# Patient Record
Sex: MALE | Race: WHITE | Employment: FULL TIME | URBAN - METROPOLITAN AREA
[De-identification: names, ages, dates, MRNs, and addresses within clinical notes are randomized per-mention and may not be internally consistent; named-entity substitution may affect disease eponyms.]

---

## 2017-01-03 NOTE — PROGRESS NOTES
"  SUBJECTIVE:                                                    Richard Ball is a 56 year old male who presents to clinic today for the following health issues:      Cough SYMPTOMS      Duration: sx for about 5 weeks    Description  cough and chest congestion    Severity: severe    Accompanying signs and symptoms: None    History (predisposing factors):  asthma    Precipitating or alleviating factors: None    Therapies tried and outcome:  Abx, tessalon       cough started about 5 weeks ago with isolated dry, hacking cough.  Did not improve and was seen, cxr was read at the time as LL infiltrate, treated with zpack with no change in symptoms.  Cough meds not helping.  Cough is non productive.  Some headache and PND noted, but that is new.  No fever or chills, but at night feels like he is running low grade fever.  Is achy.  No sweats, no chills.  No hemoptysis. Diagnosed with asthma after heart surgery.  Uses inhalers in allergy seasons.  Tried inhaler initially but didn't help the cough.   No chest pain, no shortness of breath, no orthopnea or pnd, no swelling noted.        Problem list and histories reviewed & adjusted, as indicated.  Additional history: as documented    Problem list, Medication list, Allergies, and Medical/Social/Surgical histories reviewed in [facility] and updated as appropriate.    ROS:  Constitutional, HEENT, cardiovascular, pulmonary, gi and gu systems are negative, except as otherwise noted.    OBJECTIVE:                                                    /60 mmHg  Pulse 70  Temp(Src) 97.6  F (36.4  C) (Tympanic)  Resp 20  Ht 5' 11\" (1.803 m)  Wt 171 lb (77.565 kg)  BMI 23.86 kg/m2  SpO2 97%  Body mass index is 23.86 kg/(m^2).  GENERAL: healthy, alert and no distress  EYES: Eyes grossly normal to inspection, PERRL and conjunctivae and sclerae normal  HENT: ear canals and TM's normal, nose and mouth without ulcers or lesions  NECK: no adenopathy, no asymmetry, masses, or scars and " thyroid normal to palpation  RESP: lungs clear to auscultation - no rales, rhonchi or wheezes  CV: regular rate and rhythm, normal S1 S2, no S3 or S4, no murmur, click or rub, no peripheral edema and peripheral pulses strong  MS: no gross musculoskeletal defects noted, no edema    Diagnostic Test Results:  none      ASSESSMENT/PLAN:                                                    (R05) Cough  (primary encounter diagnosis)  -cxr initially read as normal, no fever or other symptoms to indicate need for repeat  -will check pertussis, was treated with zithromax  -suspect PND triggering asthma, will treat for sinusitis and RAD  -if not improving consider allergy/asthma treatment and possible CT chest   Plan: Bordetella pert parapert DNA pcr           (J01.10) Acute non-recurrent frontal sinusitis  -will treat for sinusitis with RAD component  -reviewed potential side effects of medications  -reviewed effect of abx on inr  -call if worsening or not improving  Plan: cefdinir (OMNICEF) 300 MG capsule, predniSONE         (DELTASONE) 20 MG tablet          FUTURE APPOINTMENTS:       - Follow-up for annual visit or as needed    Teresa Esquivel MD  [facility]

## 2017-01-04 ENCOUNTER — OFFICE VISIT (OUTPATIENT)
Dept: PEDIATRICS | Facility: CLINIC | Age: 57
End: 2017-01-04
Payer: COMMERCIAL

## 2017-01-04 VITALS
OXYGEN SATURATION: 97 % | SYSTOLIC BLOOD PRESSURE: 122 MMHG | DIASTOLIC BLOOD PRESSURE: 60 MMHG | HEART RATE: 70 BPM | HEIGHT: 71 IN | RESPIRATION RATE: 20 BRPM | TEMPERATURE: 97.6 F | WEIGHT: 171 LBS | BODY MASS INDEX: 23.94 KG/M2

## 2017-01-04 DIAGNOSIS — R05.9 COUGH: Primary | ICD-10-CM

## 2017-01-04 DIAGNOSIS — J01.10 ACUTE NON-RECURRENT FRONTAL SINUSITIS: ICD-10-CM

## 2017-01-04 PROCEDURE — 99213 OFFICE O/P EST LOW 20 MIN: CPT | Performed by: INTERNAL MEDICINE

## 2017-01-04 PROCEDURE — 99000 SPECIMEN HANDLING OFFICE-LAB: CPT | Performed by: INTERNAL MEDICINE

## 2017-01-04 PROCEDURE — 87801 DETECT AGNT MULT DNA AMPLI: CPT | Mod: 90 | Performed by: INTERNAL MEDICINE

## 2017-01-04 RX ORDER — CEFDINIR 300 MG/1
300 CAPSULE ORAL 2 TIMES DAILY
Qty: 20 CAPSULE | Refills: 0 | Status: SHIPPED | OUTPATIENT
Start: 2017-01-04 | End: 2017-03-24

## 2017-01-04 RX ORDER — PREDNISONE 20 MG/1
40 TABLET ORAL DAILY
Qty: 10 TABLET | Refills: 0 | Status: SHIPPED | OUTPATIENT
Start: 2017-01-04 | End: 2017-01-09

## 2017-01-04 NOTE — NURSING NOTE
"Chief Complaint   Patient presents with     Cough     f/u on cold sx, pt still having cough sx for 5 weeks, chest congestions       Initial /60 mmHg  Pulse 70  Temp(Src) 97.6  F (36.4  C) (Tympanic)  Resp 20  Ht 5' 11\" (1.803 m)  Wt 171 lb (77.565 kg)  BMI 23.86 kg/m2  SpO2 97% Estimated body mass index is 23.86 kg/(m^2) as calculated from the following:    Height as of this encounter: 5' 11\" (1.803 m).    Weight as of this encounter: 171 lb (77.565 kg).  BP completed using cuff size: large  Irma Chang CMA      "

## 2017-01-04 NOTE — Clinical Note
[facility]  [address] [address]                  [phone]   January 10, 2017    Richard Quinn  [address] [address] [address]      Dear Richard,    Here is a summary of your recent test results:    Your whooping cough test was negative.    Your test results are enclosed.      Please contact me if you have any questions.           Thank you very much for choosing [facility]    Best regards,    Teresa Esquivel MD        Results for orders placed or performed in visit on 01/04/17   Bordetella pert parapert DNA pcr   Result Value Ref Range    Specimen Description Nasopharyngeal     Bordetella Per/Paraper PCR  NEG     Negative  Negative for B. pertussis and B. parapertussis by PCR     This test was developed and its performance characteristics determined by the   [facility] at [place], [place]. The PCR test has proven to be more sensitive than culture and   highly specific.  A false positive for B. pertussis may occur in samples   containing B. holmesii DNA.   A false positive for B. parapertussis may occur in samples containing B.   bronchiseptica DNA. Both B. holmesii and B. bronchiseptica rarely cause disease   in humans.  A negative result does not rule out the presence of B. pertussis or   B. parapertussis DNA in concentrations below detection level of the assay.   Nasopharyngeal swabs are the preferred specimen types.   Analyte Specific Reagents (ASRs) are used in many laboratory tests necessary   for standard medical care and generally do not require U.S. Food and Drug   Administration approval. The FDA has determined that such c learance or approval   is not necessary. This test is used for clinical purposes. It should not be   regarded as investigational or for research.   This laboratory is certified under the Clinical Laboratory Improvement   Amendments of 1988  (CLIA-88) as qualified to perform high complexity clinical   laboratory testing.

## 2017-01-05 LAB
B PERT+PARAPERT DNA PNL SPEC NAA+PROBE: NORMAL
SPECIMEN SOURCE: NORMAL

## 2017-03-24 ENCOUNTER — OFFICE VISIT (OUTPATIENT)
Dept: PEDIATRICS | Facility: CLINIC | Age: 57
End: 2017-03-24
Payer: COMMERCIAL

## 2017-03-24 VITALS
OXYGEN SATURATION: 97 % | DIASTOLIC BLOOD PRESSURE: 60 MMHG | TEMPERATURE: 97.7 F | HEART RATE: 61 BPM | BODY MASS INDEX: 23.66 KG/M2 | HEIGHT: 71 IN | SYSTOLIC BLOOD PRESSURE: 128 MMHG | WEIGHT: 169 LBS

## 2017-03-24 DIAGNOSIS — M41.9 SCOLIOSIS OF THORACOLUMBAR SPINE, UNSPECIFIED SCOLIOSIS TYPE: Primary | ICD-10-CM

## 2017-03-24 DIAGNOSIS — Q87.40 MARFAN'S SYNDROME: ICD-10-CM

## 2017-03-24 DIAGNOSIS — M25.512 ACUTE PAIN OF LEFT SHOULDER: ICD-10-CM

## 2017-03-24 DIAGNOSIS — L30.9 DERMATITIS: ICD-10-CM

## 2017-03-24 PROCEDURE — 99214 OFFICE O/P EST MOD 30 MIN: CPT | Performed by: INTERNAL MEDICINE

## 2017-03-24 RX ORDER — TRIAMCINOLONE ACETONIDE 1 MG/G
CREAM TOPICAL
Qty: 80 G | Refills: 0 | Status: SHIPPED | OUTPATIENT
Start: 2017-03-24 | End: 2019-04-30

## 2017-03-24 NOTE — MR AVS SNAPSHOT
After Visit Summary   3/24/2017    Richard Ball    MRN: [id number]           Patient Information     Date Of Birth          1960        Visit Information        Provider Department      3/24/2017 1:50 PM Royer Roblero MD [facility]        Today's Diagnoses     Scoliosis of thoracolumbar spine, unspecified scoliosis type    -  1    Acute pain of left shoulder        Dermatitis          Care Instructions    1) Physical therapy evaluation as we discussed for both back and for the shoulder    2) Dermatology evaluation here in clinic- call to make appointment    3) Triamcinolone up to three times per day on hand area, use Aquaphor or Eucerin at night with gloves to keep hydrated    Great to meet you let me know if questions or concerns come up!    Royer Roblero MD                                       Rotator Cuff Injury   What is a rotator cuff injury?   A rotator cuff injury is a strain or tear in the group of tendons and muscles that hold your shoulder joint together and help move your shoulder.   How does it occur?   A rotator cuff injury may result from:     using your arm to break a fall     falling onto your arm     lifting a heavy object     use of your shoulder in sports with a repetitive overhead movement, such as swimming, baseball (mainly pitchers), football, and tennis, which gradually strains the tendon     manual labor such as painting, plastering, raking leaves, or housework   What are the symptoms?   The symptoms of a torn rotator cuff are:     arm and shoulder pain     shoulder weakness     shoulder tenderness     loss of shoulder movement, especially overhead   How is it diagnosed?   Your healthcare provider will examine you and check your shoulder for pain, tenderness, and loss of motion as you move your arm in all directions. Your provider will ask if your shoulder pain began suddenly or gradually. You may have an X-ray to make sure there are not any fractures or  bone spurs.   Based on these results, you may have other tests or procedures right away or later, such as:     magnetic resonance imaging (MRI), which creates images of your shoulder and surrounding structures with sound waves     an arthrogram, which is an X-ray or MRI that is taken after a special dye has been injected into your shoulder joint to outline its soft structures     arthroscopy, a surgical procedure in which a small instrument is inserted into your shoulder joint so your provider can look directly at your rotator cuff   What is the treatment?   A tendon in your shoulder can be inflamed, partially torn, or completely torn. What is done about it depends on how torn it is and how much it hurts.   If your tear is a minor one, it can be left to heal by itself if it does not interfere with your everyday activities. Your treatment plan should include:     proper sitting posture, in which your head and shoulders are balanced     rest for your shoulder, which means avoiding strenuous activity or any overhead motion that causes pain     ice packs at least once a day, and preferably 2 or 3 times a day     doing the exercises your healthcare provider gives you     anti-inflammatory drugs. Adults aged 65 years and older should not take non-steroidal anti-inflammatory medicine for more than 7 days without their healthcare provider's approval.     physical therapy to strengthen your shoulder as it heals   If you have a bad tear, you may need to have it repaired by arthroscopy. Arthroscopy can be used to perform surgery on a joint as well as to see inside the joint. The rough edges of a torn tendon can be trimmed and left to heal. Larger tears can be stitched back together. After surgery, your treatment plan will include physical therapy to strengthen your shoulder as it heals.   How long will the effects last?   Full recovery depends on what is torn and how it is treated.   When can I return to my normal activities?    Everyone recovers from an injury at a different rate. Return to your activities will be determined by how soon your shoulder recovers, not by how many days or weeks it has been since your injury has occurred. In general, the longer you have symptoms before you start treatment, the longer it will take to get better. The goal of rehabilitation is to return you to your normal activities as soon as is safely possible. If you return too soon you may worsen your injury.   You may safely return to your normal activities when:     Your injured shoulder has full range of motion without pain.     Your injured shoulder has regained normal strength compared to the uninjured shoulder.   What can be done to help prevent this from recurring?   The best way to prevent a recurrence is to strengthen your shoulder muscles and keep them in peak condition with shoulder exercises.           Rotator Cuff Strain Rehabilitation Exercises                  You may do all of these exercises right away.   Isometric shoulder external rotation:  a doorway with your elbow bent 90 degrees and the back of the wrist on your injured side pressed against the door frame. Try to press your hand outward into the door frame. Hold for 5 seconds. Do 3 sets of 10.   Isometric shoulder internal rotation:  a doorway with your elbow bent 90 degrees and the front of the wrist on your injured side pressed against the door frame. Try to press your palm into the door frame. Hold for 5 seconds. Do 3 sets of 10.   Wand exercise: Flexion: Stand upright and hold a stick in both hands, palms down. Stretch your arms by lifting them over your head, keeping your arms straight. Hold for 5 seconds and return to the starting position. Repeat 10 times.   Wand exercise: Extension: Stand upright and hold a stick in both hands behind your back. Move the stick away from your back. Hold the end position for 5 seconds. Relax and return to the starting position.  Repeat 10 times.   Wand exercise: External rotation: Lie on your back and hold a stick in both hands, palms up. Your upper arms should be resting on the floor with your elbows at your sides and bent 90 degrees. Use your uninjured arm to push your injured arm out away from your body. Keep the elbow of your injured arm at your side while it is being pushed. Hold the stretch for 5 seconds. Repeat 10 times.   Wand exercise: Shoulder abduction and adduction: Stand and hold a stick with both hands, palms facing away from your body. Rest the stick against the front of your thighs. Use your uninjured arm to push your injured arm out to the side and up as high as possible. Keep your arms straight. Hold for 5 seconds. Repeat 10 times.   Resisted shoulder external rotation: Stand sideways next to a door with your injured arm farther from the door. Tie a knot in the end of the tubing and shut the knot in the door at waist level. Hold the other end of the tubing with the hand of your injured arm. Rest the hand of your injured arm across your stomach. Keeping your elbow in at your side, rotate your arm outward and away from your waist. Make sure you keep your elbow bent 90 degrees and your forearm parallel to the floor. Repeat 10 times. Build up to 3 sets of 10.   Resisted shoulder internal rotation: Stand sideways next to a door with your injured arm closest to the door. Tie a knot in the end of the tubing and shut the knot in the door at waist level. Hold the other end of the tubing with the hand of your injured arm. Bend the elbow of your injured arm 90 degrees. Keeping your elbow in at your side, rotate your forearm across your body and then back to the starting position. Make sure you keep your forearm parallel to the floor. Do 3 sets of 10.   Scaption: Stand with your arms at your sides and with your elbows straight. Slowly raise your arms to eye level. As you raise your arms, spread them apart so that they are only  "slightly in front of your body (at about a 30-degree angle to the front of your body). Point your thumbs toward the ceiling. Hold for 2 seconds and lower your arms slowly. Do 3 sets of 10. Progress to holding a soup can or light weight when you are doing the exercise and increase the weight as the exercise gets easier.   Side-lying external rotation: Lie on your uninjured side with your injured arm at your side and your elbow bent 90 degrees. Keeping your elbow against your side, raise your forearm toward the ceiling and hold for 2 seconds. Slowly lower your arm. Do 3 sets of 10. You can start doing this exercise holding a soup can or light weight and gradually increase the weight as long as there is no pain.   Horizontal abduction: Lie on your stomach on a table or the edge of a bed with the arm on your injured side hanging down over the edge. Raise your arm out to the side, with your thumb pointed toward the ceiling, until your arm is parallel to the floor. Hold for 2 seconds and then lower it slowly. Start this exercise with no weight. As you get stronger, add a light weight or hold a soup can. Do 3 sets of 10.   Push-up with a plus: Begin on the floor on your hands and knees. Keep your arms a shoulder width apart and lift your feet off the floor. Arch your back as high as possible and round your shoulders (this is the \"plus\" part or the exercise). Bend your elbows and lower your body to the floor. Return to the starting position and arch your back again. Do 3 sets of 10.   Published by [employer].  This content is reviewed periodically and is subject to change as new health information becomes available. The information is intended to inform and educate and is not a replacement for medical evaluation, advice, diagnosis or treatment by a healthcare professional.   Written by Joceline Sneed, MS, PT, and Radha Brown PT, [facility], [facility], for [employer].   ? 2010 [employer] and/or its affiliates. All Rights Reserved. "   Copyright   [employer] 2011  Adult Health Advisor                      Follow-ups after your visit        Additional Services     DERMATOLOGY REFERRAL       Your provider has referred you to: [facility] [phone]    Please be aware that coverage of these services is subject to the terms and limitations of your health insurance plan.  Call member services at your health plan with any benefit or coverage questions.      Please bring the following with you to your appointment:    (1) Any X-Rays, CTs or MRIs which have been performed.  Contact the facility where they were done to arrange for  prior to your scheduled appointment.    (2) List of current medications  (3) This referral request   (4) Any documents/labs given to you for this referral            [facility] PT, HAND, AND CHIROPRACTIC REFERRAL       **This order will print in the [facility] Scheduling Office**    Physical Therapy, Hand Therapy and Chiropractic Care are available through:    *[facility]  *[facility]  *[facility]    Call one number to schedule at any of the above locations: [phone].    Your provider has referred you to: Physical Therapy at [facility] or [facility]    Indication/Reason for Referral: scoliosis and also left shoulder pain- concern for rotator cuff tendonitis  Onset of Illness: Fall 2016  Therapy Orders: Evaluate and Treat  Special Programs: None  Special Request: None    Layton Traylor      Additional Comments for the Therapist or Chiropractor:     Please be aware that coverage of these services is subject to the terms and limitations of your health insurance plan.  Call member services at your health plan with any benefit or coverage questions.      Please bring the following to your appointment:    *Your personal calendar for scheduling future appointments  *Comfortable clothing                  Who to contact     If you have questions or need follow up  "information about today's clinic visit or your schedule please contact [facility] directly at [phone].  Normal or non-critical lab and imaging results will be communicated to you by [employer], letter or phone within 4 business days after the clinic has received the results. If you do not hear from us within 7 days, please contact the clinic through [employer] or phone. If you have a critical or abnormal lab result, we will notify you by phone as soon as possible.  Submit refill requests through [employer] or call your pharmacy and they will forward the refill request to us. Please allow 3 business days for your refill to be completed.          Additional Information About Your Visit        [employer] Information     [employer] gives you secure access to your electronic health record. If you see a primary care provider, you can also send messages to your care team and make appointments. If you have questions, please call your primary care clinic.  If you do not have a primary care provider, please call [phone] and they will assist you.        Care EveryWhere ID     This is your Care EveryWhere ID. This could be used by other organizations to access your [facility] medical records  [id number]        Your Vitals Were     Pulse Temperature Height Pulse Oximetry BMI (Body Mass Index)       61 97.7  F (36.5  C) (Oral) 5' 11\" (1.803 m) 97% 23.57 kg/m2        Blood Pressure from Last 3 Encounters:   03/24/17 128/60   01/04/17 122/60   12/08/16 116/52    Weight from Last 3 Encounters:   03/24/17 169 lb (76.7 kg)   01/04/17 171 lb (77.6 kg)   12/08/16 170 lb 11.2 oz (77.4 kg)              We Performed the Following     DERMATOLOGY REFERRAL     [facility] PT, HAND, AND CHIROPRACTIC REFERRAL          Today's Medication Changes          These changes are accurate as of: 3/24/17  2:33 PM.  If you have any questions, ask your nurse or doctor.               Start taking these medicines.        Dose/Directions    triamcinolone 0.1 " % cream   Commonly known as:  KENALOG   Used for:  Dermatitis   Started by:  Royer Roblero MD        Apply sparingly to affected area three times daily as needed   Quantity:  80 g   Refills:  0            Where to get your medicines      These medications were sent to [id number] [address]  [address] [facility] [address]     Phone:  [phone]     triamcinolone 0.1 % cream                Primary Care Provider Office Phone # Fax #    Royer Roblero [phone]       [facility] [address] [place] [address]        Thank you!     Thank you for choosing [facility]  for your care. Our goal is always to provide you with excellent care. Hearing back from our patients is one way we can continue to improve our services. Please take a few minutes to complete the written survey that you may receive in the mail after your visit with us. Thank you!             Your Updated Medication List - Protect others around you: Learn how to safely use, store and throw away your medicines at www.disposemymeds.org.          This list is accurate as of: 3/24/17  2:33 PM.  Always use your most recent med list.                   Brand Name Dispense Instructions for use    atenolol 50 MG tablet    TENORMIN     Take 50 mg by mouth daily       atorvastatin 20 MG tablet    LIPITOR         COUMADIN 5 MG tablet   Generic drug:  warfarin      Take 0.5 tablets (2.5 mg) by mouth daily While on antibiotic       fexofenadine 180 MG tablet    ALLEGRA    30 tablet    Take 1 tablet (180 mg) by mouth daily       triamcinolone 0.1 % cream    KENALOG    80 g    Apply sparingly to affected area three times daily as needed       VENTOLIN  (90 BASE) MCG/ACT Inhaler   Generic drug:  albuterol      Reported on 3/24/2017

## 2017-03-24 NOTE — PROGRESS NOTES
SUBJECTIVE:                                                    Richard Ball is a 57 year old male who presents to clinic today for the following health issues:      Musculoskeletal problem/pain      Duration: Fall of 2016     Description  Location: Left Arm     Intensity:  moderate    Accompanying signs and symptoms:Weakness of left arm, range of motion is becoming less and less. Will get shooting pains, states the worst of it is just above the elbow.     History  Previous similar problem: no   Previous evaluation:  none    Precipitating or alleviating factors:  Trauma or overuse: no   Aggravating factors include: lifting.     Therapies tried and outcome: acetaminophen-slightly effective     Left arm, ROM decreased, extreme pain in the mid arm, when going to reach over cannot reach without having pain.     [facility] has been- managing warfarin, goal INR 2.5-3.5.     Has congenital scoliosis and does have some ongoing pain from this.     Right hand rash: has noted dryness on the hands and area of a patch with pruritis, no new soaps or lotions. Has believed to use a steroid cream in the past. Also notes area on left finger that has been having some changes where part of the nailbed will grow out abnormally.        Problem list and histories reviewed & adjusted, as indicated.  Additional history: as documented    Patient Active Problem List   Diagnosis     Marfan's syndrome     Pacemaker     Scoliosis, congenital     S/P aortic valve replacement/ 2.5-3.5     Health Care Home     Advanced directives, counseling/discussion     Past Surgical History:   Procedure Laterality Date     AORTIC VALVE REPLACEMENT  8/2/2001    Ascending aorta graft     HC REMOVE TONSILS/ADENOIDS,<13 Y/O      T & A <13y.o.     HC VASECTOMY UNILAT/BILAT W POSTOP SEMEN      Vasectomy       Social History   Substance Use Topics     Smoking status: Never Smoker     Smokeless tobacco: Never Used     Alcohol use 2.4 oz/week     4  "Standard drinks or equivalent per week     Family History   Problem Relation Age of Onset     Asthma No family hx of      DIABETES No family hx of      Hypertension No family hx of      Breast Cancer No family hx of      Cancer - colorectal No family hx of      Prostate Cancer No family hx of      Lipids No family hx of      Musculoskeletal Disorder No family hx of      Neurologic Disorder No family hx of            Reviewed and updated as needed this visit by clinical staff       Reviewed and updated as needed this visit by Provider         ROS:  Constitutional, HEENT, cardiovascular, pulmonary, GI, , musculoskeletal, neuro, skin, endocrine and psych systems are negative, except as otherwise noted.    OBJECTIVE:                                                    /60 (BP Location: Right arm, Cuff Size: Adult Regular)  Pulse 61  Temp 97.7  F (36.5  C) (Oral)  Ht 5' 11\" (1.803 m)  Wt 169 lb (76.7 kg)  SpO2 97%  BMI 23.57 kg/m2  Body mass index is 23.57 kg/(m^2).  GENERAL: healthy, alert and no distress  NECK: no adenopathy, no asymmetry, masses, or scars and thyroid normal to palpation  RESP: lungs clear to auscultation - no rales, rhonchi or wheezes  CV: RRR, mechanical valve noted  ABDOMEN: soft, nontender, no hepatosplenomegaly, no masses and bowel sounds normal  MS: decreased ROM of the left shoulder noted, difficulty with abduction past 90 degrees, increased pain with near impingement testing, diffuclty with scapular lift testing, normal internal and external rotation, significant curvature from scoliosis.   SKIN: noted dry papules over the palm of the right hand. Dry and lichenified skin noted, noted area on the left finger with subungual changes with lichenification    Diagnostic Test Results:  none      ASSESSMENT/PLAN:                                                    1. Scoliosis of thoracolumbar spine, unspecified scoliosis type  Will have PT evaluate exercises he may do at home to help  - " [facility]    2. Acute pain of left shoulder  Likely rotator cuff tendonitis, will refer to PT for evaluation cannot use NSAIDs on warfarin  - [facility]    3. Dermatitis  Will try topical therapy with antifungal and steroid cream over right hand, dermatology evaluation for area on left hand  - DERMATOLOGY REFERRAL  - triamcinolone (KENALOG) 0.1 % cream; Apply sparingly to affected area three times daily as needed  Dispense: 80 g; Refill: 0      Patient Instructions   1) Physical therapy evaluation as we discussed for both back and for the shoulder    2) Dermatology evaluation here in clinic- call to make appointment    3) Triamcinolone up to three times per day on hand area, use Aquaphor or Eucerin at night with gloves to keep hydrated    Great to meet you let me know if questions or concerns come up!    MD Royer Chirinos MD, MD  [facility]

## 2017-03-24 NOTE — NURSING NOTE
"Chief Complaint   Patient presents with     Musculoskeletal Problem       Initial /60 (BP Location: Right arm, Cuff Size: Adult Regular)  Pulse 61  Temp 97.7  F (36.5  C) (Oral)  Ht 5' 11\" (1.803 m)  Wt 169 lb (76.7 kg)  SpO2 97%  BMI 23.57 kg/m2 Estimated body mass index is 23.57 kg/(m^2) as calculated from the following:    Height as of this encounter: 5' 11\" (1.803 m).    Weight as of this encounter: 169 lb (76.7 kg).  Medication Reconciliation: complete   Celi Winter MA    "

## 2017-03-24 NOTE — PATIENT INSTRUCTIONS
1) Physical therapy evaluation as we discussed for both back and for the shoulder    2) Dermatology evaluation here in clinic- call to make appointment    3) Triamcinolone up to three times per day on hand area, use Aquaphor or Eucerin at night with gloves to keep hydrated    Great to meet you let me know if questions or concerns come up!    Royer Roblero MD                                       Rotator Cuff Injury   What is a rotator cuff injury?   A rotator cuff injury is a strain or tear in the group of tendons and muscles that hold your shoulder joint together and help move your shoulder.   How does it occur?   A rotator cuff injury may result from:     using your arm to break a fall     falling onto your arm     lifting a heavy object     use of your shoulder in sports with a repetitive overhead movement, such as swimming, baseball (mainly pitchers), football, and tennis, which gradually strains the tendon     manual labor such as painting, plastering, raking leaves, or housework   What are the symptoms?   The symptoms of a torn rotator cuff are:     arm and shoulder pain     shoulder weakness     shoulder tenderness     loss of shoulder movement, especially overhead   How is it diagnosed?   Your healthcare provider will examine you and check your shoulder for pain, tenderness, and loss of motion as you move your arm in all directions. Your provider will ask if your shoulder pain began suddenly or gradually. You may have an X-ray to make sure there are not any fractures or bone spurs.   Based on these results, you may have other tests or procedures right away or later, such as:     magnetic resonance imaging (MRI), which creates images of your shoulder and surrounding structures with sound waves     an arthrogram, which is an X-ray or MRI that is taken after a special dye has been injected into your shoulder joint to outline its soft structures     arthroscopy, a surgical procedure in which a small instrument is  inserted into your shoulder joint so your provider can look directly at your rotator cuff   What is the treatment?   A tendon in your shoulder can be inflamed, partially torn, or completely torn. What is done about it depends on how torn it is and how much it hurts.   If your tear is a minor one, it can be left to heal by itself if it does not interfere with your everyday activities. Your treatment plan should include:     proper sitting posture, in which your head and shoulders are balanced     rest for your shoulder, which means avoiding strenuous activity or any overhead motion that causes pain     ice packs at least once a day, and preferably 2 or 3 times a day     doing the exercises your healthcare provider gives you     anti-inflammatory drugs. Adults aged 65 years and older should not take non-steroidal anti-inflammatory medicine for more than 7 days without their healthcare provider's approval.     physical therapy to strengthen your shoulder as it heals   If you have a bad tear, you may need to have it repaired by arthroscopy. Arthroscopy can be used to perform surgery on a joint as well as to see inside the joint. The rough edges of a torn tendon can be trimmed and left to heal. Larger tears can be stitched back together. After surgery, your treatment plan will include physical therapy to strengthen your shoulder as it heals.   How long will the effects last?   Full recovery depends on what is torn and how it is treated.   When can I return to my normal activities?   Everyone recovers from an injury at a different rate. Return to your activities will be determined by how soon your shoulder recovers, not by how many days or weeks it has been since your injury has occurred. In general, the longer you have symptoms before you start treatment, the longer it will take to get better. The goal of rehabilitation is to return you to your normal activities as soon as is safely possible. If you return too soon you  may worsen your injury.   You may safely return to your normal activities when:     Your injured shoulder has full range of motion without pain.     Your injured shoulder has regained normal strength compared to the uninjured shoulder.   What can be done to help prevent this from recurring?   The best way to prevent a recurrence is to strengthen your shoulder muscles and keep them in peak condition with shoulder exercises.           Rotator Cuff Strain Rehabilitation Exercises                  You may do all of these exercises right away.   Isometric shoulder external rotation:  a doorway with your elbow bent 90 degrees and the back of the wrist on your injured side pressed against the door frame. Try to press your hand outward into the door frame. Hold for 5 seconds. Do 3 sets of 10.   Isometric shoulder internal rotation:  a doorway with your elbow bent 90 degrees and the front of the wrist on your injured side pressed against the door frame. Try to press your palm into the door frame. Hold for 5 seconds. Do 3 sets of 10.   Wand exercise: Flexion: Stand upright and hold a stick in both hands, palms down. Stretch your arms by lifting them over your head, keeping your arms straight. Hold for 5 seconds and return to the starting position. Repeat 10 times.   Wand exercise: Extension: Stand upright and hold a stick in both hands behind your back. Move the stick away from your back. Hold the end position for 5 seconds. Relax and return to the starting position. Repeat 10 times.   Wand exercise: External rotation: Lie on your back and hold a stick in both hands, palms up. Your upper arms should be resting on the floor with your elbows at your sides and bent 90 degrees. Use your uninjured arm to push your injured arm out away from your body. Keep the elbow of your injured arm at your side while it is being pushed. Hold the stretch for 5 seconds. Repeat 10 times.   Wand exercise: Shoulder abduction and  adduction: Stand and hold a stick with both hands, palms facing away from your body. Rest the stick against the front of your thighs. Use your uninjured arm to push your injured arm out to the side and up as high as possible. Keep your arms straight. Hold for 5 seconds. Repeat 10 times.   Resisted shoulder external rotation: Stand sideways next to a door with your injured arm farther from the door. Tie a knot in the end of the tubing and shut the knot in the door at waist level. Hold the other end of the tubing with the hand of your injured arm. Rest the hand of your injured arm across your stomach. Keeping your elbow in at your side, rotate your arm outward and away from your waist. Make sure you keep your elbow bent 90 degrees and your forearm parallel to the floor. Repeat 10 times. Build up to 3 sets of 10.   Resisted shoulder internal rotation: Stand sideways next to a door with your injured arm closest to the door. Tie a knot in the end of the tubing and shut the knot in the door at waist level. Hold the other end of the tubing with the hand of your injured arm. Bend the elbow of your injured arm 90 degrees. Keeping your elbow in at your side, rotate your forearm across your body and then back to the starting position. Make sure you keep your forearm parallel to the floor. Do 3 sets of 10.   Scaption: Stand with your arms at your sides and with your elbows straight. Slowly raise your arms to eye level. As you raise your arms, spread them apart so that they are only slightly in front of your body (at about a 30-degree angle to the front of your body). Point your thumbs toward the ceiling. Hold for 2 seconds and lower your arms slowly. Do 3 sets of 10. Progress to holding a soup can or light weight when you are doing the exercise and increase the weight as the exercise gets easier.   Side-lying external rotation: Lie on your uninjured side with your injured arm at your side and your elbow bent 90 degrees. Keeping  "your elbow against your side, raise your forearm toward the ceiling and hold for 2 seconds. Slowly lower your arm. Do 3 sets of 10. You can start doing this exercise holding a soup can or light weight and gradually increase the weight as long as there is no pain.   Horizontal abduction: Lie on your stomach on a table or the edge of a bed with the arm on your injured side hanging down over the edge. Raise your arm out to the side, with your thumb pointed toward the ceiling, until your arm is parallel to the floor. Hold for 2 seconds and then lower it slowly. Start this exercise with no weight. As you get stronger, add a light weight or hold a soup can. Do 3 sets of 10.   Push-up with a plus: Begin on the floor on your hands and knees. Keep your arms a shoulder width apart and lift your feet off the floor. Arch your back as high as possible and round your shoulders (this is the \"plus\" part or the exercise). Bend your elbows and lower your body to the floor. Return to the starting position and arch your back again. Do 3 sets of 10.   Published by [employer].  This content is reviewed periodically and is subject to change as new health information becomes available. The information is intended to inform and educate and is not a replacement for medical evaluation, advice, diagnosis or treatment by a healthcare professional.   Written by Joceline Sneed MS, PT, and Radha Brown PT, [facility], [facility], for [employer].   ? 2010 [employer] [facility] and/or its affiliates. All Rights Reserved.   Copyright   [employer] 2011  Adult Health Advisor                "

## 2017-03-30 ENCOUNTER — THERAPY VISIT (OUTPATIENT)
Dept: PHYSICAL THERAPY | Facility: CLINIC | Age: 57
End: 2017-03-30
Payer: COMMERCIAL

## 2017-03-30 DIAGNOSIS — Q67.5 SCOLIOSIS, CONGENITAL: ICD-10-CM

## 2017-03-30 DIAGNOSIS — M25.512 SHOULDER PAIN, LEFT: Primary | ICD-10-CM

## 2017-03-30 DIAGNOSIS — M54.9 BACK PAIN: ICD-10-CM

## 2017-03-30 PROCEDURE — 97110 THERAPEUTIC EXERCISES: CPT | Mod: GP | Performed by: PHYSICAL THERAPIST

## 2017-03-30 PROCEDURE — 97161 PT EVAL LOW COMPLEX 20 MIN: CPT | Mod: GP | Performed by: PHYSICAL THERAPIST

## 2017-03-30 NOTE — PROGRESS NOTES
[facility] Initial Evaluation    Subjective:    Richard Ball is a 57 year old male with a left shoulder condition.  Condition occurred with:  Unknown cause.  Condition occurred: for unknown reasons.  This is a new condition  Patient is reporting insidious onset L upper arm pain that began in December 2016. Feels the pain in the posterior lateral arm between the elbow and the shoulder. No reports of neck pain. States is increases with external rotation in 90* of abduction and when he puts his arm around someone. Also reporting generalized back pain, more recently the lower back. This is a chronic issue and not his primary concern, reports he has scoliosis since the age of 11. Reports he has a 70* S curve, per [facility] xray the thoracic curve is R convex. Reports he wore a brace for 4 years in high school no surgery. Feels this pain is worse around 4am, when he is sleeping. .    Patient reports pain:  Upper arm, posterior and lateral.    Pain is described as shooting and is constant and reported as 6/10 and 10/10.  Associated symptoms:  Numbness, loss of strength and loss of motion/stiffness. Worse during: varies with activity.  Symptoms are exacerbated by using arm overhead, using arm behind back, other, lifting and lying on extremity (extending arm ) and relieved by analgesics.  Since onset symptoms are gradually worsening.        General health as reported by patient is good.          Current occupation is ..    Primary job tasks include:  Prolonged sitting and other (computer work).                                Objective:    Standing Alignment:    Cervical/Thoracic:  Convex thoracic scoliosis R  Shoulder/UE:  Scapular winging R  Lumbar:  Lordosis decr                           Lumbar/SI Evaluation  ROM:    AROM Lumbar:   Flexion:        Mid thighs pain  Ext:                    50%   Side Bend:        Left:     Right:   Rotation:           Left:     Right:   Side Glide:         Left:     Right:                                  Cervical/Thoracic Evaluation  Arom wnl cervical: no pain provocation with AROM.     AROM:  AROM Cervical:    Flexion:            Wfl pulling  Extension:       50%  Rotation:         Left: 75%     Right: 75%  Side Bend:      Left: 50%     Right:  50%        Cervical Myotomes:        C4 (shrug):  Left: 5    Right: 5  C5 (Deltoid):  Left: 5-    Right: 5-  C6 (Biceps):  Left: 5    Right: 5  C7 (Triceps):  Left: 5    Right: 5  C8 (Thumb Ext): Left: 5    Right: 5  T1 (Intrinsics): Left: 5    Right: 5                       Shoulder Evaluation:  ROM:  AROM:  : R Shoulder normal all directions.  Flexion:  Left:  120        Abduction:  Left: 93                   Flexion/External Rotation:  Left:  T3      Extension/Internal Rotation:  Left:  T10        PROM:    Flexion:  Left:  130          Abduction:  Left:  123        Internal Rotation:  Left:  Wfl      External Rotation:  Left:  42                        Strength:    Flexion: Left:5-/5    Pain: +    Right: 5/5     Pain:   Extension:  Left: 5/5    Pain:    Right: 5/5    Pain:  Abduction:  Left: 5-/5   Pain:+    Right: 5/5     Pain:    Internal Rotation:  Left:5/5     Pain:    Right: 5/5     Pain:  External Rotation:   Left:5/5     Pain:   Right:5/5     Pain:        Elbow Flexion:  Left:5/5     Pain:    Right:5/5     Pain:  Elbow Extension:  Left:5/5     Pain:    Right:5/5     Pain:    Special Tests:  Special tests assessed shoulder: Speed's: +L.  Left shoulder positive for the following special tests:  Impingement  Left shoulder negative for the following special tests:  Rotator cuff tear    Palpation:    Left shoulder tenderness present at:  Bicipital Groove  Left shoulder tenderness not present at: Supraspinatus; Infraspinatus or Teres Minor    Mobility Tests:    Glenohumeral anterior left:  Hypomobile      Glenohumeral inferior left:  Hypomobile                                             General      ROS    Assessment/Plan:      Patient is a 57 year old male with thoracic, lumbar and left side shoulder complaints.    Patient has the following significant findings with corresponding treatment plan.                Diagnosis 1:  L shoulder pain  Pain -  hot/cold therapy, manual therapy, education and home program  Decreased ROM/flexibility - manual therapy and therapeutic exercise  Decreased joint mobility - manual therapy and therapeutic exercise  Decreased strength - therapeutic exercise and therapeutic activities  Impaired muscle performance - neuro re-education  Decreased function - therapeutic activities  Impaired posture - neuro re-education  Diagnosis 2:  Back pain secondary to congenital scoliosis   Pain -  hot/cold therapy, US, manual therapy, education, directional preference exercise and home program  Decreased ROM/flexibility - manual therapy and therapeutic exercise  Impaired muscle performance - neuro re-education  Impaired posture - neuro re-education    Therapy Evaluation Codes:   1) History comprised of:   Personal factors that impact the plan of care:      Time since onset of symptoms and finances.    Comorbidity factors that impact the plan of care are:      Heart problems and Marfans.     Medications impacting care: Cardiac.  2) Examination of Body Systems comprised of:   Body structures and functions that impact the plan of care:      Lumbar spine, Shoulder and Thoracic Spine.   Activity limitations that impact the plan of care are:      Bathing, Cooking, Dressing, Lifting, Reading/Computer work, Throwing, Sleeping and Laying down.  3) Clinical presentation characteristics are:   Stable/Uncomplicated.  4) Decision-Making    Low complexity using standardized patient assessment instrument and/or measureable assessment of functional outcome.  Cumulative Therapy Evaluation is: Low complexity.    Previous and current functional limitations:  (See Goal Flow Sheet for this information)    Short  term and Long term goals: (See Goal Flow Sheet for this information)     Communication ability:  Patient appears to be able to clearly communicate and understand verbal and written communication and follow directions correctly.  Treatment Explanation - The following has been discussed with the patient:   RX ordered/plan of care  Anticipated outcomes  Possible risks and side effects  This patient would benefit from PT intervention to resume normal activities.   Rehab potential is fair.    Frequency:  2 X a month, once daily (per pt request d/t high deductible plan)  Duration:  for 4 months  Discharge Plan:  Achieve all LTG.  Independent in home treatment program.  Reach maximal therapeutic benefit.    Please refer to the daily flowsheet for treatment today, total treatment time and time spent performing 1:1 timed codes.

## 2017-05-04 ENCOUNTER — TELEPHONE (OUTPATIENT)
Dept: PEDIATRICS | Facility: CLINIC | Age: 57
End: 2017-05-04

## 2017-05-04 NOTE — TELEPHONE ENCOUNTER
Panel Management Review      Patient has the following on his problem list: None      Composite cancer screening  Chart review shows that this patient is due/due soon for the following Colonoscopy  Summary:    Patient is due/failing the following:   None, patient has since completed.    Action needed:   None, patient has since completed.    Type of outreach:    None, patient has since completed.    Questions for provider review:    None                                                                                                                                    Celi Winter MA       Chart routed to self .

## 2017-05-08 PROBLEM — M25.512 SHOULDER PAIN, LEFT: Status: RESOLVED | Noted: 2017-03-30 | Resolved: 2017-05-08

## 2017-05-08 PROBLEM — M54.9 BACK PAIN: Status: RESOLVED | Noted: 2017-03-30 | Resolved: 2017-05-08

## 2017-05-08 NOTE — PROGRESS NOTES
Patient seen one time for evaluation and treatment.  Patient did not return for further treatment and current status is unknown.  Please see initial evaluation for further information.

## 2017-07-17 ENCOUNTER — TRANSFERRED RECORDS (OUTPATIENT)
Dept: HEALTH INFORMATION MANAGEMENT | Facility: CLINIC | Age: 57
End: 2017-07-17

## 2018-04-11 ENCOUNTER — OFFICE VISIT (OUTPATIENT)
Dept: FAMILY MEDICINE | Facility: CLINIC | Age: 58
End: 2018-04-11

## 2018-04-11 VITALS
SYSTOLIC BLOOD PRESSURE: 132 MMHG | WEIGHT: 168.4 LBS | RESPIRATION RATE: 20 BRPM | TEMPERATURE: 98.2 F | BODY MASS INDEX: 23.57 KG/M2 | DIASTOLIC BLOOD PRESSURE: 74 MMHG | HEART RATE: 64 BPM | HEIGHT: 71 IN

## 2018-04-11 DIAGNOSIS — Z95.2 S/P AORTIC VALVE REPLACEMENT: ICD-10-CM

## 2018-04-11 DIAGNOSIS — J01.01 ACUTE RECURRENT MAXILLARY SINUSITIS: Primary | ICD-10-CM

## 2018-04-11 DIAGNOSIS — Q87.40 MARFAN'S SYNDROME: ICD-10-CM

## 2018-04-11 PROCEDURE — 99213 OFFICE O/P EST LOW 20 MIN: CPT | Performed by: PHYSICIAN ASSISTANT

## 2018-04-11 RX ORDER — AMOXICILLIN 875 MG
875 TABLET ORAL 2 TIMES DAILY
Qty: 20 TABLET | Refills: 0 | Status: SHIPPED | OUTPATIENT
Start: 2018-04-11 | End: 2018-04-21

## 2018-04-11 RX ORDER — WARFARIN SODIUM 5 MG/1
5 TABLET ORAL DAILY
Qty: 30 TABLET | COMMUNITY
Start: 2018-04-11 | End: 2018-11-20

## 2018-04-11 RX ORDER — LISINOPRIL 5 MG/1
5 TABLET ORAL DAILY
Qty: 90 TABLET | Refills: 1 | COMMUNITY
Start: 2018-04-11 | End: 2018-07-23

## 2018-04-11 NOTE — PROGRESS NOTES
"CC: Sinus Issues    History:  5 days ago, [facility] with [facility], fever. Tested negative for strep and flu. Following day developed headache, and significant facial pain. Teeth are starting to hurt as well. No fever, sweats, chills. Is getting some nasal drainage, and unfortunately some epistaxis.     Has been taking Sudafed and Tylenol extra strength without relief. Does have a distant history of sinus infections.     PMH, MEDICATIONS, ALLERGIES, SOCIAL AND FAMILY HISTORY in [facility] and reviewed by me personally.      ROS negative other than the symptoms noted above in the HPI.        Examination   /74 (BP Location: Left arm, Patient Position: Chair, Cuff Size: Adult Regular)  Pulse 64  Temp 98.2  F (36.8  C) (Oral)  Resp 20  Ht 1.803 m (5' 11\")  Wt 76.4 kg (168 lb 6.4 oz)  BMI 23.49 kg/m2       Constitutional: Sitting comfortably, in no acute distress. Vital signs noted  Eyes: pupils equal round reactive to light and accomodation, extra ocular movements intact  Ears: external canals and TMs free of abnormalities  Nose: patent, without mucosal abnormalities. Severe tenderness to palpation over maxillary sinuses.   Mouth and throat: without erythema or lesions of the mucosa  Neck:  no adenopathy, trachea midline and normal to palpation  Cardiovascular:  regular rate and rhythm, no murmurs, clicks, or gallops  Respiratory:  normal respiratory rate and rhythm, lungs clear to auscultation  Psychiatric: mentation appears normal and affect normal/bright        A/P    ICD-10-CM    1. Acute recurrent maxillary sinusitis J01.01 amoxicillin (AMOXIL) 875 MG tablet   2. S/P aortic valve replacement/ 2.5-3.5 Z95.2 warfarin (COUMADIN) 5 MG tablet   3. Marfan's syndrome Q87.40 warfarin (COUMADIN) 5 MG tablet       DISCUSSION:  Given duration of symptoms, recommended he complete 10 day course of amoxicillin. He should take this twice daily with food, and should consider taking probiotic or eating yogurt " in between. Warned him of possible side effects including loose stool. Provided him with recommendations on OTC therapies based on symptoms. He should contact me in 1 week if symptoms are not improving, or sooner with any intolerable side effects or worsening symptoms.    Should consider closer monitoring of INR while on antibiotics, as can alter control.     follow up visit: As needed    Jennifer Moreno PA-C  [facility]

## 2018-04-11 NOTE — NURSING NOTE
Richard Ball is here for a possible sinus infection. C/O sinus pain, pressure, tooth pain, HA and congestion.  Questioned patient about current smoking habits.  Pt. has never smoked.  PULSE regular  My Chart: active  CLASSIFICATION OF OVERWEIGHT AND OBESITY BY BMI                        Obesity Class           BMI(kg/m2)  Underweight                                    < 18.5  Normal                                         18.5-24.9  Overweight                                     25.0-29.9  OBESITY                     I                  30.0-34.9                             II                 35.0-39.9  EXTREME OBESITY             III                >40                            Patient's  BMI Body mass index is 23.49 kg/(m^2).  http://hin.nhlbi.nih.gov/menuplanner/menu.cgi  Pre-visit planning  Immunizations - Pt thinks up to date  Colonoscopy - is up to date  Mammogram -   Asthma -   PHQ9 -    PINKY-7 -

## 2018-04-11 NOTE — MR AVS SNAPSHOT
After Visit Summary   4/11/2018    Richard Ball    MRN: [id number]           Patient Information     Date Of Birth          1960        Visit Information        Provider Department      4/11/2018 9:00 AM Jennifer Moreno PA-C [facility]        Today's Diagnoses     Acute recurrent maxillary sinusitis    -  1    S/P aortic valve replacement/ 2.5-3.5        Marfan's syndrome           Follow-ups after your visit        Follow-up notes from your care team     Return in about 1 week (around 4/18/2018), or if symptoms worsen or fail to improve.      Who to contact     If you have questions or need follow up information about today's clinic visit or your schedule please contact [facility] directly at [phone].  Normal or non-critical lab and imaging results will be communicated to you by MyChart, letter or phone within 4 business days after the clinic has received the results. If you do not hear from us within 7 days, please contact the clinic through [employer] or phone. If you have a critical or abnormal lab result, we will notify you by phone as soon as possible.  Submit refill requests through [employer] or call your pharmacy and they will forward the refill request to us. Please allow 3 business days for your refill to be completed.          Additional Information About Your Visit        [employer] Information     [employer] gives you secure access to your electronic health record. If you see a primary care provider, you can also send messages to your care team and make appointments. If you have questions, please call your primary care clinic.  If you do not have a primary care provider, please call [phone] and they will assist you.        Care EveryWhere ID     This is your Care EveryWhere ID. This could be used by other organizations to access your [facility] medical records  [id number]        Your Vitals Were     Pulse Temperature Respirations  "Height BMI (Body Mass Index)       64 98.2  F (36.8  C) (Oral) 20 1.803 m (5' 11\") 23.49 kg/m2        Blood Pressure from Last 3 Encounters:   04/11/18 132/74   03/24/17 128/60   01/04/17 122/60    Weight from Last 3 Encounters:   04/11/18 76.4 kg (168 lb 6.4 oz)   03/24/17 76.7 kg (169 lb)   01/04/17 77.6 kg (171 lb)              Today, you had the following     No orders found for display         Today's Medication Changes          These changes are accurate as of 4/11/18  9:40 AM.  If you have any questions, ask your nurse or doctor.               Start taking these medicines.        Dose/Directions    amoxicillin 875 MG tablet   Commonly known as:  AMOXIL   Used for:  Acute recurrent maxillary sinusitis   Started by:  Jennifer Moreno PA-C        Dose:  875 mg   Take 1 tablet (875 mg) by mouth 2 times daily for 10 days   Quantity:  20 tablet   Refills:  0         These medicines have changed or have updated prescriptions.        Dose/Directions    COUMADIN 5 MG tablet   This may have changed:    - how much to take  - additional instructions   Used for:  S/P aortic valve replacement, Marfan's syndrome   Generic drug:  warfarin   Changed by:  Jennifer Moreno PA-C        Dose:  5 mg   Take 1 tablet (5 mg) by mouth daily Alternating with 2.5 every other day   Quantity:  30 tablet   Refills:  0            Where to get your medicines      These medications were sent to [facility] [address] [place] [address]     Phone:  [phone]     amoxicillin 875 MG tablet                Primary Care Provider Office Phone # Fax #    Miranda Moran [phone]       [address] [address]        Equal Access to Services     CAMILLE ESCALONA AH: Tran Valdez, wacelestina luqfly, qaybta kaalmapresley phillips, geovanny renteria. [facility] [phone].    ATENCIÓN: Si ike ramirez, " tiene a joel disposición servicios gratuitos de asistencia lingüística. Jayson jain [phone].    We comply with applicable federal civil rights laws and [place] laws. We do not discriminate on the basis of race, color, national origin, age, disability, sex, sexual orientation, or gender identity.            Thank you!     Thank you for choosing [facility]  for your care. Our goal is always to provide you with excellent care. Hearing back from our patients is one way we can continue to improve our services. Please take a few minutes to complete the written survey that you may receive in the mail after your visit with us. Thank you!             Your Updated Medication List - Protect others around you: Learn how to safely use, store and throw away your medicines at www.disposemymeds.org.          This list is accurate as of 4/11/18  9:40 AM.  Always use your most recent med list.                   Brand Name Dispense Instructions for use Diagnosis    amoxicillin 875 MG tablet    AMOXIL    20 tablet    Take 1 tablet (875 mg) by mouth 2 times daily for 10 days    Acute recurrent maxillary sinusitis       atenolol 50 MG tablet    TENORMIN     Take 50 mg by mouth daily        atorvastatin 20 MG tablet    LIPITOR          COUMADIN 5 MG tablet   Generic drug:  warfarin     30 tablet    Take 1 tablet (5 mg) by mouth daily Alternating with 2.5 every other day    S/P aortic valve replacement, Marfan's syndrome       fexofenadine 180 MG tablet    ALLEGRA    30 tablet    Take 1 tablet (180 mg) by mouth daily    Sinus pain       lisinopril 5 MG tablet    PRINIVIL/ZESTRIL    90 tablet    Take 1 tablet (5 mg) by mouth daily        triamcinolone 0.1 % cream    KENALOG    80 g    Apply sparingly to affected area three times daily as needed    Dermatitis       VENTOLIN  (90 Base) MCG/ACT Inhaler   Generic drug:  albuterol      Reported on 3/24/2017

## 2018-05-01 ENCOUNTER — HOSPITAL ENCOUNTER (EMERGENCY)
Facility: CLINIC | Age: 58
Discharge: HOME | End: 2018-05-01
Attending: EMERGENCY MEDICINE | Admitting: EMERGENCY MEDICINE
Payer: COMMERCIAL

## 2018-05-01 ENCOUNTER — APPOINTMENT (OUTPATIENT)
Dept: CT IMAGING | Facility: CLINIC | Age: 58
End: 2018-05-01
Attending: EMERGENCY MEDICINE
Payer: COMMERCIAL

## 2018-05-01 ENCOUNTER — APPOINTMENT (OUTPATIENT)
Dept: CARDIOLOGY | Facility: CLINIC | Age: 58
End: 2018-05-01
Attending: EMERGENCY MEDICINE
Payer: COMMERCIAL

## 2018-05-01 ENCOUNTER — APPOINTMENT (OUTPATIENT)
Dept: GENERAL RADIOLOGY | Facility: CLINIC | Age: 58
End: 2018-05-01
Attending: EMERGENCY MEDICINE
Payer: COMMERCIAL

## 2018-05-01 VITALS
RESPIRATION RATE: 14 BRPM | DIASTOLIC BLOOD PRESSURE: 67 MMHG | SYSTOLIC BLOOD PRESSURE: 119 MMHG | BODY MASS INDEX: 28.7 KG/M2 | OXYGEN SATURATION: 98 % | TEMPERATURE: 97.8 F | HEART RATE: 51 BPM | WEIGHT: 162 LBS | HEIGHT: 63 IN

## 2018-05-01 DIAGNOSIS — G45.9 TRANSIENT CEREBRAL ISCHEMIA, UNSPECIFIED TYPE: ICD-10-CM

## 2018-05-01 LAB
ALBUMIN SERPL-MCNC: 3.5 G/DL (ref 3.4–5)
ALP SERPL-CCNC: 69 U/L (ref 40–150)
ALT SERPL W P-5'-P-CCNC: 18 U/L (ref 0–70)
ANION GAP SERPL CALCULATED.3IONS-SCNC: 8 MMOL/L (ref 3–14)
AST SERPL W P-5'-P-CCNC: 19 U/L (ref 0–45)
BASOPHILS # BLD AUTO: 0 10E9/L (ref 0–0.2)
BASOPHILS NFR BLD AUTO: 0 %
BILIRUB DIRECT SERPL-MCNC: 0.1 MG/DL (ref 0–0.2)
BILIRUB SERPL-MCNC: 0.5 MG/DL (ref 0.2–1.3)
BUN SERPL-MCNC: 12 MG/DL (ref 7–30)
CALCIUM SERPL-MCNC: 8.4 MG/DL (ref 8.5–10.1)
CHLORIDE SERPL-SCNC: 105 MMOL/L (ref 94–109)
CO2 SERPL-SCNC: 29 MMOL/L (ref 20–32)
CREAT SERPL-MCNC: 0.82 MG/DL (ref 0.66–1.25)
DIFFERENTIAL METHOD BLD: ABNORMAL
EOSINOPHIL # BLD AUTO: 0 10E9/L (ref 0–0.7)
EOSINOPHIL NFR BLD AUTO: 0.9 %
ERYTHROCYTE [DISTWIDTH] IN BLOOD BY AUTOMATED COUNT: 14 % (ref 10–15)
GFR SERPL CREATININE-BSD FRML MDRD: >90 ML/MIN/1.7M2
GLUCOSE SERPL-MCNC: 94 MG/DL (ref 70–99)
HCT VFR BLD AUTO: 37.7 % (ref 40–53)
HGB BLD-MCNC: 13.1 G/DL (ref 13.3–17.7)
IMM GRANULOCYTES # BLD: 0 10E9/L (ref 0–0.4)
IMM GRANULOCYTES NFR BLD: 0.3 %
INR PPP: 2.11 (ref 0.86–1.14)
INTERPRETATION ECG - MUSE: NORMAL
LYMPHOCYTES # BLD AUTO: 1.3 10E9/L (ref 0.8–5.3)
LYMPHOCYTES NFR BLD AUTO: 39.9 %
MCH RBC QN AUTO: 30.6 PG (ref 26.5–33)
MCHC RBC AUTO-ENTMCNC: 34.7 G/DL (ref 31.5–36.5)
MCV RBC AUTO: 88 FL (ref 78–100)
MONOCYTES # BLD AUTO: 0.2 10E9/L (ref 0–1.3)
MONOCYTES NFR BLD AUTO: 6.6 %
NEUTROPHILS # BLD AUTO: 1.7 10E9/L (ref 1.6–8.3)
NEUTROPHILS NFR BLD AUTO: 52.3 %
NRBC # BLD AUTO: 0 10*3/UL
NRBC BLD AUTO-RTO: 0 /100
PLATELET # BLD AUTO: 102 10E9/L (ref 150–450)
POTASSIUM SERPL-SCNC: 3.9 MMOL/L (ref 3.4–5.3)
PROT SERPL-MCNC: 6.3 G/DL (ref 6.8–8.8)
RBC # BLD AUTO: 4.28 10E12/L (ref 4.4–5.9)
SODIUM SERPL-SCNC: 142 MMOL/L (ref 133–144)
TROPONIN I SERPL-MCNC: <0.015 UG/L (ref 0–0.04)
WBC # BLD AUTO: 3.2 10E9/L (ref 4–11)

## 2018-05-01 PROCEDURE — 96360 HYDRATION IV INFUSION INIT: CPT | Mod: 59

## 2018-05-01 PROCEDURE — 93296 REM INTERROG EVL PM/IDS: CPT

## 2018-05-01 PROCEDURE — 25000125 ZZHC RX 250: Performed by: EMERGENCY MEDICINE

## 2018-05-01 PROCEDURE — 70450 CT HEAD/BRAIN W/O DYE: CPT

## 2018-05-01 PROCEDURE — 87040 BLOOD CULTURE FOR BACTERIA: CPT | Mod: 91 | Performed by: EMERGENCY MEDICINE

## 2018-05-01 PROCEDURE — 85025 COMPLETE CBC W/AUTO DIFF WBC: CPT | Performed by: EMERGENCY MEDICINE

## 2018-05-01 PROCEDURE — 25000128 H RX IP 250 OP 636: Performed by: EMERGENCY MEDICINE

## 2018-05-01 PROCEDURE — 93005 ELECTROCARDIOGRAM TRACING: CPT

## 2018-05-01 PROCEDURE — 93306 TTE W/DOPPLER COMPLETE: CPT | Mod: 26 | Performed by: INTERNAL MEDICINE

## 2018-05-01 PROCEDURE — 99285 EMERGENCY DEPT VISIT HI MDM: CPT | Mod: 25

## 2018-05-01 PROCEDURE — 93306 TTE W/DOPPLER COMPLETE: CPT

## 2018-05-01 PROCEDURE — 70496 CT ANGIOGRAPHY HEAD: CPT

## 2018-05-01 PROCEDURE — 85610 PROTHROMBIN TIME: CPT | Performed by: EMERGENCY MEDICINE

## 2018-05-01 PROCEDURE — 80076 HEPATIC FUNCTION PANEL: CPT | Performed by: EMERGENCY MEDICINE

## 2018-05-01 PROCEDURE — 84484 ASSAY OF TROPONIN QUANT: CPT | Performed by: EMERGENCY MEDICINE

## 2018-05-01 PROCEDURE — 36415 COLL VENOUS BLD VENIPUNCTURE: CPT

## 2018-05-01 PROCEDURE — 80048 BASIC METABOLIC PNL TOTAL CA: CPT | Performed by: EMERGENCY MEDICINE

## 2018-05-01 PROCEDURE — 71046 X-RAY EXAM CHEST 2 VIEWS: CPT

## 2018-05-01 RX ORDER — SODIUM CHLORIDE 9 MG/ML
1000 INJECTION, SOLUTION INTRAVENOUS CONTINUOUS
Status: DISCONTINUED | OUTPATIENT
Start: 2018-05-01 | End: 2018-05-01 | Stop reason: HOSPADM

## 2018-05-01 RX ORDER — IOPAMIDOL 755 MG/ML
70 INJECTION, SOLUTION INTRAVASCULAR ONCE
Status: COMPLETED | OUTPATIENT
Start: 2018-05-01 | End: 2018-05-01

## 2018-05-01 RX ADMIN — SODIUM CHLORIDE 90 ML: 9 INJECTION, SOLUTION INTRAVENOUS at 10:34

## 2018-05-01 RX ADMIN — SODIUM CHLORIDE 1000 ML: 9 INJECTION, SOLUTION INTRAVENOUS at 10:42

## 2018-05-01 RX ADMIN — IOPAMIDOL 70 ML: 755 INJECTION, SOLUTION INTRAVENOUS at 10:34

## 2018-05-01 ASSESSMENT — ENCOUNTER SYMPTOMS
LIGHT-HEADEDNESS: 1
HEADACHES: 0
NAUSEA: 1
DIZZINESS: 1
SHORTNESS OF BREATH: 0

## 2018-05-01 NOTE — ED AVS SNAPSHOT
Emergency Department    [address] [place] [id number]    Phone:  [phone]    Fax:  [phone]                                       Richard Ball   MRN: [id number]    Department:   Emergency Department   Date of Visit:  5/1/2018           After Visit Summary Signature Page     I have received my discharge instructions, and my questions have been answered. I have discussed any challenges I see with this plan with the nurse or doctor.    ..........................................................................................................................................  Patient/Patient Representative Signature      ..........................................................................................................................................  Patient Representative Print Name and Relationship to Patient    ..................................................               ................................................  Date                                            Time    ..........................................................................................................................................  Reviewed by Signature/Title    ...................................................              ..............................................  Date                                                            Time

## 2018-05-01 NOTE — ED AVS SNAPSHOT
Emergency Department    [address] [facility] [address]    Phone:  [phone]    Fax:  [phone]                                       Richard Ball   MRN: [id number]    Department:   Emergency Department   Date of Visit:  5/1/2018           Patient Information     Date Of Birth          1960        Your diagnoses for this visit were:     Transient cerebral ischemia, unspecified type        You were seen by Prabhu Telles MD.      Follow-up Information     Follow up with Luis Vail MD. Call in 1 day.    Specialty:  Neurology    Contact information:    [facility]  [address]  [facility] [address]  [phone]          Discharge Instructions         Transient Ischemic Attack (TIA)   Your symptoms were caused by a TIA, or mini-stroke. Even though your symptoms have gone away, this condition is as serious as a full stroke. It means you are more likely to have a full stroke. About 1 in 3 people who have a TIA go on to have a full stroke. And 4% to 10% of those people will have the stroke within 2 days.  A TIA is caused when something decreases or blocks blood flow to a part of your brain. A TIA often happens when a blood clot travels to a blood vessel in the brain. The clot reduces or blocks blood flow. This causes the symptoms you had. After a short while, the clot dissolves. Blood flows again, and the symptoms go away. People with hardening of the arteries (atherosclerosis) are at higher risk for a TIA. So are people who have an irregular heartbeat called atrial fibrillation.  A TIA causes symptoms similar to a stroke, but they last less than 24 hours. A full stroke causes symptoms that last more than 24 hours and may be permanent. But even if your symptoms only lasted a short time, the TIA may have damaged your brain tissue. Once you have had a TIA, you are at risk of having a full stroke. You will need tests to look at the blood flow to your brain.  The tests can also rule out other causes of your symptoms. The tests may include an ultrasound of the arteries in your neck and an evaluation of your heart. They may also include a CT scan of your brain, an MRI scan of your brain, or both. If your healthcare provider finds problems, he or she will recommend treatment with medicines, procedures, or both.  Your provider may prescribe medicines to reduce your chance of having another TIA and stroke. These may include medicines that prevent blood clots, such as antiplatelet medicines and blood thinners (anticoagulants). Your doctor may recommend other treatment. This may include a procedure to open up a blocked artery in your neck.  Home care  The following guidelines will help you take care of yourself at home:    Take any medicines your doctor has prescribed as directed. These may include antiplatelet medicines or medicines for other conditions, such as high blood pressure or high cholesterol.    A TIA is a serious event that puts you at risk of having a full stroke. Because of this, it is important to take steps to help prevent a stroke from happening. Your doctor will look at all of your risk factors when deciding on what other treatment you may need.  Ways to reduce your risk for stroke  High blood pressure, diabetes, high cholesterol, heavy drinking, and smoking are risk factors for stroke and heart disease. You can control these by taking medicines and making diet and lifestyle changes. One way to help prevent a stroke is to take aspirin or a similar medicine every day. But don't take daily aspirin unless your healthcare provider tells you to.  Your provider will work with you to make lifestyle changes to help prevent a stroke.  Diet  Your healthcare provider will give you information about changes you may need to make to your diet. You may need to see a registered dietitian for help with diet changes. Changes may include:    Eating less fat and  cholesterol    Eating less salt (sodium). This is especially important if you have high blood pressure.    Eating more fresh fruits and vegetables    Eating lean proteins, such as fish, poultry, beans, and peas    Eating less red meat and processed meats    Using low-fat dairy products    Using vegetable and nut oils in limited amounts    Limiting how many sweets and processed foods such as chips, cookies, and baked goods you eat    Limiting how much alcohol you drink  Physical activity  Your healthcare provider may recommend that you get more exercise if you have not been as active as possible. He or she may suggest that you get 40 minutes of moderate to vigorous physical activity each day. You should do this at least 3 to 4 days a week. A few examples of moderate to vigorous exercise are:    Walking at a brisk pace, about 3 to 4 miles per hour    Jogging or running    Swimming or water aerobics    Hiking    Dancing    Martial arts    Tennis    Riding a bike  Other ways to reduce your risk    Weight management. If you are overweight or obese, your healthcare provider will work with you to lose weight and lower your body mass index (BMI) to a normal or near-normal level. Making diet changes and increasing physical activity can help.    Smoking. If you smoke, break the habit. Enroll in a stop smoking program to improve your chances of success.    Stress. Learn how to manage your stress. This will help you deal with stress at home and at work.  Follow-up care  Call your doctor for an appointment in the next few days for another evaluation, or as advised. This is to make a plan for preventing another TIA or stroke. You may need to see a neurologist to follow up on your TIA. A neurologist is a doctor who specializes in treating brain and nervous system problems. You may need other tests or procedures.  If you had an X-ray, CT scan, MRI scan, or ECG (electrocardiogram), a specialist will review it. You ll be told of any  "new findings that will affect your care.  Call 911  Call 911 if any of these occur:    Any of your TIA symptoms return    New problems with speech, vision, walking, or weakness or numbness of the face or on one side of the body    Severe headache, fainting spell, dizziness, or seizure  Date Last Reviewed: 10/1/2016    9754-9047 [employer]. [address] [place] [address]. All rights reserved. This information is not intended as a substitute for professional medical care. Always follow your healthcare professional's instructions.      Paraesthesias  Paraesthesia is a burning or prickling sensation that is sometimes felt in the hands, arms, legs or feet. It can also occur in other parts of the body. It can also feel like tingling or numbness, skin crawling, or itching. The feeling is not comfortable, but it is not painful. (The \"pins and needles\" feeling that happens when a foot or hand \"falls asleep\" is a temporary paraesthesia.)  Paraesthesias that last or come and go may be caused by medical issues that need to be treated. These include stroke, a bulging disk pressing on a nerve, a trapped nerve, vitamin deficiencies, or even certain medicines.  Tests are often done. These tests may include blood tests, X-ray, CT (computerized tomography) scan, or a muscle test (electromyography). Depending on the cause, treatment may include physical therapy.  Home care    Tell the healthcare provider about all medicines you take. This includes prescription and over-the-counter medicines, vitamins, and herbs. Ask if any of the medicines may be causing your problems. Do not make any changes to prescription medicines without talking to your healthcare provider first.    You may be prescribed medicines to help relieve the tingling feeling or for pain. Take all medicines as directed.    A numb hand or foot may be more prone to injury. To help protect it:  ? Always use oven mitts.  ? Test water with an " unaffected hand or foot.  ? Use caution when trimming nails. File sharp areas.  ? Wear shoes that fit well to avoid pressure points, blisters, and ulcers.  ? Inspect your hands and feet carefully (including the soles of your feet and between your toes) at least once a week. If you see red areas, sores, or other problems, tell your healthcare provider.  Follow-up care  Follow up with your doctor or as advised by our staff. You may need further testing or evaluation.  When to seek medical advice  Call your healthcare provider right away if any of the following occur:    Numbness or weakness of the face, one arm, or one leg    Slurred speech, confusion, trouble speaking, walking, or seeing    Severe headache, fainting spell, dizziness, or seizure    Chest, arm, neck, or upper back pain    Loss of bladder or bowel control    Open wound with redness, swelling, or pus  Date Last Reviewed: 9/25/2015 2000-2017 [employer]. [address] [place] [address]. All rights reserved. This information is not intended as a substitute for professional medical care. Always follow your healthcare professional's instructions.              24 Hour Appointment Hotline       To make an appointment at any [facility], call [phone] ([phone]). If you don't have a family doctor or clinic, we will help you find one. [facility] are conveniently located to serve the needs of you and your family.             Review of your medicines      Our records show that you are taking the medicines listed below. If these are incorrect, please call your family doctor or clinic.        Dose / Directions Last dose taken    atenolol 50 MG tablet   Commonly known as:  TENORMIN   Dose:  50 mg        Take 50 mg by mouth daily   Refills:  0        atorvastatin 20 MG tablet   Commonly known as:  LIPITOR        Refills:  0        COUMADIN 5 MG tablet   Dose:  5 mg   Quantity:  30 tablet   Generic drug:  warfarin         Take 1 tablet (5 mg) by mouth daily Alternating with 2.5 every other day   Refills:  0        fexofenadine 180 MG tablet   Commonly known as:  ALLEGRA   Dose:  180 mg   Quantity:  30 tablet        Take 1 tablet (180 mg) by mouth daily   Refills:  1        lisinopril 5 MG tablet   Commonly known as:  PRINIVIL/ZESTRIL   Dose:  5 mg   Quantity:  90 tablet        Take 1 tablet (5 mg) by mouth daily   Refills:  1        triamcinolone 0.1 % cream   Commonly known as:  KENALOG   Quantity:  80 g        Apply sparingly to affected area three times daily as needed   Refills:  0        VENTOLIN  (90 Base) MCG/ACT Inhaler   Generic drug:  albuterol        Reported on 3/24/2017   Refills:  0                Procedures and tests performed during your visit     Procedure/Test Number of Times Performed    Basic metabolic panel 1    Blood culture 2    CBC with platelets + differential 1    CT Head Neck Angio w/o & w Contrast 1    Cardiac Continuous Monitoring 1    EKG 12 lead 1    Echocardiogram Complete 1    Head CT w/o contrast 1    Hepatic panel 1    INR 1    Pulse oximetry nursing 1    Troponin I 1    XR Chest 2 Views 1      Orders Needing Specimen Collection     None      Pending Results     Date and Time Order Name Status Description    5/1/2018 1152 Blood culture Preliminary     5/1/2018 1152 Blood culture Preliminary     5/1/2018 1022 CT Head Neck Angio w/o & w Contrast Preliminary     5/1/2018 1005 Head CT w/o contrast Preliminary             Pending Culture Results     Date and Time Order Name Status Description    5/1/2018 1152 Blood culture Preliminary     5/1/2018 1152 Blood culture Preliminary             Pending Results Instructions     If you had any lab results that were not finalized at the time of your Discharge, you can call the ED Lab Result RN at [phone]. You will be contacted by this team for any positive Lab results or changes in treatment. The nurses are available 7 days a week from 10A to 6:30P.   You can leave a message 24 hours per day and they will return your call.        Test Results From Your Hospital Stay        5/1/2018 10:24 AM      Component Results     Component Value Ref Range & Units Status    WBC 3.2 (L) 4.0 - 11.0 10e9/L Final    RBC Count 4.28 (L) 4.4 - 5.9 10e12/L Final    Hemoglobin 13.1 (L) 13.3 - 17.7 g/dL Final    Hematocrit 37.7 (L) 40.0 - 53.0 % Final    MCV 88 78 - 100 fl Final    MCH 30.6 26.5 - 33.0 pg Final    MCHC 34.7 31.5 - 36.5 g/dL Final    RDW 14.0 10.0 - 15.0 % Final    Platelet Count 102 (L) 150 - 450 10e9/L Final    Diff Method Automated Method  Final    % Neutrophils 52.3 % Final    % Lymphocytes 39.9 % Final    % Monocytes 6.6 % Final    % Eosinophils 0.9 % Final    % Basophils 0.0 % Final    % Immature Granulocytes 0.3 % Final    Nucleated RBCs 0 0 /100 Final    Absolute Neutrophil 1.7 1.6 - 8.3 10e9/L Final    Absolute Lymphocytes 1.3 0.8 - 5.3 10e9/L Final    Absolute Monocytes 0.2 0.0 - 1.3 10e9/L Final    Absolute Eosinophils 0.0 0.0 - 0.7 10e9/L Final    Absolute Basophils 0.0 0.0 - 0.2 10e9/L Final    Abs Immature Granulocytes 0.0 0 - 0.4 10e9/L Final    Absolute Nucleated RBC 0.0  Final         5/1/2018 10:31 AM      Component Results     Component Value Ref Range & Units Status    Sodium 142 133 - 144 mmol/L Final    Potassium 3.9 3.4 - 5.3 mmol/L Final    Chloride 105 94 - 109 mmol/L Final    Carbon Dioxide 29 20 - 32 mmol/L Final    Anion Gap 8 3 - 14 mmol/L Final    Glucose 94 70 - 99 mg/dL Final    Urea Nitrogen 12 7 - 30 mg/dL Final    Creatinine 0.82 0.66 - 1.25 mg/dL Final    GFR Estimate >90 >60 mL/min/1.7m2 Final    Non  GFR Calc    GFR Estimate If Black >90 >60 mL/min/1.7m2 Final    African American GFR Calc    Calcium 8.4 (L) 8.5 - 10.1 mg/dL Final         5/1/2018 10:40 AM      Component Results     Component Value Ref Range & Units Status    INR 2.11 (H) 0.86 - 1.14 Final         5/1/2018 10:46 AM      Narrative     XR CHEST 2 VW  5/1/2018 10:39 AM    COMPARISON: 12/8/2016    HISTORY: Chest pain.        Impression     IMPRESSION: 2-lead implanted cardiac pacer over the left chest in  unchanged position. Median sternotomy wires and valvular prosthesis  again seen. Convex right curvature in the thoracic spine again seen.  No focal airspace disease, pleural effusion or pneumothorax.    KIKA JACOBSON MD         5/1/2018 10:55 AM      Narrative     CT OF THE HEAD WITHOUT CONTRAST 5/1/2018 10:49 AM     COMPARISON: None.    HISTORY: Left-sided numbness, history of Marfan's, AV replacement,  aortic graft.     TECHNIQUE: 5 mm thick axial CT images of the head were acquired  without IV contrast material.    FINDINGS: There is mild diffuse cerebral volume loss. There are subtle  patchy areas of decreased density in the cerebral white matter  bilaterally that are consistent with sequela of chronic small vessel  ischemic disease.    The ventricles and basal cisterns are within normal limits in  configuration given the degree of cerebral volume loss. There is no  midline shift. There are no extra-axial fluid collections.    No intracranial hemorrhage, mass or recent infarct.    The visualized paranasal sinuses are well-aerated. There is no  mastoiditis. There are no fractures of the visualized bones.        Impression     IMPRESSION: Diffuse cerebral volume loss and cerebral white matter  changes consistent with chronic small vessel ischemic disease. No  evidence for acute intracranial pathology.        Radiation dose for this scan was reduced using automated exposure  control, adjustment of the mA and/or kV according to patient size, or  iterative reconstruction technique.           5/1/2018 10:36 AM      Component Results     Component Value Ref Range & Units Status    Bilirubin Direct 0.1 0.0 - 0.2 mg/dL Final    Bilirubin Total 0.5 0.2 - 1.3 mg/dL Final    Albumin 3.5 3.4 - 5.0 g/dL Final    Protein Total 6.3 (L) 6.8 - 8.8 g/dL Final    Alkaline  Phosphatase 69 40 - 150 U/L Final    ALT 18 0 - 70 U/L Final    AST 19 0 - 45 U/L Final         5/1/2018 10:36 AM      Component Results     Component Value Ref Range & Units Status    Troponin I ES <0.015 0.000 - 0.045 ug/L Final    The 99th percentile for upper reference range is 0.045 ug/L.  Troponin values   in the range of 0.045 - 0.120 ug/L may be associated with risks of adverse   clinical events.           5/1/2018 11:19 AM      Narrative     CT ANGIOGRAM OF THE HEAD AND NECK WITHOUT AND WITH CONTRAST  5/1/2018  11:03 AM     COMPARISON: None.    HISTORY: Evaluate for dissection/thromboembolism.    TECHNIQUE:  Precontrast localizing scans were followed by CT  angiography with an injection of 70 mL [id number]-370 nonionic intravenous  contrast material with scans through the head and neck.  Images were  transferred to a separate 3-D workstation where multiplanar  reformations and 3-D images were created.  Estimates of carotid  stenoses are made relative to the distal internal carotid artery  diameters except as noted.      FINDINGS:   Neck CTA: The bilateral common carotid, bilateral cervical internal  carotid and bilateral vertebral arteries are patent without stenosis.     Head CTA: The basilar, bilateral distal internal carotid, bilateral  anterior cerebral, bilateral middle cerebral and bilateral posterior  cerebral arteries are patent and unremarkable. The anterior  communicating artery is patent and unremarkable.        Impression     IMPRESSION: Normal neck and head CTA.    Radiation dose for this scan was reduced using automated exposure  control, adjustment of the mA and/or kV according to patient size, or  iterative reconstruction technique.         5/1/2018  2:03 PM      Component Results     Component    Specimen Description    Blood Right Arm    Special Requests    Aerobic and anaerobic bottles received    Culture Micro    No growth after 1 hour         5/1/2018  2:03 PM      Component Results      Component    Specimen Description    Blood Right Arm    Special Requests    Aerobic and anaerobic bottles received    Culture Micro    No growth after 1 hour                Clinical Quality Measure: Blood Pressure Screening     Your blood pressure was checked while you were in the emergency department today. The last reading we obtained was  BP: (!) 123/95 . Please read the guidelines below about what these numbers mean and what you should do about them.  If your systolic blood pressure (the top number) is less than 120 and your diastolic blood pressure (the bottom number) is less than 80, then your blood pressure is normal. There is nothing more that you need to do about it.  If your systolic blood pressure (the top number) is 120-139 or your diastolic blood pressure (the bottom number) is 80-89, your blood pressure may be higher than it should be. You should have your blood pressure rechecked within a year by a primary care provider.  If your systolic blood pressure (the top number) is 140 or greater or your diastolic blood pressure (the bottom number) is 90 or greater, you may have high blood pressure. High blood pressure is treatable, but if left untreated over time it can put you at risk for heart attack, stroke, or kidney failure. You should have your blood pressure rechecked by a primary care provider within the next 4 weeks.  If your provider in the emergency department today gave you specific instructions to follow-up with your doctor or provider even sooner than that, you should follow that instruction and not wait for up to 4 weeks for your follow-up visit.        Thank you for choosing [facility]       Thank you for choosing [facility] for your care. Our goal is always to provide you with excellent care. Hearing back from our patients is one way we can continue to improve our services. Please take a few minutes to complete the written survey that you may receive in the mail after you visit with us. Thank  you!        [employer] Information     [employer] gives you secure access to your electronic health record. If you see a primary care provider, you can also send messages to your care team and make appointments. If you have questions, please call your primary care clinic.  If you do not have a primary care provider, please call [phone] and they will assist you.        Care EveryWhere ID     This is your Care EveryWhere ID. This could be used by other organizations to access your [facility] medical records  [id number]        Equal Access to Services     CAMILLE ESCALONA : Tran Valdez, waaxpresley luqadaha, qaconor kaalmapresley phillips, geovanny renteria. So [facility] [phone].    ATENCIÓN: Si habla español, tiene a joel disposición servicios gratuitos de asistencia lingüística. Llame al [phone].    We comply with applicable federal civil rights laws and [place] laws. We do not discriminate on the basis of race, color, national origin, age, disability, sex, sexual orientation, or gender identity.            After Visit Summary       This is your record. Keep this with you and show to your community pharmacist(s) and doctor(s) at your next visit.

## 2018-05-01 NOTE — DISCHARGE INSTRUCTIONS
Transient Ischemic Attack (TIA)   Your symptoms were caused by a TIA, or mini-stroke. Even though your symptoms have gone away, this condition is as serious as a full stroke. It means you are more likely to have a full stroke. About 1 in 3 people who have a TIA go on to have a full stroke. And 4% to 10% of those people will have the stroke within 2 days.  A TIA is caused when something decreases or blocks blood flow to a part of your brain. A TIA often happens when a blood clot travels to a blood vessel in the brain. The clot reduces or blocks blood flow. This causes the symptoms you had. After a short while, the clot dissolves. Blood flows again, and the symptoms go away. People with hardening of the arteries (atherosclerosis) are at higher risk for a TIA. So are people who have an irregular heartbeat called atrial fibrillation.  A TIA causes symptoms similar to a stroke, but they last less than 24 hours. A full stroke causes symptoms that last more than 24 hours and may be permanent. But even if your symptoms only lasted a short time, the TIA may have damaged your brain tissue. Once you have had a TIA, you are at risk of having a full stroke. You will need tests to look at the blood flow to your brain. The tests can also rule out other causes of your symptoms. The tests may include an ultrasound of the arteries in your neck and an evaluation of your heart. They may also include a CT scan of your brain, an MRI scan of your brain, or both. If your healthcare provider finds problems, he or she will recommend treatment with medicines, procedures, or both.  Your provider may prescribe medicines to reduce your chance of having another TIA and stroke. These may include medicines that prevent blood clots, such as antiplatelet medicines and blood thinners (anticoagulants). Your doctor may recommend other treatment. This may include a procedure to open up a blocked artery in your neck.  Home care  The following guidelines  will help you take care of yourself at home:    Take any medicines your doctor has prescribed as directed. These may include antiplatelet medicines or medicines for other conditions, such as high blood pressure or high cholesterol.    A TIA is a serious event that puts you at risk of having a full stroke. Because of this, it is important to take steps to help prevent a stroke from happening. Your doctor will look at all of your risk factors when deciding on what other treatment you may need.  Ways to reduce your risk for stroke  High blood pressure, diabetes, high cholesterol, heavy drinking, and smoking are risk factors for stroke and heart disease. You can control these by taking medicines and making diet and lifestyle changes. One way to help prevent a stroke is to take aspirin or a similar medicine every day. But don't take daily aspirin unless your healthcare provider tells you to.  Your provider will work with you to make lifestyle changes to help prevent a stroke.  Diet  Your healthcare provider will give you information about changes you may need to make to your diet. You may need to see a registered dietitian for help with diet changes. Changes may include:    Eating less fat and cholesterol    Eating less salt (sodium). This is especially important if you have high blood pressure.    Eating more fresh fruits and vegetables    Eating lean proteins, such as fish, poultry, beans, and peas    Eating less red meat and processed meats    Using low-fat dairy products    Using vegetable and nut oils in limited amounts    Limiting how many sweets and processed foods such as chips, cookies, and baked goods you eat    Limiting how much alcohol you drink  Physical activity  Your healthcare provider may recommend that you get more exercise if you have not been as active as possible. He or she may suggest that you get 40 minutes of moderate to vigorous physical activity each day. You should do this at least 3 to 4 days  a week. A few examples of moderate to vigorous exercise are:    Walking at a brisk pace, about 3 to 4 miles per hour    Jogging or running    Swimming or water aerobics    Hiking    Dancing    Martial arts    Tennis    Riding a bike  Other ways to reduce your risk    Weight management. If you are overweight or obese, your healthcare provider will work with you to lose weight and lower your body mass index (BMI) to a normal or near-normal level. Making diet changes and increasing physical activity can help.    Smoking. If you smoke, break the habit. Enroll in a stop smoking program to improve your chances of success.    Stress. Learn how to manage your stress. This will help you deal with stress at home and at work.  Follow-up care  Call your doctor for an appointment in the next few days for another evaluation, or as advised. This is to make a plan for preventing another TIA or stroke. You may need to see a neurologist to follow up on your TIA. A neurologist is a doctor who specializes in treating brain and nervous system problems. You may need other tests or procedures.  If you had an X-ray, CT scan, MRI scan, or ECG (electrocardiogram), a specialist will review it. You ll be told of any new findings that will affect your care.  Call 911  Call 911 if any of these occur:    Any of your TIA symptoms return    New problems with speech, vision, walking, or weakness or numbness of the face or on one side of the body    Severe headache, fainting spell, dizziness, or seizure  Date Last Reviewed: 10/1/2016    9903-6561 [employer]. [address] [place] [address], [place] [address]. All rights reserved. This information is not intended as a substitute for professional medical care. Always follow your healthcare professional's instructions.      Paraesthesias  Paraesthesia is a burning or prickling sensation that is sometimes felt in the hands, arms, legs or feet. It can also occur in other parts of the body. It can  "also feel like tingling or numbness, skin crawling, or itching. The feeling is not comfortable, but it is not painful. (The \"pins and needles\" feeling that happens when a foot or hand \"falls asleep\" is a temporary paraesthesia.)  Paraesthesias that last or come and go may be caused by medical issues that need to be treated. These include stroke, a bulging disk pressing on a nerve, a trapped nerve, vitamin deficiencies, or even certain medicines.  Tests are often done. These tests may include blood tests, X-ray, CT (computerized tomography) scan, or a muscle test (electromyography). Depending on the cause, treatment may include physical therapy.  Home care    Tell the healthcare provider about all medicines you take. This includes prescription and over-the-counter medicines, vitamins, and herbs. Ask if any of the medicines may be causing your problems. Do not make any changes to prescription medicines without talking to your healthcare provider first.    You may be prescribed medicines to help relieve the tingling feeling or for pain. Take all medicines as directed.    A numb hand or foot may be more prone to injury. To help protect it:  ? Always use oven mitts.  ? Test water with an unaffected hand or foot.  ? Use caution when trimming nails. File sharp areas.  ? Wear shoes that fit well to avoid pressure points, blisters, and ulcers.  ? Inspect your hands and feet carefully (including the soles of your feet and between your toes) at least once a week. If you see red areas, sores, or other problems, tell your healthcare provider.  Follow-up care  Follow up with your doctor or as advised by our staff. You may need further testing or evaluation.  When to seek medical advice  Call your healthcare provider right away if any of the following occur:    Numbness or weakness of the face, one arm, or one leg    Slurred speech, confusion, trouble speaking, walking, or seeing    Severe headache, fainting spell, dizziness, or " seizure    Chest, arm, neck, or upper back pain    Loss of bladder or bowel control    Open wound with redness, swelling, or pus  Date Last Reviewed: 9/25/2015 2000-2017 [employer]. [address] [place] [address], [place] [address]. All rights reserved. This information is not intended as a substitute for professional medical care. Always follow your healthcare professional's instructions.

## 2018-05-01 NOTE — CONSULTS
Neurology Stroke Consult    Richard Ball  [id number]  1960    HPI  Mr. Ball is a 58-year-old gentleman with past medical history of Marfan syndrome, mechanical aortic valve placement approximately more than 10 years ago, otherwise healthy-who is on Coumadin with goal INR 2-3 had an episode of left hand heaviness and numbness that progressed to left leg numbness lasted for several minutes and resolved completely.  At the time of evaluation in the emergency room patient had no neurological deficits.  CT CTA of head and neck did not show any acute abnormalities.  He is compliant with his Coumadin and his INR was therapeutic to 2.1.  He did mention that recently he suffered from sinus infection for which she required amoxicillin for 10 days.  He denies of any fevers, chills, fatigue, any other neurological deficits.    Pertinent Past Medical/Surgical History    Active Ambulatory Problems     Diagnosis Date Noted     Marfan's syndrome 12/06/2011     Pacemaker 12/06/2011     S/P aortic valve replacement/ 2.5-3.5 12/16/2011     Health Care Home 06/24/2013     Advanced directives, counseling/discussion 12/22/2014     Resolved Ambulatory Problems     Diagnosis Date Noted     Scoliosis, congenital 12/06/2011     Shoulder pain, left 03/30/2017     Back pain 03/30/2017     Past Medical History:   Diagnosis Date     Heart abnormality      Hemothorax      Marfan's syndrome 12/6/2011         Medications:   (Not in a hospital admission).    Allergies: No Known Allergies.    Family History:   Family History   Problem Relation Age of Onset     Asthma No family hx of      DIABETES No family hx of      Hypertension No family hx of      Breast Cancer No family hx of      Cancer - colorectal No family hx of      Prostate Cancer No family hx of      Lipids No family hx of      Musculoskeletal Disorder No family hx of      Neurologic Disorder No family hx of    .    Social History:   Social History   Substance Use Topics      Smoking status: Never Smoker     Smokeless tobacco: Never Used     Alcohol use 2.4 oz/week     4 Standard drinks or equivalent per week      Comment: Occasional   .      ROS:  The 10 point Review of Systems is negative other than noted in the HPI or here.     PHYSICAL EXAMINATION  Vital Signs:  B/P: 111/67,  T: 97.9,  P: Data Unavailable,  R: 24    General:  patient lying in bed without any acute distress    HEENT:  normocephalic/atraumatic  Cardio:  RRR  Pulmonary:  no respiratory distress  Abdomen:  soft  Extremities:  no edema  Skin:  intact     Neurologic  Mental Status:  fully alert, attentive and oriented, follows commands, speech clear and fluent  Cranial Nerves:  visual fields intact, PERRL, EOMI with normal smooth pursuit, facial sensation intact and symmetric, facial movements symmetric, hearing not formally tested but intact to conversation, palate elevation symmetric and uvula midline, no dysarthria, shoulder shrug strong bilaterally, tongue protrusion midline  Motor:  no abnormal movements, normal tone throughout, normal muscle bulk, no pronator drift, normal and symmetric rapid finger tapping, able to move all limbs spontaneously, strength 5/5 throughout upper and lower extremities  Reflexes:  2+ and symmetric throughout, no clonus, toes downgoing  Sensory:  intact/symmetric to light touch and pin prick throughout upper and lower extremities  Coordination:  FNF and HS intact without dysmetria  Station/Gait:  unable to test    Labs  Most Recent 3 CBC's:  Recent Labs   Lab Test  05/01/18   0957 05/29/14   WBC  3.2*  4.8   HGB  13.1*  14.2   MCV  88  96.4*   PLT  102*  151      Most Recent 3 BMP's:  Recent Labs   Lab Test  05/01/18   0957   NA  142   POTASSIUM  3.9   CHLORIDE  105   CO2  29   BUN  12   CR  0.82   ANIONGAP  8   CLINT  8.4*   GLC  94     Most Recent 2 LFT's:  Recent Labs   Lab Test  05/01/18   0957   AST  19   ALT  18   ALKPHOS  69   BILITOTAL  0.5     Most Recent INR's and Anticoagulation  Dosing History:  Anticoagulation Dose History     Recent Dosing and Labs Latest Ref Rng & Units 12/16/2011 5/29/2014 5/1/2018    INR 0.86 - 1.14 - - 2.11(H)    INR - 3.2 3.4 -        Most Recent 3 Troponin's:  Recent Labs   Lab Test  05/01/18   0957   TROPI  <0.015     Most Recent Cholesterol Panel:No lab results found.  Most Recent 6 Bacteria Isolates From Any Culture (See EPIC Reports for Culture Details):  Recent Labs   Lab Test  05/01/18   1230  05/01/18   1200   CULT  No growth after 1 hour  No growth after 1 hour     Most Recent TSH, T4 and A1c Labs:No lab results found.  Results for orders placed or performed during the hospital encounter of 05/01/18   XR Chest 2 Views    Narrative    XR CHEST 2 VW 5/1/2018 10:39 AM    COMPARISON: 12/8/2016    HISTORY: Chest pain.      Impression    IMPRESSION: 2-lead implanted cardiac pacer over the left chest in  unchanged position. Median sternotomy wires and valvular prosthesis  again seen. Convex right curvature in the thoracic spine again seen.  No focal airspace disease, pleural effusion or pneumothorax.    KIKA JACOBSON MD   Head CT w/o contrast    Narrative    CT OF THE HEAD WITHOUT CONTRAST 5/1/2018 10:49 AM     COMPARISON: None.    HISTORY: Left-sided numbness, history of Marfan's, AV replacement,  aortic graft.     TECHNIQUE: 5 mm thick axial CT images of the head were acquired  without IV contrast material.    FINDINGS: There is mild diffuse cerebral volume loss. There are subtle  patchy areas of decreased density in the cerebral white matter  bilaterally that are consistent with sequela of chronic small vessel  ischemic disease.    The ventricles and basal cisterns are within normal limits in  configuration given the degree of cerebral volume loss. There is no  midline shift. There are no extra-axial fluid collections.    No intracranial hemorrhage, mass or recent infarct.    The visualized paranasal sinuses are well-aerated. There is no  mastoiditis. There are  no fractures of the visualized bones.      Impression    IMPRESSION: Diffuse cerebral volume loss and cerebral white matter  changes consistent with chronic small vessel ischemic disease. No  evidence for acute intracranial pathology.        Radiation dose for this scan was reduced using automated exposure  control, adjustment of the mA and/or kV according to patient size, or  iterative reconstruction technique.    JOSE CASTELLON MD   CT Head Neck Angio w/o & w Contrast    Narrative    CT ANGIOGRAM OF THE HEAD AND NECK WITHOUT AND WITH CONTRAST  5/1/2018  11:03 AM     COMPARISON: None.    HISTORY: Evaluate for dissection/thromboembolism.    TECHNIQUE:  Precontrast localizing scans were followed by CT  angiography with an injection of 70 mL [id number]-370 nonionic intravenous  contrast material with scans through the head and neck.  Images were  transferred to a separate 3-D workstation where multiplanar  reformations and 3-D images were created.  Estimates of carotid  stenoses are made relative to the distal internal carotid artery  diameters except as noted.      FINDINGS:   Neck CTA: The bilateral common carotid, bilateral cervical internal  carotid and bilateral vertebral arteries are patent without stenosis.     Head CTA: The basilar, bilateral distal internal carotid, bilateral  anterior cerebral, bilateral middle cerebral and bilateral posterior  cerebral arteries are patent and unremarkable. The anterior  communicating artery is patent and unremarkable.      Impression    IMPRESSION: Normal neck and head CTA.    Radiation dose for this scan was reduced using automated exposure  control, adjustment of the mA and/or kV according to patient size, or  iterative reconstruction technique.    JOSE CASTELLON MD          ASSESSMENT & RECOMMENDATIONS     Mr. Ball is a 58-year-old gentleman with past medical history of Marfan syndrome and mechanical aortic valve who is on Coumadin with therapeutic INR goal of 2-3  presented with an episode of transient neurologic deficit that was likely TIA.  He is CT was unremarkable for any acute intracranial pathology, CTA did not show any intra-or extracranial vascular abnormalities including dissection.  At this point of time his symptoms are resolved.  His mechanical valve is not compatible with MRI.    Given the fact that he recently had sinus infection for which he required antibiotics, consider drawing 2 sets of blood culture, and repeat echocardiogram to rule out any cardiac source of emboli.  Patient will need outpatient PCP and cardiology follow-up.      Please call with questions    Plan discussed withDr. Beatriz Darnell Lopez  [phone]

## 2018-05-01 NOTE — ED PROVIDER NOTES
"  History     Chief Complaint:  Left Arm Weakness      HPI   Richard Ball is a 58 year old male on Coumadin with a history of aortic valve replacement and Marfan's syndrome with a pacemaker who presents to the emergency department today accompanied by his  for evaluation of left arm weakness. The patient reports that he was at work this morning at [employer] when he had sudden onset of dizziness, lightheadedness, and nausea followed by left-sided weakness around 0915. He notes that the nausea resolved quickly. He reports that he has never had these symptoms in the past. He states that he was evaluated by the school nurse who found him to have \"left-sided deficits\" and referred him to the emergency department for a stroke evaluation. The patient notes that his left arm has continued to feel \"rubbery\" and that he is also still dizzy. He denies double vision, vision changes, headache, chest pain, or shortness of breath. He notes that his spine curves left-cloud due to his Marfan's. He reports a history of left shoulder pain and limited range of motion which has been on-going for one year. He notes that his pacemaker is not MRI compatible.    Allergies:  No Known Drug Allergies     Medications:    Atenolol  Lipitor  Allegra  Lisinopril  Kenalog cream  Ventolin inhaler  Warfarin    Past Medical History:    Marfan's syndrome  Chronic anticoagulation  Complete heart block  Pacemaker atrial lead malfunction  Coronary artery calcification  Aortic valve replaced    Past Surgical History:    Aortic valve replacement  Tonsillectomy/adenoidectomy  Vasectomy  Implant pacemaker    Family History:    No family history of asthma, diabetes, hypertension, breast cancer, colorectal cancer, prostate cancer, lipids, musculoskeletal disorder, or neurologic disorder.    Social History:  The patient was accompanied to the ED by his .  Smoking Status: Never Smoker  Smokeless Tobacco: Never Used  Alcohol Use: " "Positive  Marital Status:       Review of Systems   Eyes: Negative for visual disturbance.        Negative for double vision.   Respiratory: Negative for shortness of breath.    Cardiovascular: Negative for chest pain.   Gastrointestinal: Positive for nausea.   Neurological: Positive for dizziness and light-headedness. Negative for headaches.        Positive for left-sided weakness.   All other systems reviewed and are negative.    Physical Exam     Patient Vitals for the past 24 hrs:   BP Temp Temp src Pulse Heart Rate Resp SpO2 Height Weight   05/01/18 1400 - - - - 57 19 - - -   05/01/18 1330 (!) 123/95 - - - 56 18 98 % - -   05/01/18 1300 130/79 - - - 49 13 96 % - -   05/01/18 1230 (!) 131/91 - - - 49 11 98 % - -   05/01/18 1200 130/89 - - - 49 14 98 % - -   05/01/18 1130 131/79 - - - 50 16 98 % - -   05/01/18 1100 122/75 - - - 50 (!) 7 95 % - -   05/01/18 1042 140/79 - - - 51 14 99 % - -   05/01/18 1005 125/79 - - 51 - 16 97 % - -   05/01/18 1001 129/82 97.8  F (36.6  C) Oral 53 - 16 98 % 1.6 m (5' 3\") 73.5 kg (162 lb)     Left Arm - Blood Pressure 127/78, Rate 55  Right Arm - Blood Pressure 129/82, Rate 53    Physical Exam  Nursing note and vitals reviewed.  General: Oriented to person, place, and time. Appears well-developed and well-nourished.   Head: No signs of trauma.   Mouth/Throat: Oropharynx is clear and moist.   Eyes: Conjunctivae are normal. Pupils are equal, round, and reactive to light.   Neck: Normal range of motion. No nuchal rigidity.   Cardiovascular: Normal rate and regular rhythm. Mechanical click at S2. Pacemaker in left upper chest.  Respiratory: Effort normal and breath sounds normal. No respiratory distress.   Abdominal: Soft. There is no tenderness. There is no guarding.   Musculoskeletal: Normal range of motion. no edema.   Neurological: The patient is alert and oriented to person, place, and time.  PERRLA, EOMI, visual fields intact, strength in upper/lower extremities normal and " symmetrical. No drift. DTR's normal.  Sensation normal. Finger nose finger, rapid alternating movements normal. Rhomberg normal. Speech normal. Gait normal. GCS 15.  Skin: Skin is warm and dry. No rash noted.   Psychiatric: normal mood and affect. behavior is normal.     Emergency Department Course     ECG:  ECG taken at 1010, ECG read at 1015  Atrial-sensed ventricular-paced rhythm  Abnormal ECG  Rate 54 bpm. DC interval 154 ms. QRS duration 168 ms. QT/QTc 506/479 ms. P-R-T axes 53 -77 83.    Imaging:  Radiology findings were communicated with the patient who voiced understanding of the findings.    Echocardiogram  1. The left ventricle is normal in structure, function and size. The visual  ejection fraction is estimated at 60%.  2. The right ventricle is normal in structure, function and size.  3. There is a mechanical aortic valve ([place] Casimiro, size unknown). Mean gradient  6mmHg, Vmax 1.9m/s, ALEJA 1.4cm2, DI 0.49, SVI low at 28ml/m2. Mild paravalvular  AI. Normal valve function.  Echo report from 5/2016 ([facility]): EF 60%, mechanical AV with mean 7mmHg, Vmax  1.8m/s, ALEJA 1.3cm2, DI 0.41, mild AI.    CT Head Neck Angio w/o & w Contrast  Normal neck and head CTA.  Reading per radiology     Head CT w/o contrast  Diffuse cerebral volume loss and cerebral white matter  changes consistent with chronic small vessel ischemic disease. No  evidence for acute intracranial pathology.  Reading per radiology    XR Chest 2 Views  2-lead implanted cardiac pacer over the left chest in  unchanged position. Median sternotomy wires and valvular prosthesis  again seen. Convex right curvature in the thoracic spine again seen.  No focal airspace disease, pleural effusion or pneumothorax.  KIKA JACOBSON MD  Reading per radiology    Laboratory:  Laboratory findings were communicated with the patient who voiced understanding of the findings.    Blood culture: pending x2  CBC: WBC 3.2 (L), HGB 13.1 (L),  (L)  BMP: Calcium 8.4 (L), o/w WNL  (Creatinine 0.82)  INR: 2.11 (H)  Hepatic panel: Bilirubin direct 0.1, Bilirubin total 0.5, Albumin 3.5, Protein total 6.3 (L), Alkphos 69, ALT 18, and AST 19  Troponin (Collected 0957): <0.015    Interventions:  1042 NS 1000 mL IV    Emergency Department Course:  Nursing notes and vitals reviewed.  I performed an exam of the patient as documented above.   IV was inserted and blood was drawn for laboratory testing, results above.  The patient was sent for an echocardiogram, XR Chest 2 Views, CT Head Neck Angio without and with contrast, Head CT without contrast while in the emergency department, results above.   1015 I spoke with Dr. Rios Henderson of the neurology service regarding patient's presentation, findings, and plan of care.  1023 I spoke with radiology regarding the plan for the patient's CT.  1355 I spoke with [employer] regarding the patient's pacemaker interrogation. They report that the setting for the patient's pacemaker is VDD. The lowest rate is set at 50. The pacemaker is functioning properly. The last arrhythmia detected was on 01/26/2018 when the patient had a short episode of atrial fibrillation.  1511 I discussed the treatment plan with the patient. They expressed understanding of this plan and consented to discharge. They will be discharged home with instructions for care and follow up. In addition, the patient will return to the emergency department if their symptoms persist, worsen, if new symptoms arise or if there is any concern.  All questions were answered.  I personally reviewed the ECG, imaging, and laboratory results with the patient and answered all related questions prior to discharge.    Impression & Plan      Medical Decision Making:  Richard Ball is a 58 year old male who presents for evaluation of left sided numbness with slight weakness.    Neurology was consulted, unable to MRI secondary to current VDD pacer, CTs negative. Negative cardiac echo eval for synthetic aortic valvular  vegetation.  This is consistent with possible transient ischemic attack.     Detailed neurologic exam here in ED shows complete resolution of symptoms.     Based on symptom resolution and NIH stroke scale, they are not a candidate for TPA.   Did discuss this with the patient and they express understanding.   Differential considered for symptoms included CVA, TIA, dissection, cerebral tumor, migraine, infection, metabolic derangement, demyelination, etc.    EKG shows no atrial fibrillation nor arrhythmia noted on telemetry monitoring.    Dr Lopez from Neurology is recommending outpatient follow up.    Diagnosis:    ICD-10-CM    1. Transient cerebral ischemia, unspecified type G45.9      Disposition:  The patient is discharged to home.    Scribe Disclosure:  Zechariah MELTON, am serving as a scribe at 10:04 AM on 5/1/2018 to document services personally performed by Prabhu Telles MD based on my observations and the provider's statements to me.    EMERGENCY DEPARTMENT       Prabhu Telles MD  05/01/18 7122

## 2018-05-01 NOTE — ED NOTES
Pt's HR remains consistently from 49-57. He is 100% ventricular paced. Seems odd to me that this would be his set rate. Pt reports that he believes his set rate is either 60 or 62. Dr. Telles requested pacemaker interrogation. EDT aware.

## 2018-05-01 NOTE — ED NOTES
Pt passed swallow evaluation. Diet ordered for pt per approval from Dr. Telles. 2 sets of blood cultures have been sent. Pending ECHO.

## 2018-05-07 LAB
BACTERIA SPEC CULT: NO GROWTH
BACTERIA SPEC CULT: NO GROWTH
Lab: NORMAL
Lab: NORMAL
SPECIMEN SOURCE: NORMAL
SPECIMEN SOURCE: NORMAL

## 2018-05-08 ENCOUNTER — TELEPHONE (OUTPATIENT)
Dept: CARDIOLOGY | Facility: CLINIC | Age: 58
End: 2018-05-08

## 2018-06-21 ENCOUNTER — OFFICE VISIT (OUTPATIENT)
Dept: CARDIOLOGY | Facility: CLINIC | Age: 58
End: 2018-06-21
Payer: COMMERCIAL

## 2018-06-21 VITALS
SYSTOLIC BLOOD PRESSURE: 132 MMHG | HEART RATE: 60 BPM | WEIGHT: 166.7 LBS | DIASTOLIC BLOOD PRESSURE: 86 MMHG | BODY MASS INDEX: 23.34 KG/M2 | HEIGHT: 71 IN

## 2018-06-21 DIAGNOSIS — Z95.0 PACEMAKER: ICD-10-CM

## 2018-06-21 DIAGNOSIS — Z95.2 S/P AVR (AORTIC VALVE REPLACEMENT): ICD-10-CM

## 2018-06-21 DIAGNOSIS — Z95.2 S/P AORTIC VALVE REPLACEMENT: ICD-10-CM

## 2018-06-21 DIAGNOSIS — Q87.40 MARFAN'S SYNDROME: Primary | ICD-10-CM

## 2018-06-21 DIAGNOSIS — I71.21 ASCENDING AORTIC ANEURYSM (H): ICD-10-CM

## 2018-06-21 PROCEDURE — 99213 OFFICE O/P EST LOW 20 MIN: CPT | Performed by: INTERNAL MEDICINE

## 2018-06-21 RX ORDER — ASPIRIN 81 MG/1
81 TABLET, CHEWABLE ORAL DAILY
Qty: 90 TABLET | Refills: 3 | Status: SHIPPED | OUTPATIENT
Start: 2018-06-21

## 2018-06-21 NOTE — LETTER
2018      Royer Roblero MD, MD  [address] [place] [address]      RE: Richard Ball       Dear Colleague,    I had the pleasure of seeing Richard Ball in the [facility].    Service Date: 2018      HISTORY OF PRESENT ILLNESS:  Mr. Ball is a very pleasant 58-year-old gentleman who has a past medical history significant for Marfan syndrome.  Unfortunately, his childhood was traumatized by the sudden death of his dad when he was 2 year old from a brain aneurysm.  His dad was not known at that time to have Marfan's.  His mom subsequently  when he was 4 years old of ovarian cancer.  She was 39 at the time, and his family was then split up when he was adopted by a family in [place] and his 4 sisters were adopted by other families.  One of his sisters  from ovarian cancer at age 42.  He has 2 other sisters who are living and he is in contact with that have not been screened for Marfan syndrome.  A fourth sister no one has heard from in a number of years.  He has 2 children, age 28, daughter, and 29, a son, neither one of them have been screened for Marfan syndrome.      Mr. Ball was diagnosed with Marfan at age 24.  He apparently had genetic testing done at [facility] in [place] after a murmur was found and then he was found to have an aneurysm.  He ultimately required surgery in , which was done at [facility] in [place] by Dr. Arriola.  He believes his aorta was 5.5 cm at the time, but at this point we do not have these records either from [place] or from [place].  They did replace his aortic valve and reimplanted his coronary arteries.  His last testing appears to be from  and this was done at [facility].  They did not note any additional aneurysms at that time with a CT. He does have a pacemaker which was placed at the time of surgery due to heart block.  Apparently his upper lead has failed, and  only his lower lead is working.  He is 100% paced.      Mr. Ball works as a  in [employer].  He is  for 2 years to his , and he is with him today and very supportive.  He has been on atenolol 50 mg a day and lisinopril 5 mg a day as well as Lipitor 20 mg a day for quite some time.  He is also on Coumadin given his mechanical valve.  He has not been on a baby aspirin for unclear reasons.  He does see the dentist every 6 months and takes antibiotics beforehand.  He sees the ophthalmologist yearly.  He has never had any retinal detachment.  He has significant scoliosis for which he had to wear a brace in high school; however, he has back pain and has not seen anybody that can deal with that.        Mr. Ball was followed at [facility] by Dr. Oliveros after a recent hospitalization here at [facility] and has been referred to [facility] and seeing me today, as noted, for the first time.  His recent visit at [facility] involved left-sided heaviness that occurred suddenly around 9:00 a.m. on 05/01/2018.  He felt like his arm and leg were heavy.  He felt nauseated, lightheaded and there was also some numbness associated.  He ended up at the ER here.  They did do a CT of the head that at that time was unremarkable and his heaviness resolved after 2 minutes, but he continued to have numbness actually for hours, ultimately resolved around 12 that same day.  It was a CT angio, and they did not notice any cerebral aneurysms, and the CT of the head was also unremarkable.  He cannot have an MRI given his pacemaker.  He is otherwise doing well.  He is pretty active.  He knows his limitations with regards to his aneurysm, and he did undergo an echo on 05/01/2018 that showed an EF of 50%-60%, mechanical aortic valve with a mean gradient of 7 and no concerning findings.  He was noted to have mild perivalvular AI.      IMPRESSION, REPORT, PLAN:    1.  Marfan syndrome.   2.  Aortic aneurysm, status post composite graft placement with mechanical aortic valve at [facility] in [place] in 2001 by Dr. Arriola.   3.  TIA 05/01/2018 with complete resolution of symptoms.  Negative CTA of the head with no aneurysms found or bleeding.   4.  Hypertension, well controlled on atenolol and lisinopril.   5.  Hyperlipidemia, on Lipitor 20.   6.  Scoliosis with back pain.   7.  Need for family members screening.   8.  Pacemaker placement in 2001 with a failure of the atrial lead.      DISCUSSION:  It was a pleasure to see Mr. Ball in [facility].  Today I reviewed his history, his symptoms and the testing he has had thus far in detail with him and his .  We will work on getting his operative report from [facility] in [place] as well as his genetic testing that was done in [place] at the [facility].  I did discuss with him the importance of having his family members screened and with knowing the gene that he is positive for Marfan, it will be beneficial for them to have genetic testing, but at the minimum they will need an echo.  We discussed that he see his ophthalmologist yearly, which he will continue.  We will check for cholesterol and TSH, will to the CTA of the chest, abdomen and pelvis that is indicated as well as a coronary CTA to look at the coronary buttons, and we can call with those results.  His valve is functioning well at this time.  I did recommend being on a baby aspirin a day.  He already does take antibiotic prophylaxis before dental work, which he will continue.  We will work on getting him to a specialist for his back as well.  He understands and is in agreement with the plan as outlined.  All questions were answered.  It was a pleasure to see him.  Please do not hesitate to contact me with any questions or concerns.         HARRY SAINI MD             D: 06/21/2018   T: 06/21/2018    MT: JOSE      Name:     KAVON GARCIA   MRN:      [id number]        Account:      [id number]   :      1960           Service Date: 2018      Document: [id number]         Outpatient Encounter Prescriptions as of 2018   Medication Sig Dispense Refill     aspirin 81 MG chewable tablet Take 1 tablet (81 mg) by mouth daily 90 tablet 3     atenolol (TENORMIN) 50 MG tablet Take 50 mg by mouth daily       atorvastatin (LIPITOR) 20 MG tablet daily        Fexofenadine HCl (ALLEGRA PO) Take by mouth daily as needed for allergies       lisinopril (PRINIVIL/ZESTRIL) 5 MG tablet Take 1 tablet (5 mg) by mouth daily 90 tablet 1     triamcinolone (KENALOG) 0.1 % cream Apply sparingly to affected area three times daily as needed 80 g 0     VENTOLIN  (90 BASE) MCG/ACT inhaler daily as needed Reported on 3/24/2017       warfarin (COUMADIN) 5 MG tablet Take 1 tablet (5 mg) by mouth daily Alternating with 2.5 every other day 30 tablet      [DISCONTINUED] fexofenadine (ALLEGRA) 180 MG tablet Take 1 tablet (180 mg) by mouth daily (Patient not taking: Reported on 3/24/2017) 30 tablet 1     No facility-administered encounter medications on file as of 2018.        Again, thank you for allowing me to participate in the care of your patient.      Sincerely,    Sundar Garcia MD     [facility]

## 2018-06-21 NOTE — PATIENT INSTRUCTIONS
"You were seen today in the Adult Congenital and Cardiovascular Genetics Clinic at the [facility].    Cardiology Providers you saw during your visit:  Dr. Alondra Garcia     Diagnosis:  Marfan's     Results:  No testing today    Recommendations:    1.  Continue to eat a heart healthy, low salt diet.  2.  Continue to get 20-30 minutes of aerobic activity, 4-5 days per week.  Examples of aerobic activity include walking, running, swimming, cycling, etc.  3.  Continue to observe good oral hygiene, with regular dental visits.  4.  Please start a baby aspirin - 81 mg tablet daily.   5. Please have all 1st degree relatives screened for Marfans with an echo and possibly see a genetic counselor for genetic testing.  You can call our genetic counselor Anita Luna to discuss genetic testing for your children.   6. We will call you with a referral for a spine doctor at the [facility].  7. Please have labs drawn - fasting labs (TSH and Lipid panel).  8. Please have a CT angiogram of your chest/abdomen/ pelvis and your coronary arteries.  9. Please follow up with the device clinic to establish routine care.       Vitals:    06/21/18 0808   BP: 132/86   BP Location: Right arm   Cuff Size: Adult Large   Pulse: 60   Weight: 75.6 kg (166 lb 11.2 oz)   Height: 1.803 m (5' 11\")     SBE prophylaxis:   Yes__X__  No____    Lifelong Bacterial Endocarditis Prophylaxis:  YES__X__  NO____    If YES is checked, follow the recommendations outlined below:  1. Take antibiotic(s) prior to recommended dental procedures and procedures on the respiratory tract or with infected skin, muscle or bones. SBE prophylaxis is not needed for routine GI and  procedures (ie. Colonoscopy or vaginal delivery)  2. Observe good oral hygiene daily, as advised by your dentist. Get regular professional dental care.  3. Keep cuts clean.  4. Infections should be treated promptly.  5. Symptoms of Infective Endocarditis could include: fever lasting more " than 4-5 days or a recurrent fever that initially resolves but returns within 1-2 days)      Exercise restrictions:   Yes__X__  No____         If yes, list restrictions:  Must be allowed to rest if fatigued or SOB      Work restrictions:  Yes____  No_X___         If yes, list restrictions:      Follow-up:  Follow up with Dr. Garcia in 1 year with an echo - and Anita Luna genetic councselor if you would like.     If you have questions or concerns please contact us at:    Brody Elder, RN, BSN   Odell Cartagena (Scheduling)  Nurse Coordinator     Clinic   Adult Congenital and CV Genetics Adult Congenital and CV Genetic  [facility]  (P)[phone]    (P) [phone]  [email or web address] (F)[phone]        For after hours urgent needs, call [phone] and ask to speak to the Adult Congenital Physician on call.  Mention Job Code 0401.    For emergencies call 911.    [facility]  [facility]  Mail Code [id number]  [address] [place] [address]  [id number]

## 2018-06-21 NOTE — MR AVS SNAPSHOT
"              After Visit Summary   6/21/2018    Richard Ball    MRN: [id number]           Patient Information     Date Of Birth          1960        Visit Information        Provider Department      6/21/2018 8:00 AM Sundar Garcia MD [facility]   [place]        Today's Diagnoses     Marfan's syndrome    -  1    S/P aortic valve replacement/ 2.5-3.5        Pacemaker        S/P AVR (aortic valve replacement)        Ascending aortic aneurysm (H)          Care Instructions    You were seen today in the Adult Congenital and Cardiovascular Genetics Clinic at the [facility].    Cardiology Providers you saw during your visit:  Dr. Alondra Garcia     Diagnosis:  Marfan's     Results:  No testing today    Recommendations:    1.  Continue to eat a heart healthy, low salt diet.  2.  Continue to get 20-30 minutes of aerobic activity, 4-5 days per week.  Examples of aerobic activity include walking, running, swimming, cycling, etc.  3.  Continue to observe good oral hygiene, with regular dental visits.  4.  Please start a baby aspirin - 81 mg tablet daily.   5. Please have all 1st degree relatives screened for Marfans with an echo and possibly see a genetic counselor for genetic testing.  You can call our genetic counselor Anita Luna to discuss genetic testing for your children.   6. We will call you with a referral for a spine doctor at the [facility].  7. Please have labs drawn - fasting labs (TSH and Lipid panel).  8. Please have a CT angiogram of your chest/abdomen/ pelvis and your coronary arteries.  9. Please follow up with the device clinic to establish routine care.       Vitals:    06/21/18 0808   BP: 132/86   BP Location: Right arm   Cuff Size: Adult Large   Pulse: 60   Weight: 75.6 kg (166 lb 11.2 oz)   Height: 1.803 m (5' 11\")     SBE prophylaxis:   Yes__X__  No____    Lifelong Bacterial Endocarditis Prophylaxis:  YES__X__  NO____    If YES is checked, follow " the recommendations outlined below:  1. Take antibiotic(s) prior to recommended dental procedures and procedures on the respiratory tract or with infected skin, muscle or bones. SBE prophylaxis is not needed for routine GI and  procedures (ie. Colonoscopy or vaginal delivery)  2. Observe good oral hygiene daily, as advised by your dentist. Get regular professional dental care.  3. Keep cuts clean.  4. Infections should be treated promptly.  5. Symptoms of Infective Endocarditis could include: fever lasting more than 4-5 days or a recurrent fever that initially resolves but returns within 1-2 days)      Exercise restrictions:   Yes__X__  No____         If yes, list restrictions:  Must be allowed to rest if fatigued or SOB      Work restrictions:  Yes____  No_X___         If yes, list restrictions:      Follow-up:  Follow up with Dr. Garcia in 1 year with an echo - and palmira Gonzales councselor if you would like.     If you have questions or concerns please contact us at:    Brody Elder RN, BSN   Odell Cartagena (Scheduling)  Nurse Coordinator     Clinic   Adult Congenital and CV Genetics Adult Congenital and CV Genetic  [facility]  (P)[phone]    (P) [phone]  [email or web address] (F)[phone]        For after hours urgent needs, call [phone] and ask to speak to the Adult Congenital Physician on call.  Mention Job Code 0401.    For emergencies call 911.    [facility]   Clinics and Surgery Center  Mail Code [id number]  [address] [place] [address]  [id number]           Follow-ups after your visit        Additional Services     Follow-Up with Device Clinic                 Future tests that were ordered for you today     Open Future Orders        Priority Expected Expires Ordered    Follow-Up with Device Clinic Routine 6/22/2018 6/21/2019  "6/21/2018    CT Chest/Abd/Pelvis Angio w Processing Routine 6/22/2018 6/21/2019 6/21/2018    CT Angiogram coronary artery Routine 6/22/2018 6/21/2019 6/21/2018            Who to contact     If you have questions or need follow up information about today's clinic visit or your schedule please contact [facility] directly at [phone].  Normal or non-critical lab and imaging results will be communicated to you by Hub[employer], letter or phone within 4 business days after the clinic has received the results. If you do not hear from us within 7 days, please contact the clinic through [facility] [employer] or phone. If you have a critical or abnormal lab result, we will notify you by phone as soon as possible.  Submit refill requests through [employer] or call your pharmacy and they will forward the refill request to us. Please allow 3 business days for your refill to be completed.          Additional Information About Your Visit        [employer] Information     [employer] gives you secure access to your electronic health record. If you see a primary care provider, you can also send messages to your care team and make appointments. If you have questions, please call your primary care clinic.  If you do not have a primary care provider, please call [phone] and they will assist you.        Care EveryWhere ID     This is your Care EveryWhere ID. This could be used by other organizations to access your [facility] medical records  [id number]        Your Vitals Were     Pulse Height BMI (Body Mass Index)             60 1.803 m (5' 11\") 23.25 kg/m2          Blood Pressure from Last 3 Encounters:   06/21/18 132/86   05/01/18 119/67   04/11/18 132/74    Weight from Last 3 Encounters:   06/21/18 75.6 kg (166 lb 11.2 oz)   05/01/18 73.5 kg (162 lb)   04/11/18 76.4 kg (168 lb 6.4 oz)                 Today's Medication Changes          These changes are accurate as of 6/21/18  9:19 AM.  If you have any questions, " ask your nurse or doctor.               Start taking these medicines.        Dose/Directions    aspirin 81 MG chewable tablet   Used for:  Marfan's syndrome, S/P aortic valve replacement   Started by:  Sundar Garcia MD        Dose:  81 mg   Take 1 tablet (81 mg) by mouth daily   Quantity:  90 tablet   Refills:  3            Where to get your medicines      These medications were sent to [facility] [id number]  [address] [facility] [address]     Phone:  [phone]     aspirin 81 MG chewable tablet                Primary Care Provider Office Phone # Fax #    Royer Roblero [phone]       [address] [place] [address]        Equal Access to Services     [facility]: Hadii gregory nayak hadasho Soomaali, waaxda luqadaha, qaybta kaalmada adeegyada, waxay anthonyin haykyle qureshi . So [facility] [phone].    ATENCIÓN: Si habla español, tiene a joel disposición servicios gratuitos de asistencia lingüística. [facility] [phone].    We comply with applicable federal civil rights laws and [place] laws. We do not discriminate on the basis of race, color, national origin, age, disability, sex, sexual orientation, or gender identity.            Thank you!     Thank you for choosing [facility]  for your care. Our goal is always to provide you with excellent care. Hearing back from our patients is one way we can continue to improve our services. Please take a few minutes to complete the written survey that you may receive in the mail after your visit with us. Thank you!             Your Updated Medication List - Protect others around you: Learn how to safely use, store and throw away your medicines at www.disposemymeds.org.          This list is accurate as of 6/21/18  9:19 AM.  Always use your most recent med list.                   Brand Name Dispense Instructions for use Diagnosis     ALLEGRA PO      Take by mouth daily as needed for allergies        aspirin 81 MG chewable tablet     90 tablet    Take 1 tablet (81 mg) by mouth daily    Marfan's syndrome, S/P aortic valve replacement       atenolol 50 MG tablet    TENORMIN     Take 50 mg by mouth daily        atorvastatin 20 MG tablet    LIPITOR     daily        COUMADIN 5 MG tablet   Generic drug:  warfarin     30 tablet    Take 1 tablet (5 mg) by mouth daily Alternating with 2.5 every other day    S/P aortic valve replacement, Marfan's syndrome       lisinopril 5 MG tablet    PRINIVIL/ZESTRIL    90 tablet    Take 1 tablet (5 mg) by mouth daily        triamcinolone 0.1 % cream    KENALOG    80 g    Apply sparingly to affected area three times daily as needed    Dermatitis       VENTOLIN  (90 Base) MCG/ACT Inhaler   Generic drug:  albuterol      daily as needed Reported on 3/24/2017

## 2018-06-21 NOTE — LETTER
6/21/2018    Royer Roblero MD, MD  [address] [place] [address]    RE: Richard Ball       Dear Colleague,    I had the pleasure of seeing Richard Ball in the [facility].    HPI:     Please see dictated note    PAST MEDICAL HISTORY:  Past Medical History:   Diagnosis Date     Heart abnormality     Artifical aortic graft - ascending aorta     Hemothorax      Marfan's syndrome 12/6/2011       CURRENT MEDICATIONS:  Current Outpatient Prescriptions   Medication Sig Dispense Refill     atenolol (TENORMIN) 50 MG tablet Take 50 mg by mouth daily       atorvastatin (LIPITOR) 20 MG tablet        fexofenadine (ALLEGRA) 180 MG tablet Take 1 tablet (180 mg) by mouth daily (Patient not taking: Reported on 3/24/2017) 30 tablet 1     lisinopril (PRINIVIL/ZESTRIL) 5 MG tablet Take 1 tablet (5 mg) by mouth daily 90 tablet 1     triamcinolone (KENALOG) 0.1 % cream Apply sparingly to affected area three times daily as needed 80 g 0     VENTOLIN  (90 BASE) MCG/ACT inhaler Reported on 3/24/2017       warfarin (COUMADIN) 5 MG tablet Take 1 tablet (5 mg) by mouth daily Alternating with 2.5 every other day 30 tablet        PAST SURGICAL HISTORY:  Past Surgical History:   Procedure Laterality Date     AORTIC VALVE REPLACEMENT  8/2/2001    Ascending aorta graft     HC REMOVE TONSILS/ADENOIDS,<12 Y/O      T & A <13y.o.     HC VASECTOMY UNILAT/BILAT W POSTOP SEMEN      Vasectomy     IMPLANT PACEMAKER         ALLERGIES   No Known Allergies    FAMILY HISTORY:  Family History   Problem Relation Age of Onset     Asthma No family hx of      Diabetes No family hx of      Hypertension No family hx of      Breast Cancer No family hx of      Cancer - colorectal No family hx of      Prostate Cancer No family hx of      Lipids No family hx of      Musculoskeletal Disorder No family hx of      Neurologic Disorder No family hx of        SOCIAL HISTORY:  Social History     Social History      "Marital status:      Spouse name: N/A     Number of children: 2     Years of education: 16     Occupational History      Unemployed     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 No     Exercise Yes     Seat Belt Yes     Self-Exams Yes     Social History Narrative       ROS:   Constitutional: No fever, chills, or sweats. No weight gain/loss   ENT: No visual disturbance, ear ache, epistaxis, sore throat  Allergies/Immunologic: Negative.   Respiratory: No cough, hemoptysia  Cardiovascular: As per HPI  GI: No nausea, vomiting, hematemesis, melena, or hematochezia  : No urinary frequency, dysuria, or hematuria  Integument: Negative  Psychiatric: Negative  Neuro: Negative  Endocrinology: Negative   Musculoskeletal: Negative    EXAM:  /86 (BP Location: Right arm, Cuff Size: Adult Large)  Pulse 60  Ht 1.803 m (5' 11\")  Wt 75.6 kg (166 lb 11.2 oz)  BMI 23.25 kg/m2  In general, the patient is a pleasant male in no apparent distress.    HEENT: NC/AT.  PERRLA.  EOMI.  Sclerae white, not injected.  Nares clear.  Pharynx without erythema or exudate.  Dentition intact.  Wears glasses  Neck:  Carotids +4/4 bilaterally without bruits.  No jugular venous distension.   Heart: RRR. Normal S1, S2 splits physiologically. There is a mechanical S2.  Soft systolic flow murmur. No diastolic murmur  Lungs: CTA.    Abdomen: Soft, nontender, nondistended.   Extremities: No clubbing, cyanosis, or edema. Scoliosis.  Neurologic: Alert and oriented to person/place/time, normal speech, gait and affect  Skin: No petechiae, purpura or rash.    Labs:  LIPID RESULTS:    LIVER ENZYME RESULTS:  Lab Results   Component Value Date "    AST 19 2018    ALT 18 2018       CBC RESULTS:  Lab Results   Component Value Date    WBC 3.2 (L) 2018    RBC 4.28 (L) 2018    HGB 13.1 (L) 2018    HCT 37.7 (L) 2018    MCV 88 2018    MCH 30.6 2018    MCHC 34.7 2018    RDW 14.0 2018     (L) 2018       BMP RESULTS:  Lab Results   Component Value Date     2018    POTASSIUM 3.9 2018    CHLORIDE 105 2018    CO2 29 2018    ANIONGAP 8 2018    GLC 94 2018    BUN 12 2018    CR 0.82 2018    GFRESTIMATED >90 2018    GFRESTBLACK >90 2018    CLINT 8.4 (L) 2018        A1C RESULTS:  No results found for: A1C    INR RESULTS:  Lab Results   Component Value Date    INR 2.11 (H) 2018    INR 3.4 2014    INR 3.2 2011       LASHAWN Garcia MD [facility]  Adult Congenital and Interventional Cardiology   Physicians Heart    CC  Patient Care Team:  Royer Bass MD as PCP - General (Internal Medicine)  ROYER BASS    Service Date: 2018      HISTORY OF PRESENT ILLNESS:  Mr. Ball is a very pleasant 58-year-old gentleman who has a past medical history significant for Marfan syndrome.  Unfortunately, his childhood was traumatized by the sudden death of his dad when he was 2 year old from a brain aneurysm.  His dad was not known at that time to have Marfan's.  His mom subsequently  when he was 4 years old of ovarian cancer.  She was 39 at the time, and his family was then split up when he was adopted by a family in [place] and his 4 sisters were adopted by other families.  One of his sisters  from ovarian cancer at age 42.  He has 2 other sisters who are living and he is in contact with that have not been screened for Marfan syndrome.  A fourth sister no one has heard from in a number of years.  He has 2 children, age 28, daughter, and 29, a son, neither one of them have been screened for Marfan  syndrome.      Mr. Ball was diagnosed with Marfan at age 24.  He apparently had genetic testing done at [facility] in [place] after a murmur was found and then he was found to have an aneurysm.  He ultimately required surgery in 2001, which was done at [facility] in [place] by Dr. Arriola.  He believes his aorta was 5.5 cm at the time, but at this point we do not have these records either from [place] or from [place].  They did replace his aortic valve and reimplanted his coronary arteries.  His last testing appears to be from 2014 and this was done at [facility].  They did not note any additional aneurysms at that time with a CT. He does have a pacemaker which was placed at the time of surgery due to heart block.  Apparently his upper lead has failed, and only his lower lead is working.  He is 100% paced.      Mr. Ball works as a  in [employer].  He is  for 2 years to his , and he is with him today and very supportive.  He has been on atenolol 50 mg a day and lisinopril 5 mg a day as well as Lipitor 20 mg a day for quite some time.  He is also on Coumadin given his mechanical valve.  He has not been on a baby aspirin for unclear reasons.  He does see the dentist every 6 months and takes antibiotics beforehand.  He sees the ophthalmologist yearly.  He has never had any retinal detachment.  He has significant scoliosis for which he had to wear a brace in high school; however, he has back pain and has not seen anybody that can deal with that.        Mr. Ball was followed at [facility] by Dr. Oliveros after a recent hospitalization here at [facility] and has been referred to Adult Congenital Cardiovascular Genetics Clinic and seeing me today, as noted, for the first time.  His recent visit at [facility] involved left-sided heaviness that occurred suddenly around 9:00 a.m. on 05/01/2018.  He felt like his arm and leg were  heavy.  He felt nauseated, lightheaded and there was also some numbness associated.  He ended up at the ER here.  They did do a CT of the head that at that time was unremarkable and his heaviness resolved after 2 minutes, but he continued to have numbness actually for hours, ultimately resolved around 12 that same day.  It was a CT angio, and they did not notice any cerebral aneurysms, and the CT of the head was also unremarkable.  He cannot have an MRI given his pacemaker.  He is otherwise doing well.  He is pretty active.  He knows his limitations with regards to his aneurysm, and he did undergo an echo on 05/01/2018 that showed an EF of 50%-60%, mechanical aortic valve with a mean gradient of 7 and no concerning findings.  He was noted to have mild perivalvular AI.      IMPRESSION, REPORT, PLAN:   1.  Marfan syndrome.   2.  Aortic aneurysm, status post composite graft placement with mechanical aortic valve at [facility] in [place] in 2001 by Dr. Arriola.   3.  TIA 05/01/2018 with complete resolution of symptoms.  Negative CTA of the head with no aneurysms found or bleeding.   4.  Hypertension, well controlled on atenolol and lisinopril.   5.  Hyperlipidemia, on Lipitor 20.   6.  Scoliosis with back pain.   7.  Need for family members screening.   8.  Pacemaker placement in 2001 with a failure of the atrial lead.      DISCUSSION:  It was a pleasure to see Mr. Ball in Adult Congenital Cardiovascular Genetics Clinic.  Today I reviewed his history, his symptoms and the testing he has had thus far in detail with him and his .  We will work on getting his operative report from [facility] in [place] as well as his genetic testing that was done in [place] at the [facility].  I did discuss with him the importance of having his family members screened and with knowing the gene that he is positive for Marfan, it will be beneficial for them to have genetic testing, but at the minimum they will  need an echo.  We discussed that he see his ophthalmologist yearly, which he will continue.  We will check for cholesterol and TSH, will to the CTA of the chest, abdomen and pelvis that is indicated as well as a coronary CTA to look at the coronary buttons, and we can call with those results.  His valve is functioning well at this time.  I did recommend being on a baby aspirin a day.  He already does take antibiotic prophylaxis before dental work, which he will continue.  We will work on getting him to a specialist for his back as well.  He understands and is in agreement with the plan as outlined.  All questions were answered.  It was a pleasure to see him.  Please do not hesitate to contact me with any questions or concerns.         SUNDAR GARCIA MD             D: 2018   T: 2018   MT: JOSE      Name:     KAVON GARCIA   MRN:      [id number]        Account:      [id number]   :      1960           Service Date: 2018      Document: [id number]       Thank you for allowing me to participate in the care of your patient.      Sincerely,     Sundar Garcia MD     [facility]    cc:   Royer Roblero MD  [address] [place] [address]

## 2018-06-21 NOTE — NURSING NOTE
Chief Complaint   Patient presents with     Congenital Heart Disease        Cardiac Testing: Patient given instructions regarding  echocardiogram  (CTA chest/abd/pelvis - coronary). Discussed purpose, preparation, procedure and when to expect results reported back to the patient. Patient demonstrated understanding of this information and agreed to call with further questions or concerns.  Labs: Patient was given results of the laboratory testing obtained today. Patient was instructed to return for the next laboratory testing next week . Patient demonstrated understanding of this information and agreed to call with further questions or concerns.   Med Reconcile: Reviewed and verified all current medications with the patient. The updated medication list was printed and given to the patient.  New Medication: Patient was educated regarding newly prescribed medication, including discussion of  the indication, administration, side effects, and when to report to MD or RN. Patient demonstrated understanding of this information and agreed to call with further questions or concerns.  Return Appointment: Patient given instructions regarding scheduling next clinic visit. Patient demonstrated understanding of this information and agreed to call with further questions or concerns.  Patient stated he understood all health information given and agreed to call with further questions or concerns.     Brody Elder RN, BSN  Cardiology Care Coordinator  [facility] Heart  [email or web address]  [phone]

## 2018-06-21 NOTE — PROGRESS NOTES
HPI:     Please see dictated note    PAST MEDICAL HISTORY:  Past Medical History:   Diagnosis Date     Heart abnormality     Artifical aortic graft - ascending aorta     Hemothorax      Marfan's syndrome 12/6/2011       CURRENT MEDICATIONS:  Current Outpatient Prescriptions   Medication Sig Dispense Refill     atenolol (TENORMIN) 50 MG tablet Take 50 mg by mouth daily       atorvastatin (LIPITOR) 20 MG tablet        fexofenadine (ALLEGRA) 180 MG tablet Take 1 tablet (180 mg) by mouth daily (Patient not taking: Reported on 3/24/2017) 30 tablet 1     lisinopril (PRINIVIL/ZESTRIL) 5 MG tablet Take 1 tablet (5 mg) by mouth daily 90 tablet 1     triamcinolone (KENALOG) 0.1 % cream Apply sparingly to affected area three times daily as needed 80 g 0     VENTOLIN  (90 BASE) MCG/ACT inhaler Reported on 3/24/2017       warfarin (COUMADIN) 5 MG tablet Take 1 tablet (5 mg) by mouth daily Alternating with 2.5 every other day 30 tablet        PAST SURGICAL HISTORY:  Past Surgical History:   Procedure Laterality Date     AORTIC VALVE REPLACEMENT  8/2/2001    Ascending aorta graft     HC REMOVE TONSILS/ADENOIDS,<14 Y/O      T & A <13y.o.     HC VASECTOMY UNILAT/BILAT W POSTOP SEMEN      Vasectomy     IMPLANT PACEMAKER         ALLERGIES   No Known Allergies    FAMILY HISTORY:  Family History   Problem Relation Age of Onset     Asthma No family hx of      Diabetes No family hx of      Hypertension No family hx of      Breast Cancer No family hx of      Cancer - colorectal No family hx of      Prostate Cancer No family hx of      Lipids No family hx of      Musculoskeletal Disorder No family hx of      Neurologic Disorder No family hx of        SOCIAL HISTORY:  Social History     Social History     Marital status:      Spouse name: N/A     Number of children: 2     Years of education: 16     Occupational History      Unemployed     Fabricator in window [employer]     Social History Main Topics     Smoking status: Never  "Smoker     Smokeless tobacco: Never Used     Alcohol use 2.4 oz/week     4 Standard drinks or equivalent per week      Comment: 2 drinks week     Drug use: No     Sexual activity: No     Other Topics Concern     Parent/Sibling W/ Cabg, Mi Or Angioplasty Before 65f 55m? No     Exercise Yes     Seat Belt Yes     Self-Exams Yes     Social History Narrative       ROS:   Constitutional: No fever, chills, or sweats. No weight gain/loss   ENT: No visual disturbance, ear ache, epistaxis, sore throat  Allergies/Immunologic: Negative.   Respiratory: No cough, hemoptysia  Cardiovascular: As per HPI  GI: No nausea, vomiting, hematemesis, melena, or hematochezia  : No urinary frequency, dysuria, or hematuria  Integument: Negative  Psychiatric: Negative  Neuro: Negative  Endocrinology: Negative   Musculoskeletal: Negative    EXAM:  /86 (BP Location: Right arm, Cuff Size: Adult Large)  Pulse 60  Ht 1.803 m (5' 11\")  Wt 75.6 kg (166 lb 11.2 oz)  BMI 23.25 kg/m2  In general, the patient is a pleasant male in no apparent distress.    HEENT: NC/AT.  PERRLA.  EOMI.  Sclerae white, not injected.  Nares clear.  Pharynx without erythema or exudate.  Dentition intact.  Wears glasses  Neck:  Carotids +4/4 bilaterally without bruits.  No jugular venous distension.   Heart: RRR. Normal S1, S2 splits physiologically. There is a mechanical S2.  Soft systolic flow murmur. No diastolic murmur  Lungs: CTA.    Abdomen: Soft, nontender, nondistended.   Extremities: No clubbing, cyanosis, or edema. Scoliosis.  Neurologic: Alert and oriented to person/place/time, normal speech, gait and affect  Skin: No petechiae, purpura or rash.    Labs:  LIPID RESULTS:    LIVER ENZYME RESULTS:  Lab Results   Component Value Date    AST 19 05/01/2018    ALT 18 05/01/2018       CBC RESULTS:  Lab Results   Component Value Date    WBC 3.2 (L) 05/01/2018    RBC 4.28 (L) 05/01/2018    HGB 13.1 (L) 05/01/2018    HCT 37.7 (L) 05/01/2018    MCV 88 05/01/2018    " MCH 30.6 05/01/2018    MCHC 34.7 05/01/2018    RDW 14.0 05/01/2018     (L) 05/01/2018       BMP RESULTS:  Lab Results   Component Value Date     05/01/2018    POTASSIUM 3.9 05/01/2018    CHLORIDE 105 05/01/2018    CO2 29 05/01/2018    ANIONGAP 8 05/01/2018    GLC 94 05/01/2018    BUN 12 05/01/2018    CR 0.82 05/01/2018    GFRESTIMATED >90 05/01/2018    GFRESTBLACK >90 05/01/2018    CLINT 8.4 (L) 05/01/2018        A1C RESULTS:  No results found for: A1C    INR RESULTS:  Lab Results   Component Value Date    INR 2.11 (H) 05/01/2018    INR 3.4 05/29/2014    INR 3.2 12/16/2011       LASHAWN Garcia MD [facility]  Adult Congenital [facility] Interventional Cardiology   [facility]    CC  Patient Care Team:  Royer Bass MD as PCP - General (Internal Medicine)  ROYER BASS

## 2018-06-21 NOTE — PROGRESS NOTES
Service Date: 2018      HISTORY OF PRESENT ILLNESS:  Mr. Ball is a very pleasant 58-year-old gentleman who has a past medical history significant for Marfan syndrome.  Unfortunately, his childhood was traumatized by the sudden death of his dad when he was 2 year old from a brain aneurysm.  His dad was not known at that time to have Marfan's.  His mom subsequently  when he was 4 years old of ovarian cancer.  She was 39 at the time, and his family was then split up when he was adopted by a family in [place] and his 4 sisters were adopted by other families.  One of his sisters  from ovarian cancer at age 42.  He has 2 other sisters who are living and he is in contact with that have not been screened for Marfan syndrome.  A fourth sister no one has heard from in a number of years.  He has 2 children, age 28, daughter, and 29, a son, neither one of them have been screened for Marfan syndrome.      Mr. Ball was diagnosed with Marfan at age 24.  He apparently had genetic testing done at [facility] in [place] after a murmur was found and then he was found to have an aneurysm.  He ultimately required surgery in , which was done at [facility] in [place] by Dr. Arriola.  He believes his aorta was 5.5 cm at the time, but at this point we do not have these records either from [place] or from [place].  They did replace his aortic valve and reimplanted his coronary arteries.  His last testing appears to be from  and this was done at [facility] in [place].  They did not note any additional aneurysms at that time with a CT. He does have a pacemaker which was placed at the time of surgery due to heart block.  Apparently his upper lead has failed, and only his lower lead is working.  He is 100% paced.      Mr. Ball works as a  in [employer].  He is  for 2 years to his , and he is with him today and very supportive.  He has been on atenolol 50  mg a day and lisinopril 5 mg a day as well as Lipitor 20 mg a day for quite some time.  He is also on Coumadin given his mechanical valve.  He has not been on a baby aspirin for unclear reasons.  He does see the dentist every 6 months and takes antibiotics beforehand.  He sees the ophthalmologist yearly.  He has never had any retinal detachment.  He has significant scoliosis for which he had to wear a brace in high school; however, he has back pain and has not seen anybody that can deal with that.        Mr. Ball was followed [place] [facility] [place]. Oliveros after a recent hospitalization here at [facility] and has been referred to [facility] and seeing me today, as noted, for the first time.  His recent visit at [facility] involved left-sided heaviness that occurred suddenly around 9:00 a.m. on 05/01/2018.  He felt like his arm and leg were heavy.  He felt nauseated, lightheaded and there was also some numbness associated.  He ended up at the ER here.  They did do a CT of the head that at that time was unremarkable and his heaviness resolved after 2 minutes, but he continued to have numbness actually for hours, ultimately resolved around 12 that same day.  It was a CT angio, and they did not notice any cerebral aneurysms, and the CT of the head was also unremarkable.  He cannot have an MRI given his pacemaker.  He is otherwise doing well.  He is pretty active.  He knows his limitations with regards to his aneurysm, and he did undergo an echo on 05/01/2018 that showed an EF of 50%-60%, mechanical aortic valve with a mean gradient of 7 and no concerning findings.  He was noted to have mild perivalvular AI.      IMPRESSION, REPORT, PLAN:   1.  Marfan syndrome.   2.  Aortic aneurysm, status post composite graft placement with mechanical aortic valve at [facility] in [place] in 2001 by Dr. Arriola.   3.  TIA 05/01/2018 with complete resolution of symptoms.   Negative CTA of the head with no aneurysms found or bleeding.   4.  Hypertension, well controlled on atenolol and lisinopril.   5.  Hyperlipidemia, on Lipitor 20.   6.  Scoliosis with back pain.   7.  Need for family members screening.   8.  Pacemaker placement in  with a failure of the atrial lead.      DISCUSSION:  It was a pleasure to see Mr. Ball in [facility].  Today I reviewed his history, his symptoms and the testing he has had thus far in detail with him and his .  We will work on getting his operative report from [facility] in [place] as well as his genetic testing that was done in [place] at the [facility].  I did discuss with him the importance of having his family members screened and with knowing the gene that he is positive for Marfan, it will be beneficial for them to have genetic testing, but at the minimum they will need an echo.  We discussed that he see his ophthalmologist yearly, which he will continue.  We will check for cholesterol and TSH, will to the CTA of the chest, abdomen and pelvis that is indicated as well as a coronary CTA to look at the coronary buttons, and we can call with those results.  His valve is functioning well at this time.  I did recommend being on a baby aspirin a day.  He already does take antibiotic prophylaxis before dental work, which he will continue.  We will work on getting him to a specialist for his back as well.  He understands and is in agreement with the plan as outlined.  All questions were answered.  It was a pleasure to see him.  Please do not hesitate to contact me with any questions or concerns.         HARRY SAINI MD             D: 2018   T: 2018   MT: JOSE      Name:     KAVON BALL   MRN:      [id number]        Account:      [id number]   :      1960           Service Date: 2018      Document: [id number]

## 2018-06-25 ENCOUNTER — HOSPITAL ENCOUNTER (OUTPATIENT)
Dept: CARDIOLOGY | Facility: CLINIC | Age: 58
Discharge: HOME | End: 2018-06-25
Attending: INTERNAL MEDICINE | Admitting: INTERNAL MEDICINE
Payer: COMMERCIAL

## 2018-06-25 ENCOUNTER — PRE VISIT (OUTPATIENT)
Dept: CARDIOLOGY | Facility: CLINIC | Age: 58
End: 2018-06-25

## 2018-06-25 VITALS — HEART RATE: 60 BPM | SYSTOLIC BLOOD PRESSURE: 99 MMHG | DIASTOLIC BLOOD PRESSURE: 62 MMHG

## 2018-06-25 DIAGNOSIS — Q87.40 MARFAN'S SYNDROME: ICD-10-CM

## 2018-06-25 DIAGNOSIS — Z95.2 S/P AVR (AORTIC VALVE REPLACEMENT): ICD-10-CM

## 2018-06-25 PROCEDURE — 71275 CT ANGIOGRAPHY CHEST: CPT

## 2018-06-25 PROCEDURE — 75574 CT ANGIO HRT W/3D IMAGE: CPT | Mod: 26 | Performed by: INTERNAL MEDICINE

## 2018-06-25 PROCEDURE — 25000128 H RX IP 250 OP 636: Performed by: INTERNAL MEDICINE

## 2018-06-25 PROCEDURE — 75574 CT ANGIO HRT W/3D IMAGE: CPT

## 2018-06-25 PROCEDURE — 25000132 ZZH RX MED GY IP 250 OP 250 PS 637: Performed by: INTERNAL MEDICINE

## 2018-06-25 RX ORDER — NITROGLYCERIN 0.4 MG/1
0.4 TABLET SUBLINGUAL
Status: DISCONTINUED | OUTPATIENT
Start: 2018-06-25 | End: 2018-06-26 | Stop reason: HOSPADM

## 2018-06-25 RX ORDER — METHYLPREDNISOLONE SODIUM SUCCINATE 125 MG/2ML
125 INJECTION, POWDER, LYOPHILIZED, FOR SOLUTION INTRAMUSCULAR; INTRAVENOUS
Status: DISCONTINUED | OUTPATIENT
Start: 2018-06-25 | End: 2018-06-26 | Stop reason: HOSPADM

## 2018-06-25 RX ORDER — METOPROLOL TARTRATE 50 MG
50-100 TABLET ORAL
Status: DISCONTINUED | OUTPATIENT
Start: 2018-06-25 | End: 2018-06-26 | Stop reason: HOSPADM

## 2018-06-25 RX ORDER — METOPROLOL TARTRATE 1 MG/ML
5-15 INJECTION, SOLUTION INTRAVENOUS
Status: DISCONTINUED | OUTPATIENT
Start: 2018-06-25 | End: 2018-06-26 | Stop reason: HOSPADM

## 2018-06-25 RX ORDER — ACYCLOVIR 200 MG/1
0-1 CAPSULE ORAL
Status: DISCONTINUED | OUTPATIENT
Start: 2018-06-25 | End: 2018-06-26 | Stop reason: HOSPADM

## 2018-06-25 RX ORDER — IOPAMIDOL 755 MG/ML
150 INJECTION, SOLUTION INTRAVASCULAR ONCE
Status: COMPLETED | OUTPATIENT
Start: 2018-06-25 | End: 2018-06-25

## 2018-06-25 RX ORDER — DIPHENHYDRAMINE HCL 25 MG
25 CAPSULE ORAL
Status: DISCONTINUED | OUTPATIENT
Start: 2018-06-25 | End: 2018-06-26 | Stop reason: HOSPADM

## 2018-06-25 RX ORDER — DIPHENHYDRAMINE HYDROCHLORIDE 50 MG/ML
25-50 INJECTION INTRAMUSCULAR; INTRAVENOUS
Status: DISCONTINUED | OUTPATIENT
Start: 2018-06-25 | End: 2018-06-26 | Stop reason: HOSPADM

## 2018-06-25 RX ORDER — ONDANSETRON 2 MG/ML
4 INJECTION INTRAMUSCULAR; INTRAVENOUS
Status: DISCONTINUED | OUTPATIENT
Start: 2018-06-25 | End: 2018-06-26 | Stop reason: HOSPADM

## 2018-06-25 RX ADMIN — IOPAMIDOL 135 ML: 755 INJECTION, SOLUTION INTRAVENOUS at 15:35

## 2018-06-25 RX ADMIN — NITROGLYCERIN 0.4 MG: 0.4 TABLET SUBLINGUAL at 14:33

## 2018-06-25 NOTE — TELEPHONE ENCOUNTER
FUTURE VISIT INFORMATION      FUTURE VISIT INFORMATION:    Date: 06/25/2018    Time: 1256    Location: [facility]  REFERRAL INFORMATION:    Referring provider:  Luis Alberto    Referring providers clinic:  [facility]    Reason for visit/diagnosis  Incoming Records    RECORDS REQUESTED FROM:       Clinic name Comments Records Status Imaging Status   [facility] Genetic Testing Received -   [facility] OP and H&P Reports Received -                             RECORDS STATUS

## 2018-06-26 ENCOUNTER — ANTICOAGULATION THERAPY VISIT (OUTPATIENT)
Dept: CARDIOLOGY | Facility: CLINIC | Age: 58
End: 2018-06-26
Attending: INTERNAL MEDICINE
Payer: COMMERCIAL

## 2018-06-26 ENCOUNTER — TELEPHONE (OUTPATIENT)
Dept: CARDIOLOGY | Facility: CLINIC | Age: 58
End: 2018-06-26

## 2018-06-26 DIAGNOSIS — Z79.01 LONG-TERM (CURRENT) USE OF ANTICOAGULANTS: ICD-10-CM

## 2018-06-26 DIAGNOSIS — I25.10 LAD STENOSIS: Primary | ICD-10-CM

## 2018-06-26 DIAGNOSIS — Z95.2 HEART VALVE REPLACED: ICD-10-CM

## 2018-06-26 LAB
INR POINT OF CARE: 3.1 (ref 0.86–1.14)
INR PPP: 3

## 2018-06-26 PROCEDURE — 85610 PROTHROMBIN TIME: CPT | Mod: QW | Performed by: INTERNAL MEDICINE

## 2018-06-26 PROCEDURE — 36416 COLLJ CAPILLARY BLOOD SPEC: CPT | Performed by: INTERNAL MEDICINE

## 2018-06-26 RX ORDER — LIDOCAINE 40 MG/G
CREAM TOPICAL
Status: CANCELLED | OUTPATIENT
Start: 2018-06-26

## 2018-06-26 RX ORDER — SODIUM CHLORIDE 9 MG/ML
INJECTION, SOLUTION INTRAVENOUS CONTINUOUS
Status: CANCELLED | OUTPATIENT
Start: 2018-06-26

## 2018-06-26 NOTE — TELEPHONE ENCOUNTER
INR clinic returned Brody's call from earlier today in regards to patients INR and heart cath tomorrow. Will forward message to Brody DELA CRUZ.

## 2018-06-26 NOTE — PROGRESS NOTES
ANTICOAGULATION INITIAL CLINIC VISIT    Patient Name:  Richard Ball  Date:  6/26/2018  Referred by: Dr. Garcia  Contact Type:  Face to Face    SUBJECTIVE:  Coumadin education was completed today.  Topics covered include:  -Introduction to coumadin  -Proper Administration  -INR Testing  -Sign/Symptoms of Bleeding  -Signs/Symptoms of Clot Formation or Stroke  -Dietary Intake of Vitamin K  -Drug Interactions  -Anticoagulation Identification (bracelet, necklace or wallet card)  -Future Surgery  -Effects of Alcohol, Tobacco, and Exercise on Coumadin    Coumadin Education Booklet and Coumadin Identification Wallet Card were given to the patient.       Patient Findings     Positives Initiation of therapy    Comments Been on Warfarin since 2001 after AVR surgery, switching from [facility] clinic to /[facility] clinics for INR management          OBJECTIVE    INR Protime   Date Value Ref Range Status   06/26/2018 3.1 (A) 0.86 - 1.14 Final       ASSESSMENT / PLAN  INR assessment THER    Recheck INR In: 3 DAYS    INR Location Clinic      Anticoagulation Summary as of 6/26/2018     INR goal 2.0-3.0   Today's INR 3.1!   Warfarin maintenance plan 2.5 mg (5 mg x 0.5) on Mon, Wed, Fri; 5 mg (5 mg x 1) all other days   Full warfarin instructions 6/26: Hold; Otherwise 2.5 mg on Mon, Wed, Fri; 5 mg all other days   Weekly warfarin total 27.5 mg   Plan last modified Teresa Espino, RN (6/26/2018)   Next INR check 6/29/2018   Target end date Indefinite    Indications   Long-term (current) use of anticoagulants [Z79.01] [Z79.01]  Heart valve replaced [Z95.2] [Z95.2]         Anticoagulation Episode Summary     INR check location     Preferred lab     Send INR reminders to [facility] HEART INR NURSE    Comments AVR in 2001, range 2-3, no Lovenox needed      Anticoagulation Care Providers     Provider Role Specialty Phone number    March, Sundar Cantu MD Responsible Cardiology [phone]            See the Encounter Report to view  Anticoagulation Flowsheet and Dosing Calendar (Go to Encounters tab in chart review, and find the Anticoagulation Therapy Visit)    INR 3.1.  Patient was added to the INR schedule today per Dr. Garcia.  He is scheduled for heart cath tomorrow and they want INR 1.8-2.0.  Patient has been managed by [facility] clinic but he is switching INR management to our clinic now that he established care with Dr. Garcia.  He has history of Marfan syndrome and s/p mechanical aortic valve in 2001.  His most consistent warfarin dosing schedule was 4 days of 2.5mg and 3 days of 5mg.  On 5/1/18 he had possible TIA with complete resolution of symptoms.  At that time his range changed from 2-3 to 2.5-3.5 so his dosing was increased to 4 days of 5mg and 3 days of 2.5mg.  I spoke to LANIE Jean and confirmed that his range is standard AVR range 2-3 per Dr. Garcia and ASA has been added.  Called and left message for LANIE Jean with INR of 3.1 today.  Patient had already held his Warfarin last night on Monday and he will hold his dose again tonight and eat greens for lunch and dinner and the decision to move ahead with the heart cath tomorrow will be up to Dr. Garcia and her team.  NO lovenox needed if INR <2.  He is scheduled for device check in [place] on Friday and CT will be cancelled according to the patient so we will check an INR on Friday since he will be in the clinic. Otherwise, once he gets past holding for heart cath we should be able to resume his normal dosing schedule and recheck INR in 10 days which I scheduled in [place] then we just need to decide if he needs 3 or 4 days of the 2.5mg.  He said he took tylenol recently and usually that isn't needed.  INR was 2.11 on 5/1 then he said it was 3.0 when checked by neuro clinic about 3 weeks ago.  Rios Espino RN

## 2018-06-26 NOTE — MR AVS SNAPSHOT
Richard Ball   6/26/2018 10:20 AM   Anticoagulation Therapy Visit    Description:  58 year old male   Provider:  [facility]   Department:  [facility]           INR as of 6/26/2018     Today's INR 3.1!      Anticoagulation Summary as of 6/26/2018     INR goal 2.0-3.0   Today's INR 3.1!   Full warfarin instructions 6/26: Hold; Otherwise 2.5 mg on Mon, Wed, Fri; 5 mg all other days   Next INR check 6/29/2018    Indications   Long-term (current) use of anticoagulants [Z79.01] [Z79.01]  Heart valve replaced [Z95.2] [Z95.2]         Your next Anticoagulation Clinic appointment(s)     Jun 29, 2018 10:00 AM CDT   Anticoagulation Visit with [facility]   [facility]    [address]  [facility] [address]   [phone] OPT 2            Jul 10, 2018  9:40 AM CDT   Anticoagulation Visit with RU ANTICOAGULATION   [facility] ([facility])    [address]  [facility] [place] [place] [address]   [phone]              June 2018 Details    Sun Mon Tue Wed Thu Fri Sat          1               2                 3               4               5               6               7               8               9                 10               11               12               13               14               15               16                 17               18               19               20               21               22               23                 24               25               26      Hold   See details      27      2.5 mg         28      5 mg         29            30                Date Details   06/26 This INR check       Date of next INR:  6/29/2018         How to take your warfarin dose     To take:  2.5 mg Take 0.5 of a 5 mg tablet.    To take:  5 mg Take 1 of the 5 mg tablets.    Hold Do not take your warfarin dose. See the Details table to the right for  additional instructions.

## 2018-06-27 ENCOUNTER — APPOINTMENT (OUTPATIENT)
Dept: CARDIOLOGY | Facility: CLINIC | Age: 58
End: 2018-06-27
Attending: INTERNAL MEDICINE
Payer: COMMERCIAL

## 2018-06-27 ENCOUNTER — HOSPITAL ENCOUNTER (OUTPATIENT)
Facility: CLINIC | Age: 58
Discharge: HOME | End: 2018-06-27
Attending: INTERNAL MEDICINE | Admitting: INTERNAL MEDICINE
Payer: COMMERCIAL

## 2018-06-27 VITALS
TEMPERATURE: 97.9 F | RESPIRATION RATE: 18 BRPM | WEIGHT: 159 LBS | SYSTOLIC BLOOD PRESSURE: 128 MMHG | BODY MASS INDEX: 22.26 KG/M2 | HEIGHT: 71 IN | HEART RATE: 62 BPM | OXYGEN SATURATION: 97 % | DIASTOLIC BLOOD PRESSURE: 77 MMHG

## 2018-06-27 DIAGNOSIS — I25.10 LAD STENOSIS: ICD-10-CM

## 2018-06-27 DIAGNOSIS — I25.10 ATHEROSCLEROSIS OF NATIVE CORONARY ARTERY OF NATIVE HEART, ANGINA PRESENCE UNSPECIFIED: ICD-10-CM

## 2018-06-27 DIAGNOSIS — Z95.2 S/P AORTIC VALVE REPLACEMENT: Primary | ICD-10-CM

## 2018-06-27 DIAGNOSIS — Z98.890 STATUS POST CORONARY ANGIOGRAM: ICD-10-CM

## 2018-06-27 LAB
ANION GAP SERPL CALCULATED.3IONS-SCNC: 3 MMOL/L (ref 3–14)
APTT PPP: 36 SEC (ref 22–37)
BUN SERPL-MCNC: 12 MG/DL (ref 7–30)
CALCIUM SERPL-MCNC: 9 MG/DL (ref 8.5–10.1)
CHLORIDE SERPL-SCNC: 110 MMOL/L (ref 94–109)
CO2 SERPL-SCNC: 30 MMOL/L (ref 20–32)
CREAT SERPL-MCNC: 0.92 MG/DL (ref 0.66–1.25)
ERYTHROCYTE [DISTWIDTH] IN BLOOD BY AUTOMATED COUNT: 13.6 % (ref 10–15)
GFR SERPL CREATININE-BSD FRML MDRD: 85 ML/MIN/1.7M2
GLUCOSE SERPL-MCNC: 92 MG/DL (ref 70–99)
HCT VFR BLD AUTO: 42.1 % (ref 40–53)
HGB BLD-MCNC: 14.8 G/DL (ref 13.3–17.7)
INR PPP: 1.38 (ref 0.86–1.14)
KCT BLD-ACNC: 347 SEC (ref 75–150)
MCH RBC QN AUTO: 31.2 PG (ref 26.5–33)
MCHC RBC AUTO-ENTMCNC: 35.2 G/DL (ref 31.5–36.5)
MCV RBC AUTO: 89 FL (ref 78–100)
PLATELET # BLD AUTO: 103 10E9/L (ref 150–450)
POTASSIUM SERPL-SCNC: 4.5 MMOL/L (ref 3.4–5.3)
RBC # BLD AUTO: 4.75 10E12/L (ref 4.4–5.9)
SODIUM SERPL-SCNC: 143 MMOL/L (ref 133–144)
WBC # BLD AUTO: 4 10E9/L (ref 4–11)

## 2018-06-27 PROCEDURE — 25000125 ZZHC RX 250

## 2018-06-27 PROCEDURE — 85027 COMPLETE CBC AUTOMATED: CPT | Performed by: INTERNAL MEDICINE

## 2018-06-27 PROCEDURE — 93454 CORONARY ARTERY ANGIO S&I: CPT | Mod: 26 | Performed by: INTERNAL MEDICINE

## 2018-06-27 PROCEDURE — 85610 PROTHROMBIN TIME: CPT | Performed by: INTERNAL MEDICINE

## 2018-06-27 PROCEDURE — C1769 GUIDE WIRE: HCPCS

## 2018-06-27 PROCEDURE — 40000852 ZZH STATISTIC HEART CATH LAB OR EP LAB

## 2018-06-27 PROCEDURE — 80048 BASIC METABOLIC PNL TOTAL CA: CPT | Performed by: INTERNAL MEDICINE

## 2018-06-27 PROCEDURE — 40000065 ZZH STATISTIC EKG NON-CHARGEABLE

## 2018-06-27 PROCEDURE — 25000128 H RX IP 250 OP 636

## 2018-06-27 PROCEDURE — 85347 COAGULATION TIME ACTIVATED: CPT

## 2018-06-27 PROCEDURE — 27210946 ZZH KIT HC TOTES DISP CR8

## 2018-06-27 PROCEDURE — 27211089 ZZH KIT ACIST INJECTOR CR3

## 2018-06-27 PROCEDURE — 40000235 ZZH STATISTIC TELEMETRY

## 2018-06-27 PROCEDURE — 93010 ELECTROCARDIOGRAM REPORT: CPT | Performed by: INTERNAL MEDICINE

## 2018-06-27 PROCEDURE — 27210742 ZZH CATH CR1

## 2018-06-27 PROCEDURE — 93571 IV DOP VEL&/PRESS C FLO 1ST: CPT

## 2018-06-27 PROCEDURE — 27210914 ZZH SHEATH CR8

## 2018-06-27 PROCEDURE — 36415 COLL VENOUS BLD VENIPUNCTURE: CPT | Performed by: INTERNAL MEDICINE

## 2018-06-27 PROCEDURE — 27210998 ZZH ACCESS HEART CATH CR6

## 2018-06-27 PROCEDURE — 27210795 ZZH PAD DEFIB QUICK CR4

## 2018-06-27 PROCEDURE — 27210856 ZZH ACCESS HEART CATH CR2

## 2018-06-27 PROCEDURE — 99152 MOD SED SAME PHYS/QHP 5/>YRS: CPT | Mod: GC | Performed by: INTERNAL MEDICINE

## 2018-06-27 PROCEDURE — 27210787 ZZH MANIFOLD CR2

## 2018-06-27 PROCEDURE — 93454 CORONARY ARTERY ANGIO S&I: CPT

## 2018-06-27 PROCEDURE — 93571 IV DOP VEL&/PRESS C FLO 1ST: CPT | Mod: 26 | Performed by: INTERNAL MEDICINE

## 2018-06-27 PROCEDURE — 85730 THROMBOPLASTIN TIME PARTIAL: CPT | Performed by: INTERNAL MEDICINE

## 2018-06-27 PROCEDURE — 99153 MOD SED SAME PHYS/QHP EA: CPT

## 2018-06-27 PROCEDURE — 25000128 H RX IP 250 OP 636: Performed by: INTERNAL MEDICINE

## 2018-06-27 PROCEDURE — 99152 MOD SED SAME PHYS/QHP 5/>YRS: CPT

## 2018-06-27 PROCEDURE — 93005 ELECTROCARDIOGRAM TRACING: CPT

## 2018-06-27 RX ORDER — METHYLPREDNISOLONE SODIUM SUCCINATE 125 MG/2ML
125 INJECTION, POWDER, LYOPHILIZED, FOR SOLUTION INTRAMUSCULAR; INTRAVENOUS
Status: DISCONTINUED | OUTPATIENT
Start: 2018-06-27 | End: 2018-06-27 | Stop reason: HOSPADM

## 2018-06-27 RX ORDER — ONDANSETRON 2 MG/ML
4 INJECTION INTRAMUSCULAR; INTRAVENOUS EVERY 4 HOURS PRN
Status: DISCONTINUED | OUTPATIENT
Start: 2018-06-27 | End: 2018-06-27 | Stop reason: HOSPADM

## 2018-06-27 RX ORDER — METOPROLOL TARTRATE 1 MG/ML
5 INJECTION, SOLUTION INTRAVENOUS EVERY 5 MIN PRN
Status: DISCONTINUED | OUTPATIENT
Start: 2018-06-27 | End: 2018-06-27 | Stop reason: HOSPADM

## 2018-06-27 RX ORDER — CLOPIDOGREL BISULFATE 75 MG/1
75 TABLET ORAL
Status: DISCONTINUED | OUTPATIENT
Start: 2018-06-27 | End: 2018-06-27 | Stop reason: HOSPADM

## 2018-06-27 RX ORDER — POTASSIUM CHLORIDE 7.45 MG/ML
10 INJECTION INTRAVENOUS
Status: DISCONTINUED | OUTPATIENT
Start: 2018-06-27 | End: 2018-06-27 | Stop reason: HOSPADM

## 2018-06-27 RX ORDER — LIDOCAINE 40 MG/G
CREAM TOPICAL
Status: DISCONTINUED | OUTPATIENT
Start: 2018-06-27 | End: 2018-06-27 | Stop reason: HOSPADM

## 2018-06-27 RX ORDER — SODIUM NITROPRUSSIDE 25 MG/ML
100-200 INJECTION INTRAVENOUS
Status: DISCONTINUED | OUTPATIENT
Start: 2018-06-27 | End: 2018-06-27 | Stop reason: HOSPADM

## 2018-06-27 RX ORDER — HYDRALAZINE HYDROCHLORIDE 20 MG/ML
10-20 INJECTION INTRAMUSCULAR; INTRAVENOUS
Status: DISCONTINUED | OUTPATIENT
Start: 2018-06-27 | End: 2018-06-27 | Stop reason: HOSPADM

## 2018-06-27 RX ORDER — NITROGLYCERIN 20 MG/100ML
.07-2 INJECTION INTRAVENOUS CONTINUOUS PRN
Status: DISCONTINUED | OUTPATIENT
Start: 2018-06-27 | End: 2018-06-27 | Stop reason: HOSPADM

## 2018-06-27 RX ORDER — NALOXONE HYDROCHLORIDE 0.4 MG/ML
.1-.4 INJECTION, SOLUTION INTRAMUSCULAR; INTRAVENOUS; SUBCUTANEOUS
Status: DISCONTINUED | OUTPATIENT
Start: 2018-06-27 | End: 2018-06-27 | Stop reason: HOSPADM

## 2018-06-27 RX ORDER — FENTANYL CITRATE 50 UG/ML
25-50 INJECTION, SOLUTION INTRAMUSCULAR; INTRAVENOUS
Status: DISCONTINUED | OUTPATIENT
Start: 2018-06-27 | End: 2018-06-27 | Stop reason: HOSPADM

## 2018-06-27 RX ORDER — DOPAMINE HYDROCHLORIDE 160 MG/100ML
2-20 INJECTION, SOLUTION INTRAVENOUS CONTINUOUS PRN
Status: DISCONTINUED | OUTPATIENT
Start: 2018-06-27 | End: 2018-06-27 | Stop reason: HOSPADM

## 2018-06-27 RX ORDER — ENALAPRILAT 1.25 MG/ML
1.25-2.5 INJECTION INTRAVENOUS
Status: DISCONTINUED | OUTPATIENT
Start: 2018-06-27 | End: 2018-06-27 | Stop reason: HOSPADM

## 2018-06-27 RX ORDER — PROTAMINE SULFATE 10 MG/ML
25-100 INJECTION, SOLUTION INTRAVENOUS EVERY 5 MIN PRN
Status: DISCONTINUED | OUTPATIENT
Start: 2018-06-27 | End: 2018-06-27 | Stop reason: HOSPADM

## 2018-06-27 RX ORDER — LORAZEPAM 2 MG/ML
.5-2 INJECTION INTRAMUSCULAR EVERY 4 HOURS PRN
Status: DISCONTINUED | OUTPATIENT
Start: 2018-06-27 | End: 2018-06-27 | Stop reason: HOSPADM

## 2018-06-27 RX ORDER — NALOXONE HYDROCHLORIDE 0.4 MG/ML
.2-.4 INJECTION, SOLUTION INTRAMUSCULAR; INTRAVENOUS; SUBCUTANEOUS
Status: DISCONTINUED | OUTPATIENT
Start: 2018-06-27 | End: 2018-06-27 | Stop reason: HOSPADM

## 2018-06-27 RX ORDER — LIDOCAINE HYDROCHLORIDE 10 MG/ML
30 INJECTION, SOLUTION EPIDURAL; INFILTRATION; INTRACAUDAL; PERINEURAL
Status: DISCONTINUED | OUTPATIENT
Start: 2018-06-27 | End: 2018-06-27 | Stop reason: HOSPADM

## 2018-06-27 RX ORDER — DEXTROSE MONOHYDRATE 25 G/50ML
12.5-5 INJECTION, SOLUTION INTRAVENOUS EVERY 30 MIN PRN
Status: DISCONTINUED | OUTPATIENT
Start: 2018-06-27 | End: 2018-06-27 | Stop reason: HOSPADM

## 2018-06-27 RX ORDER — SODIUM CHLORIDE 9 MG/ML
INJECTION, SOLUTION INTRAVENOUS CONTINUOUS
Status: DISCONTINUED | OUTPATIENT
Start: 2018-06-27 | End: 2018-06-27 | Stop reason: HOSPADM

## 2018-06-27 RX ORDER — PHENYLEPHRINE HCL IN 0.9% NACL 1 MG/10 ML
20-100 SYRINGE (ML) INTRAVENOUS
Status: DISCONTINUED | OUTPATIENT
Start: 2018-06-27 | End: 2018-06-27 | Stop reason: HOSPADM

## 2018-06-27 RX ORDER — FUROSEMIDE 10 MG/ML
20-100 INJECTION INTRAMUSCULAR; INTRAVENOUS
Status: DISCONTINUED | OUTPATIENT
Start: 2018-06-27 | End: 2018-06-27 | Stop reason: HOSPADM

## 2018-06-27 RX ORDER — ATROPINE SULFATE 0.1 MG/ML
.5-1 INJECTION INTRAVENOUS
Status: DISCONTINUED | OUTPATIENT
Start: 2018-06-27 | End: 2018-06-27 | Stop reason: HOSPADM

## 2018-06-27 RX ORDER — ATROPINE SULFATE 0.1 MG/ML
0.5 INJECTION INTRAVENOUS EVERY 5 MIN PRN
Status: DISCONTINUED | OUTPATIENT
Start: 2018-06-27 | End: 2018-06-27 | Stop reason: HOSPADM

## 2018-06-27 RX ORDER — NIFEDIPINE 10 MG/1
10 CAPSULE ORAL
Status: DISCONTINUED | OUTPATIENT
Start: 2018-06-27 | End: 2018-06-27 | Stop reason: HOSPADM

## 2018-06-27 RX ORDER — EPINEPHRINE 1 MG/ML
0.3 INJECTION, SOLUTION, CONCENTRATE INTRAVENOUS
Status: DISCONTINUED | OUTPATIENT
Start: 2018-06-27 | End: 2018-06-27 | Stop reason: HOSPADM

## 2018-06-27 RX ORDER — NITROGLYCERIN 5 MG/ML
100-500 VIAL (ML) INTRAVENOUS
Status: DISCONTINUED | OUTPATIENT
Start: 2018-06-27 | End: 2018-06-27 | Stop reason: HOSPADM

## 2018-06-27 RX ORDER — IOPAMIDOL 755 MG/ML
80 INJECTION, SOLUTION INTRAVASCULAR ONCE
Status: COMPLETED | OUTPATIENT
Start: 2018-06-27 | End: 2018-06-27

## 2018-06-27 RX ORDER — PRASUGREL 10 MG/1
10-60 TABLET, FILM COATED ORAL
Status: DISCONTINUED | OUTPATIENT
Start: 2018-06-27 | End: 2018-06-27 | Stop reason: HOSPADM

## 2018-06-27 RX ORDER — CLOPIDOGREL 300 MG/1
300-600 TABLET, FILM COATED ORAL
Status: DISCONTINUED | OUTPATIENT
Start: 2018-06-27 | End: 2018-06-27 | Stop reason: HOSPADM

## 2018-06-27 RX ORDER — MORPHINE SULFATE 2 MG/ML
1-2 INJECTION, SOLUTION INTRAMUSCULAR; INTRAVENOUS EVERY 5 MIN PRN
Status: DISCONTINUED | OUTPATIENT
Start: 2018-06-27 | End: 2018-06-27 | Stop reason: HOSPADM

## 2018-06-27 RX ORDER — HEPARIN SODIUM 1000 [USP'U]/ML
1000-10000 INJECTION, SOLUTION INTRAVENOUS; SUBCUTANEOUS EVERY 5 MIN PRN
Status: DISCONTINUED | OUTPATIENT
Start: 2018-06-27 | End: 2018-06-27 | Stop reason: HOSPADM

## 2018-06-27 RX ORDER — VERAPAMIL HYDROCHLORIDE 2.5 MG/ML
1-2.5 INJECTION, SOLUTION INTRAVENOUS
Status: COMPLETED | OUTPATIENT
Start: 2018-06-27 | End: 2018-06-27

## 2018-06-27 RX ORDER — NITROGLYCERIN 5 MG/ML
100-200 VIAL (ML) INTRAVENOUS
Status: DISCONTINUED | OUTPATIENT
Start: 2018-06-27 | End: 2018-06-27 | Stop reason: HOSPADM

## 2018-06-27 RX ORDER — BUPIVACAINE HYDROCHLORIDE 2.5 MG/ML
1-10 INJECTION, SOLUTION EPIDURAL; INFILTRATION; INTRACAUDAL
Status: DISCONTINUED | OUTPATIENT
Start: 2018-06-27 | End: 2018-06-27 | Stop reason: HOSPADM

## 2018-06-27 RX ORDER — DIPHENHYDRAMINE HYDROCHLORIDE 50 MG/ML
25-50 INJECTION INTRAMUSCULAR; INTRAVENOUS
Status: DISCONTINUED | OUTPATIENT
Start: 2018-06-27 | End: 2018-06-27 | Stop reason: HOSPADM

## 2018-06-27 RX ORDER — NALOXONE HYDROCHLORIDE 0.4 MG/ML
0.4 INJECTION, SOLUTION INTRAMUSCULAR; INTRAVENOUS; SUBCUTANEOUS EVERY 5 MIN PRN
Status: DISCONTINUED | OUTPATIENT
Start: 2018-06-27 | End: 2018-06-27 | Stop reason: HOSPADM

## 2018-06-27 RX ORDER — NICARDIPINE HYDROCHLORIDE 2.5 MG/ML
100 INJECTION INTRAVENOUS
Status: DISCONTINUED | OUTPATIENT
Start: 2018-06-27 | End: 2018-06-27 | Stop reason: HOSPADM

## 2018-06-27 RX ORDER — DOBUTAMINE HYDROCHLORIDE 200 MG/100ML
2-20 INJECTION INTRAVENOUS CONTINUOUS PRN
Status: DISCONTINUED | OUTPATIENT
Start: 2018-06-27 | End: 2018-06-27 | Stop reason: HOSPADM

## 2018-06-27 RX ORDER — FLUMAZENIL 0.1 MG/ML
0.2 INJECTION, SOLUTION INTRAVENOUS
Status: DISCONTINUED | OUTPATIENT
Start: 2018-06-27 | End: 2018-06-27 | Stop reason: HOSPADM

## 2018-06-27 RX ORDER — HYDROCODONE BITARTRATE AND ACETAMINOPHEN 5; 325 MG/1; MG/1
1-2 TABLET ORAL EVERY 4 HOURS PRN
Status: DISCONTINUED | OUTPATIENT
Start: 2018-06-27 | End: 2018-06-27 | Stop reason: HOSPADM

## 2018-06-27 RX ORDER — ACETAMINOPHEN 325 MG/1
325-650 TABLET ORAL EVERY 4 HOURS PRN
Status: DISCONTINUED | OUTPATIENT
Start: 2018-06-27 | End: 2018-06-27 | Stop reason: HOSPADM

## 2018-06-27 RX ORDER — ADENOSINE 3 MG/ML
12-12000 INJECTION, SOLUTION INTRAVENOUS
Status: DISCONTINUED | OUTPATIENT
Start: 2018-06-27 | End: 2018-06-27 | Stop reason: HOSPADM

## 2018-06-27 RX ORDER — ASPIRIN 325 MG
325 TABLET ORAL
Status: DISCONTINUED | OUTPATIENT
Start: 2018-06-27 | End: 2018-06-27 | Stop reason: HOSPADM

## 2018-06-27 RX ORDER — POTASSIUM CHLORIDE 29.8 MG/ML
20 INJECTION INTRAVENOUS
Status: DISCONTINUED | OUTPATIENT
Start: 2018-06-27 | End: 2018-06-27 | Stop reason: HOSPADM

## 2018-06-27 RX ORDER — PROTAMINE SULFATE 10 MG/ML
1-5 INJECTION, SOLUTION INTRAVENOUS
Status: DISCONTINUED | OUTPATIENT
Start: 2018-06-27 | End: 2018-06-27 | Stop reason: HOSPADM

## 2018-06-27 RX ORDER — ASPIRIN 81 MG/1
81-324 TABLET, CHEWABLE ORAL
Status: DISCONTINUED | OUTPATIENT
Start: 2018-06-27 | End: 2018-06-27 | Stop reason: HOSPADM

## 2018-06-27 RX ORDER — NITROGLYCERIN 0.4 MG/1
0.4 TABLET SUBLINGUAL EVERY 5 MIN PRN
Status: DISCONTINUED | OUTPATIENT
Start: 2018-06-27 | End: 2018-06-27 | Stop reason: HOSPADM

## 2018-06-27 RX ORDER — LIDOCAINE HYDROCHLORIDE 10 MG/ML
1-10 INJECTION, SOLUTION EPIDURAL; INFILTRATION; INTRACAUDAL; PERINEURAL
Status: COMPLETED | OUTPATIENT
Start: 2018-06-27 | End: 2018-06-27

## 2018-06-27 RX ADMIN — FENTANYL CITRATE 50 MCG: 50 INJECTION, SOLUTION INTRAMUSCULAR; INTRAVENOUS at 14:25

## 2018-06-27 RX ADMIN — NITROGLYCERIN 200 MCG: 5 INJECTION, SOLUTION INTRAVENOUS at 14:25

## 2018-06-27 RX ADMIN — MIDAZOLAM 1 MG: 1 INJECTION INTRAMUSCULAR; INTRAVENOUS at 15:15

## 2018-06-27 RX ADMIN — VERAPAMIL HYDROCHLORIDE 2.5 MG: 2.5 INJECTION, SOLUTION INTRAVENOUS at 14:25

## 2018-06-27 RX ADMIN — MIDAZOLAM 1 MG: 1 INJECTION INTRAMUSCULAR; INTRAVENOUS at 14:15

## 2018-06-27 RX ADMIN — ADENOSINE 140 MCG/KG/MIN: 3 INJECTION, SOLUTION INTRAVENOUS at 15:05

## 2018-06-27 RX ADMIN — SODIUM CHLORIDE: 9 INJECTION, SOLUTION INTRAVENOUS at 11:27

## 2018-06-27 RX ADMIN — HEPARIN SODIUM 2000 UNITS: 1000 INJECTION, SOLUTION INTRAVENOUS; SUBCUTANEOUS at 14:53

## 2018-06-27 RX ADMIN — HEPARIN SODIUM 5000 UNITS: 1000 INJECTION, SOLUTION INTRAVENOUS; SUBCUTANEOUS at 14:27

## 2018-06-27 RX ADMIN — FENTANYL CITRATE 25 MCG: 50 INJECTION, SOLUTION INTRAMUSCULAR; INTRAVENOUS at 14:40

## 2018-06-27 RX ADMIN — IOPAMIDOL 80 ML: 755 INJECTION, SOLUTION INTRAVASCULAR at 15:30

## 2018-06-27 RX ADMIN — MIDAZOLAM 1 MG: 1 INJECTION INTRAMUSCULAR; INTRAVENOUS at 14:40

## 2018-06-27 RX ADMIN — LIDOCAINE HYDROCHLORIDE 1 ML: 10 INJECTION, SOLUTION EPIDURAL; INFILTRATION; INTRACAUDAL; PERINEURAL at 14:23

## 2018-06-27 RX ADMIN — FENTANYL CITRATE 25 MCG: 50 INJECTION, SOLUTION INTRAMUSCULAR; INTRAVENOUS at 15:15

## 2018-06-27 RX ADMIN — FENTANYL CITRATE 50 MCG: 50 INJECTION, SOLUTION INTRAMUSCULAR; INTRAVENOUS at 14:15

## 2018-06-27 RX ADMIN — MIDAZOLAM 1 MG: 1 INJECTION INTRAMUSCULAR; INTRAVENOUS at 14:25

## 2018-06-27 NOTE — IP AVS SNAPSHOT
MRN:[id number]                      After Visit Summary   6/27/2018    Richard Ball    MRN: [id number]           Visit Information        Department      6/27/2018 10:56 AM [facility]          Review of your medicines      UNREVIEWED medicines. Ask your doctor about these medicines        Dose / Directions    ALLEGRA PO        Take by mouth daily as needed for allergies   Refills:  0       aspirin 81 MG chewable tablet   Used for:  Marfan's syndrome, S/P aortic valve replacement        Dose:  81 mg   Take 1 tablet (81 mg) by mouth daily   Quantity:  90 tablet   Refills:  3       atenolol 50 MG tablet   Commonly known as:  TENORMIN        Dose:  50 mg   Take 50 mg by mouth daily   Refills:  0       atorvastatin 20 MG tablet   Commonly known as:  LIPITOR        daily   Refills:  0       COUMADIN 5 MG tablet   Used for:  S/P aortic valve replacement, Marfan's syndrome   Generic drug:  warfarin        Dose:  5 mg   Take 1 tablet (5 mg) by mouth daily Alternating with 2.5 every other day   Quantity:  30 tablet   Refills:  0       lisinopril 5 MG tablet   Commonly known as:  PRINIVIL/ZESTRIL        Dose:  5 mg   Take 1 tablet (5 mg) by mouth daily   Quantity:  90 tablet   Refills:  1       triamcinolone 0.1 % cream   Commonly known as:  KENALOG   Used for:  Dermatitis        Apply sparingly to affected area three times daily as needed   Quantity:  80 g   Refills:  0       VENTOLIN  (90 Base) MCG/ACT Inhaler   Generic drug:  albuterol        daily as needed Reported on 3/24/2017   Refills:  0         START taking        Dose / Directions    LOVENOX 80 MG/0.8ML injection   Used for:  S/P aortic valve replacement   Generic drug:  enoxaparin        Dose:  70 mg   Inject 0.7 mLs (70 mg) Subcutaneous 2 times daily   Quantity:  10 Syringe   Refills:  0            Where to get your medicines      Some of these will need a paper prescription and others can be bought over the counter.  Ask your nurse if you have questions.     Bring a paper prescription for each of these medications     LOVENOX 80 MG/0.8ML injection               Prescriptions were sent or printed at these locations (1 Prescription)                   [id number] [address] [facility] [place]   [address] [facility] [address]    Telephone:  [phone]   Fax:  [phone]   Hours:                  Printed at Department/Unit printer (1 of 1)         LOVENOX 80 MG/0.8ML injection                 Protect others around you: Learn how to safely use, store and throw away your medicines at www.disposemymeds.org.         Follow-ups after your visit        Your next 10 appointments already scheduled     Jun 29, 2018  9:00 AM CDT   Pacemaker Check with TYSON ASHLEYN   [facility] ([facility])    [address]  [facility] [id number] [phone] OPT 2            Jun 29, 2018 10:00 AM CDT   CT CHEST/ABD/PELVIS ANGIO W PROCESSING with SHCT1   [facility] ([facility])    [address] [id number] [phone]           Please bring any scans or X-rays taken at other hospitals, if similar tests were done. Also bring a list of your medicines, including vitamins, minerals and over-the-counter drugs. It is safest to leave personal items at home.  Be sure to tell your doctor:   If you have any allergies.   If there s any chance you are pregnant.   If you are breastfeeding.    If you have diabetes as your medication may need to be adjusted for this exam.  You will have contrast for this exam. To prepare:   Do not eat or drink for 2 hours before your exam. If you need to take medicine, you may take it with small sips of water. (We may ask you to take liquid medicine as well.)   The day before your exam, drink extra fluids at least six 8-ounce glasses (unless your doctor tells you to restrict your fluids).   Patients over 70 or patients with diabetes or kidney problems:   If you haven t had a blood test (creatinine test) within the last 30 days, the Cardiologist/Radiologist may require you to get this test prior to your exam.  Please wear loose clothing, such as a sweat suit or jogging clothes. Avoid snaps, zippers and other metal. We may ask you to undress and put on a hospital gown.  If you have any questions, please call the Imaging Department where you will have your exam.            Jun 29, 2018 10:00 AM CDT   Anticoagulation Visit with TYSON ANTICOAGULATION   [facility] ([facility])    [address]  [facility] [place] [facility] [address]   [phone] OPT 2            Jul 10, 2018  9:40 AM CDT   Anticoagulation Visit with RU ANTICOAGULATION   [facility] ([facility])    [address]  [facility] [place] [address]   [phone]               Care Instructions        After Care Instructions     Discharge Instructions - IF on Metformin (Glucophage or Glucovance) or Metformin containing medications       IF on Metformin (Glucophage or Glucovance) or Metformin containing medications , schedule a Basic Metabolic Panel at [facility] or Primary Clinic in 48 - 72 hours post procedure and PRIOR TO resuming the Metformin or Metformin containing medications.  Hold Metformin (Glucophage or Glucovance) or Metformin containing medications until after the Basic Metabolic Panel on the 2nd or 3rd day following the procedure.  May resume after blood draw is complete.                  Further instructions from your care team       Cardiac Angiogram Discharge Instructions - Radial    After you go home:      Have an adult stay with you until tomorrow.    Drink extra fluids for 2 days.    You may resume your normal diet.    No smoking       For 24 hours - due to the sedation you received:    Relax and take it easy.    Do NOT make  any important or legal decisions.    Do NOT drive or operate machines at home or at work.    Do NOT drink alcohol.    Care of Wrist Puncture Site:      For the first 24 hrs - check the puncture site every 1-2 hours while awake.    It is normal to have soreness at the puncture site and mild tingling in your hand for up to 3 days.    Remove the bandaid after 24 hours. If there is minor oozing, apply another bandaid and remove it after 12 hours.    You may shower tomorrow.  Do NOT take a bath, or use a hot tub or pool for at least 3 days. Do NOT scrub the site. Do not use lotion or powder near the puncture site.           Activity:        For 2 days:     do not use your hand or arm to support your weight (such as rising from a chair)     do not bend your wrist (such as lifting a garage door).    do not lift more than 5 pounds or exercise your arm (such as tennis, golf or bowling).    Do NOT do any heavy activity such as exercise, lifting, or straining.     Bleeding:      If you start bleeding from the site in your wrist, sit down and press firmly on/above the site for 10 minutes.     Once bleeding stops, keep arm still for 2 hours.     Call [facility] as soon as you can.       Call 911 right away if you have heavy bleeding or bleeding that does not stop.      Medicines:.           Take your medications, including blood thinners, unless your provider tells you not to.  If you take Coumadin (Warfarin), have your INR checked by your provider in  3-5 days. Call your clinic to schedule this. Restart coumadin tonight.    If you have stopped any medicines, check with your provider about when to restart them.    Lovenox injections twice daily, 12 hours apart, 70 mg.  Start tonight. Until INR is therapeutic    Follow Up Appointments:      Follow up with [facility] Nurse Practitioner at [facility] of patient preference in 7-10 days.    Call the clinic if:      You have a large or growing hard lump around the site.    The  "site is red, swollen, hot or tender.    Blood or fluid is draining from the site.    You have chills or a fever greater than 101 F (38 C).    Your arm feels numb, cool or changes color.    You have hives, a rash or unusual itching.    Any questions or concerns.          [facility] at [facility]:    [phone] [facility] (7 days a week)             Additional Information About Your Visit        [employer] Information     The [employer] gives you secure access to your electronic health record. If you see a primary care provider, you can also send messages to your care team and make appointments. If you have questions, please call your primary care clinic.  If you do not have a primary care provider, please call [phone] and they will assist you.        Care EveryWhere ID     This is your Care EveryWhere ID. This could be used by other organizations to access your [facility] medical records  [id number]        Your Vitals Were     Blood Pressure Pulse Temperature Respirations Height Weight    109/68 (BP Location: Right arm) 62 97.9  F (36.6  C) (Oral) 18 1.803 m (5' 11\") 72.1 kg (159 lb)    Pulse Oximetry BMI (Body Mass Index)                95% 22.18 kg/m2           Primary Care Provider Office Phone # Fax #    Royer Roblero [phone]      Equal Access to Services     CAMILLE ESCALONA AH: Hadii aad ku hadasho Soomaali, waaxda luqadaha, qaybta kaalmada adeegyada, waxay tigist haykyle renteria. So [facility] [phone].    ATENCIÓN: Si habla español, tiene a joel disposición servicios gratuitos de asistencia lingüística. Llame al [phone].    We comply with applicable federal civil rights laws and [place] laws. We do not discriminate on the basis of race, color, national origin, age, disability, sex, sexual orientation, or gender identity.            Thank you!     Thank you for choosing [facility] for your care. Our goal is always to provide you with excellent care. Hearing " back from our patients is one way we can continue to improve our services. Please take a few minutes to complete the written survey that you may receive in the mail after you visit with us. Thank you!             Medication List: This is a list of all your medications and when to take them. Check marks below indicate your daily home schedule. Keep this list as a reference.      Medications           Morning Afternoon Evening Bedtime As Needed    ALLEGRA PO   Take by mouth daily as needed for allergies                                aspirin 81 MG chewable tablet   Take 1 tablet (81 mg) by mouth daily                                atenolol 50 MG tablet   Commonly known as:  TENORMIN   Take 50 mg by mouth daily                                atorvastatin 20 MG tablet   Commonly known as:  LIPITOR   daily                                COUMADIN 5 MG tablet   Take 1 tablet (5 mg) by mouth daily Alternating with 2.5 every other day   Generic drug:  warfarin                                lisinopril 5 MG tablet   Commonly known as:  PRINIVIL/ZESTRIL   Take 1 tablet (5 mg) by mouth daily                                LOVENOX 80 MG/0.8ML injection   Inject 0.7 mLs (70 mg) Subcutaneous 2 times daily   Generic drug:  enoxaparin                                triamcinolone 0.1 % cream   Commonly known as:  KENALOG   Apply sparingly to affected area three times daily as needed                                VENTOLIN  (90 Base) MCG/ACT Inhaler   daily as needed Reported on 3/24/2017   Generic drug:  albuterol

## 2018-06-27 NOTE — IP AVS SNAPSHOT
[place] [address]    [place] [address]    Phone:  [phone]                                       After Visit Summary   6/27/2018    Richard Ball    MRN: [id number]           After Visit Summary Signature Page     I have received my discharge instructions, and my questions have been answered. I have discussed any challenges I see with this plan with the nurse or doctor.    ..........................................................................................................................................  Patient/Patient Representative Signature      ..........................................................................................................................................  Patient Representative Print Name and Relationship to Patient    ..................................................               ................................................  Date                                            Time    ..........................................................................................................................................  Reviewed by Signature/Title    ...................................................              ..............................................  Date                                                            Time

## 2018-06-27 NOTE — PROGRESS NOTES
Admission profile complete. Contrast instructions reviewed with patient. Labs drawn. IV fluids infusing. Patient resting comfortably,  at bedside.

## 2018-06-27 NOTE — DISCHARGE INSTRUCTIONS
Cardiac Angiogram Discharge Instructions - Radial    After you go home:      Have an adult stay with you until tomorrow.    Drink extra fluids for 2 days.    You may resume your normal diet.    No smoking       For 24 hours - due to the sedation you received:    Relax and take it easy.    Do NOT make any important or legal decisions.    Do NOT drive or operate machines at home or at work.    Do NOT drink alcohol.    Care of Wrist Puncture Site:      For the first 24 hrs - check the puncture site every 1-2 hours while awake.    It is normal to have soreness at the puncture site and mild tingling in your hand for up to 3 days.    Remove the bandaid after 24 hours. If there is minor oozing, apply another bandaid and remove it after 12 hours.    You may shower tomorrow.  Do NOT take a bath, or use a hot tub or pool for at least 3 days. Do NOT scrub the site. Do not use lotion or powder near the puncture site.           Activity:        For 2 days:     do not use your hand or arm to support your weight (such as rising from a chair)     do not bend your wrist (such as lifting a garage door).    do not lift more than 5 pounds or exercise your arm (such as tennis, golf or bowling).    Do NOT do any heavy activity such as exercise, lifting, or straining.     Bleeding:      If you start bleeding from the site in your wrist, sit down and press firmly on/above the site for 10 minutes.     Once bleeding stops, keep arm still for 2 hours.     Call [facility] as soon as you can.       Call 911 right away if you have heavy bleeding or bleeding that does not stop.      Medicines:.           Take your medications, including blood thinners, unless your provider tells you not to.  If you take Coumadin (Warfarin), have your INR checked by your provider in  3-5 days. Call your clinic to schedule this. Restart coumadin tonight.    If you have stopped any medicines, check with your provider about when to restart them.    Lovenox injections  twice daily, 12 hours apart, 70 mg.  Start tonight. Until INR is therapeutic    Follow Up Appointments:      Follow up with [facility] Nurse Practitioner at [facility] of patient preference in 7-10 days.    Call the clinic if:      You have a large or growing hard lump around the site.    The site is red, swollen, hot or tender.    Blood or fluid is draining from the site.    You have chills or a fever greater than 101 F (38 C).    Your arm feels numb, cool or changes color.    You have hives, a rash or unusual itching.    Any questions or concerns.          [facility] at [place]:    [phone] [facility] (7 days a week)

## 2018-06-27 NOTE — PROGRESS NOTES
1611 back to room 1530. Denies pain. VSS. Left radial site CDI, soft, no hematoma. Fingers warm. Given water. Ordered meal. Partner in room. Pt still feels a bit groogy.Rx for lovenox sent to pharmacy to fill. Has copy of contrast discharge sheet and copy of labs given.  1704 first attempt to remove air from tr band at 1630, site bleed and 3 cc put back in. 1700 now able to remove air without bleeding. Reviewed avs discharge instructions with pt and partner and copy given. Questions answered. Pt given paper copy of guide to lovenox therapy and pt reviewing this now.   1720  Pt watching video on giving self lovenox. Pt ok with this process.  1830 VSS. Denies pain. Tr band off and site slight bruised under band. No bleed or hematoma. Soft. CDI with bandaid. Up to walk and tolerated well, able to void in restroom. No dizziness. Pt ate meal. Pt was able to give self first lovenox injection of 70 mg into left abdomen at about 1810. Did well. Has full filled rx from pharmacy of his lovenox. Iv d/c'd. Will discharge walking out to partner picking him up.  1839 site CDI, stable. Pt does have some swelling in thumb area which is squishy soft, no hematoma. Also still has some numbness there. Discharged to partner.

## 2018-06-28 ENCOUNTER — TELEPHONE (OUTPATIENT)
Dept: CARDIOLOGY | Facility: CLINIC | Age: 58
End: 2018-06-28

## 2018-06-28 NOTE — TELEPHONE ENCOUNTER
Called patient back to confirm that he still need CTA C/A/P scheduled for tomorrow to evaluate his entire aorta. Left message for him to call if he has additional questions.    Brody Elder RN, BSN  Cardiology Care Coordinator  [facility]  [email or web address]  [phone]

## 2018-06-29 ENCOUNTER — ANTICOAGULATION THERAPY VISIT (OUTPATIENT)
Dept: CARDIOLOGY | Facility: CLINIC | Age: 58
End: 2018-06-29
Payer: COMMERCIAL

## 2018-06-29 ENCOUNTER — ALLIED HEALTH/NURSE VISIT (OUTPATIENT)
Dept: CARDIOLOGY | Facility: CLINIC | Age: 58
End: 2018-06-29
Attending: INTERNAL MEDICINE
Payer: COMMERCIAL

## 2018-06-29 ENCOUNTER — CARE COORDINATION (OUTPATIENT)
Dept: CARDIOLOGY | Facility: CLINIC | Age: 58
End: 2018-06-29

## 2018-06-29 DIAGNOSIS — Q87.40 MARFAN'S SYNDROME: ICD-10-CM

## 2018-06-29 DIAGNOSIS — Z95.2 S/P AORTIC VALVE REPLACEMENT: ICD-10-CM

## 2018-06-29 DIAGNOSIS — Z95.2 S/P AVR (AORTIC VALVE REPLACEMENT): ICD-10-CM

## 2018-06-29 DIAGNOSIS — Z79.01 LONG-TERM (CURRENT) USE OF ANTICOAGULANTS: ICD-10-CM

## 2018-06-29 DIAGNOSIS — I71.21 ASCENDING AORTIC ANEURYSM (H): ICD-10-CM

## 2018-06-29 DIAGNOSIS — Z95.0 PACEMAKER: ICD-10-CM

## 2018-06-29 DIAGNOSIS — Z95.2 HEART VALVE REPLACED: ICD-10-CM

## 2018-06-29 LAB
CHOLEST SERPL-MCNC: 154 MG/DL
HDLC SERPL-MCNC: 44 MG/DL
INR POINT OF CARE: 1.4 (ref 0.86–1.14)
INTERPRETATION ECG - MUSE: NORMAL
LDLC SERPL CALC-MCNC: 80 MG/DL
NONHDLC SERPL-MCNC: 110 MG/DL
TRIGL SERPL-MCNC: 150 MG/DL
TSH SERPL DL<=0.005 MIU/L-ACNC: 3.06 MU/L (ref 0.4–4)

## 2018-06-29 PROCEDURE — 84443 ASSAY THYROID STIM HORMONE: CPT | Performed by: INTERNAL MEDICINE

## 2018-06-29 PROCEDURE — 85610 PROTHROMBIN TIME: CPT | Mod: QW | Performed by: INTERNAL MEDICINE

## 2018-06-29 PROCEDURE — 36416 COLLJ CAPILLARY BLOOD SPEC: CPT | Performed by: INTERNAL MEDICINE

## 2018-06-29 PROCEDURE — 80061 LIPID PANEL: CPT | Performed by: INTERNAL MEDICINE

## 2018-06-29 PROCEDURE — 93280 PM DEVICE PROGR EVAL DUAL: CPT | Performed by: INTERNAL MEDICINE

## 2018-06-29 NOTE — MR AVS SNAPSHOT
Richard Ball   6/29/2018 8:20 AM   Anticoagulation Therapy Visit    Description:  58 year old male   Provider:   ANTICOAGULATION   Department:  [facility]           INR as of 6/29/2018     Today's INR 1.4!      Anticoagulation Summary as of 6/29/2018     INR goal 2.0-3.0   Today's INR 1.4!   Full warfarin instructions 6/29: 7.5 mg; Otherwise 2.5 mg on Mon, Wed, Fri; 5 mg all other days   Next INR check 7/2/2018    Indications   Long-term (current) use of anticoagulants [Z79.01] [Z79.01]  Heart valve replaced [Z95.2] [Z95.2]         Your next Anticoagulation Clinic appointment(s)     Jun 29, 2018  8:20 AM CDT   Anticoagulation Visit with  ANTICOAGULATION   [facility] ([facility])    [address] [place] [address]   [phone] OPT 2            Jul 02, 2018 10:00 AM CDT   Anticoagulation Visit with  ANTICOAGULATION   [facility] ([facility])    [address] [place] [address]   [phone] OPT 2              Contact Numbers     Anticoagulant (INR) Clinic Number: [phone]          June 2018 Details    Sun Mon Tue Wed Thu Fri Sat          1               2                 3               4               5               6               7               8               9                 10               11               12               13               14               15               16                 17               18               19               20               21               22               23                 24               25               26               27               28               29      7.5 mg   See details      30      5 mg          Date Details   06/29 This INR check               How to take your warfarin dose     To take:  5 mg Take 1 of the 5 mg tablets.    To take:  7.5 mg Take 1.5 of the 5 mg tablets.           July 2018  Details    Sun Mon Tue Wed Thu Fri Sat     1      5 mg         2            3               4               5               6               7                 8               9               10               11               12               13               14                 15               16               17               18               19               20               21                 22               23               24               25               26               27               28                 29               30               31                    Date Details   No additional details    Date of next INR:  7/2/2018         How to take your warfarin dose     To take:  2.5 mg Take 0.5 of a 5 mg tablet.    To take:  5 mg Take 1 of the 5 mg tablets.

## 2018-06-29 NOTE — PROGRESS NOTES
Medtronic Adapta Pacemaker Device Check  AP: 0 % : 100 %  Mode: VDD        Underlying Rhythm: CHB with vent rate < 30  Heart Rate: Adequate variation  Sensing: Stable - no R waves at 40    Pacing Threshold: V stable. Atrial lead not working   Impedance: stable  Battery Status: 4 years  Device Site: well healed   Atrial Arrhythmia: 10 atrial high rates since last check. EGMs show 'noise' on atrail lead. Hx of previous fx.   Ventricular Arrhythmia: none  Setting Change: none    Care Plan: f/u 3 months remote PPM check. Enrolled in [employer]. KIKI

## 2018-06-29 NOTE — PROGRESS NOTES
ANTICOAGULATION FOLLOW-UP CLINIC VISIT    Patient Name:  Richard Ball  Date:  6/29/2018  Contact Type:  Face to Face    SUBJECTIVE:     Patient Findings     Positives Change in diet/appetite (ate dark greens prior to heart cath this week), Intentional hold of therapy (held 2 doses for heart cath)           OBJECTIVE    INR Protime   Date Value Ref Range Status   06/29/2018 1.4 (A) 0.86 - 1.14 Final       ASSESSMENT / PLAN  INR assessment SUB    Recheck INR In: 3 DAYS    INR Location Clinic      Anticoagulation Summary as of 6/29/2018     INR goal 2.0-3.0   Today's INR 1.4!   Warfarin maintenance plan 2.5 mg (5 mg x 0.5) on Mon, Wed, Fri; 5 mg (5 mg x 1) all other days   Full warfarin instructions 6/29: 7.5 mg; Otherwise 2.5 mg on Mon, Wed, Fri; 5 mg all other days   Weekly warfarin total 27.5 mg   Plan last modified Teresa Espino RN (6/26/2018)   Next INR check 7/2/2018   Target end date Indefinite    Indications   Long-term (current) use of anticoagulants [Z79.01] [Z79.01]  Heart valve replaced [Z95.2] [Z95.2]         Anticoagulation Episode Summary     INR check location     Preferred lab     Send INR reminders to [facility] HEART INR NURSE    Comments AVR in 2001, range 2-3, no Lovenox needed      Anticoagulation Care Providers     Provider Role Specialty Phone number    March, Sundar Cantu MD Responsible Cardiology [phone]            See the Encounter Report to view Anticoagulation Flowsheet and Dosing Calendar (Go to Encounters tab in chart review, and find the Anticoagulation Therapy Visit)    INR 1.4 Pt recently transferred INR management to our clinic. He has Marfan's syndrome and is s/p AVR in 2001 and had TIA in 5/2018. He held 2 doses of Warfarin prior to a heart cath on 6/27 (focal CAD with no flow limiting lesions). Hospital MD ordered bridging with Lovenox 70  Mg SQ every 12 hours until INR therapeutic. He has bruising on abdomen at injection sites. Pt has been avoiding greens since  heart cath to allow INR to rise faster. He also switched his dosing days (took 5 mg on 6/27 and 2.5 mg 6/28). Will boost today's dose to 7.5 mg then resume 2.5 mg MWF and 5 mg all other days with recheck on Monday. He will continue to use Lovenox 70 mg SQ every 12 hours until INR 2.0 or greater (he has 7 syringes left). He still has an INR appt scheduled at the [place] INR clinic on 7/10/18. Dosage adjustment made based on physician directed care plan.    Constance Chacko RN

## 2018-06-29 NOTE — PROGRESS NOTES
Patient called with questions about bruising on his left wrist from his recent coronary angiogram. States that it is a little sore, able to move and has some bruising up his thumb and about 3 inches up his wrist. Advised him bruising is normal but to continue to watch for a lump or hardening at the site. All questions answered.    Brody Elder RN, BSN  Cardiology Care Coordinator  [facility]  [email or web address]  [phone]

## 2018-06-29 NOTE — MR AVS SNAPSHOT
After Visit Summary   6/29/2018    Richard Ball    MRN: [id number]           Patient Information     Date Of Birth          1960        Visit Information        Provider Department      6/29/2018 9:00 AM TYSON DCRN [facility]        Today's Diagnoses     Marfan's syndrome        Pacemaker        S/P AVR (aortic valve replacement)        Ascending aortic aneurysm (H)           Follow-ups after your visit        Your next 10 appointments already scheduled     Jul 02, 2018 10:00 AM CDT   Anticoagulation Visit with TYSON ANTICOAGULATION   [facility] ([facility])    [address]  [place] [address] [phone] OPT 2            Jul 10, 2018  9:40 AM CDT   Anticoagulation Visit with RU ANTICOAGULATION   [facility] ([facility])    [address]  [place] [address] [phone]            Oct 08, 2018  1:45 PM CDT   Remote PPM Check with TYSON TECH1   [facility] ([facility])    [address]  [place] [address] [phone] OPT 2           This appointment is for a remote check of your pacemaker.  This is not an appointment at the office.              Who to contact     If you have questions or need follow up information about today's clinic visit or your schedule please contact [facility] directly at [phone].  Normal or non-critical lab and imaging results will be communicated to you by MyChart, letter or phone within 4 business days after the clinic has received the results. If you do not hear from us within 7 days, please contact the clinic through MyChart or phone. If you have a critical or abnormal lab result, we will notify you by phone as soon as possible.  Submit refill requests through [employer] or call your  pharmacy and they will forward the refill request to us. Please allow 3 business days for your refill to be completed.          Additional Information About Your Visit        Studentgemshart Information     [employer] gives you secure access to your electronic health record. If you see a primary care provider, you can also send messages to your care team and make appointments. If you have questions, please call your primary care clinic.  If you do not have a primary care provider, please call [phone] and they will assist you.        Care EveryWhere ID     This is your Care EveryWhere ID. This could be used by other organizations to access your [place] medical records  [id number]         Blood Pressure from Last 3 Encounters:   06/27/18 128/77   06/25/18 99/62   06/21/18 132/86    Weight from Last 3 Encounters:   06/27/18 72.1 kg (159 lb)   06/21/18 75.6 kg (166 lb 11.2 oz)   05/01/18 73.5 kg (162 lb)              We Performed the Following     Follow-Up with Device Clinic     PM DEVICE PROGRAMMING EVAL, DUAL LEAD PACER ([id number])        Primary Care Provider Office Phone # Fax #    Royer Roblero [phone] [address] [place] [address]        Equal Access to Services     HERACLIO ESCALONA : Hadii aad ku hadasho Soomaali, waaxda luqadaha, qaybta kaalmada adeegyada, geovanny irahetan xavi renteria. So [facility] [phone].    ATENCIÓN: Si habla español, tiene a joel disposición servicios gratuitos de asistencia lingüística. Llame al [phone].    We comply with applicable federal civil rights laws and [place] laws. We do not discriminate on the basis of race, color, national origin, age, disability, sex, sexual orientation, or gender identity.            Thank you!     Thank you for choosing [facility]  for your care. Our goal is always to provide you with excellent care. Hearing back from our patients is one way we can continue to  improve our services. Please take a few minutes to complete the written survey that you may receive in the mail after your visit with us. Thank you!             Your Updated Medication List - Protect others around you: Learn how to safely use, store and throw away your medicines at www.disposemymeds.org.          This list is accurate as of 6/29/18  9:33 AM.  Always use your most recent med list.                   Brand Name Dispense Instructions for use Diagnosis    ALLEGRA PO      Take by mouth daily as needed for allergies        aspirin 81 MG chewable tablet     90 tablet    Take 1 tablet (81 mg) by mouth daily    Marfan's syndrome, S/P aortic valve replacement       atenolol 50 MG tablet    TENORMIN     Take 50 mg by mouth daily        atorvastatin 20 MG tablet    LIPITOR     daily        COUMADIN 5 MG tablet   Generic drug:  warfarin     30 tablet    Take 1 tablet (5 mg) by mouth daily Alternating with 2.5 every other day    S/P aortic valve replacement, Marfan's syndrome       lisinopril 5 MG tablet    PRINIVIL/ZESTRIL    90 tablet    Take 1 tablet (5 mg) by mouth daily        LOVENOX 80 MG/0.8ML injection   Generic drug:  enoxaparin     10 Syringe    Inject 0.7 mLs (70 mg) Subcutaneous 2 times daily    S/P aortic valve replacement       triamcinolone 0.1 % cream    KENALOG    80 g    Apply sparingly to affected area three times daily as needed    Dermatitis       VENTOLIN  (90 Base) MCG/ACT Inhaler   Generic drug:  albuterol      daily as needed Reported on 3/24/2017

## 2018-07-02 ENCOUNTER — ANTICOAGULATION THERAPY VISIT (OUTPATIENT)
Dept: CARDIOLOGY | Facility: CLINIC | Age: 58
End: 2018-07-02
Payer: COMMERCIAL

## 2018-07-02 DIAGNOSIS — Z79.01 LONG-TERM (CURRENT) USE OF ANTICOAGULANTS: ICD-10-CM

## 2018-07-02 DIAGNOSIS — Z95.2 HEART VALVE REPLACED: ICD-10-CM

## 2018-07-02 LAB — INR POINT OF CARE: 2.6 (ref 0.86–1.14)

## 2018-07-02 PROCEDURE — 85610 PROTHROMBIN TIME: CPT | Mod: QW | Performed by: INTERNAL MEDICINE

## 2018-07-02 PROCEDURE — 36416 COLLJ CAPILLARY BLOOD SPEC: CPT | Performed by: INTERNAL MEDICINE

## 2018-07-02 PROCEDURE — 99207 ZZC NO CHARGE NURSE ONLY: CPT | Performed by: INTERNAL MEDICINE

## 2018-07-02 NOTE — PROGRESS NOTES
ANTICOAGULATION FOLLOW-UP CLINIC VISIT    Patient Name:  Richard Ball  Date:  7/2/2018  Contact Type:  Face to Face    SUBJECTIVE:     Patient Findings     Positives No Problem Findings           OBJECTIVE    INR Protime   Date Value Ref Range Status   07/02/2018 2.6 (A) 0.86 - 1.14 Final       ASSESSMENT / PLAN  INR assessment THER    Recheck INR In: 10 DAYS    INR Location Clinic      Anticoagulation Summary as of 7/2/2018     INR goal 2.0-3.0   Today's INR 2.6   Warfarin maintenance plan 5 mg (5 mg x 1) on Sun, Tue, Thu; 2.5 mg (5 mg x 0.5) all other days   Full warfarin instructions 5 mg on Sun, Tue, Thu; 2.5 mg all other days   Weekly warfarin total 25 mg   Plan last modified Teresa Espino RN (7/2/2018)   Next INR check 7/13/2018   Target end date Indefinite    Indications   Long-term (current) use of anticoagulants [Z79.01] [Z79.01]  Heart valve replaced [Z95.2] [Z95.2]         Anticoagulation Episode Summary     INR check location     Preferred lab     Send INR reminders to [facility] HE[facility] INR NURSE    Comments AVR in 2001, range 2-3, no Lovenox needed      Anticoagulation Care Providers     Provider Role Specialty Phone number    March, Sundar Cantu MD Responsible Cardiology [phone]            See the Encounter Report to view Anticoagulation Flowsheet and Dosing Calendar (Go to Encounters tab in chart review, and find the Anticoagulation Therapy Visit)    INR 2.6.  He held warfarin for 2 days for heart cath last week then was discharged on Lovenox.  He did his last injection of Lovenox on Saturday AM then he had bleeding through the band-aid of the injection site so he didn't do any further shots.  His most stable dosing schedule prior to us managing since last week has been 4 days of 2.5mg and 3 days of 5mg.  His first INR with us was 3.1 and that was after dosing had been increased to 4 days of 5mg and 3 days of 2.5mg because they first increased his range to 2.5-3.5 after possible TIA.   Dr. Garcia confirmed that range is 2-3 and she had said no lovenox needed.  We will resume his previous lower schedule an recheck in [place] in 10 days.  He then leaves for [place] for 1 month on 7/24 so we will check another INR right before he goes and if perfectly in range then stretch to 1 month or give him an order for INR check if needed.  Rios Espino, RN

## 2018-07-02 NOTE — MR AVS SNAPSHOT
Richard Ball   7/2/2018 10:00 AM   Anticoagulation Therapy Visit    Description:  58 year old male   Provider:  JAH ANTICOAGULATION   Department:  [facility]           INR as of 7/2/2018     Today's INR 2.6      Anticoagulation Summary as of 7/2/2018     INR goal 2.0-3.0   Today's INR 2.6   Full warfarin instructions 5 mg on Sun, Tue, Thu; 2.5 mg all other days   Next INR check 7/13/2018    Indications   Long-term (current) use of anticoagulants [Z79.01] [Z79.01]  Heart valve replaced [Z95.2] [Z95.2]         Your next Anticoagulation Clinic appointment(s)     Jul 13, 2018  9:20 AM CDT   Anticoagulation Visit with RU ANTICOAGULATION   [facility] ([facility])    [id number] [place] [address] [address]  [facility] [id number] [phone]              Contact Numbers     Anticoagulant (INR) Clinic Number: [phone]          July 2018 Details    Sun Mon Tue Wed Thu Fri Sat     1               2      2.5 mg   See details      3      5 mg         4      2.5 mg         5      5 mg         6      2.5 mg         7      2.5 mg           8      5 mg         9      2.5 mg         10      5 mg         11      2.5 mg         12      5 mg         13            14                 15               16               17               18               19               20               21                 22               23               24               25               26               27               28                 29               30               31                    Date Details   07/02 This INR check       Date of next INR:  7/13/2018         How to take your warfarin dose     To take:  2.5 mg Take 0.5 of a 5 mg tablet.    To take:  5 mg Take 1 of the 5 mg tablets.

## 2018-07-13 ENCOUNTER — ANTICOAGULATION THERAPY VISIT (OUTPATIENT)
Dept: CARDIOLOGY | Facility: CLINIC | Age: 58
End: 2018-07-13
Payer: COMMERCIAL

## 2018-07-13 DIAGNOSIS — Z79.01 LONG-TERM (CURRENT) USE OF ANTICOAGULANTS: ICD-10-CM

## 2018-07-13 DIAGNOSIS — Z95.2 HEART VALVE REPLACED: ICD-10-CM

## 2018-07-13 LAB — INR POINT OF CARE: 3 (ref 0.86–1.14)

## 2018-07-13 PROCEDURE — 85610 PROTHROMBIN TIME: CPT | Mod: QW | Performed by: INTERNAL MEDICINE

## 2018-07-13 PROCEDURE — 36416 COLLJ CAPILLARY BLOOD SPEC: CPT | Performed by: INTERNAL MEDICINE

## 2018-07-13 NOTE — MR AVS SNAPSHOT
Richard Ball   7/13/2018 9:20 AM   Anticoagulation Therapy Visit    Description:  58 year old male   Provider:  ADDIE ANTICOAGULATION   Department:  [facility]           INR as of 7/13/2018     Today's INR 3.0      Anticoagulation Summary as of 7/13/2018     INR goal 2.0-3.0   Today's INR 3.0   Full warfarin instructions 5 mg on Sun, Tue, Thu; 2.5 mg all other days   Next INR check 7/23/2018    Indications   Long-term (current) use of anticoagulants [Z79.01] [Z79.01]  Heart valve replaced [Z95.2] [Z95.2]         Your next Anticoagulation Clinic appointment(s)     Jul 23, 2018 10:00 AM CDT   Anticoagulation Visit with TYSON ANTICOAGULATION   [facility] ([facility])    [address]  [place] [address]   [phone] OPT 2            Aug 24, 2018  9:20 AM CDT   Anticoagulation Visit with RU ANTICOAGULATION   [facility] ([facility])    [address]  [place] [address] [phone]              July 2018 Details    Sun Mon Tue Wed Thu Fri Sat     1               2               3               4               5               6               7                 8               9               10               11               12               13      2.5 mg   See details      14      2.5 mg           15      5 mg         16      2.5 mg         17      5 mg         18      2.5 mg         19      5 mg         20      2.5 mg         21      2.5 mg           22      5 mg         23            24               25               26               27               28                 29               30               31                    Date Details   07/13 This INR check       Date of next INR:  7/23/2018         How to take your warfarin dose     To take:  2.5 mg Take 0.5 of a 5 mg tablet.    To take:  5 mg Take 1 of the 5 mg tablets.

## 2018-07-13 NOTE — PROGRESS NOTES
ANTICOAGULATION FOLLOW-UP CLINIC VISIT    Patient Name:  Richard Ball  Date:  7/13/2018  Contact Type:  Face to Face    SUBJECTIVE:     Patient Findings     Positives No Problem Findings           OBJECTIVE    INR Protime   Date Value Ref Range Status   07/13/2018 3.0 (A) 0.86 - 1.14 Final       ASSESSMENT / PLAN  INR assessment THER    Recheck INR In: 10 DAYS    INR Location Clinic      Anticoagulation Summary as of 7/13/2018     INR goal 2.0-3.0   Today's INR 3.0   Warfarin maintenance plan 5 mg (5 mg x 1) on Sun, Tue, Thu; 2.5 mg (5 mg x 0.5) all other days   Full warfarin instructions 5 mg on Sun, Tue, Thu; 2.5 mg all other days   Weekly warfarin total 25 mg   No change documented Teresa Espino RN   Plan last modified Teresa Espino RN (7/2/2018)   Next INR check 7/23/2018   Target end date Indefinite    Indications   Long-term (current) use of anticoagulants [Z79.01] [Z79.01]  Heart valve replaced [Z95.2] [Z95.2]         Anticoagulation Episode Summary     INR check location     Preferred lab     Send INR reminders to [facility] HEART INR NURSE    Comments AVR in 2001, range 2-3, no Lovenox needed      Anticoagulation Care Providers     Provider Role Specialty Phone number    March, Sundar Cantu MD Responsible Cardiology [phone]            See the Encounter Report to view Anticoagulation Flowsheet and Dosing Calendar (Go to Encounters tab in chart review, and find the Anticoagulation Therapy Visit)    INR 3.0.  He said his abdomen is still sore from doing Lovenox shots after the heart cath.  He has been back on his most stable schedule of 3 days of 5mg and 4 days of 2.5mg.  We just started managing his INRs just as he needed to hold his Warfarin for heart cath but he has been on Warfarin since 2001 and INRs usually very stable.  Continue same schedule and recheck in 10 days since then he will be in [place] for about 1 month.  Rios Espino, LANIE

## 2018-07-23 ENCOUNTER — TELEPHONE (OUTPATIENT)
Dept: CARDIOLOGY | Facility: CLINIC | Age: 58
End: 2018-07-23

## 2018-07-23 ENCOUNTER — ANTICOAGULATION THERAPY VISIT (OUTPATIENT)
Dept: CARDIOLOGY | Facility: CLINIC | Age: 58
End: 2018-07-23
Payer: COMMERCIAL

## 2018-07-23 DIAGNOSIS — Z79.01 LONG-TERM (CURRENT) USE OF ANTICOAGULANTS: ICD-10-CM

## 2018-07-23 DIAGNOSIS — Q87.40 MARFAN'S SYNDROME: Primary | ICD-10-CM

## 2018-07-23 DIAGNOSIS — Z95.2 HEART VALVE REPLACED: ICD-10-CM

## 2018-07-23 LAB — INR POINT OF CARE: 2.5 (ref 0.86–1.14)

## 2018-07-23 PROCEDURE — 85610 PROTHROMBIN TIME: CPT | Mod: QW | Performed by: INTERNAL MEDICINE

## 2018-07-23 PROCEDURE — 36416 COLLJ CAPILLARY BLOOD SPEC: CPT | Performed by: INTERNAL MEDICINE

## 2018-07-23 RX ORDER — ATENOLOL 50 MG/1
50 TABLET ORAL DAILY
Qty: 90 TABLET | Refills: 3 | Status: SHIPPED | OUTPATIENT
Start: 2018-07-23 | End: 2019-07-27

## 2018-07-23 RX ORDER — LISINOPRIL 5 MG/1
5 TABLET ORAL DAILY
Qty: 90 TABLET | Refills: 3 | Status: SHIPPED | OUTPATIENT
Start: 2018-07-23 | End: 2019-10-29

## 2018-07-23 NOTE — TELEPHONE ENCOUNTER
Patient called asking for a medication refill for his Lisinopril and Atenolol to [place] [facility] sent in today, as he will be out of town tomorrow.

## 2018-07-23 NOTE — MR AVS SNAPSHOT
Richard Ball   7/23/2018 10:00 AM   Anticoagulation Therapy Visit    Description:  58 year old male   Provider:  JAH ANTICOAGULATION   Department:  [facility]           INR as of 7/23/2018     Today's INR 2.5      Anticoagulation Summary as of 7/23/2018     INR goal 2.0-3.0   Today's INR 2.5   Full warfarin instructions 5 mg on Sun, Tue, Thu; 2.5 mg all other days   Next INR check 8/24/2018    Indications   Long-term (current) use of anticoagulants [Z79.01] [Z79.01]  Heart valve replaced [Z95.2] [Z95.2]         Your next Anticoagulation Clinic appointment(s)     Aug 24, 2018  9:20 AM CDT   Anticoagulation Visit with RU ANTICOAGULATION   [facility] ([facility])    [id number] [place] [id number] [phone]              Contact Numbers     Anticoagulant (INR) Clinic Number: [phone]          July 2018 Details    Sun Mon Tue Wed Thu Fri Sat     1               2               3               4               5               6               7                 8               9               10               11               12               13               14                 15               16               17               18               19               20               21                 22               23      2.5 mg   See details      24      5 mg         25      2.5 mg         26      5 mg         27      2.5 mg         28      2.5 mg           29      5 mg         30      2.5 mg         31      5 mg              Date Details   07/23 This INR check               How to take your warfarin dose     To take:  2.5 mg Take 0.5 of a 5 mg tablet.    To take:  5 mg Take 1 of the 5 mg tablets.           August 2018 Details    Sun Mon Tue Wed Thu Fri Sat        1      2.5 mg         2      5 mg         3      2.5 mg         4      2.5 mg           5      5 mg         6      2.5 mg         7      5 mg         8      2.5 mg          9      5 mg         10      2.5 mg         11      2.5 mg           12      5 mg         13      2.5 mg         14      5 mg         15      2.5 mg         16      5 mg         17      2.5 mg         18      2.5 mg           19      5 mg         20      2.5 mg         21      5 mg         22      2.5 mg         23      5 mg         24            25                 26               27               28               29               30               31                 Date Details   No additional details    Date of next INR:  8/24/2018         How to take your warfarin dose     To take:  2.5 mg Take 0.5 of a 5 mg tablet.    To take:  5 mg Take 1 of the 5 mg tablets.

## 2018-07-23 NOTE — PROGRESS NOTES
ANTICOAGULATION FOLLOW-UP CLINIC VISIT    Patient Name:  Richard Ball  Date:  7/23/2018  Contact Type:  Face to Face    SUBJECTIVE:     Patient Findings     Positives No Problem Findings           OBJECTIVE    INR Protime   Date Value Ref Range Status   07/23/2018 2.5 (A) 0.86 - 1.14 Final       ASSESSMENT / PLAN  INR assessment THER    Recheck INR In: 4 WEEKS    INR Location Clinic      Anticoagulation Summary as of 7/23/2018     INR goal 2.0-3.0   Today's INR 2.5   Warfarin maintenance plan 5 mg (5 mg x 1) on Sun, Tue, Thu; 2.5 mg (5 mg x 0.5) all other days   Full warfarin instructions 5 mg on Sun, Tue, Thu; 2.5 mg all other days   Weekly warfarin total 25 mg   No change documented Constance Chacko RN   Plan last modified Teresa Espino RN (7/2/2018)   Next INR check 8/24/2018   Target end date Indefinite    Indications   Long-term (current) use of anticoagulants [Z79.01] [Z79.01]  Heart valve replaced [Z95.2] [Z95.2]         Anticoagulation Episode Summary     INR check location     Preferred lab     Send INR reminders to [facility] HEART INR NURSE    Comments AVR in 2001, range 2-3, no Lovenox needed      Anticoagulation Care Providers     Provider Role Specialty Phone number    March, Sundar Cantu MD Responsible Cardiology [phone]            See the Encounter Report to view Anticoagulation Flowsheet and Dosing Calendar (Go to Encounters tab in chart review, and find the Anticoagulation Therapy Visit)    INR 2.5 No change in meds or diet. No abnormal bleeding or bruising. He is leaving for a 4 week trip to [place] tomorrow. Will continue current dosing of 5 mg SuTuTh and 2.5 mg all other days with recheck in 4 weeks in [place] INR clinic.     Constance Chacko, RN

## 2018-08-09 ENCOUNTER — CARE COORDINATION (OUTPATIENT)
Dept: CARDIOLOGY | Facility: CLINIC | Age: 58
End: 2018-08-09

## 2018-08-16 ENCOUNTER — NURSE TRIAGE (OUTPATIENT)
Dept: NURSING | Facility: CLINIC | Age: 58
End: 2018-08-16

## 2018-08-16 NOTE — TELEPHONE ENCOUNTER
Patient is down in [place] and is calling for someone within [place] to call him in a prescription to treat what he thinks is a bladder infection. He said it burns when he urinates from the bladder, all the way out. He feels like he's not able to empty his bladder when he goes. It's not normal amounts coming out. I advised the ER where he is located. He was hoping for a prescription. I told him not without being seen and ER is appropriate.  Heather Laughlin RN-[facility]      Reason for Disposition    [1] Unable to urinate (or only a few drops) > 4 hours AND     [2] bladder feels very full (e.g., feels blocked with strong urge to urinate; palpable bladder)    Additional Information    Negative: Shock suspected (e.g., cold/pale/clammy skin, too weak to stand, low BP, rapid pulse)    Negative: Sounds like a life-threatening emergency to the triager    Negative: Followed a genital injury (e.g., penis, scrotum)    Negative: Taking antibiotic for urinary tract infection (UTI)    Negative: Pain in scrotum is main symptom    Protocols used: URINATION PAIN - MALE-ADULT-

## 2018-08-24 ENCOUNTER — ANTICOAGULATION THERAPY VISIT (OUTPATIENT)
Dept: CARDIOLOGY | Facility: CLINIC | Age: 58
End: 2018-08-24
Payer: COMMERCIAL

## 2018-08-24 DIAGNOSIS — Z95.2 HEART VALVE REPLACED: ICD-10-CM

## 2018-08-24 DIAGNOSIS — Z79.01 LONG-TERM (CURRENT) USE OF ANTICOAGULANTS: ICD-10-CM

## 2018-08-24 LAB — INR POINT OF CARE: 2.9 (ref 0.86–1.14)

## 2018-08-24 PROCEDURE — 85610 PROTHROMBIN TIME: CPT | Mod: QW | Performed by: INTERNAL MEDICINE

## 2018-08-24 PROCEDURE — 36416 COLLJ CAPILLARY BLOOD SPEC: CPT | Performed by: INTERNAL MEDICINE

## 2018-08-24 NOTE — PROGRESS NOTES
ANTICOAGULATION FOLLOW-UP CLINIC VISIT    Patient Name:  Rihcard Ball  Date:  8/24/2018  Contact Type:  Face to Face    SUBJECTIVE:     Patient Findings     Positives No Problem Findings           OBJECTIVE    INR Protime   Date Value Ref Range Status   08/24/2018 2.9 (A) 0.86 - 1.14 Final       ASSESSMENT / PLAN  INR assessment THER    Recheck INR In: 4 WEEKS    INR Location Clinic      Anticoagulation Summary as of 8/24/2018     INR goal 2.0-3.0   Today's INR 2.9   Warfarin maintenance plan 5 mg (5 mg x 1) on Sun, Tue, Thu; 2.5 mg (5 mg x 0.5) all other days   Full warfarin instructions 5 mg on Sun, Tue, Thu; 2.5 mg all other days   Weekly warfarin total 25 mg   No change documented Teresa Espino RN   Plan last modified Teresa Espino RN (7/2/2018)   Next INR check 9/21/2018   Target end date Indefinite    Indications   Long-term (current) use of anticoagulants [Z79.01] [Z79.01]  Heart valve replaced [Z95.2] [Z95.2]         Anticoagulation Episode Summary     INR check location     Preferred lab     Send INR reminders to [facility] HEART INR NURSE    Comments AVR in 2001, range 2-3, no Lovenox needed      Anticoagulation Care Providers     Provider Role Specialty Phone number    March, Sundar Cantu MD Responsible Cardiology [phone]            See the Encounter Report to view Anticoagulation Flowsheet and Dosing Calendar (Go to Encounters tab in chart review, and find the Anticoagulation Therapy Visit)    INR 2.9.  He has been out of the [place] for the past month.  He started treatment for UTI last week and finishes antibiotic on Sunday but he didn't know the name of the antibiotic.  No missed doses of Warfarin.  Continue same schedule and recheck in 4 weeks.  Rios Espino RN

## 2018-08-24 NOTE — MR AVS SNAPSHOT
Richard Ball   8/24/2018 1:40 PM   Anticoagulation Therapy Visit    Description:  58 year old male   Provider:  [facility]   Department:  [facility]           INR as of 8/24/2018     Today's INR 2.9      Anticoagulation Summary as of 8/24/2018     INR goal 2.0-3.0   Today's INR 2.9   Full warfarin instructions 5 mg on Sun, Tue, Thu; 2.5 mg all other days   Next INR check 9/21/2018    Indications   Long-term (current) use of anticoagulants [Z79.01] [Z79.01]  Heart valve replaced [Z95.2] [Z95.2]         Your next Anticoagulation Clinic appointment(s)     Sep 21, 2018  3:20 PM CDT   Anticoagulation Visit with RU ANTICOAGULATION   [facility] ([facility])    [address] [address] [address] [phone]              August 2018 Details    Sun Mon Tue Wed Thu Fri Sat        1               2               3               4                 5               6               7               8               9               10               11                 12               13               14               15               16               17               18                 19               20               21               22               23               24      2.5 mg   See details      25      2.5 mg           26      5 mg         27      2.5 mg         28      5 mg         29      2.5 mg         30      5 mg         31      2.5 mg           Date Details   08/24 This INR check               How to take your warfarin dose     To take:  2.5 mg Take 0.5 of a 5 mg tablet.    To take:  5 mg Take 1 of the 5 mg tablets.           September 2018 Details    Sun Mon Tue Wed Thu Fri Sat           1      2.5 mg           2      5 mg         3      2.5 mg         4      5 mg         5      2.5 mg         6      5 mg         7      2.5 mg         8      2.5 mg           9      5 mg         10      2.5 mg         11      5 mg         12       2.5 mg         13      5 mg         14      2.5 mg         15      2.5 mg           16      5 mg         17      2.5 mg         18      5 mg         19      2.5 mg         20      5 mg         21            22                 23               24               25               26               27               28               29                 30                      Date Details   No additional details    Date of next INR:  9/21/2018         How to take your warfarin dose     To take:  2.5 mg Take 0.5 of a 5 mg tablet.    To take:  5 mg Take 1 of the 5 mg tablets.

## 2018-09-21 ENCOUNTER — ANTICOAGULATION THERAPY VISIT (OUTPATIENT)
Dept: CARDIOLOGY | Facility: CLINIC | Age: 58
End: 2018-09-21
Payer: COMMERCIAL

## 2018-09-21 DIAGNOSIS — Z79.01 LONG-TERM (CURRENT) USE OF ANTICOAGULANTS: ICD-10-CM

## 2018-09-21 DIAGNOSIS — Z95.2 HEART VALVE REPLACED: ICD-10-CM

## 2018-09-21 LAB — INR POINT OF CARE: 3.2 (ref 0.86–1.14)

## 2018-09-21 PROCEDURE — 85610 PROTHROMBIN TIME: CPT | Mod: QW | Performed by: INTERNAL MEDICINE

## 2018-09-21 PROCEDURE — 99207 ZZC NO CHARGE NURSE ONLY: CPT | Performed by: INTERNAL MEDICINE

## 2018-09-21 PROCEDURE — 36416 COLLJ CAPILLARY BLOOD SPEC: CPT | Performed by: INTERNAL MEDICINE

## 2018-09-21 NOTE — PROGRESS NOTES
ANTICOAGULATION FOLLOW-UP CLINIC VISIT    Patient Name:  Richard Ball  Date:  9/21/2018  Contact Type:  Face to Face    SUBJECTIVE:     Patient Findings     Positives Change in diet/appetite (had two beers last noc. Not eating as meany greens this week)           OBJECTIVE    INR Protime   Date Value Ref Range Status   09/21/2018 3.2 (A) 0.86 - 1.14 Final       ASSESSMENT / PLAN  INR assessment SUPRA    Recheck INR In: 4 WEEKS    INR Location Clinic      Anticoagulation Summary as of 9/21/2018     INR goal 2.0-3.0   Today's INR 3.2!   Warfarin maintenance plan 5 mg (5 mg x 1) on Sun, Tue, Thu; 2.5 mg (5 mg x 0.5) all other days   Full warfarin instructions 5 mg on Sun, Tue, Thu; 2.5 mg all other days   Weekly warfarin total 25 mg   No change documented Xin Altamirano RN   Plan last modified Teresa Espino RN (7/2/2018)   Next INR check    Target end date Indefinite    Indications   Long-term (current) use of anticoagulants [Z79.01] [Z79.01]  Heart valve replaced [Z95.2] [Z95.2]         Anticoagulation Episode Summary     INR check location     Preferred lab     Send INR reminders to [facility] HEART INR NURSE    Comments AVR in 2001, range 2-3, no Lovenox needed      Anticoagulation Care Providers     Provider Role Specialty Phone number    March, Sundar Cantu MD Responsible Cardiology [phone]            See the Encounter Report to view Anticoagulation Flowsheet and Dosing Calendar (Go to Encounters tab in chart review, and find the Anticoagulation Therapy Visit)    INR 3.2. No changes to dosing. Pt had 2 beers last noc and is not eating his usual greens. Will eat greens tonight and resume his usual diet of greens. Advised pt to eat greens when drinking alcohol to offset the effects of alcohol on the INR. States he missed a dose 2 weeks ago. Advised pt he can make up the dose the next day when missing a dose. Denies unusual bleeding or bruising. Next appt in 4 weeks. Dosage adjustment made based on  physician directed care plan.    Xin Altamirano RN

## 2018-09-21 NOTE — MR AVS SNAPSHOT
Richard Ball   9/21/2018 8:20 AM   Anticoagulation Therapy Visit    Description:  58 year old male   Provider:  [facility]   Department:  [facility]           INR as of 9/21/2018     Today's INR 3.2!      Anticoagulation Summary as of 9/21/2018     INR goal 2.0-3.0   Today's INR 3.2!   Full warfarin instructions 5 mg on Sun, Tue, Thu; 2.5 mg all other days   Next INR check 10/12/2018    Indications   Long-term (current) use of anticoagulants [Z79.01] [Z79.01]  Heart valve replaced [Z95.2] [Z95.2]         Your next Anticoagulation Clinic appointment(s)     Oct 12, 2018  7:40 AM CDT   Anticoagulation Visit with RU ANTICOAGULATION   [facility] ([facility])    [address] [address] [id number] [phone]              Contact Numbers     Anticoagulant (INR) Clinic Number: [phone]          September 2018 Details    Sun Mon Tue Wed Thu Fri Sat           1                 2               3               4               5               6               7               8                 9               10               11               12               13               14               15                 16               17               18               19               20               21      2.5 mg   See details      22      2.5 mg           23      5 mg         24      2.5 mg         25      5 mg         26      2.5 mg         27      5 mg         28      2.5 mg         29      2.5 mg           30      5 mg                Date Details   09/21 This INR check               How to take your warfarin dose     To take:  2.5 mg Take 0.5 of a 5 mg tablet.    To take:  5 mg Take 1 of the 5 mg tablets.           October 2018 Details    Sun Mon Tue Wed Thu Fri Sat      1      2.5 mg         2      5 mg         3      2.5 mg         4      5 mg         5      2.5 mg         6      2.5 mg           7      5 mg         8      2.5  mg         9      5 mg         10      2.5 mg         11      5 mg         12            13                 14               15               16               17               18               19               20                 21               22               23               24               25               26               27                 28               29               30               31                   Date Details   No additional details    Date of next INR:  10/12/2018         How to take your warfarin dose     To take:  2.5 mg Take 0.5 of a 5 mg tablet.    To take:  5 mg Take 1 of the 5 mg tablets.

## 2018-10-12 ENCOUNTER — ANTICOAGULATION THERAPY VISIT (OUTPATIENT)
Dept: CARDIOLOGY | Facility: CLINIC | Age: 58
End: 2018-10-12
Payer: COMMERCIAL

## 2018-10-12 DIAGNOSIS — Z95.2 HEART VALVE REPLACED: ICD-10-CM

## 2018-10-12 LAB — INR POINT OF CARE: 2 (ref 0.86–1.14)

## 2018-10-12 PROCEDURE — 36416 COLLJ CAPILLARY BLOOD SPEC: CPT | Performed by: INTERNAL MEDICINE

## 2018-10-12 PROCEDURE — 99207 ZZC NO CHARGE NURSE ONLY: CPT | Performed by: INTERNAL MEDICINE

## 2018-10-12 PROCEDURE — 85610 PROTHROMBIN TIME: CPT | Mod: QW | Performed by: INTERNAL MEDICINE

## 2018-10-12 NOTE — MR AVS SNAPSHOT
Richard Ball   10/12/2018 7:40 AM   Anticoagulation Therapy Visit    Description:  58 year old male   Provider:  [facility]   Department:  [facility]           INR as of 10/12/2018     Today's INR 2.0      Anticoagulation Summary as of 10/12/2018     INR goal 2.0-3.0   Today's INR 2.0   Full warfarin instructions 10/12: 5 mg; Otherwise 5 mg on Sun, Tue, Thu; 2.5 mg all other days   Next INR check 11/2/2018    Indications   Long-term (current) use of anticoagulants [Z79.01] [Z79.01]  Heart valve replaced [Z95.2] [Z95.2]         Your next Anticoagulation Clinic appointment(s)     Nov 02, 2018  7:40 AM CDT   Anticoagulation Visit with RU ANTICOAGULATION   [facility] ([facility])    [address] [address] [address] [phone]              October 2018 Details    Sun Mon Tue Wed Thu Fri Sat      1               2               3               4               5               6                 7               8               9               10               11               12      5 mg   See details      13      2.5 mg           14      5 mg         15      2.5 mg         16      5 mg         17      2.5 mg         18      5 mg         19      2.5 mg         20      2.5 mg           21      5 mg         22      2.5 mg         23      5 mg         24      2.5 mg         25      5 mg         26      2.5 mg         27      2.5 mg           28      5 mg         29      2.5 mg         30      5 mg         31      2.5 mg             Date Details   10/12 This INR check               How to take your warfarin dose     To take:  2.5 mg Take 0.5 of a 5 mg tablet.    To take:  5 mg Take 1 of the 5 mg tablets.           November 2018 Details    Sun Mon Tue Wed Thu Fri Sat         1      5 mg         2            3                 4               5               6               7               8               9               10                  11               12               13               14               15               16               17                 18               19               20               21               22               23               24                 25               26               27               28               29               30                 Date Details   No additional details    Date of next INR:  11/2/2018         How to take your warfarin dose     To take:  2.5 mg Take 0.5 of a 5 mg tablet.    To take:  5 mg Take 1 of the 5 mg tablets.

## 2018-10-12 NOTE — PROGRESS NOTES
ANTICOAGULATION FOLLOW-UP CLINIC VISIT    Patient Name:  Richard Ball  Date:  10/12/2018  Contact Type:  Face to Face    SUBJECTIVE:     Patient Findings     Positives No Problem Findings           OBJECTIVE    INR Protime   Date Value Ref Range Status   10/12/2018 2.0 (A) 0.86 - 1.14 Final       ASSESSMENT / PLAN  INR assessment THER    Recheck INR In: 3 WEEKS    INR Location Clinic      Anticoagulation Summary as of 10/12/2018     INR goal 2.0-3.0   Today's INR 2.0   Warfarin maintenance plan 5 mg (5 mg x 1) on Sun, Tue, Thu; 2.5 mg (5 mg x 0.5) all other days   Full warfarin instructions 10/12: 5 mg; Otherwise 5 mg on Sun, Tue, Thu; 2.5 mg all other days   Weekly warfarin total 25 mg   Plan last modified Teresa Espino, RN (7/2/2018)   Next INR check 11/2/2018   Target end date Indefinite    Indications   Long-term (current) use of anticoagulants [Z79.01] [Z79.01]  Heart valve replaced [Z95.2] [Z95.2]         Anticoagulation Episode Summary     INR check location     Preferred lab     Send INR reminders to [facility] HEART INR NURSE    Comments AVR in 2001, range 2-3, no Lovenox needed      Anticoagulation Care Providers     Provider Role Specialty Phone number    March, Sundar Cantu MD Responsible Cardiology [phone]            See the Encounter Report to view Anticoagulation Flowsheet and Dosing Calendar (Go to Encounters tab in chart review, and find the Anticoagulation Therapy Visit)    INR 2.0.  No missed doses.  He has been taking some tylenol.  No med changes.  No extra dark greens.  Increase today from 2.5mg to 5mg x1 then back to normal schedule and recheck in 3 weeks.  INR dropped from 3.2 to 2.0 this past month for unknown reason.  Rios Espino, RN

## 2018-10-16 ENCOUNTER — DOCUMENTATION ONLY (OUTPATIENT)
Dept: CARDIOLOGY | Facility: CLINIC | Age: 58
End: 2018-10-16

## 2018-10-16 NOTE — PROGRESS NOTES
Patient missed [facility] transmission on 10/8/18. Sent letter 10/9, no response. Sent 1 week letter today

## 2018-10-23 ENCOUNTER — ALLIED HEALTH/NURSE VISIT (OUTPATIENT)
Dept: CARDIOLOGY | Facility: CLINIC | Age: 58
End: 2018-10-23
Payer: COMMERCIAL

## 2018-10-23 DIAGNOSIS — Z95.0 CARDIAC PACEMAKER IN SITU: Primary | ICD-10-CM

## 2018-10-23 PROCEDURE — 93294 REM INTERROG EVL PM/LDLS PM: CPT | Performed by: INTERNAL MEDICINE

## 2018-10-23 PROCEDURE — 93296 REM INTERROG EVL PM/IDS: CPT | Performed by: INTERNAL MEDICINE

## 2018-10-23 NOTE — MR AVS SNAPSHOT
After Visit Summary   10/23/2018    Richard Ball    MRN: [id number]           Patient Information     Date Of Birth          1960        Visit Information        Provider Department      10/23/2018 1:45 PM [facility]        Today's Diagnoses     Cardiac pacemaker in situ    -  1       Follow-ups after your visit        Your next 10 appointments already scheduled     Oct 24, 2018  4:10 PM CDT   [facility]    [address]  [address]  [place] [address] [phone]            Nov 02, 2018  7:40 AM CDT   Anticoagulation Visit with RU ANTICOAGULATION   [facility] ([facility])    [address]  [facility] [facility] [address] [phone]              Who to contact     If you have questions or need follow up information about today's clinic visit or your schedule please contact [facility] directly at [phone].  Normal or non-critical lab and imaging results will be communicated to you by [employer], letter or phone within 4 business days after the clinic has received the results. If you do not hear from us within 7 days, please contact the clinic through [employer] or phone. If you have a critical or abnormal lab result, we will notify you by phone as soon as possible.  Submit refill requests through [employer] or call your pharmacy and they will forward the refill request to us. Please allow 3 business days for your refill to be completed.          Additional Information About Your Visit        [employer] Information     [employer] gives you secure access to your electronic health record. If you see a primary care provider, you can also send messages to your care team and make appointments. If you have questions, please call your primary care clinic.  If you do not have  a primary care provider, please call [phone] and they will assist you.        Care EveryWhere ID     This is your Care EveryWhere ID. This could be used by other organizations to access your [place] medical records  [id number]         Blood Pressure from Last 3 Encounters:   06/27/18 128/77   06/25/18 99/62   06/21/18 132/86    Weight from Last 3 Encounters:   06/27/18 72.1 kg (159 lb)   06/21/18 75.6 kg (166 lb 11.2 oz)   05/01/18 73.5 kg (162 lb)              We Performed the Following     INTERROGATION DEVICE EVAL REMOTE, PACER/ICD (26895)     PM DEVICE INTERROGATE REMOTE (41902)        Primary Care Provider Office Phone # Fax #    Royer Roblero [phone] [address] [place] [address]        Equal Access to Services     [facility]: Hadii aad ku hadasho Soomaali, waaxda luqadaha, qaybta kaalmada adeegyada, waxay idiin hayaan xavi qureshi . So [facility] [phone].    ATENCIÓN: Si habla español, tiene a joel disposición servicios gratuitos de asistencia lingüística. Jayson al [phone].    We comply with applicable federal civil rights laws and [place] laws. We do not discriminate on the basis of race, color, national origin, age, disability, sex, sexual orientation, or gender identity.            Thank you!     Thank you for choosing [facility]  for your care. Our goal is always to provide you with excellent care. Hearing back from our patients is one way we can continue to improve our services. Please take a few minutes to complete the written survey that you may receive in the mail after your visit with us. Thank you!             Your Updated Medication List - Protect others around you: Learn how to safely use, store and throw away your medicines at www.disposemymeds.org.          This list is accurate as of 10/23/18 11:59 PM.  Always use your most recent med list.                   Brand Name Dispense  Instructions for use Diagnosis    ALLEGRA PO      Take by mouth daily as needed for allergies        aspirin 81 MG chewable tablet     90 tablet    Take 1 tablet (81 mg) by mouth daily    Marfan's syndrome, S/P aortic valve replacement       atenolol 50 MG tablet    TENORMIN    90 tablet    Take 1 tablet (50 mg) by mouth daily    Marfan's syndrome       atorvastatin 20 MG tablet    LIPITOR     daily        COUMADIN 5 MG tablet   Generic drug:  warfarin     30 tablet    Take 1 tablet (5 mg) by mouth daily Alternating with 2.5 every other day    S/P aortic valve replacement, Marfan's syndrome       lisinopril 5 MG tablet    PRINIVIL/ZESTRIL    90 tablet    Take 1 tablet (5 mg) by mouth daily    Marfan's syndrome       LOVENOX 80 MG/0.8ML injection   Generic drug:  enoxaparin     10 Syringe    Inject 0.7 mLs (70 mg) Subcutaneous 2 times daily    S/P aortic valve replacement       triamcinolone 0.1 % cream    KENALOG    80 g    Apply sparingly to affected area three times daily as needed    Dermatitis       VENTOLIN  (90 Base) MCG/ACT inhaler   Generic drug:  albuterol      daily as needed Reported on 3/24/2017

## 2018-10-24 ENCOUNTER — OFFICE VISIT (OUTPATIENT)
Dept: PEDIATRICS | Facility: CLINIC | Age: 58
End: 2018-10-24
Payer: COMMERCIAL

## 2018-10-24 VITALS
WEIGHT: 164 LBS | TEMPERATURE: 97.9 F | HEIGHT: 71 IN | SYSTOLIC BLOOD PRESSURE: 132 MMHG | OXYGEN SATURATION: 96 % | BODY MASS INDEX: 22.96 KG/M2 | DIASTOLIC BLOOD PRESSURE: 66 MMHG | HEART RATE: 64 BPM

## 2018-10-24 DIAGNOSIS — Z23 NEED FOR PROPHYLACTIC VACCINATION AND INOCULATION AGAINST INFLUENZA: ICD-10-CM

## 2018-10-24 DIAGNOSIS — M25.519 CHRONIC SHOULDER PAIN, UNSPECIFIED LATERALITY: ICD-10-CM

## 2018-10-24 DIAGNOSIS — G89.29 CHRONIC SHOULDER PAIN, UNSPECIFIED LATERALITY: ICD-10-CM

## 2018-10-24 DIAGNOSIS — B35.1 TOENAIL FUNGUS: ICD-10-CM

## 2018-10-24 DIAGNOSIS — B07.8 COMMON WART: ICD-10-CM

## 2018-10-24 DIAGNOSIS — M41.9 SCOLIOSIS OF THORACIC SPINE, UNSPECIFIED SCOLIOSIS TYPE: ICD-10-CM

## 2018-10-24 DIAGNOSIS — Z00.00 ROUTINE HISTORY AND PHYSICAL EXAMINATION OF ADULT: Primary | ICD-10-CM

## 2018-10-24 DIAGNOSIS — R06.2 WHEEZING: ICD-10-CM

## 2018-10-24 PROCEDURE — 90686 IIV4 VACC NO PRSV 0.5 ML IM: CPT | Performed by: INTERNAL MEDICINE

## 2018-10-24 PROCEDURE — 36415 COLL VENOUS BLD VENIPUNCTURE: CPT | Performed by: INTERNAL MEDICINE

## 2018-10-24 PROCEDURE — 90471 IMMUNIZATION ADMIN: CPT | Performed by: INTERNAL MEDICINE

## 2018-10-24 PROCEDURE — G0103 PSA SCREENING: HCPCS | Performed by: INTERNAL MEDICINE

## 2018-10-24 PROCEDURE — 86706 HEP B SURFACE ANTIBODY: CPT | Performed by: INTERNAL MEDICINE

## 2018-10-24 PROCEDURE — 99396 PREV VISIT EST AGE 40-64: CPT | Mod: 25 | Performed by: INTERNAL MEDICINE

## 2018-10-24 PROCEDURE — 87340 HEPATITIS B SURFACE AG IA: CPT | Performed by: INTERNAL MEDICINE

## 2018-10-24 PROCEDURE — 86803 HEPATITIS C AB TEST: CPT | Performed by: INTERNAL MEDICINE

## 2018-10-24 PROCEDURE — 99213 OFFICE O/P EST LOW 20 MIN: CPT | Mod: 25 | Performed by: INTERNAL MEDICINE

## 2018-10-24 RX ORDER — IMIQUIMOD 12.5 MG/.25G
CREAM TOPICAL
Qty: 12 PACKET | Refills: 3 | Status: SHIPPED | OUTPATIENT
Start: 2018-10-24 | End: 2019-04-30

## 2018-10-24 RX ORDER — ALBUTEROL SULFATE 90 UG/1
2 AEROSOL, METERED RESPIRATORY (INHALATION) EVERY 6 HOURS PRN
Qty: 1 INHALER | Refills: 3 | Status: SHIPPED | OUTPATIENT
Start: 2018-10-24 | End: 2022-04-04

## 2018-10-24 RX ORDER — TERBINAFINE HYDROCHLORIDE 250 MG/1
250 TABLET ORAL DAILY
Qty: 90 TABLET | Refills: 1 | Status: SHIPPED | OUTPATIENT
Start: 2018-10-24 | End: 2019-04-30

## 2018-10-24 ASSESSMENT — ENCOUNTER SYMPTOMS
ARTHRALGIAS: 1
CONSTIPATION: 0
DIARRHEA: 0
HEARTBURN: 0
DIZZINESS: 0
COUGH: 0
FREQUENCY: 0
HEMATOCHEZIA: 0
DYSURIA: 0
PARESTHESIAS: 0
FEVER: 0
ABDOMINAL PAIN: 0
NAUSEA: 0
CHILLS: 0
MYALGIAS: 1
WEAKNESS: 0
EYE PAIN: 0
SHORTNESS OF BREATH: 0
NERVOUS/ANXIOUS: 0
JOINT SWELLING: 0
HEADACHES: 0
HEMATURIA: 0
PALPITATIONS: 0
SORE THROAT: 0

## 2018-10-24 NOTE — PATIENT INSTRUCTIONS
1) Flu shot today    2) New shingles vaccine at the pharmacy (shingrix)     3) Labs downstairs today.    4) They will call you to set up with orthopedics    5) Call to set up with neurosurgery    6) Recheck liver functions in 1 month on the terbinafine (lab only check)    7) Use the aldara on the finger every other night and wash off in the morning.    Royer Roblero MD    Preventive Health Recommendations  Male Ages 50 - 64    Yearly exam:             See your health care provider every year in order to  o   Review health changes.   o   Discuss preventive care.    o   Review your medicines if your doctor has prescribed any.     Have a cholesterol test every 5 years, or more frequently if you are at risk for high cholesterol/heart disease.     Have a diabetes test (fasting glucose) every three years. If you are at risk for diabetes, you should have this test more often.     Have a colonoscopy at age 50, or have a yearly FIT test (stool test). These exams will check for colon cancer.      Talk with your health care provider about whether or not a prostate cancer screening test (PSA) is right for you.    You should be tested each year for STDs (sexually transmitted diseases), if you re at risk.     Shots: Get a flu shot each year. Get a tetanus shot every 10 years.     Nutrition:    Eat at least 5 servings of fruits and vegetables daily.     Eat whole-grain bread, whole-wheat pasta and brown rice instead of white grains and rice.     Get adequate Calcium and Vitamin D.     Lifestyle    Exercise for at least 150 minutes a week (30 minutes a day, 5 days a week). This will help you control your weight and prevent disease.     Limit alcohol to one drink per day.     No smoking.     Wear sunscreen to prevent skin cancer.     See your dentist every six months for an exam and cleaning.     See your eye doctor every 1 to 2 years.

## 2018-10-24 NOTE — PROGRESS NOTES
[employer]pta ADDRL1 (S) Remote PPM Device Check  : 100%  Mode: VDD        Presenting Rhythm: AS/  Heart Rate: adequate heart rates per histogram  Sensing: not performed    Pacing Threshold: stable    Impedance: stable  Battery Status: 2 - 5.5 years remaining  Atrial Arrhythmia: 310 mode switch episodes.  2 EGMs available showing 'noise' on atrail lead. Hx of previous fx.   Ventricular Arrhythmia: none     Care Plan: F/U Carelink q 3 months. Order in for annual OV to be scheduled in June 2019. No answer, left message with results and next transmission date. Kianna BUTTS

## 2018-10-24 NOTE — PROGRESS NOTES
SUBJECTIVE:   CC: Richard Ball is an 58 year old male who presents for preventative health visit.     Physical   Annual:     Getting at least 3 servings of Calcium per day:  Yes    Bi-annual eye exam:  Yes    Dental care twice a year:  Yes    Sleep apnea or symptoms of sleep apnea:  None    Diet:  Regular (no restrictions)    Taking medications regularly:  Yes    Medication side effects:  None    Additional concerns today:  No    Finger changes: notes skin changes over the left 4th finger near the nail. Has tried cutting off skin areas- wondering about next steps.    Toenail fungus: has used lamisil in the past and worked well, was before he was on warfarin. Would like to try again.     Left shoulder pain: has been ongoing for the last year. He notes that it has not been getting better. Difficulty with lifting arms above head, wondering if related to ongoing scoliosis, has been having worsening pain in the thoracic and lumbar area. No leg weakness or paresthesias.     S/p aortic valve replacement with Marfan's following with cardiology, doing well, INR in good range. No chest pains or shortness of breath.     Occasional use of albuterol, needs refill today.    Today's PHQ-2 Score:   PHQ-2 ( 1999 Pfizer) 10/24/2018   Q1: Little interest or pleasure in doing things 0   Q2: Feeling down, depressed or hopeless 0   PHQ-2 Score 0   Q1: Little interest or pleasure in doing things Not at all   Q2: Feeling down, depressed or hopeless Not at all   PHQ-2 Score 0       Abuse: Current or Past(Physical, Sexual or Emotional)- No  Do you feel safe in your environment - Yes    Social History   Substance Use Topics     Smoking status: Never Smoker     Smokeless tobacco: Never Used     Alcohol use 2.4 oz/week     4 Standard drinks or equivalent per week      Comment: 2 drinks week     No flowsheet data found.No flowsheet data found.    Last PSA: No results found for: PSA    Reviewed orders with patient. Reviewed health maintenance  "and updated orders accordingly - Yes  Labs reviewed in EPIC    Reviewed and updated as needed this visit by clinical staff  Tobacco  Allergies  Meds  Med Hx  Surg Hx  Fam Hx  Soc Hx        Reviewed and updated as needed this visit by Provider            Review of Systems  CONSTITUTIONAL: NEGATIVE for fever, chills, change in weight  INTEGUMENTARY/SKIN: NEGATIVE for worrisome rashes, moles or lesions  EYES: NEGATIVE for vision changes or irritation  ENT: NEGATIVE for ear, mouth and throat problems  RESP: NEGATIVE for significant cough or SOB  CV: NEGATIVE for chest pain, palpitations or peripheral edema  GI: NEGATIVE for nausea, abdominal pain, heartburn, or change in bowel habits   male: negative for dysuria, hematuria, decreased urinary stream, erectile dysfunction, urethral discharge  MUSCULOSKELETAL: NEGATIVE for significant arthralgias or myalgia  NEURO: NEGATIVE for weakness, dizziness or paresthesias  PSYCHIATRIC: NEGATIVE for changes in mood or affect    OBJECTIVE:   /66 (BP Location: Right arm, Cuff Size: Adult Regular)  Pulse 64  Temp 97.9  F (36.6  C) (Oral)  Ht 5' 10.5\" (1.791 m)  Wt 164 lb (74.4 kg)  SpO2 96%  BMI 23.2 kg/m2    Physical Exam  GENERAL: healthy, alert and no distress  EYES: Eyes grossly normal to inspection, PERRL and conjunctivae and sclerae normal  HENT: ear canals and TM's normal, nose and mouth without ulcers or lesions  NECK: no adenopathy, no asymmetry, masses, or scars and thyroid normal to palpation  RESP: lungs clear to auscultation - no rales, rhonchi or wheezes  CV: noted click of aortic valve, normal rhythm, normal rate  ABDOMEN: soft, nontender, no hepatosplenomegaly, no masses and bowel sounds normal  MS: noted decreased ROM of the left shoulder and noted difficulty with Neer impingement and scapular lift testing, normal  strength of the hands, normal perfusion of hands, significant scoliosis noted over the thoracic region with increased muscle tension " of bilateral para thoracic area, negative straight leg raise, normal lower extremity sensation  SKIN: noted lichenification and scaling along the lateral 4th nailbed area. Noted lichenified toenails bilaterally consistent with onychomycosis   NEURO: Normal strength and tone, mentation intact and speech normal  PSYCH: mentation appears normal, affect normal/bright    Diagnostic Test Results:  Results for orders placed or performed in visit on 10/24/18   Hepatitis B surface antigen   Result Value Ref Range    Hep B Surface Agn Nonreactive NR^Nonreactive   Hepatitis B Surface Antibody   Result Value Ref Range    Hepatitis B Surface Antibody 0.15 <8.00 m[IU]/mL   Hepatitis C Screen Reflex to HCV RNA Quant and Genotype   Result Value Ref Range    Hepatitis C Antibody Nonreactive NR^Nonreactive   PSA, screen   Result Value Ref Range    PSA 1.53 0 - 4 ug/L       ASSESSMENT/PLAN:       ICD-10-CM    1. Routine history and physical examination of adult Z00.00 Hepatitis B surface antigen     Hepatitis B Surface Antibody     Hepatitis C Screen Reflex to HCV RNA Quant and Genotype     PSA, screen   2. Scoliosis of thoracic spine, unspecified scoliosis type M41.9 NEUROSURGERY REFERRAL     OFFICE/OUTPT VISIT,EST,LEVL III   3. Wheezing R06.2 albuterol (VENTOLIN HFA) 108 (90 Base) MCG/ACT inhaler     OFFICE/OUTPT VISIT,EST,LEVL III   4. Common wart B07.8 imiquimod (ALDARA) 5 % cream     OFFICE/OUTPT VISIT,EST,LEVL III   5. Toenail fungus B35.1 imiquimod (ALDARA) 5 % cream     terbinafine (LAMISIL) 250 MG tablet     Comprehensive metabolic panel     OFFICE/OUTPT VISIT,EST,LEVL III   6. Chronic shoulder pain, unspecified laterality M25.519 OFFICE/OUTPT VISIT,EST,LEVL III    G89.29    7. Need for prophylactic vaccination and inoculation against influenza Z23 FLU VACCINE, SPLIT VIRUS, IM (QUADRIVALENT) [65283]- >3 YRS     Vaccine Administration, Initial [56876]   See pt instructions    COUNSELING:   Reviewed preventive health  "counseling, as reflected in patient instructions    BP Readings from Last 1 Encounters:   10/24/18 132/66     Estimated body mass index is 23.2 kg/(m^2) as calculated from the following:    Height as of this encounter: 5' 10.5\" (1.791 m).    Weight as of this encounter: 164 lb (74.4 kg).           reports that he has never smoked. He has never used smokeless tobacco.      Counseling Resources:  ATP IV Guidelines  Pooled Cohorts Equation Calculator  FRAX Risk Assessment  ICSI Preventive Guidelines  Dietary Guidelines for Americans, 2010  [employer]'s MyPlate  ASA Prophylaxis  Lung CA Screening    Royer Roblero MD, MD  [facility]    Injectable Influenza Immunization Documentation    1.  Is the person to be vaccinated sick today?   No    2. Does the person to be vaccinated have an allergy to a component   of the vaccine?   No  Egg Allergy Algorithm Link    3. Has the person to be vaccinated ever had a serious reaction   to influenza vaccine in the past?   No    4. Has the person to be vaccinated ever had Guillain-Barré syndrome?   No    Form completed by Celi Winter MA           Answers for HPI/ROS submitted by the patient on 10/24/2018   PHQ-2 Score: 0    "

## 2018-10-24 NOTE — MR AVS SNAPSHOT
After Visit Summary   10/24/2018    Richard Ball    MRN: [id number]           Patient Information     Date Of Birth          1960        Visit Information        Provider Department      10/24/2018 4:10 PM Royer Roblero MD [facility]        Today's Diagnoses     Need for prophylactic vaccination and inoculation against influenza    -  1    Scoliosis of thoracic spine, unspecified scoliosis type        Wheezing        Routine history and physical examination of adult        Common wart        Toenail fungus        Chronic right shoulder pain          Care Instructions    1) Flu shot today    2) New shingles vaccine at the pharmacy (shingrix)     3) Labs downstairs today.    4) They will call you to set up with orthopedics    5) Call to set up with neurosurgery    6) Recheck liver functions in 1 month on the terbinafine (lab only check)    7) Use the aldara on the finger every other night and wash off in the morning.    Royer Roblero MD    Preventive Health Recommendations  Male Ages 50 - 64    Yearly exam:             See your health care provider every year in order to  o   Review health changes.   o   Discuss preventive care.    o   Review your medicines if your doctor has prescribed any.     Have a cholesterol test every 5 years, or more frequently if you are at risk for high cholesterol/heart disease.     Have a diabetes test (fasting glucose) every three years. If you are at risk for diabetes, you should have this test more often.     Have a colonoscopy at age 50, or have a yearly FIT test (stool test). These exams will check for colon cancer.      Talk with your health care provider about whether or not a prostate cancer screening test (PSA) is right for you.    You should be tested each year for STDs (sexually transmitted diseases), if you re at risk.     Shots: Get a flu shot each year. Get a tetanus shot every 10 years.     Nutrition:    Eat at least 5 servings of fruits  and vegetables daily.     Eat whole-grain bread, whole-wheat pasta and brown rice instead of white grains and rice.     Get adequate Calcium and Vitamin D.     Lifestyle    Exercise for at least 150 minutes a week (30 minutes a day, 5 days a week). This will help you control your weight and prevent disease.     Limit alcohol to one drink per day.     No smoking.     Wear sunscreen to prevent skin cancer.     See your dentist every six months for an exam and cleaning.     See your eye doctor every 1 to 2 years.            Follow-ups after your visit        Additional Services     NEUROSURGERY REFERRAL       Your provider has referred you to: [facility] [phone]   [email or web address]    Please be aware that coverage of these services is subject to the terms and limitations of your health insurance plan.  Call member services at your health plan with any benefit or coverage questions.      Please bring the following with you to your appointment:    (1) Any X-Rays, CTs or MRIs which have been performed.  Contact the facility where they were done to arrange for  prior to your scheduled appointment.   (2) List of current medications  (3) This referral request   (4) Any documents/labs given to you for this referral            ORTHO  REFERRAL       [facility] is referring you to the Orthopedic  Services at [facility].       The  Representative will assist you in the coordination of your Orthopedic and Musculoskeletal Care as prescribed by your physician.    The  Representative will call you within 1 business day to help schedule your appointment, or you may contact the  Representative at:    All areas ~ [phone]     Type of Referral : Non Surgical / Sport Medicine       Timeframe requested: 3 - 5 days    Coverage of these services is subject to the terms  and limitations of your health insurance plan.  Please call member services at your health plan with any benefit or coverage questions.      If X-rays, CT or MRI's have been performed, please contact the facility where they were done to arrange for , prior to your scheduled appointment.  Please bring this referral request to your appointment and present it to your specialist.                  Follow-up notes from your care team     Return in about 6 months (around 4/24/2019).      Your next 10 appointments already scheduled     Nov 02, 2018  7:40 AM CDT   Anticoagulation Visit with RU ANTICOAGULATION   [facility] ([facility])    [address]  [facility] [id number] [phone]            Jan 23, 2019 10:30 AM CST   Remote PPM Check with TYSON TECH1   [facility] ([facility])    [address]  [facility] [id number] [phone] OPT 2           This appointment is for a remote check of your pacemaker.  This is not an appointment at the office.              Future tests that were ordered for you today     Open Future Orders        Priority Expected Expires Ordered    Comprehensive metabolic panel Routine  10/24/2019 10/24/2018            Who to contact     If you have questions or need follow up information about today's clinic visit or your schedule please contact [facility] directly at [phone].  Normal or non-critical lab and imaging results will be communicated to you by MyChart, letter or phone within 4 business days after the clinic has received the results. If you do not hear from us within 7 days, please contact the clinic through MyChart or phone. If you have a critical or abnormal lab result, we will notify you by phone as soon as possible.  Submit refill requests through [employer] or call your pharmacy and they will forward the refill request to us. Please  "allow 3 business days for your refill to be completed.          Additional Information About Your Visit        [facility] Information     [employer] gives you secure access to your electronic health record. If you see a primary care provider, you can also send messages to your care team and make appointments. If you have questions, please call your primary care clinic.  If you do not have a primary care provider, please call [phone] and they will assist you.        Care EveryWhere ID     This is your Care EveryWhere ID. This could be used by other organizations to access your [place] medical records  [id number]        Your Vitals Were     Pulse Temperature Height Pulse Oximetry BMI (Body Mass Index)       64 97.9  F (36.6  C) (Oral) 5' 10.5\" (1.791 m) 96% 23.2 kg/m2        Blood Pressure from Last 3 Encounters:   10/24/18 132/66   06/27/18 128/77   06/25/18 99/62    Weight from Last 3 Encounters:   10/24/18 164 lb (74.4 kg)   06/27/18 159 lb (72.1 kg)   06/21/18 166 lb 11.2 oz (75.6 kg)              We Performed the Following     FLU VACCINE, SPLIT VIRUS, IM (QUADRIVALENT) [58097]- >3 YRS     Hepatitis B Surface Antibody     Hepatitis B surface antigen     Hepatitis C Screen Reflex to HCV RNA Quant and Genotype     NEUROSURGERY REFERRAL     ORTHO  REFERRAL     PSA, screen     Vaccine Administration, Initial [06640]          Today's Medication Changes          These changes are accurate as of 10/24/18  5:02 PM.  If you have any questions, ask your nurse or doctor.               Start taking these medicines.        Dose/Directions    imiquimod 5 % cream   Commonly known as:  ALDARA   Used for:  Common wart, Toenail fungus   Started by:  Royer Roblero MD        Apply a small sized amount to area on left ring finger at bedtime.   Wash off after 8 hours.   May use for up to 16 weeks.   Quantity:  12 packet   Refills:  3       terbinafine 250 MG tablet   Commonly known as:  lamISIL   Used for:  Toenail " fungus   Started by:  Royer Roblero MD        Dose:  250 mg   Take 1 tablet (250 mg) by mouth daily   Quantity:  90 tablet   Refills:  1         These medicines have changed or have updated prescriptions.        Dose/Directions    albuterol 108 (90 Base) MCG/ACT inhaler   Commonly known as:  VENTOLIN HFA   This may have changed:    - how much to take  - how to take this  - when to take this  - reasons to take this   Used for:  Wheezing   Changed by:  Royer Roblero MD        Dose:  2 puff   Inhale 2 puffs into the lungs every 6 hours as needed for wheezing Reported on 3/24/2017   Quantity:  1 Inhaler   Refills:  3            Where to get your medicines      These medications were sent to [facility] [address] [place] [address]     Phone:  [phone]     albuterol 108 (90 Base) MCG/ACT inhaler    imiquimod 5 % cream    terbinafine 250 MG tablet                Primary Care Provider Office Phone # Fax #    Royer Roblero [phone]       [facility] [place] [address]        Equal Access to Services     [facility] AH: Hadii gregory ku hadasho Soomaali, waaxda luqadaha, qaybta kaalmada adecherelleyapresley, geovanny qureshi . So [facility] [phone].    ATENCIÓN: Si habla español, tiene a joel disposición servicios gratuitos de asistencia lingüística. [facility] [phone].    We comply with applicable federal civil rights laws and [place] laws. We do not discriminate on the basis of race, color, national origin, age, disability, sex, sexual orientation, or gender identity.            Thank you!     Thank you for choosing [facility]  for your care. Our goal is always to provide you with excellent care. Hearing back from our patients is one way we can continue to improve our services. Please take a few minutes to complete the written survey that you may receive in the mail after your visit  with us. Thank you!             Your Updated Medication List - Protect others around you: Learn how to safely use, store and throw away your medicines at www.disposemymeds.org.          This list is accurate as of 10/24/18  5:02 PM.  Always use your most recent med list.                   Brand Name Dispense Instructions for use Diagnosis    albuterol 108 (90 Base) MCG/ACT inhaler    VENTOLIN HFA    1 Inhaler    Inhale 2 puffs into the lungs every 6 hours as needed for wheezing Reported on 3/24/2017    Wheezing       ALLEGRA PO      Take by mouth daily as needed for allergies        aspirin 81 MG chewable tablet     90 tablet    Take 1 tablet (81 mg) by mouth daily    Marfan's syndrome, S/P aortic valve replacement       atenolol 50 MG tablet    TENORMIN    90 tablet    Take 1 tablet (50 mg) by mouth daily    Marfan's syndrome       atorvastatin 20 MG tablet    LIPITOR     daily        COUMADIN 5 MG tablet   Generic drug:  warfarin     30 tablet    Take 1 tablet (5 mg) by mouth daily Alternating with 2.5 every other day    S/P aortic valve replacement, Marfan's syndrome       imiquimod 5 % cream    ALDARA    12 packet    Apply a small sized amount to area on left ring finger at bedtime.   Wash off after 8 hours.   May use for up to 16 weeks.    Common wart, Toenail fungus       lisinopril 5 MG tablet    PRINIVIL/ZESTRIL    90 tablet    Take 1 tablet (5 mg) by mouth daily    Marfan's syndrome       terbinafine 250 MG tablet    lamISIL    90 tablet    Take 1 tablet (250 mg) by mouth daily    Toenail fungus       triamcinolone 0.1 % cream    KENALOG    80 g    Apply sparingly to affected area three times daily as needed    Dermatitis

## 2018-10-25 LAB — PSA SERPL-ACNC: 1.53 UG/L (ref 0–4)

## 2018-10-26 LAB
HBV SURFACE AB SERPL IA-ACNC: 0.15 M[IU]/ML
HBV SURFACE AG SERPL QL IA: NONREACTIVE
HCV AB SERPL QL IA: NONREACTIVE

## 2018-10-30 ENCOUNTER — OFFICE VISIT (OUTPATIENT)
Dept: ORTHOPEDICS | Facility: CLINIC | Age: 58
End: 2018-10-30
Payer: COMMERCIAL

## 2018-10-30 ENCOUNTER — RADIANT APPOINTMENT (OUTPATIENT)
Dept: GENERAL RADIOLOGY | Facility: CLINIC | Age: 58
End: 2018-10-30
Attending: FAMILY MEDICINE
Payer: COMMERCIAL

## 2018-10-30 VITALS — SYSTOLIC BLOOD PRESSURE: 122 MMHG | DIASTOLIC BLOOD PRESSURE: 64 MMHG | BODY MASS INDEX: 23.2 KG/M2 | WEIGHT: 164 LBS

## 2018-10-30 DIAGNOSIS — M25.512 CHRONIC LEFT SHOULDER PAIN: ICD-10-CM

## 2018-10-30 DIAGNOSIS — M12.812 ROTATOR CUFF ARTHROPATHY OF LEFT SHOULDER: ICD-10-CM

## 2018-10-30 DIAGNOSIS — G89.29 CHRONIC LEFT SHOULDER PAIN: Primary | ICD-10-CM

## 2018-10-30 DIAGNOSIS — M25.512 CHRONIC LEFT SHOULDER PAIN: Primary | ICD-10-CM

## 2018-10-30 DIAGNOSIS — G89.29 CHRONIC LEFT SHOULDER PAIN: ICD-10-CM

## 2018-10-30 DIAGNOSIS — R29.898 SHOULDER WEAKNESS: ICD-10-CM

## 2018-10-30 PROCEDURE — 99204 OFFICE O/P NEW MOD 45 MIN: CPT | Performed by: FAMILY MEDICINE

## 2018-10-30 PROCEDURE — 73030 X-RAY EXAM OF SHOULDER: CPT | Mod: LT

## 2018-10-30 NOTE — MR AVS SNAPSHOT
After Visit Summary   10/30/2018    Richard Ball    MRN: [id number]           Patient Information     Date Of Birth          1960        Visit Information        Provider Department      10/30/2018 6:20 PM Almaz Marquez, DO [facility]        Today's Diagnoses     Chronic left shoulder pain    -  1    Shoulder weakness        Rotator cuff arthropathy of left shoulder          Care Instructions    1. Chronic left shoulder pain    2. Shoulder weakness    3. Rotator cuff arthropathy of left shoulder      Discussed exam - fair rotator cuff strength  Reviewed xray - subacromial flattening likely due to high riding humerus  CT left shoulder has been ordered.     Follow-up over MyChart with results / next steps.    Official Walk with a Doc Chapter  Join me in the lobby of the [facility], November 3rd at 1 PM for a free health talk.  Get a free T-shirt, listen and walk at your own pace!              Follow-ups after your visit        Your next 10 appointments already scheduled     Nov 01, 2018  8:00 AM CDT   CT SHOULDER LEFT W/O CONTRAST with SHCT1   [facility] ([facility])    [address] [place] [id number]   [phone]           How do I prepare for my exam? (Food and drink instructions) No Food and Drink Restrictions.  How do I prepare for my exam? (Other instructions) You do not need to do anything special to prepare for this exam. For a sinus scan: Use your nose spray (nasal decongestant spray) as directed.  What should I wear: Please wear loose clothing, such as a sweat suit or jogging clothes. Avoid snaps, zippers and other metal. We may ask you to undress and put on a hospital gown.  How long does the exam take: Most scans take less than 20 minutes.  What should I bring: Please bring any scans or X-rays taken at other hospitals, if similar tests were done. Also bring a list of your medicines, including vitamins, minerals  and over-the-counter drugs. It is safest to leave personal items at home.  Do I need a : No  is needed.  What do I need to tell my doctor? Be sure to tell your doctor: * If you have any allergies. * If there s any chance you are pregnant. * If you are breastfeeding.  What should I do after the exam: No restrictions, You may resume normal activities.  What is this test: A CT (computed tomography) scan is a series of pictures that allows us to look inside your body. The scanner creates images of the body in cross sections, much like slices of bread. This helps us see any problems more clearly.  Who should I call with questions: If you have any questions, please call the Imaging Department where you will have your exam. Directions, parking instructions, and other information is available on our website, [email or web address].            Nov 02, 2018  7:40 AM CDT   Anticoagulation Visit with RU ANTICOAGULATION   [facility] ([facility])    [address]  [facility] [place] [facility] [id number] [phone]            Jan 23, 2019 10:30 AM CST   Remote PPM Check with TYSON TECH1   [facility] ([facility])    [address]  [facility] [id number] [phone] OPT 2           This appointment is for a remote check of your pacemaker.  This is not an appointment at the office.              Future tests that were ordered for you today     Open Future Orders        Priority Expected Expires Ordered    CT Shoulder Left w/o Contrast Routine  10/30/2019 10/30/2018            Who to contact     If you have questions or need follow up information about today's clinic visit or your schedule please contact [facility] directly at [phone].  Normal or non-critical lab and imaging results will be communicated to you by MyChart, letter or phone within 4 business days after the clinic has  received the results. If you do not hear from us within 7 days, please contact the clinic through [facility] or phone. If you have a critical or abnormal lab result, we will notify you by phone as soon as possible.  Submit refill requests through [facility]ill[employer] or call your pharmacy and they will forward the refill request to us. Please allow 3 business days for your refill to be completed.          Additional Information About Your Visit        Astley Clarke[facility]ietyme Information     Y[facility] gives you secure access to your electronic health record. If you see a primary care provider, you can also send messages to your care team and make appointments. If you have questions, please call your primary care clinic.  If you do not have a primary care provider, please call [phone] and they will assist you.        Care EveryWhere ID     This is your Care EveryWhere ID. This could be used by other organizations to access your [place] medical records  [id number]        Your Vitals Were     BMI (Body Mass Index)                   23.2 kg/m2            Blood Pressure from Last 3 Encounters:   10/30/18 122/64   10/24/18 132/66   06/27/18 128/77    Weight from Last 3 Encounters:   10/30/18 164 lb (74.4 kg)   10/24/18 164 lb (74.4 kg)   06/27/18 159 lb (72.1 kg)               Primary Care Provider Office Phone # Fax #    Royer Roblero [phone] [address] [place] [address]        Equal Access to Services     [facility] AH: Hadii aad ku hadasho Soomaali, waaxda luqadaha, qaybta kaalmada adeegyada, geovanny qureshi . So [facility] [phone].    ATENCIÓN: Si habla español, tiene a joel disposición servicios gratuitos de asistencia lingüística. Llame al [phone].    We comply with applicable federal civil rights laws and [place] laws. We do not discriminate on the basis of race, color, national origin, age, disability, sex, sexual orientation, or gender identity.            Thank  you!     Thank you for choosing [facility]  for your care. Our goal is always to provide you with excellent care. Hearing back from our patients is one way we can continue to improve our services. Please take a few minutes to complete the written survey that you may receive in the mail after your visit with us. Thank you!             Your Updated Medication List - Protect others around you: Learn how to safely use, store and throw away your medicines at www.disposemymeds.org.          This list is accurate as of 10/30/18 11:59 PM.  Always use your most recent med list.                   Brand Name Dispense Instructions for use Diagnosis    albuterol 108 (90 Base) MCG/ACT inhaler    VENTOLIN HFA    1 Inhaler    Inhale 2 puffs into the lungs every 6 hours as needed for wheezing Reported on 3/24/2017    Wheezing       ALLEGRA PO      Take by mouth daily as needed for allergies        aspirin 81 MG chewable tablet     90 tablet    Take 1 tablet (81 mg) by mouth daily    Marfan's syndrome, S/P aortic valve replacement       atenolol 50 MG tablet    TENORMIN    90 tablet    Take 1 tablet (50 mg) by mouth daily    Marfan's syndrome       atorvastatin 20 MG tablet    LIPITOR     daily        COUMADIN 5 MG tablet   Generic drug:  warfarin     30 tablet    Take 1 tablet (5 mg) by mouth daily Alternating with 2.5 every other day    S/P aortic valve replacement, Marfan's syndrome       imiquimod 5 % cream    ALDARA    12 packet    Apply a small sized amount to area on left ring finger at bedtime.   Wash off after 8 hours.   May use for up to 16 weeks.    Common wart, Toenail fungus       lisinopril 5 MG tablet    PRINIVIL/ZESTRIL    90 tablet    Take 1 tablet (5 mg) by mouth daily    Marfan's syndrome       terbinafine 250 MG tablet    lamISIL    90 tablet    Take 1 tablet (250 mg) by mouth daily    Toenail fungus       triamcinolone 0.1 % cream    KENALOG    80 g    Apply sparingly to affected area three  times daily as needed    Dermatitis

## 2018-10-30 NOTE — PATIENT INSTRUCTIONS
1. Chronic left shoulder pain    2. Shoulder weakness    3. Rotator cuff arthropathy of left shoulder      Discussed exam - fair rotator cuff strength  Reviewed xray - subacromial flattening likely due to high riding humerus  CT left shoulder has been ordered.     Follow-up over MyChart with results / next steps.    Official Walk with a Doc Chapter  Join me in the lobby of the [facility], November 3rd at 1 PM for a free health talk.  Get a free T-shirt, listen and walk at your own pace!

## 2018-10-30 NOTE — LETTER
10/30/2018         RE: Richard Ball  [address]  [place] [address]        Dear Colleague,    Thank you for referring your patient, Richard Ball, to the [facility]. Please see a copy of my visit note below.    ASSESSMENT & PLAN    1. Chronic left shoulder pain    2. Shoulder weakness    3. Rotator cuff arthropathy of left shoulder      Discussed exam - rotator cuff weakness  Reviewed xray - subacromial flattening likely due to high riding humerus  CT left shoulder has been ordered to assess for rotator cuff tear especially given chronicity of pain and weakness on exam.    [facility] results / next steps.    -----    SUBJECTIVE  Richard Ball is a/an 58 year old Right handed male who is seen in consultation at the request of  Royer Roblero M.D. for evaluation of left shoulder pain. The patient is seen by themselves.    Onset: 2 years(s) ago. Reports insidious onset without acute precipitating event.  Location of Pain: left shoulder pain deep within the joint, he states it feels like it is the ball.  Pain is aching, sharp,  And constant.   Rating of Pain at worst: 8/10  Rating of Pain Currently: 7/10  Worsened by: side lying on the left, driving using the left shoulder. Increased pain with reaching behind, and overhead.   Better with: massage  Treatments tried: Tylenol, Advil, physical therapy.   Associated symptoms: weakness of left arm,   Orthopedic history: YES - back pain due to scoliosis.   Relevant surgical history: NO   Patient Social History: works at [employer].     Patient's past medical, surgical, social, and family histories were reviewed today and no changes are noted.    REVIEW OF SYSTEMS:  10 point ROS is negative other than symptoms noted above in HPI, Past Medical History or as stated below  Constitutional: NEGATIVE for fever, chills, change in weight  Skin: NEGATIVE for worrisome rashes, moles or lesions  GI/: NEGATIVE for bowel or  bladder changes  Neuro: NEGATIVE for weakness, dizziness or paresthesias    OBJECTIVE:  /64 (BP Location: Right arm, Patient Position: Chair, Cuff Size: Adult Regular)  Wt 164 lb (74.4 kg)  BMI 23.2 kg/m2   General: healthy, alert and in no distress  HEENT: no scleral icterus or conjunctival erythema  Skin: no suspicious lesions or rash. No jaundice.  CV: regular rhythm by palpation, audible click from his mechanical valve  Resp: normal respiratory effort without conversational dyspnea   Psych: normal mood and affect  Gait: normal steady gait with appropriate coordination and balance  Neuro: normal light touch sensory exam of the bilateral upper extremities.    MSK:  LEFT SHOULDER  Inspection:    no atrophy  Palpation:    Tender about the AC joint, anterior capsule, proximal biceps tendon and supraspinatus insertion. Remainder of bony and tendinous landmarks are nontender.  Active Range of Motion:     Abduction 1650, FF 1650, , IR painful.      Scapular dyskinesis present  Strength:    Scapular plane abduction 4+/5, painful,  ER 5/5, IR 5/5, biceps 5-/5  Special Tests:    Positive: Coleman', supraspinatus (empty can) and crossed arm adduction    Independent visualization of the below image:  Recent Results (from the past 24 hour(s))   XR Shoulder Left G/E 3 Views    Narrative    SHOULDER LEFT THREE OR MORE VIEWS October 30, 2018 6:25 PM     HISTORY: Chronic left shoulder pain.    COMPARISON: None.      Impression    IMPRESSION: Acromioclavicular joint is unremarkable. Glenohumeral  joint is unremarkable. No acute fracture or subluxation. Implanted  cardiac device in place in a patient with previous sternotomy.    MARIA C MEJIA MD     Patient's conditions were thoroughly discussed during today's visit with greater than 50% of the visit spent counseling the patient with total time spent face-to-face with the patient being 20 minutes.    Almaz Marquez, DO [facility] [employer] [facility]  [facility]      Again, thank you for allowing me to participate in the care of your patient.        Sincerely,        Almaz Marquez, DO

## 2018-10-30 NOTE — PROGRESS NOTES
ASSESSMENT & PLAN    1. Chronic left shoulder pain    2. Shoulder weakness    3. Rotator cuff arthropathy of left shoulder      Discussed exam - rotator cuff weakness  Reviewed xray - subacromial flattening likely due to high riding humerus  CT left shoulder has been ordered to assess for rotator cuff tear especially given chronicity of pain and weakness on exam.    Follow-up over MyChart with results / next steps.    -----    SUBJECTIVE  Richard Ball is a/an 58 year old Right handed male who is seen in consultation at the request of  Royer Roblero M.D. for evaluation of left shoulder pain. The patient is seen by themselves.    Onset: 2 years(s) ago. Reports insidious onset without acute precipitating event.  Location of Pain: left shoulder pain deep within the joint, he states it feels like it is the ball.  Pain is aching, sharp,  And constant.   Rating of Pain at worst: 8/10  Rating of Pain Currently: 7/10  Worsened by: side lying on the left, driving using the left shoulder. Increased pain with reaching behind, and overhead.   Better with: massage  Treatments tried: Tylenol, Advil, physical therapy.   Associated symptoms: weakness of left arm,   Orthopedic history: YES - back pain due to scoliosis.   Relevant surgical history: NO   Patient Social History: works at [employer].     Patient's past medical, surgical, social, and family histories were reviewed today and no changes are noted.    REVIEW OF SYSTEMS:  10 point ROS is negative other than symptoms noted above in HPI, Past Medical History or as stated below  Constitutional: NEGATIVE for fever, chills, change in weight  Skin: NEGATIVE for worrisome rashes, moles or lesions  GI/: NEGATIVE for bowel or bladder changes  Neuro: NEGATIVE for weakness, dizziness or paresthesias    OBJECTIVE:  /64 (BP Location: Right arm, Patient Position: Chair, Cuff Size: Adult Regular)  Wt 164 lb (74.4 kg)  BMI 23.2 kg/m2   General: healthy, alert and in no  distress  HEENT: no scleral icterus or conjunctival erythema  Skin: no suspicious lesions or rash. No jaundice.  CV: regular rhythm by palpation, audible click from his mechanical valve  Resp: normal respiratory effort without conversational dyspnea   Psych: normal mood and affect  Gait: normal steady gait with appropriate coordination and balance  Neuro: normal light touch sensory exam of the bilateral upper extremities.    MSK:  LEFT SHOULDER  Inspection:    no atrophy  Palpation:    Tender about the AC joint, anterior capsule, proximal biceps tendon and supraspinatus insertion. Remainder of bony and tendinous landmarks are nontender.  Active Range of Motion:     Abduction 1650, FF 1650, , IR painful.      Scapular dyskinesis present  Strength:    Scapular plane abduction 4+/5, painful,  ER 5/5, IR 5/5, biceps 5-/5  Special Tests:    Positive: Coleman', supraspinatus (empty can) and crossed arm adduction    Independent visualization of the below image:  Recent Results (from the past 24 hour(s))   XR Shoulder Left G/E 3 Views    Narrative    SHOULDER LEFT THREE OR MORE VIEWS October 30, 2018 6:25 PM     HISTORY: Chronic left shoulder pain.    COMPARISON: None.      Impression    IMPRESSION: Acromioclavicular joint is unremarkable. Glenohumeral  joint is unremarkable. No acute fracture or subluxation. Implanted  cardiac device in place in a patient with previous sternotomy.    MARIA C MEJIA MD     Patient's conditions were thoroughly discussed during today's visit with greater than 50% of the visit spent counseling the patient with total time spent face-to-face with the patient being 20 minutes.    Almaz Marquez,  [facility] Care

## 2018-10-31 ENCOUNTER — TELEPHONE (OUTPATIENT)
Dept: ORTHOPEDICS | Facility: CLINIC | Age: 58
End: 2018-10-31

## 2018-10-31 NOTE — TELEPHONE ENCOUNTER
Called again about the orders - [place] [phone].    [facility] saying Dr. Marquez but the orders in and he should be good to go. Call back if there are any problems.    Dolly Butterfield ATC

## 2018-10-31 NOTE — TELEPHONE ENCOUNTER
Reason for call:  Called to double check on the orders for the CT tomorrow. They think a CT with an arthrogram would be better since he has a pacemaker. Would like a call back or have the orders changed.    Phone number to reach patient:  Other phone number:  [phone]    Dolly Butterfield ATC

## 2018-11-01 ENCOUNTER — TELEPHONE (OUTPATIENT)
Dept: CARDIOLOGY | Facility: CLINIC | Age: 58
End: 2018-11-01

## 2018-11-01 NOTE — TELEPHONE ENCOUNTER
Neftali Tran from [facility] Radiology called about changing patient's Coumadin for a procedure.

## 2018-11-01 NOTE — TELEPHONE ENCOUNTER
Date: 11/1/2018    Time of Call: 2:01 PM     Diagnosis:  AVR needs procedures - on Coumadin     [ TORB ] Ordering provider: Dr. Cantu March   Order: patient needs to be bridged with Lovenox 1mg/kg BID before and after procedure     Order received by: Brody Elder      Follow-up/additional notes: Called Neftali and gave him these instructions - advised to call our clinic back if he needed additional assistance.    Brody Elder RN, BSN  Cardiology Care Coordinator  [facility]  [email or web address]  [phone]

## 2018-11-05 ENCOUNTER — HOSPITAL ENCOUNTER (OUTPATIENT)
Dept: CT IMAGING | Facility: CLINIC | Age: 58
Discharge: HOME | End: 2018-11-05
Attending: FAMILY MEDICINE | Admitting: FAMILY MEDICINE
Payer: COMMERCIAL

## 2018-11-05 ENCOUNTER — HOSPITAL ENCOUNTER (OUTPATIENT)
Dept: GENERAL RADIOLOGY | Facility: CLINIC | Age: 58
End: 2018-11-05
Attending: FAMILY MEDICINE
Payer: COMMERCIAL

## 2018-11-05 VITALS — OXYGEN SATURATION: 100 % | HEART RATE: 61 BPM | SYSTOLIC BLOOD PRESSURE: 118 MMHG | DIASTOLIC BLOOD PRESSURE: 85 MMHG

## 2018-11-05 DIAGNOSIS — M25.512 CHRONIC LEFT SHOULDER PAIN: ICD-10-CM

## 2018-11-05 DIAGNOSIS — M12.812 ROTATOR CUFF ARTHROPATHY OF LEFT SHOULDER: ICD-10-CM

## 2018-11-05 DIAGNOSIS — R29.898 SHOULDER WEAKNESS: ICD-10-CM

## 2018-11-05 DIAGNOSIS — G89.29 CHRONIC LEFT SHOULDER PAIN: ICD-10-CM

## 2018-11-05 PROCEDURE — 25500064 ZZH RX 255 OP 636: Performed by: PHYSICIAN ASSISTANT

## 2018-11-05 PROCEDURE — 73201 CT UPPER EXTREMITY W/DYE: CPT | Mod: LT

## 2018-11-05 PROCEDURE — 25000125 ZZHC RX 250: Performed by: PHYSICIAN ASSISTANT

## 2018-11-05 PROCEDURE — 73040 CONTRAST X-RAY OF SHOULDER: CPT | Mod: LT

## 2018-11-05 RX ORDER — IOPAMIDOL 408 MG/ML
10 INJECTION, SOLUTION INTRATHECAL ONCE
Status: COMPLETED | OUTPATIENT
Start: 2018-11-05 | End: 2018-11-05

## 2018-11-05 RX ORDER — LIDOCAINE HYDROCHLORIDE 10 MG/ML
5 INJECTION, SOLUTION EPIDURAL; INFILTRATION; INTRACAUDAL; PERINEURAL ONCE
Status: COMPLETED | OUTPATIENT
Start: 2018-11-05 | End: 2018-11-05

## 2018-11-05 RX ADMIN — LIDOCAINE HYDROCHLORIDE 4 ML: 10 INJECTION, SOLUTION EPIDURAL; INFILTRATION; INTRACAUDAL; PERINEURAL at 09:18

## 2018-11-05 RX ADMIN — IOPAMIDOL 10 ML: 408 INJECTION, SOLUTION INTRATHECAL at 09:39

## 2018-11-05 NOTE — PROGRESS NOTES
RADIOLOGY PROCEDURE NOTE  Patient name: Richard Ball  MRN: [id number]  : 1960    Pre-procedure diagnosis: Left shoulder pain.  Pacemaker  Post-procedure diagnosis: Same    Procedure Date/Time: 2018  10:27 AM  Procedure: Left shoulder contrast arthrogram with CT  Estimated blood loss: None  Specimen(s) collected with description: none  The patient tolerated the procedure well with no immediate complications.  Significant findings:none    See imaging dictation for procedural details.    Provider name: Neftali Tran  Assistant(s):None

## 2018-11-06 ENCOUNTER — TELEPHONE (OUTPATIENT)
Dept: CARDIOLOGY | Facility: CLINIC | Age: 58
End: 2018-11-06

## 2018-11-06 NOTE — TELEPHONE ENCOUNTER
Returned patient call and left message to either call back or send My Chart message with his questions.    Brody Elder RN, BSN  Cardiology Care Coordinator  [facility] Heart  [email or web address]  [phone]

## 2018-11-08 ENCOUNTER — ANTICOAGULATION THERAPY VISIT (OUTPATIENT)
Dept: CARDIOLOGY | Facility: CLINIC | Age: 58
End: 2018-11-08
Payer: COMMERCIAL

## 2018-11-08 DIAGNOSIS — Z95.2 HEART VALVE REPLACED: ICD-10-CM

## 2018-11-08 DIAGNOSIS — Z79.01 LONG TERM CURRENT USE OF ANTICOAGULANTS WITH INR GOAL OF 2.0-3.0: ICD-10-CM

## 2018-11-08 LAB — INR POINT OF CARE: 1.1 (ref 0.86–1.14)

## 2018-11-08 PROCEDURE — 36416 COLLJ CAPILLARY BLOOD SPEC: CPT | Performed by: INTERNAL MEDICINE

## 2018-11-08 PROCEDURE — 85610 PROTHROMBIN TIME: CPT | Mod: QW | Performed by: INTERNAL MEDICINE

## 2018-11-08 NOTE — MR AVS SNAPSHOT
Richard Ball   11/8/2018 7:00 AM   Anticoagulation Therapy Visit    Description:  58 year old male   Provider:  JAH ANTICOAGULATION   Department:  [facility]           INR as of 11/8/2018     Today's INR 1.1!      Anticoagulation Summary as of 11/8/2018     INR goal 2.0-3.0   Today's INR 1.1!   Full warfarin instructions 11/8: 7.5 mg; 11/9: 5 mg; Otherwise 5 mg on Sun, Tue, Thu; 2.5 mg all other days   Next INR check 11/13/2018    Indications   Heart valve replaced [Z95.2] [Z95.2]  Long term current use of anticoagulants with INR goal of 2.0-3.0 [Z79.01]         Your next Anticoagulation Clinic appointment(s)     Nov 13, 2018  9:00 AM CST   Anticoagulation Visit with TYSON ANTICOAGULATION   [facility] ([facility])    [id number]  [place] [address]   [phone] OPT 2              Contact Numbers     Anticoagulant (INR) Clinic Number: [phone]          November 2018 Details    Sun Mon Tue Wed Thu Fri Sat         1               2               3                 4               5               6               7               8      7.5 mg   See details      9      5 mg         10      2.5 mg           11      5 mg         12      2.5 mg         13            14               15               16               17                 18               19               20               21               22               23               24                 25               26               27               28               29               30                 Date Details   11/08 This INR check       Date of next INR:  11/13/2018         How to take your warfarin dose     To take:  2.5 mg Take 0.5 of a 5 mg tablet.    To take:  5 mg Take 1 of the 5 mg tablets.    To take:  7.5 mg Take 1.5 of the 5 mg tablets.

## 2018-11-08 NOTE — PROGRESS NOTES
ANTICOAGULATION FOLLOW-UP CLINIC VISIT    Patient Name:  Richard Ball  Date:  11/8/2018  Contact Type:  Face to Face    SUBJECTIVE:     Patient Findings     Positives Intentional hold of therapy (held 4 days prior to arthrogram)           OBJECTIVE    INR Protime   Date Value Ref Range Status   11/08/2018 1.1 0.86 - 1.14 Final       ASSESSMENT / PLAN  INR assessment SUB    Recheck INR In: 5 DAYS    INR Location Clinic      Anticoagulation Summary as of 11/8/2018     INR goal 2.0-3.0   Today's INR 1.1!   Warfarin maintenance plan 5 mg (5 mg x 1) on Sun, Tue, Thu; 2.5 mg (5 mg x 0.5) all other days   Full warfarin instructions 11/8: 7.5 mg; 11/9: 5 mg; Otherwise 5 mg on Sun, Tue, Thu; 2.5 mg all other days   Weekly warfarin total 25 mg   Plan last modified Teresa Espino, RN (7/2/2018)   Next INR check 11/13/2018   Target end date Indefinite    Indications   Heart valve replaced [Z95.2] [Z95.2]  Long term current use of anticoagulants with INR goal of 2.0-3.0 [Z79.01]         Anticoagulation Episode Summary     INR check location     Preferred lab     Send INR reminders to [facility] HEART INR NURSE    Comments AVR in 2001, range 2-3, no Lovenox needed      Anticoagulation Care Providers     Provider Role Specialty Phone number    Sundar Garcia MD [facility] Cardiology [phone]            See the Encounter Report to view Anticoagulation Flowsheet and Dosing Calendar (Go to Encounters tab in chart review, and find the Anticoagulation Therapy Visit)    INR 1.1 He held warfarin 4 days prior to arthrogram of shoulder on Monday (workup for suspected rotator cuff tear). Pt did not call the anticoagulation clinic to discuss holding warfarin. Reji SAL gave him a Rx for Lovenox 80 mg subcutaneous every 12 hours to use after the procedure. He has 3 syringes remaining. In June 2018, Dr Garcia had specified that pt did not need to bridge with Lovenox when INR <2.0. Pt will complete the current Rx of Lovenox  syringes ordered by ortho MD. Pt took a full 5 mg of Warfarin on Monday night. No other med or diet changes. Bruising at Lovenox injection sites. Will boost today's dose to 7.5 mg and tomorrow to 5 mg then resume usual dosing of 5 mg SuTuTh and 2.5 mg all other days. Recheck in 5 days. Reviewed home INR monitoring with pt. He will consider home INR monitoring and let us know if he wants an order placed. Dosage adjustment made based on physician directed care plan.    Constance Chacko RN

## 2018-11-13 ENCOUNTER — ANTICOAGULATION THERAPY VISIT (OUTPATIENT)
Dept: CARDIOLOGY | Facility: CLINIC | Age: 58
End: 2018-11-13
Payer: COMMERCIAL

## 2018-11-13 DIAGNOSIS — Z95.2 HEART VALVE REPLACED: ICD-10-CM

## 2018-11-13 DIAGNOSIS — Z79.01 LONG TERM CURRENT USE OF ANTICOAGULANTS WITH INR GOAL OF 2.0-3.0: ICD-10-CM

## 2018-11-13 LAB — INR POINT OF CARE: 1.8 (ref 0.86–1.14)

## 2018-11-13 PROCEDURE — 36416 COLLJ CAPILLARY BLOOD SPEC: CPT | Performed by: INTERNAL MEDICINE

## 2018-11-13 PROCEDURE — 99207 ZZC NO CHARGE NURSE ONLY: CPT | Performed by: INTERNAL MEDICINE

## 2018-11-13 PROCEDURE — 85610 PROTHROMBIN TIME: CPT | Mod: QW | Performed by: INTERNAL MEDICINE

## 2018-11-13 NOTE — MR AVS SNAPSHOT
Richard Ball   11/13/2018 8:20 AM   Anticoagulation Therapy Visit    Description:  58 year old male   Provider:  [facility]   Department:  [facility]           INR as of 11/13/2018     Today's INR 1.8!      Anticoagulation Summary as of 11/13/2018     INR goal 2.0-3.0   Today's INR 1.8!   Full warfarin instructions 11/13: 7.5 mg; Otherwise 5 mg on Sun, Tue, Thu; 2.5 mg all other days   Next INR check 11/27/2018    Indications   Heart valve replaced [Z95.2] [Z95.2]  Long term current use of anticoagulants with INR goal of 2.0-3.0 [Z79.01]         Your next Anticoagulation Clinic appointment(s)     Nov 27, 2018  8:00 AM CST   Anticoagulation Visit with RU ANTICOAGULATION   [facility] ([facility])    [address] [address] [id number] [phone]              Contact Numbers     Anticoagulant (INR) Clinic Number: [phone]          November 2018 Details    Sun Mon Tue Wed Thu Fri Sat         1               2               3                 4               5               6               7               8               9               10                 11               12               13      7.5 mg   See details      14      2.5 mg         15      5 mg         16      2.5 mg         17      2.5 mg           18      5 mg         19      2.5 mg         20      5 mg         21      2.5 mg         22      5 mg         23      2.5 mg         24      2.5 mg           25      5 mg         26      2.5 mg         27            28               29               30                 Date Details   11/13 This INR check       Date of next INR:  11/27/2018         How to take your warfarin dose     To take:  2.5 mg Take 0.5 of a 5 mg tablet.    To take:  5 mg Take 1 of the 5 mg tablets.    To take:  7.5 mg Take 1.5 of the 5 mg tablets.

## 2018-11-13 NOTE — PROGRESS NOTES
ANTICOAGULATION FOLLOW-UP CLINIC VISIT    Patient Name:  Richard Ball  Date:  11/13/2018  Contact Type:  Face to Face    SUBJECTIVE:        OBJECTIVE    INR Protime   Date Value Ref Range Status   11/13/2018 1.8 (A) 0.86 - 1.14 Final       ASSESSMENT / PLAN  INR assessment SUB    Recheck INR In: 2 WEEKS    INR Location Clinic      Anticoagulation Summary as of 11/13/2018     INR goal 2.0-3.0   Today's INR 1.8!   Warfarin maintenance plan 5 mg (5 mg x 1) on Sun, Tue, Thu; 2.5 mg (5 mg x 0.5) all other days   Full warfarin instructions 11/13: 7.5 mg; Otherwise 5 mg on Sun, Tue, Thu; 2.5 mg all other days   Weekly warfarin total 25 mg   Plan last modified Teresa Espino RN (7/2/2018)   Next INR check 11/27/2018   Target end date Indefinite    Indications   Heart valve replaced [Z95.2] [Z95.2]  Long term current use of anticoagulants with INR goal of 2.0-3.0 [Z79.01]         Anticoagulation Episode Summary     INR check location     Preferred lab     Send INR reminders to [facility] HEART INR NURSE    Comments AVR in 2001, range 2-3, no Lovenox needed      Anticoagulation Care Providers     Provider Role Specialty Phone number    March, Sundar Cantu MD Responsible Cardiology [phone]            See the Encounter Report to view Anticoagulation Flowsheet and Dosing Calendar (Go to Encounters tab in chart review, and find the Anticoagulation Therapy Visit)    INR=1.8  Denies med or diet changes, denies bleeding or increased bruising. He finished the Lovenox over the weekend. Will dose as above and recheck in 2 weeks.   Lynette E. Penfield, RN

## 2018-11-20 DIAGNOSIS — Q87.40 MARFAN'S SYNDROME: ICD-10-CM

## 2018-11-20 DIAGNOSIS — Z95.2 S/P AORTIC VALVE REPLACEMENT: ICD-10-CM

## 2018-11-20 RX ORDER — WARFARIN SODIUM 5 MG/1
TABLET ORAL
Qty: 75 TABLET | Refills: 1 | Status: SHIPPED | OUTPATIENT
Start: 2018-11-20 | End: 2019-02-14

## 2018-11-27 ENCOUNTER — DOCUMENTATION ONLY (OUTPATIENT)
Dept: CARDIOLOGY | Facility: CLINIC | Age: 58
End: 2018-11-27

## 2018-11-27 ENCOUNTER — ANTICOAGULATION THERAPY VISIT (OUTPATIENT)
Dept: CARDIOLOGY | Facility: CLINIC | Age: 58
End: 2018-11-27
Payer: COMMERCIAL

## 2018-11-27 DIAGNOSIS — Z95.2 HEART VALVE REPLACED: ICD-10-CM

## 2018-11-27 DIAGNOSIS — Z79.01 LONG TERM CURRENT USE OF ANTICOAGULANTS WITH INR GOAL OF 2.0-3.0: ICD-10-CM

## 2018-11-27 LAB — INR POINT OF CARE: 1.9 (ref 0.86–1.14)

## 2018-11-27 PROCEDURE — 85610 PROTHROMBIN TIME: CPT | Mod: QW | Performed by: INTERNAL MEDICINE

## 2018-11-27 PROCEDURE — 36416 COLLJ CAPILLARY BLOOD SPEC: CPT | Performed by: INTERNAL MEDICINE

## 2018-11-27 PROCEDURE — 99207 ZZC NO CHARGE NURSE ONLY: CPT | Performed by: INTERNAL MEDICINE

## 2018-11-27 NOTE — MR AVS SNAPSHOT
Richard Ball   11/27/2018 8:00 AM   Anticoagulation Therapy Visit    Description:  58 year old male   Provider:  [facility]   Department:  [facility]           INR as of 11/27/2018     Today's INR 1.9!      Anticoagulation Summary as of 11/27/2018     INR goal 2.0-3.0   Today's INR 1.9!   Full warfarin instructions 11/27: 7.5 mg; Otherwise 2.5 mg on Mon, Wed, Fri; 5 mg all other days   Next INR check 12/7/2018    Indications   Heart valve replaced [Z95.2] [Z95.2]  Long term current use of anticoagulants with INR goal of 2.0-3.0 [Z79.01]         Your next Anticoagulation Clinic appointment(s)     Dec 07, 2018  7:40 AM CST   Anticoagulation Visit with RU ANTICOAGULATION   [facility] ([facility])    [address] [address] [id number] [phone]              November 2018 Details    Sun Mon Tue Wed Thu Fri Sat         1               2               3                 4               5               6               7               8               9               10                 11               12               13               14               15               16               17                 18               19               20               21               22               23               24                 25               26               27      7.5 mg   See details      28      2.5 mg         29      5 mg         30      2.5 mg           Date Details   11/27 This INR check               How to take your warfarin dose     To take:  2.5 mg Take 0.5 of a 5 mg tablet.    To take:  5 mg Take 1 of the 5 mg tablets.    To take:  7.5 mg Take 1.5 of the 5 mg tablets.           December 2018 Details    Sun Mon Tue Wed Thu Fri Sat           1      5 mg           2      5 mg         3      2.5 mg         4      5 mg         5      2.5 mg         6      5 mg         7            8                 9               10                11               12               13               14               15                 16               17               18               19               20               21               22                 23               24               25               26               27               28               29                 30               31                     Date Details   No additional details    Date of next INR:  12/7/2018         How to take your warfarin dose     To take:  2.5 mg Take 0.5 of a 5 mg tablet.    To take:  5 mg Take 1 of the 5 mg tablets.

## 2018-11-27 NOTE — PROGRESS NOTES
Patient is on Warfarin for mechanical aortic valve done in 2001.  We started managing his INRs in June 2018.  He had to hold Warfarin for a procedure and we asked Dr. Garcia if Lovenox was needed and she said NO.  However, he just had to hold his Warfarin for CT with arthrogram of shoulder and Dr. Garcia was asked by her [facility] nurse about holding Warfarin and she ordered Lovenox.  The INR clinic wasn't aware of him holding Warfarin or using Lovenox until after the procedure and after he had stopped the Lovenox.  I will send message to Dr. Garcia to clarify if patient needs to bridge with Lovenox in the future.  Rios DELA CRUZ    12/4/18 Reply from Dr. Garcia: The indication for Lovenox bridging in him is possible TIA in May of 2018.   I am sorry for the confusion, but he should be bridged at this time.   Alondra Garcia    Will update patient at next INR appointment on Friday

## 2018-11-27 NOTE — PROGRESS NOTES
ANTICOAGULATION FOLLOW-UP CLINIC VISIT    Patient Name:  Richard Ball  Date:  11/27/2018  Contact Type:  Face to Face    SUBJECTIVE:     Patient Findings     Positives No Problem Findings           OBJECTIVE    INR Protime   Date Value Ref Range Status   11/27/2018 1.9 (A) 0.86 - 1.14 Final       ASSESSMENT / PLAN  INR assessment THER    Recheck INR In: 10 DAYS    INR Location Clinic      Anticoagulation Summary as of 11/27/2018     INR goal 2.0-3.0   Today's INR 1.9!   Warfarin maintenance plan 2.5 mg (5 mg x 0.5) on Mon, Wed, Fri; 5 mg (5 mg x 1) all other days   Full warfarin instructions 11/27: 7.5 mg; Otherwise 2.5 mg on Mon, Wed, Fri; 5 mg all other days   Weekly warfarin total 27.5 mg   Plan last modified Teresa Espino, RN (11/27/2018)   Next INR check 12/7/2018   Target end date Indefinite    Indications   Heart valve replaced [Z95.2] [Z95.2]  Long term current use of anticoagulants with INR goal of 2.0-3.0 [Z79.01]         Anticoagulation Episode Summary     INR check location     Preferred lab     Send INR reminders to [facility] HEART INR NURSE    Comments AVR in 2001, range 2-3, no Lovenox needed      Anticoagulation Care Providers     Provider Role Specialty Phone number    Sundar Garcia MD Responsible Cardiology [phone]            See the Encounter Report to view Anticoagulation Flowsheet and Dosing Calendar (Go to Encounters tab in chart review, and find the Anticoagulation Therapy Visit)    INR 1.9.  He held Warfarin for shoulder CT with arthrogram on 11/5 and Dr. Garcia ordered Lovenox bridging per her [facility] nurse note on 11/1.  However, when we first started managing his INRs in June we had asked Dr. Garcia about holding Warfarin for a procedure and she had said no Lovenox needed so we will clarify that.  Last INR was 1.8 and that was 2 weeks ago and he had already stopped the Lovenox.  Boost today from 5mg to 7.5mg x1 then overall increase by 2.5mg and recheck INR in 10 days.  No  surgery planned for shoulder but he will continue taking Tylenol daily.  He is taking an antifungal med for 90 days and that ends at the end of January.  Rios Espino RN

## 2018-12-07 ENCOUNTER — ANTICOAGULATION THERAPY VISIT (OUTPATIENT)
Dept: CARDIOLOGY | Facility: CLINIC | Age: 58
End: 2018-12-07
Payer: COMMERCIAL

## 2018-12-07 DIAGNOSIS — Z95.2 HEART VALVE REPLACED: ICD-10-CM

## 2018-12-07 DIAGNOSIS — Z79.01 LONG TERM CURRENT USE OF ANTICOAGULANTS WITH INR GOAL OF 2.0-3.0: ICD-10-CM

## 2018-12-07 LAB — INR POINT OF CARE: 1.9 (ref 0.86–1.14)

## 2018-12-07 PROCEDURE — 85610 PROTHROMBIN TIME: CPT | Mod: QW | Performed by: INTERNAL MEDICINE

## 2018-12-07 PROCEDURE — 36416 COLLJ CAPILLARY BLOOD SPEC: CPT | Performed by: INTERNAL MEDICINE

## 2018-12-07 NOTE — MR AVS SNAPSHOT
Richard Ball   12/7/2018 7:40 AM   Anticoagulation Therapy Visit    Description:  58 year old male   Provider:  [facility]   Department:  [facility]           INR as of 12/7/2018     Today's INR 1.9!      Anticoagulation Summary as of 12/7/2018     INR goal 2.0-3.0   Today's INR 1.9!   Full warfarin instructions 12/7: 7.5 mg; Otherwise 2.5 mg on Mon, Wed, Fri; 5 mg all other days   Next INR check 12/13/2018    Indications   Heart valve replaced [Z95.2] [Z95.2]  Long term current use of anticoagulants with INR goal of 2.0-3.0 [Z79.01]         Your next Anticoagulation Clinic appointment(s)     Dec 13, 2018  8:00 AM CST   Anticoagulation Visit with [facility]ON ANTICOAGULATION   [facility] ([facility])    [address] [id number]  [facility] [phone]   [phone] OPT 2 December 2018 Details    Sun Mon Tue Wed Thu Fri Sat           1                 2               3               4               5               6               7      7.5 mg   See details      8      5 mg           9      5 mg         10      2.5 mg         11      5 mg         12      2.5 mg         13            14               15                 16               17               18               19               20               21               22                 23               24               25               26               27               28               29                 30               31                     Date Details   12/07 This INR check       Date of next INR:  12/13/2018         How to take your warfarin dose     To take:  2.5 mg Take 0.5 of a 5 mg tablet.    To take:  5 mg Take 1 of the 5 mg tablets.    To take:  7.5 mg Take 1.5 of the 5 mg tablets.

## 2018-12-07 NOTE — PROGRESS NOTES
ANTICOAGULATION FOLLOW-UP CLINIC VISIT    Patient Name:  Richard Ball  Date:  12/7/2018  Contact Type:  Face to Face    SUBJECTIVE:     Patient Findings     Positives No Problem Findings           OBJECTIVE    INR Protime   Date Value Ref Range Status   12/07/2018 1.9 (A) 0.86 - 1.14 Final       ASSESSMENT / PLAN  INR assessment THER    Recheck INR In: 6 DAYS    INR Location Clinic      Anticoagulation Summary as of 12/7/2018     INR goal 2.0-3.0   Today's INR 1.9!   Warfarin maintenance plan 2.5 mg (5 mg x 0.5) on Mon, Wed, Fri; 5 mg (5 mg x 1) all other days   Full warfarin instructions 12/7: 7.5 mg; Otherwise 2.5 mg on Mon, Wed, Fri; 5 mg all other days   Weekly warfarin total 27.5 mg   Plan last modified Teresa Espino, RN (11/27/2018)   Next INR check 12/13/2018   Target end date Indefinite    Indications   Heart valve replaced [Z95.2] [Z95.2]  Long term current use of anticoagulants with INR goal of 2.0-3.0 [Z79.01]         Anticoagulation Episode Summary     INR check location     Preferred lab     Send INR reminders to [facility] HEART INR NURSE    Comments AVR in 2001, Lovenox needed per Dr. Garcia due to possible TIA in 05/18      Anticoagulation Care Providers     Provider Role Specialty Phone number    March, Sundar Cantu MD Responsible Cardiology [phone]            See the Encounter Report to view Anticoagulation Flowsheet and Dosing Calendar (Go to Encounters tab in chart review, and find the Anticoagulation Therapy Visit)    INR 1.9.  His INR has been subtherapeutic for about 1 month now since holding Warfarin for shoulder test last month and INR has been very slow to come back up into range.  The only change is that he started Lamisil pills about 6 weeks ago and that is for 90 days.  [email or web address] said moderate interaction with warfarin but increase bleeding risk not lower INR.  Last week I confirmed with Dr. Garcia that she does want him to bridge with Lovenox anytime INR is <2 due to  possible TIA in 05/18.  I reviewed this with the patient and he decided that since he was 1.9 and so close to range that he did not want to pay for the Lovenox this time and would prefer to increase his dose.  Increase today from 2.5mg to 7.5mg x1 then recheck INR in 6 days.  Dosing last week was increased 1x boost then overall 2.5mg increase and INR still stayed at 1.9.  He will continue to drink cranberry juice and avoid greens.  Rios Espino RN

## 2018-12-13 ENCOUNTER — ANTICOAGULATION THERAPY VISIT (OUTPATIENT)
Dept: CARDIOLOGY | Facility: CLINIC | Age: 58
End: 2018-12-13
Payer: COMMERCIAL

## 2018-12-13 DIAGNOSIS — Z79.01 LONG TERM CURRENT USE OF ANTICOAGULANTS WITH INR GOAL OF 2.0-3.0: ICD-10-CM

## 2018-12-13 DIAGNOSIS — Z95.2 HEART VALVE REPLACED: ICD-10-CM

## 2018-12-13 LAB — INR POINT OF CARE: 3.5 (ref 0.86–1.14)

## 2018-12-13 PROCEDURE — 99207 ZZC NO CHARGE NURSE ONLY: CPT | Performed by: INTERNAL MEDICINE

## 2018-12-13 PROCEDURE — 85610 PROTHROMBIN TIME: CPT | Mod: QW | Performed by: INTERNAL MEDICINE

## 2018-12-13 PROCEDURE — 36416 COLLJ CAPILLARY BLOOD SPEC: CPT | Performed by: INTERNAL MEDICINE

## 2018-12-13 NOTE — PROGRESS NOTES
ANTICOAGULATION FOLLOW-UP CLINIC VISIT    Patient Name:  Richard Ball  Date:  12/13/2018  Contact Type:  Face to Face    SUBJECTIVE:     Patient Findings     Positives:   No Problem Findings           OBJECTIVE    INR Protime   Date Value Ref Range Status   12/13/2018 3.5 (A) 0.86 - 1.14 Final       ASSESSMENT / PLAN  INR assessment SUPRA    Recheck INR In: 8 DAYS    INR Location Clinic      Anticoagulation Summary  As of 12/13/2018    INR goal:   2.0-3.0   TTR:   63.3 % (5.3 mo)   INR used for dosing:   3.5! (12/13/2018)   Warfarin maintenance plan:   2.5 mg (5 mg x 0.5) every Mon, Thu; 5 mg (5 mg x 1) all other days   Full warfarin instructions:   2.5 mg every Mon, Thu; 5 mg all other days   Weekly warfarin total:   30 mg   Plan last modified:   Teresa Espino RN (12/13/2018)   Next INR check:   12/21/2018   Target end date:   Indefinite    Indications    Heart valve replaced [Z95.2] [Z95.2]  Long term current use of anticoagulants with INR goal of 2.0-3.0 [Z79.01]             Anticoagulation Episode Summary     INR check location:       Preferred lab:       Send INR reminders to:   [facility] HEART INR NURSE    Comments:   AVR in 2001, Lovenox needed per Dr. Garcia due to possible TIA in 05/18      Anticoagulation Care Providers     Provider Role Specialty Phone number    March Sundar Cantu MD Responsible Cardiology [phone]            See the Encounter Report to view Anticoagulation Flowsheet and Dosing Calendar (Go to Encounters tab in chart review, and find the Anticoagulation Therapy Visit)    INR 3.5.  Lovenox recommended per Dr. Garcia when INR <2 but patient denied Lovenox when INR was 1.9.  We will try to keep his INR closer to 2.5-3.0.  No bleeding.  His normal schedule had been 3 days of 5mg but we increased to 4 days of 5mg and INR was still low at 1.9 so we will try 5 days of 5mg and recheck in 8 days.  Rios Espino RN

## 2018-12-20 ENCOUNTER — ANTICOAGULATION THERAPY VISIT (OUTPATIENT)
Dept: CARDIOLOGY | Facility: CLINIC | Age: 58
End: 2018-12-20
Payer: COMMERCIAL

## 2018-12-20 DIAGNOSIS — Z79.01 LONG TERM CURRENT USE OF ANTICOAGULANTS WITH INR GOAL OF 2.0-3.0: ICD-10-CM

## 2018-12-20 DIAGNOSIS — Z95.2 HEART VALVE REPLACED: ICD-10-CM

## 2018-12-20 LAB — INR POINT OF CARE: 2.9 (ref 0.86–1.14)

## 2018-12-20 PROCEDURE — 85610 PROTHROMBIN TIME: CPT | Mod: QW | Performed by: INTERNAL MEDICINE

## 2018-12-20 PROCEDURE — 99207 ZZC NO CHARGE NURSE ONLY: CPT | Performed by: INTERNAL MEDICINE

## 2018-12-20 PROCEDURE — 36416 COLLJ CAPILLARY BLOOD SPEC: CPT | Performed by: INTERNAL MEDICINE

## 2018-12-20 NOTE — PROGRESS NOTES
ANTICOAGULATION FOLLOW-UP CLINIC VISIT    Patient Name:  Richard Ball  Date:  2018  Contact Type:  Face to Face    SUBJECTIVE:     Patient Findings     Positives:   No Problem Findings           OBJECTIVE    INR Protime   Date Value Ref Range Status   2018 2.9 (A) 0.86 - 1.14 Final       ASSESSMENT / PLAN  INR assessment THER    Recheck INR In: 3 WEEKS    INR Location Clinic      Anticoagulation Summary  As of 2018    INR goal:   2.0-3.0   TTR:   61.3 % (5.6 mo)   INR used for dosin.9 (2018)   Warfarin maintenance plan:   2.5 mg (5 mg x 0.5) every Mon, Thu; 5 mg (5 mg x 1) all other days   Full warfarin instructions:   2.5 mg every Mon, Thu; 5 mg all other days   Weekly warfarin total:   30 mg   No change documented:   Teresa Espino RN   Plan last modified:   Teresa Espino RN (2018)   Next INR check:   2019   Target end date:   Indefinite    Indications    Heart valve replaced [Z95.2] [Z95.2]  Long term current use of anticoagulants with INR goal of 2.0-3.0 [Z79.01]             Anticoagulation Episode Summary     INR check location:       Preferred lab:       Send INR reminders to:   [facility] HEART INR NURSE    Comments:   AVR in , Lovenox needed per Dr. Garcia due to possible TIA in       Anticoagulation Care Providers     Provider Role Specialty Phone number    Sundar Garcia MD Responsible Cardiology [phone]            See the Encounter Report to view Anticoagulation Flowsheet and Dosing Calendar (Go to Encounters tab in chart review, and find the Anticoagulation Therapy Visit)    INR 2.9.  Continue the increased dosing and recheck in 3 weeks when back in town.  We want to keep INR closer to 3 since Dr. Garcia wants Lovenox when INR <2.  Rios Espino, LANIE

## 2019-01-11 ENCOUNTER — ANTICOAGULATION THERAPY VISIT (OUTPATIENT)
Dept: CARDIOLOGY | Facility: CLINIC | Age: 59
End: 2019-01-11
Payer: COMMERCIAL

## 2019-01-11 DIAGNOSIS — Z79.01 LONG TERM CURRENT USE OF ANTICOAGULANTS WITH INR GOAL OF 2.0-3.0: ICD-10-CM

## 2019-01-11 DIAGNOSIS — Z95.2 HEART VALVE REPLACED: ICD-10-CM

## 2019-01-11 LAB — INR POINT OF CARE: 3.7 (ref 0.86–1.14)

## 2019-01-11 PROCEDURE — 36416 COLLJ CAPILLARY BLOOD SPEC: CPT | Performed by: INTERNAL MEDICINE

## 2019-01-11 PROCEDURE — 85610 PROTHROMBIN TIME: CPT | Mod: QW | Performed by: INTERNAL MEDICINE

## 2019-01-11 NOTE — PROGRESS NOTES
ANTICOAGULATION FOLLOW-UP CLINIC VISIT    Patient Name:  Richard Ball  Date:  1/11/2019  Contact Type:  Face to Face    SUBJECTIVE:     Patient Findings     Positives:   No Problem Findings           OBJECTIVE    INR Protime   Date Value Ref Range Status   01/11/2019 3.7 (A) 0.86 - 1.14 Final       ASSESSMENT / PLAN  INR assessment SUPRA    Recheck INR In: 2 WEEKS    INR Location Clinic      Anticoagulation Summary  As of 1/11/2019    INR goal:   2.0-3.0   TTR:   55.6 % (6.3 mo)   INR used for dosing:   3.7! (1/11/2019)   Warfarin maintenance plan:   2.5 mg (5 mg x 0.5) every Mon, Thu; 5 mg (5 mg x 1) all other days   Full warfarin instructions:   1/11: 2.5 mg; Otherwise 2.5 mg every Mon, Thu; 5 mg all other days   Weekly warfarin total:   30 mg   Plan last modified:   Teresa Espino RN (12/13/2018)   Next INR check:   1/29/2019   Target end date:   Indefinite    Indications    Heart valve replaced [Z95.2] [Z95.2]  Long term current use of anticoagulants with INR goal of 2.0-3.0 [Z79.01]             Anticoagulation Episode Summary     INR check location:       Preferred lab:       Send INR reminders to:   [facility] HEART INR NURSE    Comments:   AVR in 2001, Lovenox needed per Dr. Garcia due to possible TIA in 05/18      Anticoagulation Care Providers     Provider Role Specialty Phone number    Sundar Garcia MD Responsible Cardiology [phone]            See the Encounter Report to view Anticoagulation Flowsheet and Dosing Calendar (Go to Encounters tab in chart review, and find the Anticoagulation Therapy Visit)    INR 3.7.  Dosing has been increased since Dr. Garcia clarified that she wants Lovenox when INR <2.  Take 1/2 dose today x1 then back to increased schedule and he will increase greens slightly.  Recheck in 2 weeks.  No bleeding.  Rios Espino, LANIE

## 2019-01-23 ENCOUNTER — ANCILLARY PROCEDURE (OUTPATIENT)
Dept: CARDIOLOGY | Facility: CLINIC | Age: 59
End: 2019-01-23

## 2019-01-23 DIAGNOSIS — I44.30 AV BLOCK: ICD-10-CM

## 2019-01-24 DIAGNOSIS — Z95.0 CARDIAC PACEMAKER IN SITU: Primary | ICD-10-CM

## 2019-01-29 ENCOUNTER — ANTICOAGULATION THERAPY VISIT (OUTPATIENT)
Dept: CARDIOLOGY | Facility: CLINIC | Age: 59
End: 2019-01-29
Payer: COMMERCIAL

## 2019-01-29 DIAGNOSIS — Z95.2 HEART VALVE REPLACED: ICD-10-CM

## 2019-01-29 DIAGNOSIS — Z79.01 LONG TERM CURRENT USE OF ANTICOAGULANTS WITH INR GOAL OF 2.0-3.0: ICD-10-CM

## 2019-01-29 LAB — INR POINT OF CARE: 2.7 (ref 0.86–1.14)

## 2019-01-29 PROCEDURE — 36416 COLLJ CAPILLARY BLOOD SPEC: CPT | Performed by: INTERNAL MEDICINE

## 2019-01-29 PROCEDURE — 85610 PROTHROMBIN TIME: CPT | Mod: QW | Performed by: INTERNAL MEDICINE

## 2019-01-29 NOTE — PROGRESS NOTES
ANTICOAGULATION FOLLOW-UP CLINIC VISIT    Patient Name:  Richard Ball  Date:  2019  Contact Type:  Face to Face    SUBJECTIVE:     Patient Findings     Positives:   No Problem Findings           OBJECTIVE    INR Protime   Date Value Ref Range Status   2019 2.7 (A) 0.86 - 1.14 Final       ASSESSMENT / PLAN  INR assessment THER    Recheck INR In: 4 WEEKS    INR Location Clinic      Anticoagulation Summary  As of 2019    INR goal:   2.0-3.0   TTR:   53.4 % (6.9 mo)   INR used for dosin.7 (2019)   Warfarin maintenance plan:   2.5 mg (5 mg x 0.5) every Mon, Thu; 5 mg (5 mg x 1) all other days   Full warfarin instructions:   2.5 mg every Mon, Thu; 5 mg all other days   Weekly warfarin total:   30 mg   No change documented:   Teresa Espino RN   Plan last modified:   Teresa Espino RN (2018)   Next INR check:   2019   Target end date:   Indefinite    Indications    Heart valve replaced [Z95.2] [Z95.2]  Long term current use of anticoagulants with INR goal of 2.0-3.0 [Z79.01]             Anticoagulation Episode Summary     INR check location:       Preferred lab:       Send INR reminders to:   [facility] HEART INR NURSE    Comments:   AVR in , Lovenox needed per Dr. Garcia due to possible TIA in       Anticoagulation Care Providers     Provider Role Specialty Phone number    March, Sundar Cantu MD Responsible Cardiology [phone]            See the Encounter Report to view Anticoagulation Flowsheet and Dosing Calendar (Go to Encounters tab in chart review, and find the Anticoagulation Therapy Visit)    INR 2.7.  No changes.  Continue same schedule and recheck in 4 weeks.  Rios Espino RN

## 2019-01-31 DIAGNOSIS — M41.9 SCOLIOSIS: Primary | ICD-10-CM

## 2019-01-31 NOTE — PROGRESS NOTES
Spine Surgery Consultation    REFERRING PHYSICIAN: Royer Roblero   PRIMARY CARE PHYSICIAN: Royer Roblero           Chief Complaint:   Consult (scoliosis)      History of Present Illness:  Symptom Profile Including: location of symptoms, onset, severity, exacerbating/alleviating factors, previous treatments:        Richard Ball is a 58 year old male who presents for evaluation of right-sided upper thoracic back pain which is been present ever since he was a child.  He was diagnosed with Marfan syndrome and had a known scoliosis and was braced for 4 years.  He also has a complicated cardiac history with a dilated aortic aneurysm which she had replaced along with aortic valve replacement and he is on chronic Coumadin.    More recently he is been taking Tylenol for his upper thoracic back pain.  He has not done any recent physical therapy or had any recent injections in part because of the concerns over having to come off of the Coumadin.  He had a CT arthrogram for a left shoulder problem and it took him about a month to get his INR back to baseline so he is hesitant to come off it unless it is really necessary.  He denies any numbness tingling weakness in the legs or bowel or bladder problems it is really an upper left-sided and somewhat upper right-sided back pain problem.         Past Medical History:     Past Medical History:   Diagnosis Date     Heart abnormality     Artifical aortic graft - ascending aorta     Hemothorax      Marfan's syndrome 12/6/2011            Past Surgical History:     Past Surgical History:   Procedure Laterality Date     AORTIC VALVE REPLACEMENT  8/2/2001    Ascending aorta graft     HC REMOVE TONSILS/ADENOIDS,<11 Y/O      T & A <13y.o.     HC VASECTOMY UNILAT/BILAT W POSTOP SEMEN      Vasectomy     IMPLANT PACEMAKER              Social History:     Social History     Tobacco Use     Smoking status: Never Smoker     Smokeless tobacco: Never Used   Substance Use Topics     Alcohol  "use: Yes     Alcohol/week: 2.4 oz     Types: 4 Standard drinks or equivalent per week     Comment: 2 drinks week            Family History:     Family History   Problem Relation Age of Onset     Asthma No family hx of      Diabetes No family hx of      Hypertension No family hx of      Breast Cancer No family hx of      Cancer - colorectal No family hx of      Prostate Cancer No family hx of      Lipids No family hx of      Musculoskeletal Disorder No family hx of      Neurologic Disorder No family hx of             Allergies:     Allergies   Allergen Reactions     Ambien [Zolpidem] Other (See Comments)     Hallucinations/ thrashing            Medications:     Current Outpatient Medications   Medication     albuterol (VENTOLIN HFA) 108 (90 Base) MCG/ACT inhaler     aspirin 81 MG chewable tablet     atenolol (TENORMIN) 50 MG tablet     atorvastatin (LIPITOR) 20 MG tablet     Fexofenadine HCl (ALLEGRA PO)     imiquimod (ALDARA) 5 % cream     lisinopril (PRINIVIL/ZESTRIL) 5 MG tablet     terbinafine (LAMISIL) 250 MG tablet     triamcinolone (KENALOG) 0.1 % cream     warfarin (COUMADIN) 5 MG tablet     No current facility-administered medications for this visit.              Review of Systems:     A 10 point ROS was performed and reviewed. Specific responses to these questions are noted at the end of the document.         Physical Exam:   Vitals: Ht 1.778 m (5' 10\")   Wt 72.6 kg (160 lb)   BMI 22.96 kg/m    Constitutional: awake, alert, cooperative, no apparent distress, appears stated age.    Eyes: The sclera are white.  Ears, Nose, Throat: The trachea is midline.  Psychiatric: The patient has a normal affect.  Respiratory: breathing non-labored  Cardiovascular: The extremities are warm and perfused.  Skin: no obvious rashes or lesions.  Musculoskeletal, Neurologic, and Spine:   Cervical spine:    Appearance -no gross step-offs, kyphosis.    Motor -     C5: Deltoids R 5/5 and L 5/5 strength    C6: Biceps R 5/5 and L " 5/5 strength     C7: Triceps R 5/5 and L 5/5 strength     C8:  R 5/5 and L 5/5 strength     T1: Dorsal interossei R 5/5 and L 5/5 strength        Sensation: intact to light touch in C5-T1      Special Tests -      Lhermitte's Test - Negative     Spurling's Test - Negative      Zimmerman's Test - Negative       Lumbar Spine:    Appearance - No gross stepoffs or deformities    Motor -     L2-3: Hip flexion 5/5 R and 5/5 L strength          L3/4:  Knee extension R 5/5 and L 5/5 strength         L4/5:  Foot dorsiflexion R 5/5 L 5/5 and       EHL dorsiflexion R 4/5 L 4/5 strength         S1:  Plantarflexion/Peroneal Muscles  R 5/5 and L 5/5 strength    Sensation: intact to light touch L3-S1 distribution BLE      Neurologic:      REFLEXES Left Right   Biceps 1+ 1+   Triceps 1+ 1+   Brachioradialis 1+ 1+   Patella 1+ 1+   Ankle jerk 1+ 1+   Babinski No upgoing great toe No upgoing great toe   Clonus 0 beats 0 beats     Hip Exam:  No pain with hip log roll and no tenderness over the greater trochanters.    Alignment:  Patient stands with a neutral standing sagittal balance. Right sided rib prominence.           Imaging:   We ordered and independently reviewed new radiographs at this clinic visit. The results were discussed with the patient.  Findings include:    February 1, 2019 AP and lateral scoliosis radiographs show a 69 degree right-sided thoracic curvature with maintained overall standing sagittal and coronal balance.             Assessment and Plan:   Assessment:  58 year old male with Marfan syndrome and scoliosis with upper thoracic back pain     Plan:  1. At this point he is still very functional.  I think with his high level of functioning there is no rush to any surgical procedures.  I like him to try some dedicated physical therapy.  I gave him some information about what surgery might involve and I explained the main indication for a curve of this size would be disabling levels of pain that did not respond  to conservative management.  Per his report and per prior notes it seems that the curve magnitude has been stable for many years so it does not seem to be a progressive curve.  He is going to give a trial of physical therapy.  I encouraged him to read and think about the condition and let me know if he has any questions.  If he does not improve my next step would be to recommend some facet and trigger point injections through our pain clinic. He cannot take NSAIDs due to his need for warfarin.      Respectfully,  Oscar Sahu MD  Spine Surgery  [facility]

## 2019-01-31 NOTE — TELEPHONE ENCOUNTER
RECORDS RECEIVED FROM: New Scoliosis- referred by Dr. Roblero- records internal- no history of surgery- appt per pt   DATE RECEIVED: 2.1.19   NOTES STATUS DETAILS   OFFICE NOTE from referring provider Internal 10.24.18   OFFICE NOTE from other specialist Internal 3.24.17   DISCHARGE SUMMARY from hospital N/A    DISCHARGE REPORT from the ER N/A    OPERATIVE REPORT N/A    MEDICATION LIST Internal    IMPLANT RECORD/STICKER N/A    LABS     CBC/DIFF Internal 6.27.18   CULTURES N/A    INJECTIONS DONE IN RADIOLOGY N/A    MRI N/A    CT SCAN N/A    XRAYS (IMAGES & REPORTS) N/A    TUMOR     PATHOLOGY  Slides & report N/A

## 2019-02-01 ENCOUNTER — OFFICE VISIT (OUTPATIENT)
Dept: ORTHOPEDICS | Facility: CLINIC | Age: 59
End: 2019-02-01

## 2019-02-01 ENCOUNTER — PRE VISIT (OUTPATIENT)
Dept: ORTHOPEDICS | Facility: CLINIC | Age: 59
End: 2019-02-01

## 2019-02-01 ENCOUNTER — ANCILLARY PROCEDURE (OUTPATIENT)
Dept: GENERAL RADIOLOGY | Facility: CLINIC | Age: 59
End: 2019-02-01

## 2019-02-01 VITALS — BODY MASS INDEX: 22.9 KG/M2 | HEIGHT: 70 IN | WEIGHT: 160 LBS

## 2019-02-01 DIAGNOSIS — M41.44 NEUROMUSCULAR SCOLIOSIS OF THORACIC REGION: Primary | ICD-10-CM

## 2019-02-01 ASSESSMENT — MIFFLIN-ST. JEOR: SCORE: 1557.01

## 2019-02-01 NOTE — NURSING NOTE
"Reason For Visit:   Chief Complaint   Patient presents with     Consult     scoliosis       Primary MD: Royer Roblero  Ref. MD:     Smoker: No  Request smoking cessation information: No    Ht 1.778 m (5' 10\")   Wt 72.6 kg (160 lb)   BMI 22.96 kg/m      Pain Assessment  Patient Currently in Pain: Denies(genral back ache)    Oswestry (JAGJIT) Questionnaire    OSWESTRY DISABILITY INDEX 3/30/2017   Count 4   Sum 5   Oswestry Score (%) 25            Neck Disability Index (NDI) Questionnaire    No flowsheet data found.                Promis 10 Assessment    No flowsheet data found.             Ayush Chauhan, ATC  "

## 2019-02-01 NOTE — LETTER
2/1/2019       RE: Richard Ball  [address]  [place] [address]     Dear Colleague,    Thank you for referring your patient, Richard Ball, to the [facility]. Please see a copy of my visit note below.    Spine Surgery Consultation    REFERRING PHYSICIAN: Royer Roblero   PRIMARY CARE PHYSICIAN: Royer Roblero           Chief Complaint:   Consult (scoliosis)      History of Present Illness:  Symptom Profile Including: location of symptoms, onset, severity, exacerbating/alleviating factors, previous treatments:        Richard Ball is a 58 year old male who presents for evaluation of right-sided upper thoracic back pain which is been present ever since he was a child.  He was diagnosed with Marfan syndrome and had a known scoliosis and was braced for 4 years.  He also has a complicated cardiac history with a dilated aortic aneurysm which she had replaced along with aortic valve replacement and he is on chronic Coumadin.    More recently he is been taking Tylenol for his upper thoracic back pain.  He has not done any recent physical therapy or had any recent injections in part because of the concerns over having to come off of the Coumadin.  He had a CT arthrogram for a left shoulder problem and it took him about a month to get his INR back to baseline so he is hesitant to come off it unless it is really necessary.  He denies any numbness tingling weakness in the legs or bowel or bladder problems it is really an upper left-sided and somewhat upper right-sided back pain problem.         Past Medical History:     Past Medical History:   Diagnosis Date     Heart abnormality     Artifical aortic graft - ascending aorta     Hemothorax      Marfan's syndrome 12/6/2011            Past Surgical History:     Past Surgical History:   Procedure Laterality Date     AORTIC VALVE REPLACEMENT  8/2/2001    Ascending aorta graft     HC REMOVE  "TONSILS/ADENOIDS,<13 Y/O      T & A <13y.o.     HC VASECTOMY UNILAT/BILAT W POSTOP SEMEN      Vasectomy     IMPLANT PACEMAKER              Social History:     Social History     Tobacco Use     Smoking status: Never Smoker     Smokeless tobacco: Never Used   Substance Use Topics     Alcohol use: Yes     Alcohol/week: 2.4 oz     Types: 4 Standard drinks or equivalent per week     Comment: 2 drinks week            Family History:     Family History   Problem Relation Age of Onset     Asthma No family hx of      Diabetes No family hx of      Hypertension No family hx of      Breast Cancer No family hx of      Cancer - colorectal No family hx of      Prostate Cancer No family hx of      Lipids No family hx of      Musculoskeletal Disorder No family hx of      Neurologic Disorder No family hx of             Allergies:     Allergies   Allergen Reactions     Ambien [Zolpidem] Other (See Comments)     Hallucinations/ thrashing            Medications:     Current Outpatient Medications   Medication     albuterol (VENTOLIN HFA) 108 (90 Base) MCG/ACT inhaler     aspirin 81 MG chewable tablet     atenolol (TENORMIN) 50 MG tablet     atorvastatin (LIPITOR) 20 MG tablet     Fexofenadine HCl (ALLEGRA PO)     imiquimod (ALDARA) 5 % cream     lisinopril (PRINIVIL/ZESTRIL) 5 MG tablet     terbinafine (LAMISIL) 250 MG tablet     triamcinolone (KENALOG) 0.1 % cream     warfarin (COUMADIN) 5 MG tablet     No current facility-administered medications for this visit.              Review of Systems:     A 10 point ROS was performed and reviewed. Specific responses to these questions are noted at the end of the document.         Physical Exam:   Vitals: Ht 1.778 m (5' 10\")   Wt 72.6 kg (160 lb)   BMI 22.96 kg/m     Constitutional: awake, alert, cooperative, no apparent distress, appears stated age.    Eyes: The sclera are white.  Ears, Nose, Throat: The trachea is midline.  Psychiatric: The patient has a normal affect.  Respiratory: " breathing non-labored  Cardiovascular: The extremities are warm and perfused.  Skin: no obvious rashes or lesions.  Musculoskeletal, Neurologic, and Spine:   Cervical spine:    Appearance -no gross step-offs, kyphosis.    Motor -     C5: Deltoids R 5/5 and L 5/5 strength    C6: Biceps R 5/5 and L 5/5 strength     C7: Triceps R 5/5 and L 5/5 strength     C8:  R 5/5 and L 5/5 strength     T1: Dorsal interossei R 5/5 and L 5/5 strength        Sensation: intact to light touch in C5-T1      Special Tests -      Lhermitte's Test - Negative     Spurling's Test - Negative      Zimmerman's Test - Negative       Lumbar Spine:    Appearance - No gross stepoffs or deformities    Motor -     L2-3: Hip flexion 5/5 R and 5/5 L strength          L3/4:  Knee extension R 5/5 and L 5/5 strength         L4/5:  Foot dorsiflexion R 5/5 L 5/5 and       EHL dorsiflexion R 4/5 L 4/5 strength         S1:  Plantarflexion/Peroneal Muscles  R 5/5 and L 5/5 strength    Sensation: intact to light touch L3-S1 distribution BLE      Neurologic:      REFLEXES Left Right   Biceps 1+ 1+   Triceps 1+ 1+   Brachioradialis 1+ 1+   Patella 1+ 1+   Ankle jerk 1+ 1+   Babinski No upgoing great toe No upgoing great toe   Clonus 0 beats 0 beats     Hip Exam:  No pain with hip log roll and no tenderness over the greater trochanters.    Alignment:  Patient stands with a neutral standing sagittal balance. Right sided rib prominence.           Imaging:   We ordered and independently reviewed new radiographs at this clinic visit. The results were discussed with the patient.  Findings include:    February 1, 2019 AP and lateral scoliosis radiographs show a 69 degree right-sided thoracic curvature with maintained overall standing sagittal and coronal balance.             Assessment and Plan:   Assessment:  58 year old male with Marfan syndrome and scoliosis with upper thoracic back pain     Plan:  1. At this point he is still very functional.  I think with his high  level of functioning there is no rush to any surgical procedures.  I like him to try some dedicated physical therapy.  I gave him some information about what surgery might involve and I explained the main indication for a curve of this size would be disabling levels of pain that did not respond to conservative management.  Per his report and per prior notes it seems that the curve magnitude has been stable for many years so it does not seem to be a progressive curve.  He is going to give a trial of physical therapy.  I encouraged him to read and think about the condition and let me know if he has any questions.  If he does not improve my next step would be to recommend some facet and trigger point injections through our pain clinic. He cannot take NSAIDs due to his need for warfarin.    Respectfully,  Oscar Sahu MD  Spine Surgery  [facility]

## 2019-02-14 DIAGNOSIS — Z95.2 S/P AORTIC VALVE REPLACEMENT: ICD-10-CM

## 2019-02-14 DIAGNOSIS — Q87.40 MARFAN'S SYNDROME: ICD-10-CM

## 2019-02-14 RX ORDER — WARFARIN SODIUM 5 MG/1
TABLET ORAL
Qty: 95 TABLET | Refills: 1 | Status: SHIPPED | OUTPATIENT
Start: 2019-02-14 | End: 2019-10-23

## 2019-02-18 ENCOUNTER — TELEPHONE (OUTPATIENT)
Dept: CARDIOLOGY | Facility: CLINIC | Age: 59
End: 2019-02-18

## 2019-02-18 NOTE — TELEPHONE ENCOUNTER
Patient positioned pre-procedure by RN,CST and xray tech. Pillow placed under lower legs and feet for support.   Patient requested a Rx Refill for Lovastatin to [facility] in [place].

## 2019-02-19 DIAGNOSIS — E78.5 DYSLIPIDEMIA: Primary | ICD-10-CM

## 2019-02-19 RX ORDER — LOVASTATIN 40 MG
40 TABLET ORAL AT BEDTIME
Qty: 90 TABLET | Refills: 3 | Status: SHIPPED | OUTPATIENT
Start: 2019-02-19 | End: 2020-02-07

## 2019-02-26 ENCOUNTER — ANTICOAGULATION THERAPY VISIT (OUTPATIENT)
Dept: CARDIOLOGY | Facility: CLINIC | Age: 59
End: 2019-02-26
Payer: COMMERCIAL

## 2019-02-26 DIAGNOSIS — Z79.01 LONG TERM CURRENT USE OF ANTICOAGULANTS WITH INR GOAL OF 2.0-3.0: ICD-10-CM

## 2019-02-26 DIAGNOSIS — Z95.2 HEART VALVE REPLACED: ICD-10-CM

## 2019-02-26 LAB — INR POINT OF CARE: 1.9 (ref 0.86–1.14)

## 2019-02-26 PROCEDURE — 36416 COLLJ CAPILLARY BLOOD SPEC: CPT | Performed by: INTERNAL MEDICINE

## 2019-02-26 PROCEDURE — 85610 PROTHROMBIN TIME: CPT | Mod: QW | Performed by: INTERNAL MEDICINE

## 2019-02-26 NOTE — PROGRESS NOTES
ANTICOAGULATION FOLLOW-UP CLINIC VISIT    Patient Name:  Richard Ball  Date:  2019  Contact Type:  Face to Face    SUBJECTIVE:     Patient Findings     Positives:   Missed doses           OBJECTIVE    INR Protime   Date Value Ref Range Status   2019 1.9 (A) 0.86 - 1.14 Final       ASSESSMENT / PLAN  INR assessment THER    Recheck INR In: 3 WEEKS    INR Location Clinic      Anticoagulation Summary  As of 2019    INR goal:   2.0-3.0   TTR:   57.5 % (7.8 mo)   INR used for dosin.9! (2019)   Warfarin maintenance plan:   2.5 mg (5 mg x 0.5) every Mon, Thu; 5 mg (5 mg x 1) all other days   Full warfarin instructions:   : 7.5 mg; Otherwise 2.5 mg every Mon, Thu; 5 mg all other days   Weekly warfarin total:   30 mg   Plan last modified:   Teresa Espino RN (2018)   Next INR check:   3/22/2019   Target end date:   Indefinite    Indications    Heart valve replaced [Z95.2] [Z95.2]  Long term current use of anticoagulants with INR goal of 2.0-3.0 [Z79.01]             Anticoagulation Episode Summary     INR check location:       Preferred lab:       Send INR reminders to:   [facility] HEART INR NURSE    Comments:   AVR in , Lovenox needed per Dr. Garcia due to possible TIA in       Anticoagulation Care Providers     Provider Role Specialty Phone number    March, Sundar Cantu MD Responsible Cardiology [phone]            See the Encounter Report to view Anticoagulation Flowsheet and Dosing Calendar (Go to Encounters tab in chart review, and find the Anticoagulation Therapy Visit)    INR 1.9.  He missed his 5mg dose on Saturday and didn't make it up on  but increased his dose on Monday from 2.5mg to 5mg so overall still short 2.5mg.  Reviewed how to make up dosing.  He is supposed to bridge with Lovenox per Dr. Garcia when <2 but patient doesn't want to do Lovenox when INR is only 1.9 and so close to range.  Boost today from 5mg to 7.5mg x1 and avoid greens today then  resume normal schedule.  He would have been in range if he hadn't missed his Saturday dose.  Recheck INR in 3 weeks.  Rios Espino, RN

## 2019-03-22 ENCOUNTER — ANTICOAGULATION THERAPY VISIT (OUTPATIENT)
Dept: CARDIOLOGY | Facility: CLINIC | Age: 59
End: 2019-03-22
Payer: COMMERCIAL

## 2019-03-22 DIAGNOSIS — Z95.2 HEART VALVE REPLACED: ICD-10-CM

## 2019-03-22 DIAGNOSIS — Z79.01 LONG TERM CURRENT USE OF ANTICOAGULANTS WITH INR GOAL OF 2.0-3.0: ICD-10-CM

## 2019-03-22 LAB — INR POINT OF CARE: 3.1 (ref 0.86–1.14)

## 2019-03-22 PROCEDURE — 99207 ZZC NO CHARGE NURSE ONLY: CPT | Performed by: INTERNAL MEDICINE

## 2019-03-22 PROCEDURE — 36416 COLLJ CAPILLARY BLOOD SPEC: CPT | Performed by: INTERNAL MEDICINE

## 2019-03-22 PROCEDURE — 85610 PROTHROMBIN TIME: CPT | Mod: QW | Performed by: INTERNAL MEDICINE

## 2019-03-22 NOTE — PROGRESS NOTES
ANTICOAGULATION FOLLOW-UP CLINIC VISIT    Patient Name:  Richard Ball  Date:  3/22/2019  Contact Type:  Face to Face    SUBJECTIVE:     Patient Findings            OBJECTIVE    INR Protime   Date Value Ref Range Status   03/22/2019 3.1 (A) 0.86 - 1.14 Final       ASSESSMENT / PLAN  INR assessment THER    Recheck INR In: 4 WEEKS    INR Location Clinic      Anticoagulation Summary  As of 3/22/2019    INR goal:   2.0-3.0   TTR:   59.9 % (8.6 mo)   INR used for dosing:   3.1! (3/22/2019)   Warfarin maintenance plan:   2.5 mg (5 mg x 0.5) every Mon, Thu; 5 mg (5 mg x 1) all other days   Full warfarin instructions:   2.5 mg every Mon, Thu; 5 mg all other days   Weekly warfarin total:   30 mg   No change documented:   Teresa Espino RN   Plan last modified:   Teresa Espino RN (12/13/2018)   Next INR check:   4/23/2019   Target end date:   Indefinite    Indications    Heart valve replaced [Z95.2] [Z95.2]  Long term current use of anticoagulants with INR goal of 2.0-3.0 [Z79.01]             Anticoagulation Episode Summary     INR check location:       Preferred lab:       Send INR reminders to:   [facility] HEART INR NURSE    Comments:   AVR in 2001, Lovenox needed per Dr. Garcia due to possible TIA in 05/18      Anticoagulation Care Providers     Provider Role Specialty Phone number    March, Sundar Cantu MD Responsible Cardiology [phone]            See the Encounter Report to view Anticoagulation Flowsheet and Dosing Calendar (Go to Encounters tab in chart review, and find the Anticoagulation Therapy Visit)    INR 3.1.  No bleeding.  Continue same schedule and recheck in 4 weeks.  Rios Espino RN

## 2019-04-23 ENCOUNTER — ANTICOAGULATION THERAPY VISIT (OUTPATIENT)
Dept: CARDIOLOGY | Facility: CLINIC | Age: 59
End: 2019-04-23
Payer: COMMERCIAL

## 2019-04-23 DIAGNOSIS — Z79.01 LONG TERM CURRENT USE OF ANTICOAGULANTS WITH INR GOAL OF 2.0-3.0: ICD-10-CM

## 2019-04-23 DIAGNOSIS — Z95.2 HEART VALVE REPLACED: ICD-10-CM

## 2019-04-23 LAB — INR POINT OF CARE: 3.7 (ref 0.86–1.14)

## 2019-04-23 PROCEDURE — 36416 COLLJ CAPILLARY BLOOD SPEC: CPT | Performed by: INTERNAL MEDICINE

## 2019-04-23 PROCEDURE — 85610 PROTHROMBIN TIME: CPT | Mod: QW | Performed by: INTERNAL MEDICINE

## 2019-04-23 PROCEDURE — 99207 ZZC NO CHARGE NURSE ONLY: CPT | Performed by: INTERNAL MEDICINE

## 2019-04-23 NOTE — PROGRESS NOTES
ANTICOAGULATION FOLLOW-UP CLINIC VISIT    Patient Name:  Richard Ball  Date:  4/23/2019  Contact Type:  Face to Face    SUBJECTIVE:     Patient Findings            OBJECTIVE    INR Protime   Date Value Ref Range Status   04/23/2019 3.7 (A) 0.86 - 1.14 Final       ASSESSMENT / PLAN  INR assessment SUPRA    Recheck INR In: 2 WEEKS    INR Location Clinic      Anticoagulation Summary  As of 4/23/2019    INR goal:   2.0-3.0   TTR:   53.3 % (9.7 mo)   INR used for dosing:   3.7! (4/23/2019)   Warfarin maintenance plan:   2.5 mg (5 mg x 0.5) every Mon, Thu; 5 mg (5 mg x 1) all other days   Full warfarin instructions:   4/23: 2.5 mg; Otherwise 2.5 mg every Mon, Thu; 5 mg all other days   Weekly warfarin total:   30 mg   Plan last modified:   Teresa Espino RN (12/13/2018)   Next INR check:   5/10/2019   Target end date:   Indefinite    Indications    Heart valve replaced [Z95.2] [Z95.2]  Long term current use of anticoagulants with INR goal of 2.0-3.0 [Z79.01]             Anticoagulation Episode Summary     INR check location:       Preferred lab:       Send INR reminders to:   [facility] HEART INR NURSE    Comments:   AVR in 2001, Lovenox needed per Dr. Garcia due to possible TIA in 05/18      Anticoagulation Care Providers     Provider Role Specialty Phone number    March, Sundar Cantu MD Responsible Cardiology [phone]            See the Encounter Report to view Anticoagulation Flowsheet and Dosing Calendar (Go to Encounters tab in chart review, and find the Anticoagulation Therapy Visit)    INR 3.7.  He fell onto his back while trying to kick a ball yesterday.  He is sore and iced his back last night and took 2 tabs of tylenol.  He will eat extra greens if he continues to take tylenol.  Decrease Warfarin from 5mg to 2.5mg today x1 then normal schedule and recheck INR in 2 weeks.  Rios Espino, LANIE

## 2019-04-30 ENCOUNTER — ANCILLARY PROCEDURE (OUTPATIENT)
Dept: GENERAL RADIOLOGY | Facility: CLINIC | Age: 59
End: 2019-04-30
Attending: PHYSICIAN ASSISTANT
Payer: COMMERCIAL

## 2019-04-30 ENCOUNTER — OFFICE VISIT (OUTPATIENT)
Dept: PEDIATRICS | Facility: CLINIC | Age: 59
End: 2019-04-30
Payer: OTHER MISCELLANEOUS

## 2019-04-30 VITALS
WEIGHT: 161 LBS | HEART RATE: 70 BPM | BODY MASS INDEX: 23.05 KG/M2 | TEMPERATURE: 98.1 F | HEIGHT: 70 IN | OXYGEN SATURATION: 98 % | SYSTOLIC BLOOD PRESSURE: 120 MMHG | DIASTOLIC BLOOD PRESSURE: 60 MMHG
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
## 2019-04-30 DIAGNOSIS — M53.3 PAIN IN THE COCCYX: ICD-10-CM
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
## 2019-04-30 DIAGNOSIS — L08.9 INFECTION OF HEMATOMA OF WOUND: ICD-10-CM
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
## 2019-04-30 DIAGNOSIS — M25.521 RIGHT ELBOW PAIN: ICD-10-CM

## 2019-04-30 DIAGNOSIS — T14.8XXA INFECTION OF HEMATOMA OF WOUND: ICD-10-CM

## 2019-04-30 PROCEDURE — 72220 X-RAY EXAM SACRUM TAILBONE: CPT | Performed by: INTERNAL MEDICINE

## 2019-04-30 PROCEDURE — 99214 OFFICE O/P EST MOD 30 MIN: CPT | Performed by: PHYSICIAN ASSISTANT

## 2019-04-30 PROCEDURE — 73070 X-RAY EXAM OF ELBOW: CPT | Mod: RT | Performed by: INTERNAL MEDICINE

## 2019-04-30 RX ORDER — DOXYCYCLINE HYCLATE 100 MG
100 TABLET ORAL 2 TIMES DAILY
Qty: 20 TABLET | Refills: 0 | Status: SHIPPED | OUTPATIENT
Start: 2019-04-30 | End: 2019-07-12

## 2019-04-30 RX ORDER — HYDROCODONE BITARTRATE AND ACETAMINOPHEN 5; 325 MG/1; MG/1
.5-1 TABLET ORAL EVERY 6 HOURS PRN
Qty: 10 TABLET | Refills: 0 | Status: SHIPPED | OUTPATIENT
Start: 2019-04-30 | End: 2019-07-12

## 2019-04-30 RX ORDER — ACETAMINOPHEN 500 MG
1000 TABLET ORAL
COMMUNITY

## 2019-04-30 ASSESSMENT — MIFFLIN-ST. JEOR: SCORE: 1556.54

## 2019-04-30 NOTE — PROGRESS NOTES
"  SUBJECTIVE:   Richard Ball is a 59 year old male who presents to clinic today for the following   health issues:    Work related injury:   Injury occurred on 4/22/19 2:30 pm  Works for [employer]    Patient was on the playground, kicked a ball and feet went out. Fell directly onto buttocks and onto elbows bilaterally--onto cement.   Increased bruising, swelling and pain of the right elbow. Pain with touch and pressure. Right hand dominant.     Buttocks continue to feel sore.     Patient on coumadin and last INR 3.7 last week. Normal this week.    ROS:  ROS otherwise negative    OBJECTIVE:                                                    /60 (BP Location: Left arm, Patient Position: Chair, Cuff Size: Adult Regular)   Pulse 70   Temp 98.1  F (36.7  C) (Oral)   Ht 1.778 m (5' 10\")   Wt 73 kg (161 lb)   SpO2 98%   BMI 23.10 kg/m    Body mass index is 23.1 kg/m .   GENERAL: healthy, alert, no distress   RIGHT:  Elbow Exam: Inspection: swelling, ecchymosis of the medial elbow and medial forearm. Warm and very tender to palpation. Hematoma present of the forearm.  Range of Motion: unable to fully extend; otherwise ROM intact  Strength: elbow strength full     COCCYX:   Tender to palpation. Full ROM.     Diagnostic test results:  No results found for this or any previous visit (from the past 24 hour(s)).     ASSESSMENT/PLAN:                                                    (W19.XXXA) Fall, initial encounter  (primary encounter diagnosis)  Comment:   Plan: XR Sacrum and Coccyx 2 Views, XR Elbow Right 2         Views            (T14.8XXA,  L08.9) Infection of hematoma of wound  Comment: begin antibiotics as directed. Recheck INR in three days.  Plan: doxycycline hyclate (VIBRA-TABS) 100 MG tablet            (M25.521) Right elbow pain  Comment: given MOA and exam findings, follow up with ortho.  Plan: XR Elbow Right 2 Views, ORTHO          REFERRAL, HYDROcodone-acetaminophen (NORCO) "         5-325 MG tablet            (M53.3) Pain in the coccyx  Comment:   Plan: XR Sacrum and Coccyx 2 Views            (Y99.0) Work related injury  Comment:   Plan: XR Sacrum and Coccyx 2 Views, XR Elbow Right 2         Views, ORTHO  REFERRAL            Jonah Duenas PA-C  [facility]

## 2019-05-01 ENCOUNTER — TELEPHONE (OUTPATIENT)
Dept: CARDIOLOGY | Facility: CLINIC | Age: 59
End: 2019-05-01

## 2019-05-01 NOTE — TELEPHONE ENCOUNTER
Pt calling to report that he fell at work this week. He was evaluated by MD and started on Doxycycline for infection in his elbow and Norco for pain control (elbow, coccyx). Both meds can increase bleeding risk when taken with Warfarin. Scheduled INR appt for Friday and he will eat a serving of greens this week before the INR appt. Zan

## 2019-05-03 ENCOUNTER — OFFICE VISIT (OUTPATIENT)
Dept: ORTHOPEDICS | Facility: CLINIC | Age: 59
End: 2019-05-03
Payer: OTHER MISCELLANEOUS

## 2019-05-03 ENCOUNTER — ANTICOAGULATION THERAPY VISIT (OUTPATIENT)
Dept: CARDIOLOGY | Facility: CLINIC | Age: 59
End: 2019-05-03
Payer: COMMERCIAL

## 2019-05-03 VITALS
WEIGHT: 161 LBS | HEIGHT: 70 IN | DIASTOLIC BLOOD PRESSURE: 78 MMHG | BODY MASS INDEX: 23.05 KG/M2 | SYSTOLIC BLOOD PRESSURE: 132 MMHG

## 2019-05-03 DIAGNOSIS — Z79.01 LONG TERM CURRENT USE OF ANTICOAGULANTS WITH INR GOAL OF 2.0-3.0: ICD-10-CM

## 2019-05-03 DIAGNOSIS — Z95.2 HEART VALVE REPLACED: ICD-10-CM

## 2019-05-03 DIAGNOSIS — S50.01XA TRAUMATIC HEMATOMA OF RIGHT ELBOW, INITIAL ENCOUNTER: ICD-10-CM

## 2019-05-03 DIAGNOSIS — S50.11XA CONTUSION OF RIGHT ELBOW AND FOREARM, INITIAL ENCOUNTER: Primary | ICD-10-CM

## 2019-05-03 LAB — INR POINT OF CARE: 3.9 (ref 0.86–1.14)

## 2019-05-03 PROCEDURE — 99213 OFFICE O/P EST LOW 20 MIN: CPT | Performed by: FAMILY MEDICINE

## 2019-05-03 PROCEDURE — 36416 COLLJ CAPILLARY BLOOD SPEC: CPT | Performed by: INTERNAL MEDICINE

## 2019-05-03 PROCEDURE — 85610 PROTHROMBIN TIME: CPT | Mod: QW | Performed by: INTERNAL MEDICINE

## 2019-05-03 ASSESSMENT — MIFFLIN-ST. JEOR: SCORE: 1556.54

## 2019-05-03 NOTE — LETTER
5/3/2019         RE: Richard Ball  [address]  [facility] [address]        Dear Colleague,    Thank you for referring your patient, Richard Ball, to the [facility]. Please see a copy of my visit note below.    ASSESSMENT & PLAN  Patient Instructions     1. Contusion of right elbow and forearm, initial encounter    2. Traumatic hematoma of right elbow, initial encounter    3. Long term current use of anticoagulants with INR goal of 2.0-3.0      -Patient has right elbow pain and swelling due to a contusion and hematoma as a result of being on Coumadin with a supratherapeutic INR.  -Patient has full range of motion strength and stability of the right elbow with only localized pain over the medial aspect of the elbow and proximal forearm.  -Patient may purchase a sleeve with padding to protect the painful area.  -Patient may continue to work full duty without restrictions.  -Patient will stop oral antibiotics since there are no signs of infection and it is interfering with his INR  -Patient may take extra strength Tylenol for mild to moderate pain and may continue with his hydrocodone for severe pain.  -Call direct clinic number [[phone]] at any time with questions or concerns.    Albert Yeo MD [facility]  [facility] [place]          -----    SUBJECTIVE  Richard Ball is a/an 59 year old Right handed male who is seen in consultation at the request of  Jonah Peterson PA-C for evaluation of right elbow pain. The patient is seen by themselves.    Onset: 4/22/19. Patient describes injury as he was kicking a ball while at work when his feet went out from under him and he landed on his buttocks and bilateral elbows.   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    Past Medical History:   Diagnosis Date     Heart abnormality     Artifical aortic graft - ascending aorta     Hemothorax      Marfan's syndrome 12/6/2011     Social History     Socioeconomic History     Marital status:      Spouse name: Not on file     Number of children: 2     Years of education: 16     Highest education level: Not on file   Occupational History     Employer: UNEMPLOYED     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   Social Needs     Financial resource strain: Not on file     Food insecurity:     Worry: Not on file     Inability: Not on file     Transportation needs:     Medical: Not on file     Non-medical: Not on file   Tobacco Use     Smoking status: Never Smoker     Smokeless tobacco: Never Used   Substance and Sexual Activity     Alcohol use: Yes     Alcohol/week: 2.4 oz     Types: 4 Standard drinks or equivalent per week     Comment: 2 drinks week     Drug use: No     Sexual activity: Never   Lifestyle     Physical activity:     Days per week: Not on file     Minutes per session: Not on file     Stress: Not on file   Relationships     Social connections:     Talks on phone: Not on file     Gets together: Not on file     Attends Jainism service: Not on file     Active member of club or organization: Not on file     Attends meetings of clubs or organizations: Not on file     Relationship status: Not on file     Intimate partner violence:     Fear of current or ex partner: Not on file     Emotionally abused: Not on file     Physically abused: Not on file     Forced sexual activity: Not on file   Other Topics Concern     Parent/sibling w/  "CABG, MI or angioplasty before 65F 55M? No      Service Not Asked     Blood Transfusions Not Asked     Caffeine Concern Not Asked     Occupational Exposure Not Asked     Hobby Hazards Not Asked     Sleep Concern Not Asked     Stress Concern Not Asked     Weight Concern Not Asked     Special Diet Not Asked     Back Care Not Asked     Exercise Yes     Bike Helmet Not Asked     Seat Belt Yes     Self-Exams Yes   Social History Narrative     Not on file       Patient's past medical, surgical, social, and family histories were reviewed today and no changes are noted.    REVIEW OF SYSTEMS:  10 point ROS is negative other than symptoms noted above in HPI, Past Medical History or as stated below  Constitutional: NEGATIVE for fever, chills, change in weight  Skin: NEGATIVE for worrisome rashes, moles or lesions  GI/: NEGATIVE for bowel or bladder changes  Neuro: NEGATIVE for weakness, dizziness or paresthesias    OBJECTIVE:  /78   Ht 1.778 m (5' 10\")   Wt 73 kg (161 lb)   BMI 23.10 kg/m      General: healthy, alert and in no distress  HEENT: no scleral icterus or conjunctival erythema  Skin: no suspicious lesions or rash. No jaundice.  CV: regular rhythm by palpation  Resp: normal respiratory effort without conversational dyspnea   Psych: normal mood and affect  Gait: normal steady gait with appropriate coordination and balance  Neuro: Normal sensory exam of bilateral hands.   MSK:  RIGHT ELBOW  Inspection:  Localized approximately 1 x 2 inch hematoma just distal to the medial malleolus.  Generalized swelling over the medial aspect of the elbow and ecchymosis of the medial aspect of the elbow extending to the ulnar aspect of the wrist.  Palpation:    Tender about the proximal ulna on the medial aspect. Remainder of bony, ligamentous and tendinous landmarks are nontender.    Crepitus is Absent  Range of Motion:     Extension full / flexion full / pronation full / supination full  Strength:    No deficits in " flexion, extension, pronation, or supination.  Special Tests:    Positive: none    Negative: Pain with resisted wrist extension, pain with resisted middle finger extension, pain with resisted wrist flexion, pain with resisted supination, passive valgus stress, pain with resisted pronation, milking manuever, varus stress    Independent visualization of the below image:  No results found for this or any previous visit (from the past 24 hour(s)).  ELBOW RIGHT TWO VIEWS  4/30/2019 4:35 PM       HISTORY: Fall, initial encounter. Work-related injury. Right elbow  pain.     COMPARISON: None.                                                                      IMPRESSION: No acute fracture or dislocation.     KENDALL STRAND, MD Albert Yeo MD [facility]      Again, thank you for allowing me to participate in the care of your patient.        Sincerely,        Albert Yeo, MD

## 2019-05-03 NOTE — LETTER
May 3, 2019      Richard Ball  [address] [facility] [address]        To Whom It May Concern:    Richard Ball  was seen on 5/3/19 for right elbow injury. Richard may now return to work without restrictions.        Sincerely,        Albert Yeo, MD

## 2019-05-03 NOTE — PROGRESS NOTES
ANTICOAGULATION FOLLOW-UP CLINIC VISIT    Patient Name:  Richard Ball  Date:  5/3/2019  Contact Type:  Face to Face    SUBJECTIVE:     Patient Findings     Positives:   Change in medications    Comments:   Started Doxycycline and Norco            OBJECTIVE    INR Protime   Date Value Ref Range Status   05/03/2019 3.9 (A) 0.86 - 1.14 Final       ASSESSMENT / PLAN  INR assessment SUPRA    Recheck INR In: 1 WEEK    INR Location Clinic      Anticoagulation Summary  As of 5/3/2019    INR goal:   2.0-3.0   TTR:   51.5 % (10 mo)   INR used for dosing:   3.9! (5/3/2019)   Warfarin maintenance plan:   2.5 mg (5 mg x 0.5) every Mon, Fri; 5 mg (5 mg x 1) all other days   Full warfarin instructions:   5/3: 2.5 mg; 5/4: 2.5 mg; Otherwise 2.5 mg every Mon, Fri; 5 mg all other days   Weekly warfarin total:   30 mg   Plan last modified:   Teresa Espino RN (5/3/2019)   Next INR check:   5/10/2019   Target end date:   Indefinite    Indications    Heart valve replaced [Z95.2] [Z95.2]  Long term current use of anticoagulants with INR goal of 2.0-3.0 [Z79.01]             Anticoagulation Episode Summary     INR check location:       Preferred lab:       Send INR reminders to:   [facility] INR NURSE    Comments:   AVR in 2001, Lovenox needed per Dr. Gracia due to possible TIA in 05/18      Anticoagulation Care Providers     Provider Role Specialty Phone number    March, Sundar Cantu MD Responsible Cardiology [phone]            See the Encounter Report to view Anticoagulation Flowsheet and Dosing Calendar (Go to Encounters tab in chart review, and find the Anticoagulation Therapy Visit)    INR 3.9.  He was started on Doxycycline on Tuesday for possible infection in his right elbow secondary to his recent fall.  His arm is bruised.  He took Norco just for a couple days.  No blood in urine or stool.  He has been taking 2.5mg mon/fri and not mon/thur so he took 5mg last night.  Decrease to 2.5mg today and tomorrow and  increase greens and recheck INR in 1 week.  Rios Espino, RN

## 2019-05-03 NOTE — PROGRESS NOTES
ASSESSMENT & PLAN  Patient Instructions     1. Contusion of right elbow and forearm, initial encounter    2. Traumatic hematoma of right elbow, initial encounter    3. Long term current use of anticoagulants with INR goal of 2.0-3.0      -Patient has right elbow pain and swelling due to a contusion and hematoma as a result of being on Coumadin with a supratherapeutic INR.  -Patient has full range of motion strength and stability of the right elbow with only localized pain over the medial aspect of the elbow and proximal forearm.  -Patient may purchase a sleeve with padding to protect the painful area.  -Patient may continue to work full duty without restrictions.  -Patient will stop oral antibiotics since there are no signs of infection and it is interfering with his INR  -Patient may take extra strength Tylenol for mild to moderate pain and may continue with his hydrocodone for severe pain.  -Call direct clinic number [[phone]] at any time with questions or concerns.    Albert Yeo MD [facility]  [facility]          -----    SUBJECTIVE  Richard Ball is a/an 59 year old Right handed male who is seen in consultation at the request of  Jonah Peterson PA-C for evaluation of right elbow pain. The patient is seen by themselves.    Onset: 4/22/19. Patient describes injury as he was kicking a ball while at work when his feet went out from under him and he landed on his buttocks and bilateral elbows.   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    Past Medical History:   Diagnosis Date     Heart abnormality     Artifical aortic graft - ascending aorta     Hemothorax      Marfan's syndrome 12/6/2011     Social History     Socioeconomic History     Marital status:      Spouse name: Not on file     Number of children: 2     Years of education: 16     Highest education level: Not on file   Occupational History     Employer: UNEMPLOYED     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   Social Needs     Financial resource strain: Not on file     Food insecurity:     Worry: Not on file     Inability: Not on file     Transportation needs:     Medical: Not on file     Non-medical: Not on file   Tobacco Use     Smoking status: Never Smoker     Smokeless tobacco: Never Used   Substance and Sexual Activity     Alcohol use: Yes     Alcohol/week: 2.4 oz     Types: 4 Standard drinks or equivalent per week     Comment: 2 drinks week     Drug use: No     Sexual activity: Never   Lifestyle     Physical activity:     Days per week: Not on file     Minutes per session: Not on file     Stress: Not on file   Relationships     Social connections:     Talks on phone: Not on file     Gets together: Not on file     Attends Cheondoism service: Not on file     Active member of club or organization: Not on file     Attends meetings of clubs or organizations: Not on file     Relationship status: Not on file     Intimate partner violence:     Fear of current or ex partner: Not on file     Emotionally abused: Not on file     Physically abused: Not on file     Forced sexual activity: Not on file   Other Topics Concern     Parent/sibling w/ CABG, MI or angioplasty before 65F 55M? No      Service Not Asked     Blood Transfusions Not Asked     Caffeine Concern Not Asked     Occupational Exposure Not Asked     Hobby Hazards Not Asked     Sleep Concern Not Asked     Stress Concern Not  "Asked     Weight Concern Not Asked     Special Diet Not Asked     Back Care Not Asked     Exercise Yes     Bike Helmet Not Asked     Seat Belt Yes     Self-Exams Yes   Social History Narrative     Not on file       Patient's past medical, surgical, social, and family histories were reviewed today and no changes are noted.    REVIEW OF SYSTEMS:  10 point ROS is negative other than symptoms noted above in HPI, Past Medical History or as stated below  Constitutional: NEGATIVE for fever, chills, change in weight  Skin: NEGATIVE for worrisome rashes, moles or lesions  GI/: NEGATIVE for bowel or bladder changes  Neuro: NEGATIVE for weakness, dizziness or paresthesias    OBJECTIVE:  /78   Ht 1.778 m (5' 10\")   Wt 73 kg (161 lb)   BMI 23.10 kg/m     General: healthy, alert and in no distress  HEENT: no scleral icterus or conjunctival erythema  Skin: no suspicious lesions or rash. No jaundice.  CV: regular rhythm by palpation  Resp: normal respiratory effort without conversational dyspnea   Psych: normal mood and affect  Gait: normal steady gait with appropriate coordination and balance  Neuro: Normal sensory exam of bilateral hands.   MSK:  RIGHT ELBOW  Inspection:  Localized approximately 1 x 2 inch hematoma just distal to the medial malleolus.  Generalized swelling over the medial aspect of the elbow and ecchymosis of the medial aspect of the elbow extending to the ulnar aspect of the wrist.  Palpation:    Tender about the proximal ulna on the medial aspect. Remainder of bony, ligamentous and tendinous landmarks are nontender.    Crepitus is Absent  Range of Motion:     Extension full / flexion full / pronation full / supination full  Strength:    No deficits in flexion, extension, pronation, or supination.  Special Tests:    Positive: none    Negative: Pain with resisted wrist extension, pain with resisted middle finger extension, pain with resisted wrist flexion, pain with resisted supination, passive valgus " stress, pain with resisted pronation, milking manuever, varus stress    Independent visualization of the below image:  No results found for this or any previous visit (from the past 24 hour(s)).  ELBOW RIGHT TWO VIEWS  4/30/2019 4:35 PM       HISTORY: Fall, initial encounter. Work-related injury. Right elbow  pain.     COMPARISON: None.                                                                      IMPRESSION: No acute fracture or dislocation.     KENDALL STRAND, MD Albert Yeo MD [facility]

## 2019-05-03 NOTE — PATIENT INSTRUCTIONS
1. Contusion of right elbow and forearm, initial encounter    2. Traumatic hematoma of right elbow, initial encounter    3. Long term current use of anticoagulants with INR goal of 2.0-3.0      -Patient has right elbow pain and swelling due to a contusion and hematoma as a result of being on Coumadin with a supratherapeutic INR.  -Patient has full range of motion strength and stability of the right elbow with only localized pain over the medial aspect of the elbow and proximal forearm.  -Patient may purchase a sleeve with padding to protect the painful area.  -Patient may continue to work full duty without restrictions.  -Patient will stop oral antibiotics since there are no signs of infection and it is interfering with his INR  -Patient may take extra strength Tylenol for mild to moderate pain and may continue with his hydrocodone for severe pain.  -Call direct clinic number [[phone]] at any time with questions or concerns.    Albert Yeo MD [facility]  [facility] [place]

## 2019-05-09 DIAGNOSIS — Z95.0 CARDIAC PACEMAKER IN SITU: Primary | ICD-10-CM

## 2019-05-10 ENCOUNTER — ANTICOAGULATION THERAPY VISIT (OUTPATIENT)
Dept: CARDIOLOGY | Facility: CLINIC | Age: 59
End: 2019-05-10
Payer: COMMERCIAL

## 2019-05-10 DIAGNOSIS — Z79.01 LONG TERM CURRENT USE OF ANTICOAGULANTS WITH INR GOAL OF 2.0-3.0: ICD-10-CM

## 2019-05-10 DIAGNOSIS — Z95.2 HEART VALVE REPLACED: ICD-10-CM

## 2019-05-10 LAB — INR POINT OF CARE: 2.2 (ref 0.86–1.14)

## 2019-05-10 PROCEDURE — 36416 COLLJ CAPILLARY BLOOD SPEC: CPT | Performed by: INTERNAL MEDICINE

## 2019-05-10 PROCEDURE — 85610 PROTHROMBIN TIME: CPT | Mod: QW | Performed by: INTERNAL MEDICINE

## 2019-05-10 NOTE — PROGRESS NOTES
ANTICOAGULATION FOLLOW-UP CLINIC VISIT    Patient Name:  Richard Ball  Date:  5/10/2019  Contact Type:  Face to Face    SUBJECTIVE:  Patient Findings         Clinical Outcomes     Negatives:   Major bleeding event, Thromboembolic event, Anticoagulation-related hospital admission, Anticoagulation-related ED visit, Anticoagulation-related fatality           OBJECTIVE    INR Protime   Date Value Ref Range Status   05/10/2019 2.2 (A) 0.86 - 1.14 Final       ASSESSMENT / PLAN  INR assessment THER    Recheck INR In: 3 WEEKS    INR Location Clinic      Anticoagulation Summary  As of 5/10/2019    INR goal:   2.0-3.0   TTR:   51.4 % (10.3 mo)   INR used for dosin.2 (5/10/2019)   Warfarin maintenance plan:   2.5 mg (5 mg x 0.5) every Mon, Fri; 5 mg (5 mg x 1) all other days   Full warfarin instructions:   5/10: 5 mg; Otherwise 2.5 mg every Mon, Fri; 5 mg all other days   Weekly warfarin total:   30 mg   Plan last modified:   Teresa Espino RN (5/3/2019)   Next INR check:   2019   Target end date:   Indefinite    Indications    Heart valve replaced [Z95.2] [Z95.2]  Long term current use of anticoagulants with INR goal of 2.0-3.0 [Z79.01]             Anticoagulation Episode Summary     INR check location:       Preferred lab:       Send INR reminders to:   [facility] HEART INR NURSE    Comments:   AVR in , Lovenox needed per Dr. Garcia due to possible TIA in       Anticoagulation Care Providers     Provider Role Specialty Phone number    March, Sundar Cantu MD Responsible Cardiology [phone]            See the Encounter Report to view Anticoagulation Flowsheet and Dosing Calendar (Go to Encounters tab in chart review, and find the Anticoagulation Therapy Visit)    INR 2.2.  The antibiotics were stopped 1 week ago since he didn't need it for his elbow.  He hasn't been needing the norco or tylenol for his elbow since that is getting better.  Dosing was decreased last week and he missed his   dose.  Increase today from 2.5mg to 5mg x1 then back to normal schedule and recheck in 3 weeks.  Seeing Dr. Garcia in 4 weeks.  Rios Espino, RN

## 2019-05-16 ENCOUNTER — ANCILLARY PROCEDURE (OUTPATIENT)
Dept: CARDIOLOGY | Facility: CLINIC | Age: 59
End: 2019-05-16
Attending: INTERNAL MEDICINE
Payer: COMMERCIAL

## 2019-05-16 DIAGNOSIS — I44.30 AV BLOCK: Primary | ICD-10-CM

## 2019-05-16 DIAGNOSIS — Z95.0 CARDIAC PACEMAKER IN SITU: ICD-10-CM

## 2019-05-16 PROCEDURE — 93280 PM DEVICE PROGR EVAL DUAL: CPT | Performed by: INTERNAL MEDICINE

## 2019-05-17 LAB
MDC_IDC_LEAD_IMPLANT_DT: NORMAL
MDC_IDC_LEAD_IMPLANT_DT: NORMAL
MDC_IDC_LEAD_LOCATION: NORMAL
MDC_IDC_LEAD_LOCATION: NORMAL
MDC_IDC_LEAD_LOCATION_DETAIL_1: NORMAL
MDC_IDC_LEAD_LOCATION_DETAIL_1: NORMAL
MDC_IDC_LEAD_MFG: NORMAL
MDC_IDC_LEAD_MFG: NORMAL
MDC_IDC_LEAD_MODEL: NORMAL
MDC_IDC_LEAD_MODEL: NORMAL
MDC_IDC_LEAD_POLARITY_TYPE: NORMAL
MDC_IDC_LEAD_POLARITY_TYPE: NORMAL
MDC_IDC_LEAD_SERIAL: NORMAL
MDC_IDC_LEAD_SERIAL: NORMAL
MDC_IDC_MSMT_BATTERY_DTM: NORMAL
MDC_IDC_MSMT_BATTERY_IMPEDANCE: 1774 OHM
MDC_IDC_MSMT_BATTERY_REMAINING_LONGEVITY: 39 MO
MDC_IDC_MSMT_BATTERY_STATUS: NORMAL
MDC_IDC_MSMT_BATTERY_VOLTAGE: 2.76 V
MDC_IDC_MSMT_LEADCHNL_RA_IMPEDANCE_VALUE: 67 OHM
MDC_IDC_MSMT_LEADCHNL_RA_SENSING_INTR_AMPL: 1.4 MV
MDC_IDC_MSMT_LEADCHNL_RV_IMPEDANCE_VALUE: 499 OHM
MDC_IDC_MSMT_LEADCHNL_RV_PACING_THRESHOLD_AMPLITUDE: 0.5 V
MDC_IDC_MSMT_LEADCHNL_RV_PACING_THRESHOLD_AMPLITUDE: 0.62 V
MDC_IDC_MSMT_LEADCHNL_RV_PACING_THRESHOLD_PULSEWIDTH: 0.4 MS
MDC_IDC_MSMT_LEADCHNL_RV_PACING_THRESHOLD_PULSEWIDTH: 0.4 MS
MDC_IDC_PG_IMPLANT_DTM: NORMAL
MDC_IDC_PG_MFG: NORMAL
MDC_IDC_PG_MODEL: NORMAL
MDC_IDC_PG_SERIAL: NORMAL
MDC_IDC_PG_TYPE: NORMAL
MDC_IDC_SESS_CLINIC_NAME: NORMAL
MDC_IDC_SESS_DTM: NORMAL
MDC_IDC_SESS_TYPE: NORMAL
MDC_IDC_SET_BRADY_AT_MODE_SWITCH_MODE: NORMAL
MDC_IDC_SET_BRADY_AT_MODE_SWITCH_RATE: 150 {BEATS}/MIN
MDC_IDC_SET_BRADY_LOWRATE: 50 {BEATS}/MIN
MDC_IDC_SET_BRADY_MAX_SENSOR_RATE: 130 {BEATS}/MIN
MDC_IDC_SET_BRADY_MAX_TRACKING_RATE: 130 {BEATS}/MIN
MDC_IDC_SET_BRADY_MODE: NORMAL
MDC_IDC_SET_BRADY_SAV_DELAY_LOW: 120 MS
MDC_IDC_SET_LEADCHNL_RA_SENSING_ANODE_ELECTRODE_1: NORMAL
MDC_IDC_SET_LEADCHNL_RA_SENSING_ANODE_LOCATION_1: NORMAL
MDC_IDC_SET_LEADCHNL_RA_SENSING_CATHODE_ELECTRODE_1: NORMAL
MDC_IDC_SET_LEADCHNL_RA_SENSING_CATHODE_LOCATION_1: NORMAL
MDC_IDC_SET_LEADCHNL_RA_SENSING_POLARITY: NORMAL
MDC_IDC_SET_LEADCHNL_RA_SENSING_SENSITIVITY: 0.5 MV
MDC_IDC_SET_LEADCHNL_RV_PACING_AMPLITUDE: 2 V
MDC_IDC_SET_LEADCHNL_RV_PACING_ANODE_ELECTRODE_1: NORMAL
MDC_IDC_SET_LEADCHNL_RV_PACING_ANODE_LOCATION_1: NORMAL
MDC_IDC_SET_LEADCHNL_RV_PACING_CAPTURE_MODE: NORMAL
MDC_IDC_SET_LEADCHNL_RV_PACING_CATHODE_ELECTRODE_1: NORMAL
MDC_IDC_SET_LEADCHNL_RV_PACING_CATHODE_LOCATION_1: NORMAL
MDC_IDC_SET_LEADCHNL_RV_PACING_POLARITY: NORMAL
MDC_IDC_SET_LEADCHNL_RV_PACING_PULSEWIDTH: 0.4 MS
MDC_IDC_SET_LEADCHNL_RV_SENSING_ANODE_ELECTRODE_1: NORMAL
MDC_IDC_SET_LEADCHNL_RV_SENSING_ANODE_LOCATION_1: NORMAL
MDC_IDC_SET_LEADCHNL_RV_SENSING_CATHODE_ELECTRODE_1: NORMAL
MDC_IDC_SET_LEADCHNL_RV_SENSING_CATHODE_LOCATION_1: NORMAL
MDC_IDC_SET_LEADCHNL_RV_SENSING_POLARITY: NORMAL
MDC_IDC_SET_LEADCHNL_RV_SENSING_SENSITIVITY: 4 MV
MDC_IDC_SET_ZONE_DETECTION_INTERVAL: 400 MS
MDC_IDC_SET_ZONE_DETECTION_INTERVAL: 400 MS
MDC_IDC_SET_ZONE_TYPE: NORMAL
MDC_IDC_SET_ZONE_TYPE: NORMAL
MDC_IDC_STAT_AT_BURDEN_PERCENT: 0 %
MDC_IDC_STAT_AT_DTM_END: NORMAL
MDC_IDC_STAT_AT_DTM_START: NORMAL
MDC_IDC_STAT_AT_MODE_SW_COUNT: 845
MDC_IDC_STAT_BRADY_AP_VP_PERCENT: 0 %
MDC_IDC_STAT_BRADY_AP_VS_PERCENT: 0 %
MDC_IDC_STAT_BRADY_AS_VP_PERCENT: 100 %
MDC_IDC_STAT_BRADY_AS_VS_PERCENT: 0 %
MDC_IDC_STAT_BRADY_DTM_END: NORMAL
MDC_IDC_STAT_BRADY_DTM_START: NORMAL
MDC_IDC_STAT_EPISODE_RECENT_COUNT: 0
MDC_IDC_STAT_EPISODE_RECENT_COUNT: 31
MDC_IDC_STAT_EPISODE_RECENT_COUNT_DTM_END: NORMAL
MDC_IDC_STAT_EPISODE_RECENT_COUNT_DTM_END: NORMAL
MDC_IDC_STAT_EPISODE_RECENT_COUNT_DTM_START: NORMAL
MDC_IDC_STAT_EPISODE_RECENT_COUNT_DTM_START: NORMAL
MDC_IDC_STAT_EPISODE_TYPE: NORMAL
MDC_IDC_STAT_EPISODE_TYPE: NORMAL

## 2019-06-04 ENCOUNTER — ANTICOAGULATION THERAPY VISIT (OUTPATIENT)
Dept: CARDIOLOGY | Facility: CLINIC | Age: 59
End: 2019-06-04
Payer: COMMERCIAL

## 2019-06-04 DIAGNOSIS — Z79.01 LONG TERM CURRENT USE OF ANTICOAGULANTS WITH INR GOAL OF 2.0-3.0: ICD-10-CM

## 2019-06-04 DIAGNOSIS — Z95.2 HEART VALVE REPLACED: ICD-10-CM

## 2019-06-04 DIAGNOSIS — Q87.40 MARFAN'S SYNDROME: Primary | ICD-10-CM

## 2019-06-04 LAB — INR POINT OF CARE: 3.6 (ref 0.86–1.14)

## 2019-06-04 PROCEDURE — 85610 PROTHROMBIN TIME: CPT | Mod: QW | Performed by: INTERNAL MEDICINE

## 2019-06-04 PROCEDURE — 36416 COLLJ CAPILLARY BLOOD SPEC: CPT | Performed by: INTERNAL MEDICINE

## 2019-06-04 PROCEDURE — 99207 ZZC NO CHARGE NURSE ONLY: CPT | Performed by: INTERNAL MEDICINE

## 2019-06-04 NOTE — PROGRESS NOTES
ANTICOAGULATION FOLLOW-UP CLINIC VISIT    Patient Name:  Richard Ball  Date:  6/4/2019  Contact Type:  Face to Face    SUBJECTIVE:  Patient Findings         Clinical Outcomes     Negatives:   Major bleeding event, Thromboembolic event, Anticoagulation-related hospital admission, Anticoagulation-related ED visit, Anticoagulation-related fatality           OBJECTIVE    INR Protime   Date Value Ref Range Status   06/04/2019 3.6 (A) 0.86 - 1.14 Final       ASSESSMENT / PLAN  INR assessment SUPRA    Recheck INR In: 2 WEEKS    INR Location Clinic      Anticoagulation Summary  As of 6/4/2019    INR goal:   2.0-3.0   TTR:   51.8 % (11.1 mo)   INR used for dosing:   3.6! (6/4/2019)   Warfarin maintenance plan:   2.5 mg (5 mg x 0.5) every Mon, Wed, Fri; 5 mg (5 mg x 1) all other days   Full warfarin instructions:   2.5 mg every Mon, Wed, Fri; 5 mg all other days   Weekly warfarin total:   27.5 mg   Plan last modified:   Teresa Espino RN (6/4/2019)   Next INR check:   6/21/2019   Target end date:   Indefinite    Indications    Heart valve replaced [Z95.2] [Z95.2]  Long term current use of anticoagulants with INR goal of 2.0-3.0 [Z79.01]             Anticoagulation Episode Summary     INR check location:       Preferred lab:       Send INR reminders to:   [facility] HEART INR NURSE    Comments:   AVR in 2001, Lovenox needed per Dr. Garcia due to possible TIA in 05/18      Anticoagulation Care Providers     Provider Role Specialty Phone number    March, Sundar Cantu MD Responsible Cardiology [phone]            See the Encounter Report to view Anticoagulation Flowsheet and Dosing Calendar (Go to Encounters tab in chart review, and find the Anticoagulation Therapy Visit)    INR 3.6.  This past week he took some advil and vicodin for pain but said he will try going back to ice and tylenol for the sciatic pain.  No bleeding.  INR has been >3 the majority of the time recently.  Decrease by 2.5mg overall and recheck in 2  weeks.  He will take 2.5mg on MWF and 5mg ROW.  Seeing Dr. Garcia on Friday.  Rios Espino RN

## 2019-06-07 ENCOUNTER — OFFICE VISIT (OUTPATIENT)
Dept: CARDIOLOGY | Facility: CLINIC | Age: 59
End: 2019-06-07
Attending: INTERNAL MEDICINE
Payer: COMMERCIAL

## 2019-06-07 ENCOUNTER — ANCILLARY PROCEDURE (OUTPATIENT)
Dept: CARDIOLOGY | Facility: CLINIC | Age: 59
End: 2019-06-07
Attending: INTERNAL MEDICINE

## 2019-06-07 VITALS
DIASTOLIC BLOOD PRESSURE: 73 MMHG | HEIGHT: 71 IN | OXYGEN SATURATION: 96 % | BODY MASS INDEX: 23.51 KG/M2 | SYSTOLIC BLOOD PRESSURE: 118 MMHG | WEIGHT: 167.9 LBS | HEART RATE: 64 BPM

## 2019-06-07 DIAGNOSIS — I71.21 ASCENDING AORTIC ANEURYSM (H): ICD-10-CM

## 2019-06-07 DIAGNOSIS — Z95.2 S/P AVR (AORTIC VALVE REPLACEMENT): ICD-10-CM

## 2019-06-07 DIAGNOSIS — Q87.40 MARFAN'S SYNDROME: ICD-10-CM

## 2019-06-07 LAB
ALBUMIN SERPL-MCNC: 3.6 G/DL (ref 3.4–5)
ALP SERPL-CCNC: 102 U/L (ref 40–150)
ALT SERPL W P-5'-P-CCNC: 24 U/L (ref 0–70)
ANION GAP SERPL CALCULATED.3IONS-SCNC: 3 MMOL/L (ref 3–14)
AST SERPL W P-5'-P-CCNC: 20 U/L (ref 0–45)
BASOPHILS # BLD AUTO: 0 10E9/L (ref 0–0.2)
BASOPHILS NFR BLD AUTO: 0 %
BILIRUB SERPL-MCNC: 0.4 MG/DL (ref 0.2–1.3)
BUN SERPL-MCNC: 14 MG/DL (ref 7–30)
CALCIUM SERPL-MCNC: 8.5 MG/DL (ref 8.5–10.1)
CHLORIDE SERPL-SCNC: 111 MMOL/L (ref 94–109)
CO2 SERPL-SCNC: 29 MMOL/L (ref 20–32)
CREAT SERPL-MCNC: 0.83 MG/DL (ref 0.66–1.25)
DIFFERENTIAL METHOD BLD: ABNORMAL
EOSINOPHIL # BLD AUTO: 0 10E9/L (ref 0–0.7)
EOSINOPHIL NFR BLD AUTO: 0.7 %
ERYTHROCYTE [DISTWIDTH] IN BLOOD BY AUTOMATED COUNT: 13.5 % (ref 10–15)
GFR SERPL CREATININE-BSD FRML MDRD: >90 ML/MIN/{1.73_M2}
GLUCOSE SERPL-MCNC: 100 MG/DL (ref 70–99)
HCT VFR BLD AUTO: 37.8 % (ref 40–53)
HGB BLD-MCNC: 13.1 G/DL (ref 13.3–17.7)
IMM GRANULOCYTES # BLD: 0 10E9/L (ref 0–0.4)
IMM GRANULOCYTES NFR BLD: 0.7 %
LYMPHOCYTES # BLD AUTO: 1.3 10E9/L (ref 0.8–5.3)
LYMPHOCYTES NFR BLD AUTO: 30.1 %
MCH RBC QN AUTO: 32.4 PG (ref 26.5–33)
MCHC RBC AUTO-ENTMCNC: 34.7 G/DL (ref 31.5–36.5)
MCV RBC AUTO: 94 FL (ref 78–100)
MONOCYTES # BLD AUTO: 0.3 10E9/L (ref 0–1.3)
MONOCYTES NFR BLD AUTO: 6.4 %
NEUTROPHILS # BLD AUTO: 2.6 10E9/L (ref 1.6–8.3)
NEUTROPHILS NFR BLD AUTO: 62.1 %
NRBC # BLD AUTO: 0 10*3/UL
NRBC BLD AUTO-RTO: 0 /100
PLATELET # BLD AUTO: 111 10E9/L (ref 150–450)
POTASSIUM SERPL-SCNC: 4.1 MMOL/L (ref 3.4–5.3)
PROT SERPL-MCNC: 6.2 G/DL (ref 6.8–8.8)
RBC # BLD AUTO: 4.04 10E12/L (ref 4.4–5.9)
SODIUM SERPL-SCNC: 143 MMOL/L (ref 133–144)
WBC # BLD AUTO: 4.3 10E9/L (ref 4–11)

## 2019-06-07 PROCEDURE — 99213 OFFICE O/P EST LOW 20 MIN: CPT | Mod: ZP | Performed by: INTERNAL MEDICINE

## 2019-06-07 PROCEDURE — 85025 COMPLETE CBC W/AUTO DIFF WBC: CPT | Performed by: INTERNAL MEDICINE

## 2019-06-07 PROCEDURE — 36415 COLL VENOUS BLD VENIPUNCTURE: CPT | Performed by: INTERNAL MEDICINE

## 2019-06-07 PROCEDURE — 80053 COMPREHEN METABOLIC PANEL: CPT | Performed by: INTERNAL MEDICINE

## 2019-06-07 PROCEDURE — G0463 HOSPITAL OUTPT CLINIC VISIT: HCPCS | Mod: ZF

## 2019-06-07 ASSESSMENT — MIFFLIN-ST. JEOR: SCORE: 1603.72

## 2019-06-07 ASSESSMENT — PAIN SCALES - GENERAL: PAINLEVEL: NO PAIN (0)

## 2019-06-07 NOTE — LETTER
6/7/2019      RE: Richard Ball  [address] [facility] [id number]       Dear Colleague,    Thank you for the opportunity to participate in the care of your patient, Richard Ball, at the [facility] at [facility]. Please see a copy of my visit note below.    CARDIOLOGY CONSULTATION:    Please see dictated note    PAST MEDICAL HISTORY:  Past Medical History:   Diagnosis Date     Heart abnormality     Artifical aortic graft - ascending aorta     Hemothorax      Marfan's syndrome 12/6/2011       CURRENT MEDICATIONS:  Current Outpatient Medications   Medication Sig Dispense Refill     acetaminophen (TYLENOL) 500 MG tablet Take 1,000 mg by mouth 2 times daily       albuterol (VENTOLIN HFA) 108 (90 Base) MCG/ACT inhaler Inhale 2 puffs into the lungs every 6 hours as needed for wheezing Reported on 3/24/2017 1 Inhaler 3     aspirin 81 MG chewable tablet Take 1 tablet (81 mg) by mouth daily 90 tablet 3     atenolol (TENORMIN) 50 MG tablet Take 1 tablet (50 mg) by mouth daily 90 tablet 3     Fexofenadine HCl (ALLEGRA PO) Take by mouth daily as needed for allergies       lisinopril (PRINIVIL/ZESTRIL) 5 MG tablet Take 1 tablet (5 mg) by mouth daily 90 tablet 3     lovastatin (MEVACOR) 40 MG tablet Take 1 tablet (40 mg) by mouth At Bedtime 90 tablet 3     warfarin (COUMADIN) 5 MG tablet Take 1/2 tab on Mondays and Thursdays and take 1 tab all other days or as directed by INR clinic 95 tablet 1     doxycycline hyclate (VIBRA-TABS) 100 MG tablet Take 1 tablet (100 mg) by mouth 2 times daily (Patient not taking: Reported on 6/7/2019) 20 tablet 0       PAST SURGICAL HISTORY:  Past Surgical History:   Procedure Laterality Date     AORTIC VALVE REPLACEMENT  8/2/2001    Ascending aorta graft     HC REMOVE TONSILS/ADENOIDS,<11 Y/O      T & A <13y.o.     HC VASECTOMY UNILAT/BILAT W POSTOP SEMEN      Vasectomy     IMPLANT PACEMAKER         ALLERGIES  Ambien [zolpidem]    FAMILY  HX:  Family History   Problem Relation Age of Onset     Asthma No family hx of      Diabetes No family hx of      Hypertension No family hx of      Breast Cancer No family hx of      Cancer - colorectal No family hx of      Prostate Cancer No family hx of      Lipids No family hx of      Musculoskeletal Disorder No family hx of      Neurologic Disorder No family hx of        SOCIAL HX:  Social History     Socioeconomic History     Marital status:      Spouse name: None     Number of children: 2     Years of education: 16     Highest education level: None   Occupational History     Employer: UNEMPLOYED     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   Social Needs     Financial resource strain: None     Food insecurity:     Worry: None     Inability: None     Transportation needs:     Medical: None     Non-medical: None   Tobacco Use     Smoking status: Never Smoker     Smokeless tobacco: Never Used   Substance and Sexual Activity     Alcohol use: Yes     Alcohol/week: 2.4 oz     Types: 4 Standard drinks or equivalent per week     Comment: 2 drinks week     Drug use: No     Sexual activity: Never   Lifestyle     Physical activity:     Days per week: None     Minutes per session: None     Stress: None   Relationships     Social connections:     Talks on phone: None     Gets together: None     Attends Pentecostal service: None     Active member of club or organization: None     Attends meetings of clubs or organizations: None     Relationship status: None     Intimate partner violence:     Fear of current or ex partner: None     Emotionally abused: None     Physically abused: None     Forced sexual activity: None   Other Topics Concern     Parent/sibling w/ CABG, MI or angioplasty before 65F 55M? No      Service Not Asked     Blood Transfusions Not Asked     Caffeine Concern Not Asked     Occupational Exposure Not Asked     Hobby Hazards Not Asked     Sleep Concern Not Asked     Stress Concern Not Asked      "Weight Concern Not Asked     Special Diet Not Asked     Back Care Not Asked     Exercise Yes     Bike Helmet Not Asked     Seat Belt Yes     Self-Exams Yes   Social History Narrative     None       ROS:  Constitutional: No fever, chills, or sweats. No weight gain/loss.   ENT: No visual disturbance, ear ache, epistaxis, sore throat.   Allergies/Immunologic: Negative.   Respiratory: No cough, hemoptysis.   Cardiovascular: As per HPI.   GI: No nausea, vomiting, hematemesis, melena, or hematochezia.   : No urinary frequency, dysuria, or hematuria.   Integument: Negative.   Psychiatric: Negative.   Neuro: Negative.   Endocrinology: Negative.   Musculoskeletal: No myalgia.    VITAL SIGNS:  /73 (BP Location: Right arm, Patient Position: Chair, Cuff Size: Adult Regular)   Pulse 64   Ht 1.803 m (5' 11\")   Wt 76.2 kg (167 lb 14.4 oz)   SpO2 96%   BMI 23.42 kg/m     Body mass index is 23.42 kg/m .  Wt Readings from Last 2 Encounters:   06/07/19 76.2 kg (167 lb 14.4 oz)   05/03/19 73 kg (161 lb)       PHYSICAL EXAM  Richard Ball IS A 59 year old male.in no acute distress.  HEENT: Unremarkable.  Neck: JVP normal.  Carotids +4/4 bilaterally without bruits.  Lungs: CTA.    Cor: RRR. Mechanical S1, normal S2.  No murmur, rub, or gallop.  PMI in Lf 5th ICS.    Abd: Soft, nontender, nondistended.   Extremities: No C/C/E.  Pulses +4/4 symmetric in upper and lower extremities.    Neuro: Grossly intact.    LABS    Lab Results   Component Value Date    WBC 4.3 06/07/2019     Lab Results   Component Value Date    RBC 4.04 06/07/2019     Lab Results   Component Value Date    HGB 13.1 06/07/2019     Lab Results   Component Value Date    HCT 37.8 06/07/2019     No components found for: MCT  Lab Results   Component Value Date    MCV 94 06/07/2019     Lab Results   Component Value Date    MCH 32.4 06/07/2019     Lab Results   Component Value Date    MCHC 34.7 06/07/2019     Lab Results   Component Value Date    RDW 13.5 " 2019     Lab Results   Component Value Date     2019      Recent Labs   Lab Test 19  1231 18  1111    143   POTASSIUM 4.1 4.5   CHLORIDE 111* 110*   CO2 29 30   ANIONGAP 3 3   * 92   BUN 14 12   CR 0.83 0.92   CLINT 8.5 9.0     Recent Labs   Lab Test 18  0818   CHOL 154   HDL 44   LDL 80   TRIG 150*   NHDL 110        LASHAWN Garcia MD     Service Date: 2019      HISTORY OF PRESENT ILLNESS:  Mr. Ball is a delightful 59-year-old gentleman who I met last year with a history of Marfan syndrome.  His dad likely also had Marfan syndrome and  of a cerebral aneurysm when he was 2 year old.  His mom  when he was 4 of ovarian cancer, and he was raised by an adoptive family.  He has 2 children, ages 28 and 29.  Neither of them have been screened for Marfan syndrome.      Mr. Ball was diagnosed at age 24.  He had genetic testing done at the [facility] in [place].  He underwent surgery in  at [facility] in [place] by Dr. Arriola.  His ascending aorta was 5.5 cm at the time, but we do not have those operative reports.  His aortic valve was replaced with a mechanical valve, and they reimplanted his coronary arteries.  Last year, we did a coronary CTA and there was concern about significant coronary artery disease.  He went on to have a coronary angiogram, and that showed no significant disease.      Over the course of the past year, he has been doing well.  His INR has for the most part been stable.  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  He does have a pacemaker that was placed, it sounds like in  due to heart block, and his upper lead failed.  With only his lower lead working, he is 100% paced and apparently he is 2 years from [facility].  He is interested in potentially having the device removed and getting an MRI-compatible one in the future and would be interested in meeting with Dr. Morales next  year to discuss this.  His cholesterol, his LDL is 80 and HDL is 50s.  He is on a baby aspirin daily.  He has not had any chest pain or discomfort.  He knows to watch his diet in terms of eating less fatty foods.  This summer he is going to [place] for a couple of weeks and then going on a cruise with [employer].  He is looking forward to those.  He did have a cerebral CTA last year that showed no evidence of cerebral aneurysms.  He did have a history of a TIA in the past but no deficits.  On his echo from today his valve is working well with no evidence of stenosis.  He has not had any significant palpitations.  It sounds like occasional just PVCs.      IMPRESSION, REPORT, PLAN:   1.  Marfan syndrome.   2.  Aortic aneurysm, status post composite graft placement with a mechanical aortic valve at [facility] in [place] in 2001 by Dr. Arriola, functioning well.   3.  TIA 05/01/2018 with resolution of symptoms.  Negative CT of the head with no aneurysms found or bleeding.   4.  Hypertension, well controlled on atenolol and lisinopril.   5.  Hyperlipidemia, on lovastatin 40.   6.  Scoliosis.   7.  Need for family members screening for Marfan.   8.  Pacemaker placement in 2001 with failure of the atrial lead, 2 years from BOB status with 100% ventricular pacing.   9.  Coronary artery disease, nonobstructive, on cath 06/2018.        DISCUSSION:  It was a pleasure to see Mr. Ball in followup.  Clinically, he is doing well from a cardiovascular standpoint without any concerning cardiac symptoms.  He has not had any chest pain or discomfort.  He is remaining active.  He is looking forward to the summer when he is off from school and the trips ahead.  His risk factors are well controlled.  He has maintained a therapeutic INR.  We will plan to see him back in a year with an echo and have him see Dr. Morales at that time to discuss planning for device.  It was a pleasure to see him.  Please do not hesitate to contact me  with any questions or concerns.  He will see the ophthalmologist in the near future.         HARRY SAINI MD             D: 2019   T: 2019   MT: JOSE      Name:     KAVON GARCIA   MRN:      [id number]        Account:      [id number]   :      1960           Service Date: 2019      Document: [id number]

## 2019-06-07 NOTE — NURSING NOTE
Vitals completed successfully and medication reconciled.     Brooklyn [place], RACHEL  1:46 PM  Chief Complaint   Patient presents with     Follow Up     heart problem -- 59 yr old male with PMH significant for Marfans Syndrome s/p aortic aneurysm repair with graft and mechanical AVR and PPM placement presenting for follow up

## 2019-06-07 NOTE — PROGRESS NOTES
CARDIOLOGY CONSULTATION:    Please see dictated note    PAST MEDICAL HISTORY:  Past Medical History:   Diagnosis Date     Heart abnormality     Artifical aortic graft - ascending aorta     Hemothorax      Marfan's syndrome 12/6/2011       CURRENT MEDICATIONS:  Current Outpatient Medications   Medication Sig Dispense Refill     acetaminophen (TYLENOL) 500 MG tablet Take 1,000 mg by mouth 2 times daily       albuterol (VENTOLIN HFA) 108 (90 Base) MCG/ACT inhaler Inhale 2 puffs into the lungs every 6 hours as needed for wheezing Reported on 3/24/2017 1 Inhaler 3     aspirin 81 MG chewable tablet Take 1 tablet (81 mg) by mouth daily 90 tablet 3     atenolol (TENORMIN) 50 MG tablet Take 1 tablet (50 mg) by mouth daily 90 tablet 3     Fexofenadine HCl (ALLEGRA PO) Take by mouth daily as needed for allergies       lisinopril (PRINIVIL/ZESTRIL) 5 MG tablet Take 1 tablet (5 mg) by mouth daily 90 tablet 3     lovastatin (MEVACOR) 40 MG tablet Take 1 tablet (40 mg) by mouth At Bedtime 90 tablet 3     warfarin (COUMADIN) 5 MG tablet Take 1/2 tab on Mondays and Thursdays and take 1 tab all other days or as directed by INR clinic 95 tablet 1     doxycycline hyclate (VIBRA-TABS) 100 MG tablet Take 1 tablet (100 mg) by mouth 2 times daily (Patient not taking: Reported on 6/7/2019) 20 tablet 0       PAST SURGICAL HISTORY:  Past Surgical History:   Procedure Laterality Date     AORTIC VALVE REPLACEMENT  8/2/2001    Ascending aorta graft     HC REMOVE TONSILS/ADENOIDS,<13 Y/O      T & A <13y.o.     HC VASECTOMY UNILAT/BILAT W POSTOP SEMEN      Vasectomy     IMPLANT PACEMAKER         ALLERGIES  Ambien [zolpidem]    FAMILY HX:  Family History   Problem Relation Age of Onset     Asthma No family hx of      Diabetes No family hx of      Hypertension No family hx of      Breast Cancer No family hx of      Cancer - colorectal No family hx of      Prostate Cancer No family hx of      Lipids No family hx of      Musculoskeletal Disorder No  family hx of      Neurologic Disorder No family hx of        SOCIAL HX:  Social History     Socioeconomic History     Marital status:      Spouse name: None     Number of children: 2     Years of education: 16     Highest education level: None   Occupational History     Employer: UNEMPLOYED     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   Social Needs     Financial resource strain: None     Food insecurity:     Worry: None     Inability: None     Transportation needs:     Medical: None     Non-medical: None   Tobacco Use     Smoking status: Never Smoker     Smokeless tobacco: Never Used   Substance and Sexual Activity     Alcohol use: Yes     Alcohol/week: 2.4 oz     Types: 4 Standard drinks or equivalent per week     Comment: 2 drinks week     Drug use: No     Sexual activity: Never   Lifestyle     Physical activity:     Days per week: None     Minutes per session: None     Stress: None   Relationships     Social connections:     Talks on phone: None     Gets together: None     Attends Islam service: None     Active member of club or organization: None     Attends meetings of clubs or organizations: None     Relationship status: None     Intimate partner violence:     Fear of current or ex partner: None     Emotionally abused: None     Physically abused: None     Forced sexual activity: None   Other Topics Concern     Parent/sibling w/ CABG, MI or angioplasty before 65F 55M? No      Service Not Asked     Blood Transfusions Not Asked     Caffeine Concern Not Asked     Occupational Exposure Not Asked     Hobby Hazards Not Asked     Sleep Concern Not Asked     Stress Concern Not Asked     Weight Concern Not Asked     Special Diet Not Asked     Back Care Not Asked     Exercise Yes     Bike Helmet Not Asked     Seat Belt Yes     Self-Exams Yes   Social History Narrative     None       ROS:  Constitutional: No fever, chills, or sweats. No weight gain/loss.   ENT: No visual disturbance, ear ache, epistaxis,  "sore throat.   Allergies/Immunologic: Negative.   Respiratory: No cough, hemoptysis.   Cardiovascular: As per HPI.   GI: No nausea, vomiting, hematemesis, melena, or hematochezia.   : No urinary frequency, dysuria, or hematuria.   Integument: Negative.   Psychiatric: Negative.   Neuro: Negative.   Endocrinology: Negative.   Musculoskeletal: No myalgia.    VITAL SIGNS:  /73 (BP Location: Right arm, Patient Position: Chair, Cuff Size: Adult Regular)   Pulse 64   Ht 1.803 m (5' 11\")   Wt 76.2 kg (167 lb 14.4 oz)   SpO2 96%   BMI 23.42 kg/m    Body mass index is 23.42 kg/m .  Wt Readings from Last 2 Encounters:   06/07/19 76.2 kg (167 lb 14.4 oz)   05/03/19 73 kg (161 lb)       PHYSICAL EXAM  Richard Ball IS A 59 year old male.in no acute distress.  HEENT: Unremarkable.  Neck: JVP normal.  Carotids +4/4 bilaterally without bruits.  Lungs: CTA.    Cor: RRR. Mechanical S1, normal S2.  No murmur, rub, or gallop.  PMI in Lf 5th ICS.    Abd: Soft, nontender, nondistended.   Extremities: No C/C/E.  Pulses +4/4 symmetric in upper and lower extremities.    Neuro: Grossly intact.    LABS    Lab Results   Component Value Date    WBC 4.3 06/07/2019     Lab Results   Component Value Date    RBC 4.04 06/07/2019     Lab Results   Component Value Date    HGB 13.1 06/07/2019     Lab Results   Component Value Date    HCT 37.8 06/07/2019     No components found for: MCT  Lab Results   Component Value Date    MCV 94 06/07/2019     Lab Results   Component Value Date    MCH 32.4 06/07/2019     Lab Results   Component Value Date    MCHC 34.7 06/07/2019     Lab Results   Component Value Date    RDW 13.5 06/07/2019     Lab Results   Component Value Date     06/07/2019      Recent Labs   Lab Test 06/07/19  1231 06/27/18  1111    143   POTASSIUM 4.1 4.5   CHLORIDE 111* 110*   CO2 29 30   ANIONGAP 3 3   * 92   BUN 14 12   CR 0.83 0.92   CLINT 8.5 9.0     Recent Labs   Lab Test 06/29/18  0818   CHOL 154   HDL 44 "   LDL 80   TRIG 150*   NHDL 110        LASHAWN Garcia MD

## 2019-06-07 NOTE — PROGRESS NOTES
Service Date: 2019      HISTORY OF PRESENT ILLNESS:  Mr. Ball is a delightful 59-year-old gentleman who I met last year with a history of Marfan syndrome.  His dad likely also had Marfan syndrome and  of a cerebral aneurysm when he was 2 year old.  His mom  when he was 4 of ovarian cancer, and he was raised by an adoptive family.  He has 2 children, ages 28 and 29.  Neither of them have been screened for Marfan syndrome.      Mr. Ball was diagnosed at age 24.  He had genetic testing done at the [facility] in [place].  He underwent surgery in  at [facility] in [place] by Dr. Arriola.  His ascending aorta was 5.5 cm at the time, but we do not have those operative reports.  His aortic valve was replaced with a mechanical valve, and they reimplanted his coronary arteries.  Last year, we did a coronary CTA and there was concern about significant coronary artery disease.  He went on to have a coronary angiogram, and that showed no significant disease.      Over the course of the past year, he has been doing well.  His INR has for the most part been stable.  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  He does have a pacemaker that was placed, it sounds like in  due to heart block, and his upper lead failed.  With only his lower lead working, he is 100% paced and apparently he is 2 years from [facility].  He is interested in potentially having the device removed and getting an MRI-compatible one in the future and would be interested in meeting with Dr. Morales next year to discuss this.  His cholesterol, his LDL is 80 and HDL is 50s.  He is on a baby aspirin daily.  He has not had any chest pain or discomfort.  He knows to watch his diet in terms of eating less fatty foods.  This summer he is going to [place] for a couple of weeks and then going on a cruise with [employer].  He is looking forward to those.  He did have a cerebral CTA last year that  showed no evidence of cerebral aneurysms.  He did have a history of a TIA in the past but no deficits.  On his echo from today his valve is working well with no evidence of stenosis.  He has not had any significant palpitations.  It sounds like occasional just PVCs.      IMPRESSION, REPORT, PLAN:   1.  Marfan syndrome.   2.  Aortic aneurysm, status post composite graft placement with a mechanical aortic valve at [facility] in [place] in  by Dr. Arriola, functioning well.   3.  TIA 2018 with resolution of symptoms.  Negative CT of the head with no aneurysms found or bleeding.   4.  Hypertension, well controlled on atenolol and lisinopril.   5.  Hyperlipidemia, on lovastatin 40.   6.  Scoliosis.   7.  Need for family members screening for Marfan.   8.  Pacemaker placement in  with failure of the atrial lead, 2 years from BOB status with 100% ventricular pacing.   9.  Coronary artery disease, nonobstructive, on cath 2018.        DISCUSSION:  It was a pleasure to see Mr. Garcia in followup.  Clinically, he is doing well from a cardiovascular standpoint without any concerning cardiac symptoms.  He has not had any chest pain or discomfort.  He is remaining active.  He is looking forward to the summer when he is off from school and the trips ahead.  His risk factors are well controlled.  He has maintained a therapeutic INR.  We will plan to see him back in a year with an echo and have him see Dr. Morales at that time to discuss planning for device.  It was a pleasure to see him.  Please do not hesitate to contact me with any questions or concerns.  He will see the ophthalmologist in the near future.         HARRY SAINI MD             D: 2019   T: 2019   MT: JOSE      Name:     KAVON GARCIA   MRN:      [id number]        Account:      [id number]   :      1960           Service Date: 2019      Document: [id number]

## 2019-06-07 NOTE — PATIENT INSTRUCTIONS
"You were seen today in the Adult Congenital and Cardiovascular Genetics Clinic at the [facility].    Cardiology Providers you saw during your visit:  Dr. Alondra Garcia     Diagnosis:  Marfans    Results:  The results of your echo and labs were discussed with you today.     Recommendations:    1.  Continue to eat a heart healthy diet.  2.  Continue to get 20-30 minutes of aerobic activity, 4-5 days per week. Please follow recommended exercise guidelines as discussed in your appointment.    3.  Continue to observe good oral hygiene, with regular dental visits.  4.  No changes today.      Vitals:    06/07/19 1342   BP: 118/73   BP Location: Right arm   Patient Position: Chair   Cuff Size: Adult Regular   Pulse: 64   SpO2: 96%   Weight: 76.2 kg (167 lb 14.4 oz)   Height: 1.803 m (5' 11\")       Exercise restrictions:   Yes__X__  No____         If yes, list restrictions:  Please follow Recommendations for the Acceptability of Recreational Sports Activities and Exercise in Patients Handout      Work restrictions:  Yes____  No____         If yes, list restrictions:    FASTING CHOLESTEROL was checked in the last 5 years YES___  NO___   Continue to eat a heart healthy, low salt diet.         ____ Fasting lipid panel order today         ____ No changes in medications          ____ I recommend the following changes in your cholesterol medications.:          ____ Please follow up for cholesterol screening at your primary care physician      Follow-up:  Follow up with Dr. Garcia and Dr. Morales in 1 year with an echo and device check.    If you have questions or concerns please contact us at:    Brody Elder, RN, BSN   Odell Cartagena (Scheduling)  Nurse Coordinator     Clinic   Adult Congenital and CV Genetics Adult Congenital and CV Genetic  [facility]  (P)[phone]    (P) " [phone]  [email or web address] (f)[phone]        For after hours urgent needs, call [phone] and ask to speak to the Adult Congenital Physician on call.  Mention Job Code 0401.    For emergencies call 911.    [facility]  [facility]  Mail Code [id number]  [address] [place]  [address]

## 2019-06-17 PROBLEM — Z79.01 LONG TERM CURRENT USE OF ANTICOAGULANT THERAPY: Status: ACTIVE | Noted: 2018-06-26

## 2019-06-20 ENCOUNTER — TELEPHONE (OUTPATIENT)
Dept: PEDIATRICS | Facility: CLINIC | Age: 59
End: 2019-06-20

## 2019-06-20 NOTE — TELEPHONE ENCOUNTER
Reason for call:  Persistent cough  Patient called regarding (reason for call): call back  Additional comments: Please call patient back as soon as possible. He said he was  seen in urgent care and put on prednisone for 5 days. He still has a cough that won't go away. He wants to talk to a nurse because he is leaving town and wants to know what to do. He did not want to be put on the schedule before he talks to a nurse.     Phone number to reach patient:  Home number on file [phone] (home)    Best Time:  ASAP    Can we leave a detailed message on this number?  YES

## 2019-06-21 ENCOUNTER — ANTICOAGULATION THERAPY VISIT (OUTPATIENT)
Dept: CARDIOLOGY | Facility: CLINIC | Age: 59
End: 2019-06-21
Payer: COMMERCIAL

## 2019-06-21 DIAGNOSIS — Z95.2 HEART VALVE REPLACED: ICD-10-CM

## 2019-06-21 DIAGNOSIS — Z79.01 LONG TERM CURRENT USE OF ANTICOAGULANTS WITH INR GOAL OF 2.0-3.0: ICD-10-CM

## 2019-06-21 LAB — INR POINT OF CARE: 2.7 (ref 0.86–1.14)

## 2019-06-21 PROCEDURE — 36416 COLLJ CAPILLARY BLOOD SPEC: CPT | Performed by: INTERNAL MEDICINE

## 2019-06-21 PROCEDURE — 85610 PROTHROMBIN TIME: CPT | Mod: QW | Performed by: INTERNAL MEDICINE

## 2019-06-21 NOTE — PROGRESS NOTES
ANTICOAGULATION FOLLOW-UP CLINIC VISIT    Patient Name:  Richard Ball  Date:  2019  Contact Type:  Face to Face    SUBJECTIVE:  Patient Findings         Clinical Outcomes     Negatives:   Major bleeding event, Thromboembolic event, Anticoagulation-related hospital admission, Anticoagulation-related ED visit, Anticoagulation-related fatality           OBJECTIVE    INR Protime   Date Value Ref Range Status   2019 2.7 (A) 0.86 - 1.14 Final       ASSESSMENT / PLAN  INR assessment THER    Recheck INR In: 3 WEEKS    INR Location Clinic      Anticoagulation Summary  As of 2019    INR goal:   2.0-3.0   TTR:   50.9 % (11.7 mo)   INR used for dosin.7 (2019)   Warfarin maintenance plan:   2.5 mg (5 mg x 0.5) every Mon, Wed, Fri; 5 mg (5 mg x 1) all other days   Full warfarin instructions:   2.5 mg every Mon, Wed, Fri; 5 mg all other days   Weekly warfarin total:   27.5 mg   No change documented:   Teresa Espino RN   Plan last modified:   Teresa Espino RN (2019)   Next INR check:   2019   Target end date:   Indefinite    Indications    Heart valve replaced [Z95.2] [Z95.2]  Long term current use of anticoagulants with INR goal of 2.0-3.0 [Z79.01]             Anticoagulation Episode Summary     INR check location:       Preferred lab:       Send INR reminders to:   [facility] HEART INR NURSE    Comments:   AVR in , Lovenox needed per Dr. Garcia due to possible TIA in       Anticoagulation Care Providers     Provider Role Specialty Phone number    MarchSundar MD Responsible Cardiology [phone]            See the Encounter Report to view Anticoagulation Flowsheet and Dosing Calendar (Go to Encounters tab in chart review, and find the Anticoagulation Therapy Visit)    INR 2.7.  Dosing was decreased last check and now back in range.  He took prednisone for 5 days for a cough and last dose was on Monday.  Continue the same schedule and recheck in 3 weeks then  hopefully next time he will be in range again and we can stretch to 5 weeks since he will be out of town.  Rios Espino RN

## 2019-06-21 NOTE — TELEPHONE ENCOUNTER
Call to patient-left message for patient to call back.  Aurora Dupont, LANIE  Message handled by Nurse Triage.

## 2019-06-27 NOTE — TELEPHONE ENCOUNTER
Called pt back. Pt went to [facility] about 10 days ago for cough, had chest x-ray(wnl), took prednisone x 5 days.     - prednisone didn't do anything for his cough  - still has dry tickling cough throughout the day with post nasal drainage  - denies fever, chills, wheezing, SOB, ST, ear pain, trouble breathing/swallowing, coughing up blood, dizziness, chest tightness, HA or GI sx's  - still using albuterol 3-4 times a day which helps a bit, not using neb  - is in [place] right now, coming back to [facility] on 7/12, requesting an OV in the week of 7/15    Advised pt to continue allegra, cough suppressant prn, flonase nasal spray, push fluids & rest. Scheduled an OV with  for a f/u on 7/15 at 12:15 pm. Advised to seek medical help in [place] if his sx's get worse. Pt agrees to the plan.    Carlitos RN  Triage Nurse

## 2019-07-12 ENCOUNTER — TELEPHONE (OUTPATIENT)
Dept: CARDIOLOGY | Facility: CLINIC | Age: 59
End: 2019-07-12

## 2019-07-12 ENCOUNTER — OFFICE VISIT (OUTPATIENT)
Dept: PEDIATRICS | Facility: CLINIC | Age: 59
End: 2019-07-12
Payer: COMMERCIAL

## 2019-07-12 ENCOUNTER — ANTICOAGULATION THERAPY VISIT (OUTPATIENT)
Dept: CARDIOLOGY | Facility: CLINIC | Age: 59
End: 2019-07-12
Payer: COMMERCIAL

## 2019-07-12 VITALS
BODY MASS INDEX: 22.79 KG/M2 | SYSTOLIC BLOOD PRESSURE: 126 MMHG | DIASTOLIC BLOOD PRESSURE: 74 MMHG | HEART RATE: 63 BPM | HEIGHT: 71 IN | OXYGEN SATURATION: 97 % | WEIGHT: 162.8 LBS | TEMPERATURE: 97.8 F

## 2019-07-12 DIAGNOSIS — Z79.01 LONG TERM CURRENT USE OF ANTICOAGULANT THERAPY: ICD-10-CM

## 2019-07-12 DIAGNOSIS — N52.9 ERECTILE DYSFUNCTION, UNSPECIFIED ERECTILE DYSFUNCTION TYPE: ICD-10-CM

## 2019-07-12 DIAGNOSIS — Z95.2 HEART VALVE REPLACED: ICD-10-CM

## 2019-07-12 DIAGNOSIS — Z79.01 LONG TERM CURRENT USE OF ANTICOAGULANTS WITH INR GOAL OF 2.0-3.0: ICD-10-CM

## 2019-07-12 DIAGNOSIS — J45.21 MILD INTERMITTENT ASTHMA WITH EXACERBATION: Primary | ICD-10-CM

## 2019-07-12 LAB — INR POINT OF CARE: 4.7 (ref 0.86–1.14)

## 2019-07-12 PROCEDURE — 99214 OFFICE O/P EST MOD 30 MIN: CPT | Performed by: PHYSICIAN ASSISTANT

## 2019-07-12 PROCEDURE — 36416 COLLJ CAPILLARY BLOOD SPEC: CPT | Performed by: INTERNAL MEDICINE

## 2019-07-12 PROCEDURE — 85610 PROTHROMBIN TIME: CPT | Mod: QW | Performed by: INTERNAL MEDICINE

## 2019-07-12 RX ORDER — SILDENAFIL 100 MG/1
TABLET, FILM COATED ORAL
Qty: 10 TABLET | Refills: 1 | Status: SHIPPED | OUTPATIENT
Start: 2019-07-12 | End: 2021-02-16

## 2019-07-12 RX ORDER — PREDNISONE 20 MG/1
TABLET ORAL
Qty: 20 TABLET | Refills: 0 | Status: SHIPPED | OUTPATIENT
Start: 2019-07-12 | End: 2019-08-15

## 2019-07-12 ASSESSMENT — MIFFLIN-ST. JEOR: SCORE: 1580.59

## 2019-07-12 NOTE — PROGRESS NOTES
ANTICOAGULATION FOLLOW-UP CLINIC VISIT    Patient Name:  Richard Ball  Date:  2019  Contact Type:  Face to Face    SUBJECTIVE:  Patient Findings     Positives:   Change in diet/appetite    Comments:   Less greens        Clinical Outcomes     Negatives:   Major bleeding event, Thromboembolic event, Anticoagulation-related hospital admission, Anticoagulation-related ED visit, Anticoagulation-related fatality    Comments:   Less greens           OBJECTIVE    INR Protime   Date Value Ref Range Status   2019 4.7 (A) 0.86 - 1.14 Final       ASSESSMENT / PLAN  INR assessment SUPRA    Recheck INR In: 10 DAYS    INR Location Clinic      Anticoagulation Summary  As of 2019    INR goal:   2.0-3.0   TTR:   48.9 % (1 y)   INR used for dosin.7! (2019)   Warfarin maintenance plan:   2.5 mg (5 mg x 0.5) every Mon, Wed, Fri; 5 mg (5 mg x 1) all other days   Full warfarin instructions:   : Hold; Otherwise 2.5 mg every Mon, Wed, Fri; 5 mg all other days   Weekly warfarin total:   27.5 mg   Plan last modified:   Teresa Espino RN (2019)   Next INR check:   2019   Target end date:   Indefinite    Indications    Heart valve replaced [Z95.2] [Z95.2]  Long term current use of anticoagulants with INR goal of 2.0-3.0 [Z79.01]             Anticoagulation Episode Summary     INR check location:       Preferred lab:       Send INR reminders to:   [facility] HEART INR NURSE    Comments:   AVR in , Lovenox needed per Dr. Garcia due to possible TIA in       Anticoagulation Care Providers     Provider Role Specialty Phone number    March, Sundar Cantu MD Responsible Cardiology [phone]            See the Encounter Report to view Anticoagulation Flowsheet and Dosing Calendar (Go to Encounters tab in chart review, and find the Anticoagulation Therapy Visit)    INR 4.7.  He just returned from out of town and ate less greens while traveling.  NO extra ETOH.  NO signs of bleeding.  He still has  a dry cough and he is seeing a provider today and I told him to mention to them that he is taking Lisinopril which could be the cause of the cough.  Hold dose today x1 then increase his greens and recheck in 10 days before he leaves again on 7/24 for 3 weeks.  Rios Espino RN

## 2019-07-12 NOTE — PROGRESS NOTES
"Subjective     Richard Ball is a 59 year old male who presents to clinic today for the following health issues:    HPI   Acute Illness   Acute illness concerns: Cough  Onset: 10 weeks ago     Fever: no    Chills/Sweats: no    Headache (location?): no    Sinus Pressure:no    Conjunctivitis:  no    Ear Pain: no    Rhinorrhea: no    Congestion: no    Sore Throat: no     Cough: YES-worsening over time    Wheeze: YES- a lot, inhaler does not help    Decreased Appetite: no    Nausea: no    Vomiting: no    Diarrhea:  no    Dysuria/Freq.: no    Fatigue/Achiness: no    Sick/Strep Exposure: no     Therapies Tried and outcome: Prednisone 20 mg daily x 5 days. did not help    History of asthma and using albuterol three times daily.   Was in [place] for three weeks. Leaving for [place] in two weeks.    Patient would like an ED medication.   Difficulty maintaing an Erection.   Tried viagra and cialis with success.   Labs completed. On statin. Echo within last month.    Review of Systems   ROS COMP: Constitutional, HEENT, cardiovascular, pulmonary, gi and gu systems are negative, except as otherwise noted.      Objective    /74 (BP Location: Right arm, Patient Position: Sitting, Cuff Size: Adult Regular)   Pulse 63   Temp 97.8  F (36.6  C) (Oral)   Ht 1.803 m (5' 11\")   Wt 73.8 kg (162 lb 12.8 oz)   SpO2 97%   BMI 22.71 kg/m    Body mass index is 22.71 kg/m .  Physical Exam   GENERAL: healthy, alert and no distress  EYES: Eyes grossly normal to inspection, PERRL and conjunctivae and sclerae normal  HENT: ear canals and TM's normal, nose and mouth without ulcers or lesions  NECK: no adenopathy, no asymmetry, masses, or scars and thyroid normal to palpation  RESP: diminished breath sounds throughout; wheezing and rhonchi present  CV: regular rate and rhythm, normal S1 S2, no S3 or S4    Diagnostic Test Results:  Results for orders placed or performed in visit on 07/12/19 (from the past 24 hour(s))   INR point " of care   Result Value Ref Range    INR Protime 4.7 (A) 0.86 - 1.14           Assessment & Plan   1. Mild intermittent asthma with exacerbation  Begin oral prednisone taper as directed. Continue with albuterol PRN. Limit triggers.   If symptoms persist in 10 days, call clinic.   - predniSONE (DELTASONE) 20 MG tablet; Take 3 tabs by mouth daily x 3 days, then 2 tabs daily x 3 days, then 1 tab daily x 3 days, then 1/2 tab daily x 3 days.  Dispense: 20 tablet; Refill: 0    2. Long term current use of anticoagulant therapy  INR out of range. Hold dose today and tomorrow and recheck in four days. Discussed with [place] INR nurse.    3. Erectile dysfunction, unspecified erectile dysfunction type  Patient has used in past with success. Call with any concerns.  - sildenafil (VIAGRA) 100 MG tablet; Take  mg one hour prior to activity  Dispense: 10 tablet; Refill: 1     Jonah Duenas PA-C  [facility] MARCIO

## 2019-07-12 NOTE — TELEPHONE ENCOUNTER
Patient noted he has had a cough and has been on Prednisone for ~10 weeks. Had INR checked this morning and a RN told him his Lisinopril may be exacerbating this condition.

## 2019-07-12 NOTE — PATIENT INSTRUCTIONS
Hold warfarin today and tomorrow  Recheck on Tuesday    Begin prednisone as directed  Call when reducing to 1 tablet daily to check in with me

## 2019-07-12 NOTE — TELEPHONE ENCOUNTER
Returned Henrik's call.  He states that he has been dealing with a dry cough for 10 weeks.  It first started as a cold, was started on a round of Prednisone by his primary MD and the cold has resolved.  He still has a dry, hacking cough that when it happens can stop him from speaking and then goes into a uncontrolled cough.  He was started on another round of Prednisone and uses his inhaler that provides temporary relief.  He was having his INR checked and it was mentioned that maybe his Lisinopril could be the cause for this cough.  Henrik is going on an international cruise in 2 weeks and would like to trial being off of the Lisinopril to see if it helps as he wants to feel better for his trip.  Discussed with Henrik that I will send Dr Garcia a message to discuss concerns and call back with further recommendations.  Henrik states that he understands information provided and will call with further questions or concerns.    Nahun Galindo, RN  RN Care Coordinator  [facility]  [phone]

## 2019-07-15 ENCOUNTER — PATIENT OUTREACH (OUTPATIENT)
Dept: CARDIOLOGY | Facility: CLINIC | Age: 59
End: 2019-07-15

## 2019-07-15 NOTE — PROGRESS NOTES
Date: 7/15/2019    Time of Call: 9:45 AM     Diagnosis:  Cough - patient suspects Lisinopril     [ TORB ] Ordering provider: Dr. Cantu march   Order: Stop Lisinopril, monitor blood pressure.  If blood pressure stays stable will not add another medication.  If BP is elevated off Lisinopril add hydrochlorothiazide 25 mg daily.      Order received by: Brody Elder      Follow-up/additional notes: Called and spoke with patient.  He will stop Lisinopril today and monitor BP.  Patient leaves Wednesday for a cruise so will update our clinic when he gets back with his blood pressures.    Brody Elder, RN, BSN  Cardiology Care Coordinator  [facility]  [email or web address]  [phone]

## 2019-07-23 ENCOUNTER — ANTICOAGULATION THERAPY VISIT (OUTPATIENT)
Dept: CARDIOLOGY | Facility: CLINIC | Age: 59
End: 2019-07-23
Payer: COMMERCIAL

## 2019-07-23 DIAGNOSIS — Z79.01 LONG TERM CURRENT USE OF ANTICOAGULANTS WITH INR GOAL OF 2.0-3.0: ICD-10-CM

## 2019-07-23 DIAGNOSIS — Z95.2 HEART VALVE REPLACED: ICD-10-CM

## 2019-07-23 LAB — INR POINT OF CARE: 4 (ref 0.86–1.14)

## 2019-07-23 PROCEDURE — 99207 ZZC NO CHARGE NURSE ONLY: CPT | Performed by: INTERNAL MEDICINE

## 2019-07-23 PROCEDURE — 85610 PROTHROMBIN TIME: CPT | Mod: QW | Performed by: INTERNAL MEDICINE

## 2019-07-23 PROCEDURE — 36416 COLLJ CAPILLARY BLOOD SPEC: CPT | Performed by: INTERNAL MEDICINE

## 2019-07-23 NOTE — PROGRESS NOTES
ANTICOAGULATION FOLLOW-UP CLINIC VISIT    Patient Name:  Richard Ball  Date:  2019  Contact Type:  Face to Face    SUBJECTIVE:  Patient Findings     Positives:   Change in medications (Taking Prednisone for URI-has 2 more doses remaining)        Clinical Outcomes     Negatives:   Major bleeding event, Thromboembolic event, Anticoagulation-related hospital admission, Anticoagulation-related ED visit, Anticoagulation-related fatality           OBJECTIVE    INR Protime   Date Value Ref Range Status   2019 4.0 (A) 0.86 - 1.14 Final       ASSESSMENT / PLAN  INR assessment SUPRA    Recheck INR In: 3 WEEKS    INR Location Clinic      Anticoagulation Summary  As of 2019    INR goal:   2.0-3.0   TTR:   47.5 % (1 y)   INR used for dosin.0! (2019)   Warfarin maintenance plan:   2.5 mg (5 mg x 0.5) every Mon, Wed, Fri; 5 mg (5 mg x 1) all other days   Full warfarin instructions:   : Hold; Otherwise 2.5 mg every Mon, Wed, Fri; 5 mg all other days   Weekly warfarin total:   27.5 mg   Plan last modified:   Teresa Espino RN (2019)   Next INR check:   2019   Target end date:   Indefinite    Indications    Heart valve replaced [Z95.2] [Z95.2]  Long term current use of anticoagulants with INR goal of 2.0-3.0 [Z79.01]             Anticoagulation Episode Summary     INR check location:       Preferred lab:       Send INR reminders to:   [facility] HEART INR NURSE    Comments:   AVR in , Lovenox needed per Dr. Garcia due to possible TIA in       Anticoagulation Care Providers     Provider Role Specialty Phone number    March, Sundar Cantu MD Responsible Cardiology [phone]            See the Encounter Report to view Anticoagulation Flowsheet and Dosing Calendar (Go to Encounters tab in chart review, and find the Anticoagulation Therapy Visit)    INR was 4.0 today. Has been taking Prednisone for the past week for URI-has 2 doses remaining. Has just returned from 3 week convention  in [place] on 7/24/19, and INR last check was 4.7 secondary to decreased serving of greens. Pt denies any abnormal bleeding or bruising. Denies any other recent medication or dietary changes or recent illness. Eats approximately 1-2 servings of greens weekly on average. Pt is leaving for 3 week trip to [place] tomorrow on cruise ship. Instructed to hold today's 5 mg dose of warfarin today. Will then return to maintenance dosing of 2.5 mg every MWF and 5 mg all other days of the week. Will be drinking increased ETOH on cruise. Normally has 1 glass of wine nightly, but will be increased to 4-6 daily. Instructed to eat serving of greens daily while on cruise. Instructed to monitor for any abnormal signs of bleeding or bruising and see cruise ship MD if this should occur. Next INR check is scheduled in 3 weeks upon his return to [place]. Pt verbalized understanding.Dosage adjustment made based on physician directed care plan.    Has the patient previously taken warfarin? yes  If yes, for what indication? AVR    Does the patient have any of the following indications for a higher range of 2.5-3.5:    Mitral position mechanical valve? no    Jus-Shiley, Ball and Cage or Monoleaflet valve (regardless of position) no    Other (if yes, please explain) no  INR Clinic referral submitted for MD review and signature.        Jasmina Vegas RN

## 2019-07-25 DIAGNOSIS — Q87.40 MARFAN'S SYNDROME: ICD-10-CM

## 2019-07-27 RX ORDER — ATENOLOL 50 MG/1
50 TABLET ORAL DAILY
Qty: 90 TABLET | Refills: 3 | Status: SHIPPED | OUTPATIENT
Start: 2019-07-27 | End: 2020-03-24

## 2019-08-15 ENCOUNTER — OFFICE VISIT (OUTPATIENT)
Dept: PEDIATRICS | Facility: CLINIC | Age: 59
End: 2019-08-15
Payer: COMMERCIAL

## 2019-08-15 VITALS
DIASTOLIC BLOOD PRESSURE: 68 MMHG | TEMPERATURE: 97.5 F | RESPIRATION RATE: 16 BRPM | BODY MASS INDEX: 22.58 KG/M2 | WEIGHT: 161.3 LBS | HEIGHT: 71 IN | HEART RATE: 66 BPM | SYSTOLIC BLOOD PRESSURE: 120 MMHG | OXYGEN SATURATION: 98 %

## 2019-08-15 DIAGNOSIS — H61.23 BILATERAL IMPACTED CERUMEN: ICD-10-CM

## 2019-08-15 DIAGNOSIS — R05.3 CHRONIC COUGH: Primary | ICD-10-CM

## 2019-08-15 PROCEDURE — 99213 OFFICE O/P EST LOW 20 MIN: CPT | Mod: 25 | Performed by: FAMILY MEDICINE

## 2019-08-15 PROCEDURE — 69209 REMOVE IMPACTED EAR WAX UNI: CPT | Mod: 50 | Performed by: FAMILY MEDICINE

## 2019-08-15 ASSESSMENT — MIFFLIN-ST. JEOR: SCORE: 1573.78

## 2019-08-15 NOTE — PROGRESS NOTES
Subjective:   Richard Ball is a 59 year old male who presents for   Chief Complaint   Patient presents with     Cough     start ongoing since March sx Majority of the time dry hacking cough, sometimes expectorant cough, chest congestion, wheezing, exposed to bronchitis, worse at night laying is difficult  tx Contact Cold and Flu, Robitussin DM, inhaler  completed 2 treatments of Prednisone     Did taper at 60mg for 12 days. Symptoms still ongoing.   He does have hx of asthma, has been told it may be cardiac related.   Patient has PRN ventolin/albuterol, doesn't feel like it's helping. No daily controller.   Has ongoing sore throat for last couple weeks likely from coughing. No recorded fevers.     With approval from Cardiology, he did a 3 week holiday from the lisinopril with no improvement.     No lower extremity swelling. Laying down flat as night is bothersome. Laying down has been an issue since March but says he had a cold that was ongoing.     His mother-in-law also got ill while they were altogether on a cruise, this was at the beginning of the August.   Is getting some nasal drainage.     NO hx of hospitalization for asthma.     Patient has a pacemaker, artificial heart valve, and history of Marfan's. His echo from 2 months ago was normal.     Patient Active Problem List    Diagnosis Date Noted     Neuromuscular scoliosis of thoracic region 02/01/2019     Priority: Medium     Long term current use of anticoagulants with INR goal of 2.0-3.0 11/08/2018     Priority: Medium     Status post coronary angiogram 06/27/2018     Priority: Medium     Long-term (current) use of anticoagulants [Z79.01] 06/26/2018     Priority: Medium     Heart valve replaced [Z95.2] 06/26/2018     Priority: Medium     Advanced directives, counseling/discussion 12/22/2014     Priority: Medium     Advance Care Planning:   ACP Review and Resources Provided:  Reviewed chart for advance care plan.  Richard Ball has no plan or code status  "on file. Discussed available resources and provided with information. Confirmed code status reflects current choices pending further ACP discussions.  Confirmed/documented designated decision maker(s). See permanent comments section of demographics in clinical tab.   Added by Lizbeth Hogan on 12/22/2014             Health Care Home 06/24/2013     Priority: Medium     EMERGENCY CARE PLAN  State Tier Level:  Tier 1  Status:  NA  Care Coordinator:      See Letters for [facility] Care Plan           S/P aortic valve replacement 12/16/2011     Priority: Medium     Marfan's syndrome 12/06/2011     Priority: Medium     Pacemaker 12/06/2011     Priority: Medium     Pacemaker put in  8/4/2001      Battery replacement done 11/1/2011         Current Outpatient Medications   Medication     acetaminophen (TYLENOL) 500 MG tablet     albuterol (VENTOLIN HFA) 108 (90 Base) MCG/ACT inhaler     aspirin 81 MG chewable tablet     atenolol (TENORMIN) 50 MG tablet     Fexofenadine HCl (ALLEGRA PO)     lisinopril (PRINIVIL/ZESTRIL) 5 MG tablet     lovastatin (MEVACOR) 40 MG tablet     sildenafil (VIAGRA) 100 MG tablet     warfarin (COUMADIN) 5 MG tablet     No current facility-administered medications for this visit.        ROS:  As above per HPI    Objective:   /68 (BP Location: Right arm, Patient Position: Chair, Cuff Size: Adult Regular)   Pulse 66   Temp 97.5  F (36.4  C) (Oral)   Resp 16   Ht 1.803 m (5' 11\")   Wt 73.2 kg (161 lb 4.8 oz)   SpO2 98%   BMI 22.50 kg/m  , Body mass index is 22.5 kg/m .  Gen:  NAD, well-nourished, sitting in chair comfortably  HEENT: EOMI, sclera anicteric, Head normocephalic, ; nares patent; moist mucous membranes, absent tonsils, no cobblestoning of posterior pharynx, bilateral cerumen impaction  Neck: trachea midline, no thyromegaly  CV:  Hemodynamically stable, palpable click in systole, no murmurs  Pulm:  no increased work of breathing , CTAB, no wheezes/rales/rhonchi   Extrem: no cyanosis, " edema or clubbing  Skin: no obvious rashes or abnormalities  Psych: Euthymic, linear thoughts, normal rate of speech        Assessment & Plan:   Richard Ball, 59 year old male who presents with:  Chronic cough  Symptoms > 3 months fortunately with normal recent echo. No worsening of symptoms or new symptoms since the echo was performed. CXR normal a couple a months ago, lung exam today without crackles or wheezing. Has attempted drug holiday from lisinopril with no resolution in symptoms. No significant history of allergies, should consider testing for this. He did get exposed and likely has symptoms of a cold lingering, discussed if no improvement in the next week in regards to the cough should consider eval with pulmonology. Patient is a non-smoker.   - PULMONARY MEDICINE REFERRAL    Bilateral impacted cerumen - irrigation performed today by M.A. to both ear canals    Jimmy Rose MD   [place] [facility]    The use of Dragon/Cloud [employer] dictation services may have been used to construct the content in this note; any grammatical or spelling errors are non-intentional. Please contact the author of this note directly if you are in need of any clarification.

## 2019-08-15 NOTE — PATIENT INSTRUCTIONS
If your symptoms haven't improved in the next week, make appt to see lung specialists      Please call  The clinic or return to be seen if your symptoms get worse

## 2019-08-16 ENCOUNTER — ANTICOAGULATION THERAPY VISIT (OUTPATIENT)
Dept: CARDIOLOGY | Facility: CLINIC | Age: 59
End: 2019-08-16
Payer: COMMERCIAL

## 2019-08-16 DIAGNOSIS — Z79.01 LONG TERM CURRENT USE OF ANTICOAGULANTS WITH INR GOAL OF 2.0-3.0: ICD-10-CM

## 2019-08-16 DIAGNOSIS — Z95.2 HEART VALVE REPLACED: ICD-10-CM

## 2019-08-16 LAB — INR POINT OF CARE: 4 (ref 0.86–1.14)

## 2019-08-16 PROCEDURE — 36416 COLLJ CAPILLARY BLOOD SPEC: CPT | Performed by: INTERNAL MEDICINE

## 2019-08-16 PROCEDURE — 85610 PROTHROMBIN TIME: CPT | Mod: QW | Performed by: INTERNAL MEDICINE

## 2019-08-16 NOTE — PROGRESS NOTES
ANTICOAGULATION FOLLOW-UP CLINIC VISIT    Patient Name:  Richard Ball  Date:  2019  Contact Type:  Face to Face    SUBJECTIVE:  Patient Findings         Clinical Outcomes     Negatives:   Major bleeding event, Thromboembolic event, Anticoagulation-related hospital admission, Anticoagulation-related ED visit, Anticoagulation-related fatality           OBJECTIVE    INR Protime   Date Value Ref Range Status   2019 4.0 (A) 0.86 - 1.14 Final       ASSESSMENT / PLAN  INR assessment SUPRA    Recheck INR In: 2 WEEKS    INR Location Clinic      Anticoagulation Summary  As of 2019    INR goal:   2.0-3.0   TTR:   44.7 % (1.1 y)   INR used for dosin.0! (2019)   Warfarin maintenance plan:   2.5 mg (5 mg x 0.5) every Mon, Wed, Fri; 5 mg (5 mg x 1) all other days   Full warfarin instructions:   : Hold; Otherwise 2.5 mg every Mon, Wed, Fri; 5 mg all other days   Weekly warfarin total:   27.5 mg   Plan last modified:   Teresa Espino RN (2019)   Next INR check:   2019   Target end date:   Indefinite    Indications    Heart valve replaced [Z95.2] [Z95.2]  Long term current use of anticoagulants with INR goal of 2.0-3.0 [Z79.01]             Anticoagulation Episode Summary     INR check location:       Preferred lab:       Send INR reminders to:   [facility] HEART INR NURSE    Comments:   AVR in , Lovenox needed per Dr. Garcia due to possible TIA in       Anticoagulation Care Providers     Provider Role Specialty Phone number    March, Sundar Cantu MD Responsible Cardiology [phone]            See the Encounter Report to view Anticoagulation Flowsheet and Dosing Calendar (Go to Encounters tab in chart review, and find the Anticoagulation Therapy Visit)    INR 4.0.  He has been traveling.  Prednisone finished about 2 weeks ago.  He said he still has a dry cough but no antibiotics were given.  He cut his finger and it took awhile to stop bleeding.  Hold the 2.5mg dose today x1  then back to normal schedule and recheck in 2 weeks and increase greens.  Rios Espino RN

## 2019-08-23 ENCOUNTER — ANCILLARY PROCEDURE (OUTPATIENT)
Dept: CARDIOLOGY | Facility: CLINIC | Age: 59
End: 2019-08-23
Attending: INTERNAL MEDICINE
Payer: COMMERCIAL

## 2019-08-23 DIAGNOSIS — I44.30 AV BLOCK: ICD-10-CM

## 2019-08-23 DIAGNOSIS — Z95.0 CARDIAC PACEMAKER IN SITU: ICD-10-CM

## 2019-08-23 PROCEDURE — 93296 REM INTERROG EVL PM/IDS: CPT | Performed by: INTERNAL MEDICINE

## 2019-08-23 PROCEDURE — 93294 REM INTERROG EVL PM/LDLS PM: CPT | Performed by: INTERNAL MEDICINE

## 2019-08-30 ENCOUNTER — ANTICOAGULATION THERAPY VISIT (OUTPATIENT)
Dept: CARDIOLOGY | Facility: CLINIC | Age: 59
End: 2019-08-30
Payer: COMMERCIAL

## 2019-08-30 DIAGNOSIS — Z95.2 HEART VALVE REPLACED: ICD-10-CM

## 2019-08-30 DIAGNOSIS — Z79.01 LONG TERM CURRENT USE OF ANTICOAGULANTS WITH INR GOAL OF 2.0-3.0: ICD-10-CM

## 2019-08-30 LAB — INR POINT OF CARE: 2.2 (ref 0.86–1.14)

## 2019-08-30 PROCEDURE — 99207 ZZC NO CHARGE NURSE ONLY: CPT | Performed by: INTERNAL MEDICINE

## 2019-08-30 PROCEDURE — 85610 PROTHROMBIN TIME: CPT | Mod: QW | Performed by: INTERNAL MEDICINE

## 2019-08-30 PROCEDURE — 36416 COLLJ CAPILLARY BLOOD SPEC: CPT | Performed by: INTERNAL MEDICINE

## 2019-08-30 NOTE — PROGRESS NOTES
ANTICOAGULATION FOLLOW-UP CLINIC VISIT    Patient Name:  Richard Ball  Date:  2019  Contact Type:  Face to Face    SUBJECTIVE:  Patient Findings         Clinical Outcomes     Negatives:   Major bleeding event, Thromboembolic event, Anticoagulation-related hospital admission, Anticoagulation-related ED visit, Anticoagulation-related fatality           OBJECTIVE    INR Protime   Date Value Ref Range Status   2019 2.2 (A) 0.86 - 1.14 Final       ASSESSMENT / PLAN  INR assessment THER    Recheck INR In: 2 WEEKS    INR Location Clinic      Anticoagulation Summary  As of 2019    INR goal:   2.0-3.0   TTR:   44.7 % (1.2 y)   INR used for dosin.2 (2019)   Warfarin maintenance plan:   2.5 mg (5 mg x 0.5) every Mon, Wed, Fri; 5 mg (5 mg x 1) all other days   Full warfarin instructions:   2.5 mg every Mon, Wed, Fri; 5 mg all other days   Weekly warfarin total:   27.5 mg   No change documented:   Teresa Espino RN   Plan last modified:   Teresa Espino RN (2019)   Next INR check:   2019   Target end date:   Indefinite    Indications    Heart valve replaced [Z95.2] [Z95.2]  Long term current use of anticoagulants with INR goal of 2.0-3.0 [Z79.01]             Anticoagulation Episode Summary     INR check location:       Preferred lab:       Send INR reminders to:   [facility] HEART INR NURSE    Comments:   AVR in , Lovenox needed per Dr. Garcia due to possible TIA in       Anticoagulation Care Providers     Provider Role Specialty Phone number    March, Sundar Cantu MD Responsible Cardiology [phone]            See the Encounter Report to view Anticoagulation Flowsheet and Dosing Calendar (Go to Encounters tab in chart review, and find the Anticoagulation Therapy Visit)    INR 2.2.  INRs have been running high recently.  He has been home and normal routine and no antibiotics or prednisone recently.  Continue same schedule and recheck in 3 weeks and he will not increase  the amount of greens.  Rios Espino, RN

## 2019-09-20 ENCOUNTER — ANTICOAGULATION THERAPY VISIT (OUTPATIENT)
Dept: CARDIOLOGY | Facility: CLINIC | Age: 59
End: 2019-09-20
Payer: COMMERCIAL

## 2019-09-20 DIAGNOSIS — Z95.2 HEART VALVE REPLACED: ICD-10-CM

## 2019-09-20 DIAGNOSIS — Z79.01 LONG TERM CURRENT USE OF ANTICOAGULANTS WITH INR GOAL OF 2.0-3.0: ICD-10-CM

## 2019-09-20 LAB — INR POINT OF CARE: 3.1 (ref 0.86–1.14)

## 2019-09-20 PROCEDURE — 85610 PROTHROMBIN TIME: CPT | Mod: QW | Performed by: INTERNAL MEDICINE

## 2019-09-20 PROCEDURE — 36416 COLLJ CAPILLARY BLOOD SPEC: CPT | Performed by: INTERNAL MEDICINE

## 2019-09-20 NOTE — PROGRESS NOTES
ANTICOAGULATION FOLLOW-UP CLINIC VISIT    Patient Name:  Richard Ball  Date:  9/20/2019  Contact Type:  Face to Face    SUBJECTIVE:  Patient Findings     Positives:   Bruising    Comments:   Bruised rib cage area and thumb        Clinical Outcomes     Negatives:   Major bleeding event, Thromboembolic event, Anticoagulation-related hospital admission, Anticoagulation-related ED visit, Anticoagulation-related fatality    Comments:   Bruised rib cage area and thumb           OBJECTIVE    INR Protime   Date Value Ref Range Status   09/20/2019 3.1 (A) 0.86 - 1.14 Final       ASSESSMENT / PLAN  INR assessment SUPRA    Recheck INR In: 3 WEEKS    INR Location Clinic      Anticoagulation Summary  As of 9/20/2019    INR goal:   2.0-3.0   TTR:   46.8 % (1.2 y)   INR used for dosing:   3.1! (9/20/2019)   Warfarin maintenance plan:   2.5 mg (5 mg x 0.5) every Mon, Wed, Fri; 5 mg (5 mg x 1) all other days   Full warfarin instructions:   2.5 mg every Mon, Wed, Fri; 5 mg all other days   Weekly warfarin total:   27.5 mg   No change documented:   Teresa Espino RN   Plan last modified:   Teresa Espino RN (6/4/2019)   Next INR check:   10/15/2019   Target end date:   Indefinite    Indications    Heart valve replaced [Z95.2] [Z95.2]  Long term current use of anticoagulants with INR goal of 2.0-3.0 [Z79.01]             Anticoagulation Episode Summary     INR check location:       Preferred lab:       Send INR reminders to:   [facility] HEART INR NURSE    Comments:   AVR in 2001, Lovenox needed per Dr. Garcia due to possible TIA in 05/18      Anticoagulation Care Providers     Provider Role Specialty Phone number    Sundar Garcia MD Responsible Cardiology [phone]            See the Encounter Report to view Anticoagulation Flowsheet and Dosing Calendar (Go to Encounters tab in chart review, and find the Anticoagulation Therapy Visit)    INR 3.1.  He has bruise on his rib cage and jammed his thumb and has a  bruise.  No blood in urine or stools.  He missed his dose last night but took it this morning.  Continue same schedule, he is due for smaller dose tonight.  No antibiotics.  He has taken some tylenol.  He will have a salad today or this weekend.  Rios Espino RN                  Closure 2 Information: This tab is for additional flaps and grafts, including complex repair and grafts and complex repair and flaps. You can also specify a different location for the additional defect, if the location is the same you do not need to select a new one. We will insert the automated text for the repair you select below just as we do for solitary flaps and grafts. Please note that at this time if you select a location with a different insurance zone you will need to override the ICD10 and CPT if appropriate.

## 2019-10-15 ENCOUNTER — ANTICOAGULATION THERAPY VISIT (OUTPATIENT)
Dept: CARDIOLOGY | Facility: CLINIC | Age: 59
End: 2019-10-15
Payer: COMMERCIAL

## 2019-10-15 DIAGNOSIS — Z79.01 LONG TERM CURRENT USE OF ANTICOAGULANTS WITH INR GOAL OF 2.0-3.0: ICD-10-CM

## 2019-10-15 DIAGNOSIS — Z95.2 HEART VALVE REPLACED: ICD-10-CM

## 2019-10-15 LAB — INR POINT OF CARE: 4.5 (ref 0.86–1.14)

## 2019-10-15 PROCEDURE — 36416 COLLJ CAPILLARY BLOOD SPEC: CPT | Performed by: INTERNAL MEDICINE

## 2019-10-15 PROCEDURE — 85610 PROTHROMBIN TIME: CPT | Mod: QW | Performed by: INTERNAL MEDICINE

## 2019-10-15 NOTE — PROGRESS NOTES
ANTICOAGULATION FOLLOW-UP CLINIC VISIT    Patient Name:  Richard Ball  Date:  10/15/2019  Contact Type:  Face to Face    SUBJECTIVE:  Patient Findings         Clinical Outcomes     Negatives:   Major bleeding event, Thromboembolic event, Anticoagulation-related hospital admission, Anticoagulation-related ED visit, Anticoagulation-related fatality           OBJECTIVE    INR Protime   Date Value Ref Range Status   10/15/2019 4.5 (A) 0.86 - 1.14 Final       ASSESSMENT / PLAN  INR assessment SUPRA    Recheck INR In: 10 DAYS    INR Location Clinic      Anticoagulation Summary  As of 10/15/2019    INR goal:   2.0-3.0   TTR:   44.3 % (1.3 y)   INR used for dosin.5! (10/15/2019)   Warfarin maintenance plan:   5 mg (5 mg x 1) every Mon, Wed, Fri; 2.5 mg (5 mg x 0.5) all other days   Full warfarin instructions:   5 mg every Mon, Wed, Fri; 2.5 mg all other days   Weekly warfarin total:   25 mg   Plan last modified:   Teresa Espino RN (10/15/2019)   Next INR check:   10/25/2019   Target end date:   Indefinite    Indications    Heart valve replaced [Z95.2] [Z95.2]  Long term current use of anticoagulants with INR goal of 2.0-3.0 [Z79.01]             Anticoagulation Episode Summary     INR check location:       Preferred lab:       Send INR reminders to:   [facility] HEART INR NURSE    Comments:   AVR in , Lovenox needed per Dr. Garcia due to possible TIA in       Anticoagulation Care Providers     Provider Role Specialty Phone number    March, Sundar Cantu MD Responsible Cardiology [phone]            See the Encounter Report to view Anticoagulation Flowsheet and Dosing Calendar (Go to Encounters tab in chart review, and find the Anticoagulation Therapy Visit)    INR 4.5.  He is taking tylenol 1 tab daily.  Less greens recently.  He has had several INRs 4 or greater.  We will decrease dosing by 2.5mg overall and recheck in 10 days.  He will eat some broccoli or greens in the next few days.  No bleeding.   Rios Espino RN

## 2019-10-23 DIAGNOSIS — Q87.40 MARFAN'S SYNDROME: ICD-10-CM

## 2019-10-23 DIAGNOSIS — Z95.2 S/P AORTIC VALVE REPLACEMENT: ICD-10-CM

## 2019-10-23 RX ORDER — WARFARIN SODIUM 5 MG/1
TABLET ORAL
Qty: 70 TABLET | Refills: 1 | Status: SHIPPED | OUTPATIENT
Start: 2019-10-23 | End: 2020-03-24

## 2019-10-25 ENCOUNTER — ANTICOAGULATION THERAPY VISIT (OUTPATIENT)
Dept: CARDIOLOGY | Facility: CLINIC | Age: 59
End: 2019-10-25
Payer: COMMERCIAL

## 2019-10-25 DIAGNOSIS — Z79.01 LONG TERM CURRENT USE OF ANTICOAGULANTS WITH INR GOAL OF 2.0-3.0: ICD-10-CM

## 2019-10-25 DIAGNOSIS — Z95.2 HEART VALVE REPLACED: ICD-10-CM

## 2019-10-25 LAB — INR POINT OF CARE: 3.1 (ref 0.86–1.14)

## 2019-10-25 PROCEDURE — 36416 COLLJ CAPILLARY BLOOD SPEC: CPT | Performed by: INTERNAL MEDICINE

## 2019-10-25 PROCEDURE — 85610 PROTHROMBIN TIME: CPT | Mod: QW | Performed by: INTERNAL MEDICINE

## 2019-10-25 NOTE — PROGRESS NOTES
ANTICOAGULATION FOLLOW-UP CLINIC VISIT    Patient Name:  Richard Ball  Date:  10/25/2019  Contact Type:  Face to Face    SUBJECTIVE:  Patient Findings     Positives:   Upcoming dental procedure, Change in medications    Comments:   Double root canal on Wednesday and took tylenol and advil         Clinical Outcomes     Negatives:   Major bleeding event, Thromboembolic event, Anticoagulation-related hospital admission, Anticoagulation-related ED visit, Anticoagulation-related fatality    Comments:   Double root canal on Wednesday and took tylenol and advil            OBJECTIVE    INR Protime   Date Value Ref Range Status   10/25/2019 3.1 (A) 0.86 - 1.14 Final       ASSESSMENT / PLAN  INR assessment SUPRA    Recheck INR In: 2 WEEKS    INR Location Clinic      Anticoagulation Summary  As of 10/25/2019    INR goal:   2.0-3.0   TTR:   43.3 % (1.3 y)   INR used for dosing:   3.1! (10/25/2019)   Warfarin maintenance plan:   5 mg (5 mg x 1) every Mon, Wed, Fri; 2.5 mg (5 mg x 0.5) all other days   Full warfarin instructions:   5 mg every Mon, Wed, Fri; 2.5 mg all other days   Weekly warfarin total:   25 mg   No change documented:   Teresa Espino, RN   Plan last modified:   Teresa Espino, RN (10/15/2019)   Next INR check:   11/8/2019   Target end date:   Indefinite    Indications    Heart valve replaced [Z95.2] [Z95.2]  Long term current use of anticoagulants with INR goal of 2.0-3.0 [Z79.01]             Anticoagulation Episode Summary     INR check location:       Preferred lab:       Send INR reminders to:   [facility] HEART INR NURSE    Comments:   AVR in 2001, Lovenox needed per Dr. Garcia due to possible TIA in 05/18      Anticoagulation Care Providers     Provider Role Specialty Phone number    March, Sundar Cantu MD Responsible Cardiology [phone]            See the Encounter Report to view Anticoagulation Flowsheet and Dosing Calendar (Go to Encounters tab in chart review, and find the Anticoagulation  Therapy Visit)    INR 3.1.  Dosing was decreased last time and he increased his greens.  On Wednesday he had a double root canal and took advil and tylenol for a couple days.  He took tylenol PM by accident.  He doesn't think he will need much more tylenol.  We will continue the lower schedule and recheck in 2 weeks.  Rios Espino, RN

## 2019-10-28 DIAGNOSIS — Q87.40 MARFAN'S SYNDROME: ICD-10-CM

## 2019-10-29 RX ORDER — LISINOPRIL 5 MG/1
5 TABLET ORAL DAILY
Qty: 90 TABLET | Refills: 1 | Status: SHIPPED | OUTPATIENT
Start: 2019-10-29 | End: 2020-03-24

## 2019-11-08 ENCOUNTER — ANTICOAGULATION THERAPY VISIT (OUTPATIENT)
Dept: CARDIOLOGY | Facility: CLINIC | Age: 59
End: 2019-11-08
Payer: COMMERCIAL

## 2019-11-08 DIAGNOSIS — Z95.2 HEART VALVE REPLACED: ICD-10-CM

## 2019-11-08 DIAGNOSIS — Z79.01 LONG TERM CURRENT USE OF ANTICOAGULANTS WITH INR GOAL OF 2.0-3.0: ICD-10-CM

## 2019-11-08 LAB — INR POINT OF CARE: 2.5 (ref 0.86–1.14)

## 2019-11-08 PROCEDURE — 36416 COLLJ CAPILLARY BLOOD SPEC: CPT | Performed by: INTERNAL MEDICINE

## 2019-11-08 PROCEDURE — 99207 ZZC NO CHARGE NURSE ONLY: CPT | Performed by: INTERNAL MEDICINE

## 2019-11-08 PROCEDURE — 85610 PROTHROMBIN TIME: CPT | Mod: QW | Performed by: INTERNAL MEDICINE

## 2019-11-08 NOTE — PROGRESS NOTES
ANTICOAGULATION FOLLOW-UP CLINIC VISIT    Patient Name:  Richard Ball  Date:  2019  Contact Type:  Face to Face    SUBJECTIVE:  Patient Findings         Clinical Outcomes     Negatives:   Major bleeding event, Thromboembolic event, Anticoagulation-related hospital admission, Anticoagulation-related ED visit, Anticoagulation-related fatality           OBJECTIVE    INR Protime   Date Value Ref Range Status   2019 2.5 (A) 0.86 - 1.14 Final       ASSESSMENT / PLAN  INR assessment THER    Recheck INR In: 4 WEEKS    INR Location Clinic      Anticoagulation Summary  As of 2019    INR goal:   2.0-3.0   TTR:   44.5 % (1.3 y)   INR used for dosin.5 (2019)   Warfarin maintenance plan:   5 mg (5 mg x 1) every Mon, Wed, Fri; 2.5 mg (5 mg x 0.5) all other days   Full warfarin instructions:   5 mg every Mon, Wed, Fri; 2.5 mg all other days   Weekly warfarin total:   25 mg   No change documented:   Tereas Espino RN   Plan last modified:   Teresa Espino RN (10/15/2019)   Next INR check:   2019   Target end date:   Indefinite    Indications    Heart valve replaced [Z95.2] [Z95.2]  Long term current use of anticoagulants with INR goal of 2.0-3.0 [Z79.01]             Anticoagulation Episode Summary     INR check location:       Preferred lab:       Send INR reminders to:   [facility] HEART INR NURSE    Comments:   AVR in , Lovenox needed per Dr. Garcia due to possible TIA in       Anticoagulation Care Providers     Provider Role Specialty Phone number    March, Sundar Cantu MD Responsible Cardiology [phone]            See the Encounter Report to view Anticoagulation Flowsheet and Dosing Calendar (Go to Encounters tab in chart review, and find the Anticoagulation Therapy Visit)    INR 2.5.  NO changes.  Continue same schedule and recheck in 4 weeks.  Rios Espino RN

## 2019-11-26 ENCOUNTER — ANCILLARY PROCEDURE (OUTPATIENT)
Dept: CARDIOLOGY | Facility: CLINIC | Age: 59
End: 2019-11-26
Attending: INTERNAL MEDICINE
Payer: COMMERCIAL

## 2019-11-26 DIAGNOSIS — Z95.0 CARDIAC PACEMAKER IN SITU: ICD-10-CM

## 2019-11-26 PROCEDURE — 93294 REM INTERROG EVL PM/LDLS PM: CPT | Performed by: INTERNAL MEDICINE

## 2019-11-26 PROCEDURE — 93296 REM INTERROG EVL PM/IDS: CPT | Performed by: INTERNAL MEDICINE

## 2019-11-27 LAB
MDC_IDC_LEAD_IMPLANT_DT: NORMAL
MDC_IDC_LEAD_IMPLANT_DT: NORMAL
MDC_IDC_LEAD_LOCATION: NORMAL
MDC_IDC_LEAD_LOCATION: NORMAL
MDC_IDC_LEAD_LOCATION_DETAIL_1: NORMAL
MDC_IDC_LEAD_LOCATION_DETAIL_1: NORMAL
MDC_IDC_LEAD_MFG: NORMAL
MDC_IDC_LEAD_MFG: NORMAL
MDC_IDC_LEAD_MODEL: NORMAL
MDC_IDC_LEAD_MODEL: NORMAL
MDC_IDC_LEAD_POLARITY_TYPE: NORMAL
MDC_IDC_LEAD_POLARITY_TYPE: NORMAL
MDC_IDC_LEAD_SERIAL: NORMAL
MDC_IDC_LEAD_SERIAL: NORMAL
MDC_IDC_MSMT_BATTERY_DTM: NORMAL
MDC_IDC_MSMT_BATTERY_IMPEDANCE: 2164 OHM
MDC_IDC_MSMT_BATTERY_REMAINING_LONGEVITY: 31 MO
MDC_IDC_MSMT_BATTERY_STATUS: NORMAL
MDC_IDC_MSMT_BATTERY_VOLTAGE: 2.75 V
MDC_IDC_MSMT_LEADCHNL_RA_IMPEDANCE_VALUE: 67 OHM
MDC_IDC_MSMT_LEADCHNL_RV_IMPEDANCE_VALUE: 521 OHM
MDC_IDC_MSMT_LEADCHNL_RV_PACING_THRESHOLD_AMPLITUDE: 0.75 V
MDC_IDC_MSMT_LEADCHNL_RV_PACING_THRESHOLD_PULSEWIDTH: 0.4 MS
MDC_IDC_PG_IMPLANT_DTM: NORMAL
MDC_IDC_PG_MFG: NORMAL
MDC_IDC_PG_MODEL: NORMAL
MDC_IDC_PG_SERIAL: NORMAL
MDC_IDC_PG_TYPE: NORMAL
MDC_IDC_SESS_CLINIC_NAME: NORMAL
MDC_IDC_SESS_DTM: NORMAL
MDC_IDC_SESS_TYPE: NORMAL
MDC_IDC_SET_BRADY_AT_MODE_SWITCH_MODE: NORMAL
MDC_IDC_SET_BRADY_AT_MODE_SWITCH_RATE: 150 {BEATS}/MIN
MDC_IDC_SET_BRADY_LOWRATE: 50 {BEATS}/MIN
MDC_IDC_SET_BRADY_MAX_SENSOR_RATE: 130 {BEATS}/MIN
MDC_IDC_SET_BRADY_MAX_TRACKING_RATE: 130 {BEATS}/MIN
MDC_IDC_SET_BRADY_MODE: NORMAL
MDC_IDC_SET_BRADY_SAV_DELAY_LOW: 120 MS
MDC_IDC_SET_LEADCHNL_RA_SENSING_ANODE_ELECTRODE_1: NORMAL
MDC_IDC_SET_LEADCHNL_RA_SENSING_ANODE_LOCATION_1: NORMAL
MDC_IDC_SET_LEADCHNL_RA_SENSING_CATHODE_ELECTRODE_1: NORMAL
MDC_IDC_SET_LEADCHNL_RA_SENSING_CATHODE_LOCATION_1: NORMAL
MDC_IDC_SET_LEADCHNL_RA_SENSING_POLARITY: NORMAL
MDC_IDC_SET_LEADCHNL_RA_SENSING_SENSITIVITY: 0.5 MV
MDC_IDC_SET_LEADCHNL_RV_PACING_AMPLITUDE: 2 V
MDC_IDC_SET_LEADCHNL_RV_PACING_ANODE_ELECTRODE_1: NORMAL
MDC_IDC_SET_LEADCHNL_RV_PACING_ANODE_LOCATION_1: NORMAL
MDC_IDC_SET_LEADCHNL_RV_PACING_CAPTURE_MODE: NORMAL
MDC_IDC_SET_LEADCHNL_RV_PACING_CATHODE_ELECTRODE_1: NORMAL
MDC_IDC_SET_LEADCHNL_RV_PACING_CATHODE_LOCATION_1: NORMAL
MDC_IDC_SET_LEADCHNL_RV_PACING_POLARITY: NORMAL
MDC_IDC_SET_LEADCHNL_RV_PACING_PULSEWIDTH: 0.4 MS
MDC_IDC_SET_LEADCHNL_RV_SENSING_ANODE_ELECTRODE_1: NORMAL
MDC_IDC_SET_LEADCHNL_RV_SENSING_ANODE_LOCATION_1: NORMAL
MDC_IDC_SET_LEADCHNL_RV_SENSING_CATHODE_ELECTRODE_1: NORMAL
MDC_IDC_SET_LEADCHNL_RV_SENSING_CATHODE_LOCATION_1: NORMAL
MDC_IDC_SET_LEADCHNL_RV_SENSING_POLARITY: NORMAL
MDC_IDC_SET_LEADCHNL_RV_SENSING_SENSITIVITY: 4 MV
MDC_IDC_SET_ZONE_DETECTION_INTERVAL: 400 MS
MDC_IDC_SET_ZONE_DETECTION_INTERVAL: 400 MS
MDC_IDC_SET_ZONE_TYPE: NORMAL
MDC_IDC_SET_ZONE_TYPE: NORMAL
MDC_IDC_STAT_AT_BURDEN_PERCENT: 0 %
MDC_IDC_STAT_AT_DTM_END: NORMAL
MDC_IDC_STAT_AT_DTM_START: NORMAL
MDC_IDC_STAT_AT_MODE_SW_COUNT: 705
MDC_IDC_STAT_BRADY_AP_VP_PERCENT: 0 %
MDC_IDC_STAT_BRADY_AP_VS_PERCENT: 0 %
MDC_IDC_STAT_BRADY_AS_VP_PERCENT: 100 %
MDC_IDC_STAT_BRADY_AS_VS_PERCENT: 0 %
MDC_IDC_STAT_BRADY_DTM_END: NORMAL
MDC_IDC_STAT_BRADY_DTM_START: NORMAL
MDC_IDC_STAT_EPISODE_RECENT_COUNT: 0
MDC_IDC_STAT_EPISODE_RECENT_COUNT: 7
MDC_IDC_STAT_EPISODE_RECENT_COUNT_DTM_END: NORMAL
MDC_IDC_STAT_EPISODE_RECENT_COUNT_DTM_END: NORMAL
MDC_IDC_STAT_EPISODE_RECENT_COUNT_DTM_START: NORMAL
MDC_IDC_STAT_EPISODE_RECENT_COUNT_DTM_START: NORMAL
MDC_IDC_STAT_EPISODE_TYPE: NORMAL
MDC_IDC_STAT_EPISODE_TYPE: NORMAL

## 2019-12-06 ENCOUNTER — ANTICOAGULATION THERAPY VISIT (OUTPATIENT)
Dept: CARDIOLOGY | Facility: CLINIC | Age: 59
End: 2019-12-06
Payer: COMMERCIAL

## 2019-12-06 DIAGNOSIS — Z79.01 LONG TERM CURRENT USE OF ANTICOAGULANTS WITH INR GOAL OF 2.0-3.0: ICD-10-CM

## 2019-12-06 DIAGNOSIS — Z95.2 HEART VALVE REPLACED: ICD-10-CM

## 2019-12-06 LAB — INR POINT OF CARE: 2.1 (ref 0.86–1.14)

## 2019-12-06 PROCEDURE — 36416 COLLJ CAPILLARY BLOOD SPEC: CPT | Performed by: INTERNAL MEDICINE

## 2019-12-06 PROCEDURE — 85610 PROTHROMBIN TIME: CPT | Mod: QW | Performed by: INTERNAL MEDICINE

## 2019-12-06 NOTE — PROGRESS NOTES
ANTICOAGULATION FOLLOW-UP CLINIC VISIT    Patient Name:  Richard Ball  Date:  2019  Contact Type:  Face to Face    SUBJECTIVE:  Patient Findings         Clinical Outcomes     Negatives:   Major bleeding event, Thromboembolic event, Anticoagulation-related hospital admission, Anticoagulation-related ED visit, Anticoagulation-related fatality           OBJECTIVE    INR Protime   Date Value Ref Range Status   2019 2.1 (A) 0.86 - 1.14 Final       ASSESSMENT / PLAN  INR assessment THER    Recheck INR In: 5 WEEKS    INR Location Clinic      Anticoagulation Summary  As of 2019    INR goal:   2.0-3.0   TTR:   40.7 % (1 y)   INR used for dosin.1 (2019)   Warfarin maintenance plan:   5 mg (5 mg x 1) every Mon, Wed, Fri; 2.5 mg (5 mg x 0.5) all other days   Full warfarin instructions:   5 mg every Mon, Wed, Fri; 2.5 mg all other days   Weekly warfarin total:   25 mg   No change documented:   Teresa Espino RN   Plan last modified:   Teresa Espino RN (10/15/2019)   Next INR check:   2020   Target end date:   Indefinite    Indications    Heart valve replaced [Z95.2] [Z95.2]  Long term current use of anticoagulants with INR goal of 2.0-3.0 [Z79.01]             Anticoagulation Episode Summary     INR check location:       Preferred lab:       Send INR reminders to:   [facility] HEART INR NURSE    Comments:   AVR in , Lovenox needed per Dr. Garcia due to possible TIA in       Anticoagulation Care Providers     Provider Role Specialty Phone number    March, Sundar Cantu MD Responsible Cardiology [phone]            See the Encounter Report to view Anticoagulation Flowsheet and Dosing Calendar (Go to Encounters tab in chart review, and find the Anticoagulation Therapy Visit)    INR 2.1.  NO missed doses.  INR has been trending down the last few checks.  He is due for bigger dose of 5mg tonight.  He doesn't plan to eat any dark greens.  He will be out of town for 2 weeks  around [place] and will be less greens and more ETOH.  Continue the same schedule and recheck in 5 weeks.  Rios Espino RN

## 2019-12-09 LAB
MDC_IDC_LEAD_IMPLANT_DT: NORMAL
MDC_IDC_LEAD_IMPLANT_DT: NORMAL
MDC_IDC_LEAD_LOCATION: NORMAL
MDC_IDC_LEAD_LOCATION: NORMAL
MDC_IDC_LEAD_LOCATION_DETAIL_1: NORMAL
MDC_IDC_LEAD_LOCATION_DETAIL_1: NORMAL
MDC_IDC_LEAD_MFG: NORMAL
MDC_IDC_LEAD_MFG: NORMAL
MDC_IDC_LEAD_MODEL: NORMAL
MDC_IDC_LEAD_MODEL: NORMAL
MDC_IDC_LEAD_POLARITY_TYPE: NORMAL
MDC_IDC_LEAD_POLARITY_TYPE: NORMAL
MDC_IDC_LEAD_SERIAL: NORMAL
MDC_IDC_LEAD_SERIAL: NORMAL
MDC_IDC_MSMT_BATTERY_DTM: NORMAL
MDC_IDC_MSMT_BATTERY_IMPEDANCE: 2114 OHM
MDC_IDC_MSMT_BATTERY_REMAINING_LONGEVITY: 31 MO
MDC_IDC_MSMT_BATTERY_STATUS: NORMAL
MDC_IDC_MSMT_BATTERY_VOLTAGE: 2.75 V
MDC_IDC_MSMT_LEADCHNL_RA_IMPEDANCE_VALUE: 67 OHM
MDC_IDC_MSMT_LEADCHNL_RV_IMPEDANCE_VALUE: 447 OHM
MDC_IDC_MSMT_LEADCHNL_RV_PACING_THRESHOLD_AMPLITUDE: 0.62 V
MDC_IDC_MSMT_LEADCHNL_RV_PACING_THRESHOLD_PULSEWIDTH: 0.4 MS
MDC_IDC_PG_IMPLANT_DTM: NORMAL
MDC_IDC_PG_MFG: NORMAL
MDC_IDC_PG_MODEL: NORMAL
MDC_IDC_PG_SERIAL: NORMAL
MDC_IDC_PG_TYPE: NORMAL
MDC_IDC_SESS_CLINIC_NAME: NORMAL
MDC_IDC_SESS_DTM: NORMAL
MDC_IDC_SESS_TYPE: NORMAL
MDC_IDC_SET_BRADY_AT_MODE_SWITCH_MODE: NORMAL
MDC_IDC_SET_BRADY_AT_MODE_SWITCH_RATE: 150 {BEATS}/MIN
MDC_IDC_SET_BRADY_LOWRATE: 50 {BEATS}/MIN
MDC_IDC_SET_BRADY_MAX_SENSOR_RATE: 130 {BEATS}/MIN
MDC_IDC_SET_BRADY_MAX_TRACKING_RATE: 130 {BEATS}/MIN
MDC_IDC_SET_BRADY_MODE: NORMAL
MDC_IDC_SET_BRADY_SAV_DELAY_LOW: 120 MS
MDC_IDC_SET_LEADCHNL_RA_SENSING_ANODE_ELECTRODE_1: NORMAL
MDC_IDC_SET_LEADCHNL_RA_SENSING_ANODE_LOCATION_1: NORMAL
MDC_IDC_SET_LEADCHNL_RA_SENSING_CATHODE_ELECTRODE_1: NORMAL
MDC_IDC_SET_LEADCHNL_RA_SENSING_CATHODE_LOCATION_1: NORMAL
MDC_IDC_SET_LEADCHNL_RA_SENSING_POLARITY: NORMAL
MDC_IDC_SET_LEADCHNL_RA_SENSING_SENSITIVITY: 0.5 MV
MDC_IDC_SET_LEADCHNL_RV_PACING_AMPLITUDE: 2 V
MDC_IDC_SET_LEADCHNL_RV_PACING_ANODE_ELECTRODE_1: NORMAL
MDC_IDC_SET_LEADCHNL_RV_PACING_ANODE_LOCATION_1: NORMAL
MDC_IDC_SET_LEADCHNL_RV_PACING_CAPTURE_MODE: NORMAL
MDC_IDC_SET_LEADCHNL_RV_PACING_CATHODE_ELECTRODE_1: NORMAL
MDC_IDC_SET_LEADCHNL_RV_PACING_CATHODE_LOCATION_1: NORMAL
MDC_IDC_SET_LEADCHNL_RV_PACING_POLARITY: NORMAL
MDC_IDC_SET_LEADCHNL_RV_PACING_PULSEWIDTH: 0.4 MS
MDC_IDC_SET_LEADCHNL_RV_SENSING_ANODE_ELECTRODE_1: NORMAL
MDC_IDC_SET_LEADCHNL_RV_SENSING_ANODE_LOCATION_1: NORMAL
MDC_IDC_SET_LEADCHNL_RV_SENSING_CATHODE_ELECTRODE_1: NORMAL
MDC_IDC_SET_LEADCHNL_RV_SENSING_CATHODE_LOCATION_1: NORMAL
MDC_IDC_SET_LEADCHNL_RV_SENSING_POLARITY: NORMAL
MDC_IDC_SET_LEADCHNL_RV_SENSING_SENSITIVITY: 4 MV
MDC_IDC_SET_ZONE_DETECTION_INTERVAL: 400 MS
MDC_IDC_SET_ZONE_DETECTION_INTERVAL: 400 MS
MDC_IDC_SET_ZONE_TYPE: NORMAL
MDC_IDC_SET_ZONE_TYPE: NORMAL
MDC_IDC_STAT_AT_BURDEN_PERCENT: 0.1 %
MDC_IDC_STAT_AT_DTM_END: NORMAL
MDC_IDC_STAT_AT_DTM_START: NORMAL
MDC_IDC_STAT_AT_MODE_SW_COUNT: 346
MDC_IDC_STAT_BRADY_AP_VP_PERCENT: 0 %
MDC_IDC_STAT_BRADY_AP_VS_PERCENT: 0 %
MDC_IDC_STAT_BRADY_AS_VP_PERCENT: 100 %
MDC_IDC_STAT_BRADY_AS_VS_PERCENT: 0 %
MDC_IDC_STAT_BRADY_DTM_END: NORMAL
MDC_IDC_STAT_BRADY_DTM_START: NORMAL
MDC_IDC_STAT_EPISODE_RECENT_COUNT: 0
MDC_IDC_STAT_EPISODE_RECENT_COUNT: 5
MDC_IDC_STAT_EPISODE_RECENT_COUNT_DTM_END: NORMAL
MDC_IDC_STAT_EPISODE_RECENT_COUNT_DTM_END: NORMAL
MDC_IDC_STAT_EPISODE_RECENT_COUNT_DTM_START: NORMAL
MDC_IDC_STAT_EPISODE_RECENT_COUNT_DTM_START: NORMAL
MDC_IDC_STAT_EPISODE_TYPE: NORMAL
MDC_IDC_STAT_EPISODE_TYPE: NORMAL

## 2020-01-14 ENCOUNTER — ANTICOAGULATION THERAPY VISIT (OUTPATIENT)
Dept: CARDIOLOGY | Facility: CLINIC | Age: 60
End: 2020-01-14
Payer: COMMERCIAL

## 2020-01-14 DIAGNOSIS — Z79.01 LONG TERM CURRENT USE OF ANTICOAGULANTS WITH INR GOAL OF 2.0-3.0: ICD-10-CM

## 2020-01-14 DIAGNOSIS — Z95.2 HEART VALVE REPLACED: ICD-10-CM

## 2020-01-14 LAB — INR POINT OF CARE: 2.8 (ref 0.86–1.14)

## 2020-01-14 PROCEDURE — 85610 PROTHROMBIN TIME: CPT | Mod: QW | Performed by: INTERNAL MEDICINE

## 2020-01-14 PROCEDURE — 99207 ZZC NO CHARGE NURSE ONLY: CPT | Performed by: INTERNAL MEDICINE

## 2020-01-14 PROCEDURE — 36416 COLLJ CAPILLARY BLOOD SPEC: CPT | Performed by: INTERNAL MEDICINE

## 2020-01-14 NOTE — PROGRESS NOTES
ANTICOAGULATION FOLLOW-UP CLINIC VISIT    Patient Name:  Richard Ball  Date:  2020  Contact Type:  Face to Face    SUBJECTIVE:  Patient Findings         Clinical Outcomes     Negatives:   Major bleeding event, Thromboembolic event, Anticoagulation-related hospital admission, Anticoagulation-related ED visit, Anticoagulation-related fatality           OBJECTIVE    INR Protime   Date Value Ref Range Status   2020 2.8 (A) 0.86 - 1.14 Final       ASSESSMENT / PLAN  INR assessment THER    Recheck INR In: 4 WEEKS    INR Location Clinic      Anticoagulation Summary  As of 2020    INR goal:   2.0-3.0   TTR:   49.3 % (1 y)   INR used for dosin.8 (2020)   Warfarin maintenance plan:   5 mg (5 mg x 1) every Mon, Wed, Fri; 2.5 mg (5 mg x 0.5) all other days   Full warfarin instructions:   5 mg every Mon, Wed, Fri; 2.5 mg all other days   Weekly warfarin total:   25 mg   No change documented:   Teresa Espino RN   Plan last modified:   Teresa Espino RN (10/15/2019)   Next INR check:   2020   Target end date:   Indefinite    Indications    Heart valve replaced [Z95.2] [Z95.2]  Long term current use of anticoagulants with INR goal of 2.0-3.0 [Z79.01]             Anticoagulation Episode Summary     INR check location:       Preferred lab:       Send INR reminders to:   [facility] HEART INR NURSE    Comments:   AVR in , Lovenox needed per Dr. Garcia due to possible TIA in       Anticoagulation Care Providers     Provider Role Specialty Phone number    March, Sundar Cantu MD Responsible Cardiology [phone]            See the Encounter Report to view Anticoagulation Flowsheet and Dosing Calendar (Go to Encounters tab in chart review, and find the Anticoagulation Therapy Visit)    INR 2.8.  No changes.  He missed a dose last week but made it up the next day.  Continue same schedule and recheck in 4 weeks.  Rios Espino RN

## 2020-02-06 DIAGNOSIS — E78.5 DYSLIPIDEMIA: ICD-10-CM

## 2020-02-07 RX ORDER — LOVASTATIN 40 MG
40 TABLET ORAL AT BEDTIME
Qty: 90 TABLET | Refills: 0 | Status: SHIPPED | OUTPATIENT
Start: 2020-02-07 | End: 2020-03-24

## 2020-02-10 ENCOUNTER — CARE COORDINATION (OUTPATIENT)
Dept: CARDIOLOGY | Facility: CLINIC | Age: 60
End: 2020-02-10

## 2020-02-10 DIAGNOSIS — E78.5 DYSLIPIDEMIA: Primary | ICD-10-CM

## 2020-02-10 NOTE — PROGRESS NOTES
Date: 2/10/2020    Time of Call: 4:29 PM     Diagnosis:  Hyperlipidemia     [ TORB ] Ordering provider: Dr Cantu March  Order: Received refill request for Lovastatin. Patient overdue for fasting cholesterol levels.     Order received by: Nahun Galindo RN     Follow-up/additional notes:  Called patient and left voicemail with recommendations to have fasting cholesterol labs drawn.  Provided call back number to call with further questions or concerns.

## 2020-02-14 ENCOUNTER — ANTICOAGULATION THERAPY VISIT (OUTPATIENT)
Dept: CARDIOLOGY | Facility: CLINIC | Age: 60
End: 2020-02-14
Payer: COMMERCIAL

## 2020-02-14 DIAGNOSIS — Z79.01 LONG TERM CURRENT USE OF ANTICOAGULANTS WITH INR GOAL OF 2.0-3.0: ICD-10-CM

## 2020-02-14 DIAGNOSIS — Z95.2 HEART VALVE REPLACED: ICD-10-CM

## 2020-02-14 LAB — INR POINT OF CARE: 2.8 (ref 0.86–1.14)

## 2020-02-14 PROCEDURE — 85610 PROTHROMBIN TIME: CPT | Mod: QW | Performed by: INTERNAL MEDICINE

## 2020-02-14 PROCEDURE — 36416 COLLJ CAPILLARY BLOOD SPEC: CPT | Performed by: INTERNAL MEDICINE

## 2020-02-14 NOTE — PROGRESS NOTES
ANTICOAGULATION FOLLOW-UP CLINIC VISIT    Patient Name:  Richard Ball  Date:  2020  Contact Type:  Face to Face    SUBJECTIVE:  Patient Findings         Clinical Outcomes     Negatives:   Major bleeding event, Thromboembolic event, Anticoagulation-related hospital admission, Anticoagulation-related ED visit, Anticoagulation-related fatality           OBJECTIVE    INR Protime   Date Value Ref Range Status   2020 2.8 (A) 0.86 - 1.14 Final       ASSESSMENT / PLAN  INR assessment THER    Recheck INR In: 4 WEEKS    INR Location Clinic      Anticoagulation Summary  As of 2020    INR goal:   2.0-3.0   TTR:   51.9 % (1 y)   INR used for dosin.8 (2020)   Warfarin maintenance plan:   5 mg (5 mg x 1) every Mon, Wed, Fri; 2.5 mg (5 mg x 0.5) all other days   Full warfarin instructions:   5 mg every Mon, Wed, Fri; 2.5 mg all other days   Weekly warfarin total:   25 mg   No change documented:   Teresa Espino RN   Plan last modified:   Teresa Espino RN (10/15/2019)   Next INR check:   3/13/2020   Target end date:   Indefinite    Indications    Heart valve replaced [Z95.2] [Z95.2]  Long term current use of anticoagulants with INR goal of 2.0-3.0 [Z79.01]             Anticoagulation Episode Summary     INR check location:       Preferred lab:       Send INR reminders to:   [facility] HEART INR NURSE    Comments:   AVR in , Lovenox needed per Dr. Garcia due to possible TIA in       Anticoagulation Care Providers     Provider Role Specialty Phone number    March, Sundar Cantu MD Responsible Cardiology [phone]            See the Encounter Report to view Anticoagulation Flowsheet and Dosing Calendar (Go to Encounters tab in chart review, and find the Anticoagulation Therapy Visit)    INR 2.8.  No changes.  Continue same schedule and recheck in 4 weeks.  Rios Espino RN

## 2020-03-02 ENCOUNTER — HEALTH MAINTENANCE LETTER (OUTPATIENT)
Age: 60
End: 2020-03-02

## 2020-03-13 ENCOUNTER — ANTICOAGULATION THERAPY VISIT (OUTPATIENT)
Dept: CARDIOLOGY | Facility: CLINIC | Age: 60
End: 2020-03-13
Payer: COMMERCIAL

## 2020-03-13 DIAGNOSIS — Z79.01 LONG TERM CURRENT USE OF ANTICOAGULANTS WITH INR GOAL OF 2.0-3.0: ICD-10-CM

## 2020-03-13 DIAGNOSIS — Z95.2 HEART VALVE REPLACED: ICD-10-CM

## 2020-03-13 LAB — INR POINT OF CARE: 2.2 (ref 0.86–1.14)

## 2020-03-13 PROCEDURE — 85610 PROTHROMBIN TIME: CPT | Mod: QW | Performed by: INTERNAL MEDICINE

## 2020-03-13 PROCEDURE — 36416 COLLJ CAPILLARY BLOOD SPEC: CPT | Performed by: INTERNAL MEDICINE

## 2020-03-13 NOTE — PROGRESS NOTES
ANTICOAGULATION FOLLOW-UP CLINIC VISIT    Patient Name:  Richard Ball  Date:  3/13/2020  Contact Type:  Face to Face    SUBJECTIVE:  Patient Findings         Clinical Outcomes     Negatives:   Major bleeding event, Thromboembolic event, Anticoagulation-related hospital admission, Anticoagulation-related ED visit, Anticoagulation-related fatality           OBJECTIVE    INR Protime   Date Value Ref Range Status   2020 2.2 (A) 0.86 - 1.14 Final       ASSESSMENT / PLAN  INR assessment THER    Recheck INR In: 4 WEEKS    INR Location Clinic      Anticoagulation Summary  As of 3/13/2020    INR goal:   2.0-3.0   TTR:   53.4 % (1 y)   INR used for dosin.2 (3/13/2020)   Warfarin maintenance plan:   5 mg (5 mg x 1) every Mon, Wed, Fri; 2.5 mg (5 mg x 0.5) all other days   Full warfarin instructions:   5 mg every Mon, Wed, Fri; 2.5 mg all other days   Weekly warfarin total:   25 mg   No change documented:   Teresa Espino RN   Plan last modified:   Teresa Espino RN (10/15/2019)   Next INR check:   4/10/2020   Target end date:   Indefinite    Indications    Heart valve replaced [Z95.2] [Z95.2]  Long term current use of anticoagulants with INR goal of 2.0-3.0 [Z79.01]             Anticoagulation Episode Summary     INR check location:       Preferred lab:       Send INR reminders to:   [facility] HEART INR NURSE    Comments:   AVR in , Lovenox needed per Dr. Garcia due to possible TIA in       Anticoagulation Care Providers     Provider Role Specialty Phone number    March, Sundar Cantu MD Responsible Cardiology [phone]            See the Encounter Report to view Anticoagulation Flowsheet and Dosing Calendar (Go to Encounters tab in chart review, and find the Anticoagulation Therapy Visit)    INR 2.2.  NO changes.  Continue same schedule and recheck in 4 weeks.  Rios Espino RN

## 2020-03-16 ENCOUNTER — TELEPHONE (OUTPATIENT)
Dept: CARDIOLOGY | Facility: CLINIC | Age: 60
End: 2020-03-16

## 2020-03-16 NOTE — TELEPHONE ENCOUNTER
"Called Henrik to discuss his concerns.  Henrik states that he called in to discuss his risk in light of the coronavirus. Discussed the following:    According to the CDC those with underlying cardiovascular and respiratory conditions are considered at higher risk for complications due to the coronavirus.  At this time we recommend continuing to prioritize thorough hand hygiene, avoid touching your face, avoid touching common public surfaces if possible, staying home when you are ill, and trying to stay away from others that are ill.  We also are encouraging \"social distancing\" and avoiding crowds of more then 50 people according to the CDC. The CDC has a lot of really good information on their web site regarding how to stay well, symptoms to watch out for, and how to prepare your home.  I would encourage you to check out their web site for more information as it is being updated frequently.    If you have any concerns that you may have contracted the virus, we ask that you visit [email or web address] where you can set up a virtual visit with a provider to discuss your symptoms.  If they are concerned, they will direct you to one of the dedicated clinics that is performing curbside testing.    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  Henrik denies any further questions or concerns at this time.    Nahun Galindo, RN  Cardiology RN Care Coordinator  [phone]    "

## 2020-03-16 NOTE — TELEPHONE ENCOUNTER
Henrik left [place] wanting to speak to Dr. Garcia's nurse regarding some questions he has for his cardiology team.

## 2020-03-24 ENCOUNTER — MYC REFILL (OUTPATIENT)
Dept: CARDIOLOGY | Facility: CLINIC | Age: 60
End: 2020-03-24

## 2020-03-24 DIAGNOSIS — Q87.40 MARFAN'S SYNDROME: ICD-10-CM

## 2020-03-24 DIAGNOSIS — Z95.2 S/P AORTIC VALVE REPLACEMENT: ICD-10-CM

## 2020-03-24 DIAGNOSIS — E78.5 DYSLIPIDEMIA: ICD-10-CM

## 2020-03-24 RX ORDER — ATENOLOL 50 MG/1
50 TABLET ORAL DAILY
Qty: 90 TABLET | Refills: 1 | Status: SHIPPED | OUTPATIENT
Start: 2020-03-24 | End: 2020-10-06

## 2020-03-24 RX ORDER — LOVASTATIN 40 MG
40 TABLET ORAL AT BEDTIME
Qty: 90 TABLET | Refills: 1 | Status: SHIPPED | OUTPATIENT
Start: 2020-03-24 | End: 2021-02-16

## 2020-03-24 RX ORDER — LISINOPRIL 5 MG/1
5 TABLET ORAL DAILY
Qty: 90 TABLET | Refills: 1 | Status: SHIPPED | OUTPATIENT
Start: 2020-03-24 | End: 2020-10-06

## 2020-03-24 NOTE — TELEPHONE ENCOUNTER
warfarin ANTICOAGULANT (COUMADIN ANTICOAGULANT) 5 MG tablet     Last Written Prescription Date:  10/23/2019  Last Fill Quantity: 70,   # refills: 1  Last Office Visit : 6/7/2019  Future Office visit:  None  Routing refill request to provider for review/approval because:  Continue same dose??  Recheck INR??   Refer to clinic  Refer to clinic for review    Joselyn Rodrigues RN  Central Triage Red Flags/Med Refills          MEDICATIONS FILLED FOR PT CARE  atenolol (TENORMIN) 50 MG tablet      Last Written Prescription Date:  7/27/2019  Last Fill Quantity: 90,   # refills: 3  Last Office Visit : 6/7/2019  Future Office visit:  None  90 Tabs, 1 Refill sent to pharm  3/24/2020      lisinopril (ZESTRIL) 5 MG tablet   Last Written Prescription Date:  10/29/2019  Last Fill Quantity: 90,   # refills: 1  Last Office Visit : 6/7/2019  Future Office visit:  None  90 Tabs, 1 Refill sent to pharm 3/24/2020      lovastatin (MEVACOR) 40 MG tablet   Last Written Prescription Date:  2/7/2020  Last Fill Quantity: 90,   # refills: 0  Last Office Visit : 6/7/2019  Future Office visit:  None  90 Tabs, 1 Refill sent to pharm  3/24/2020    Joselyn Rodrigues RN  Central Triage Red Flags/Med Refills

## 2020-03-25 RX ORDER — WARFARIN SODIUM 5 MG/1
TABLET ORAL
Qty: 70 TABLET | Refills: 3 | Status: SHIPPED | OUTPATIENT
Start: 2020-03-25 | End: 2021-04-19

## 2020-03-25 NOTE — TELEPHONE ENCOUNTER
Refilled per protocol. Patient follows with INR clinic. Current INF goal is 2.0-3.0. Most recent INR (3/13/2020) was 2.2, within therapeutic range. Patient takes 5 mg every M, W, and F. Takes 2.5 mg all other days.

## 2020-04-02 ENCOUNTER — TELEPHONE (OUTPATIENT)
Dept: CARDIOLOGY | Facility: CLINIC | Age: 60
End: 2020-04-02

## 2020-04-02 DIAGNOSIS — Z79.01 LONG TERM CURRENT USE OF ANTICOAGULANTS WITH INR GOAL OF 2.0-3.0: Primary | ICD-10-CM

## 2020-04-02 NOTE — TELEPHONE ENCOUNTER
Left message for patient regarding switching INR to [facility] lab.  Standing order placed.  INR nurses from [facility] will contact patient with warfarin dosing instructions.  Rios DELA CRUZ

## 2020-04-14 ENCOUNTER — ANTICOAGULATION THERAPY VISIT (OUTPATIENT)
Dept: CARDIOLOGY | Facility: CLINIC | Age: 60
End: 2020-04-14
Payer: COMMERCIAL

## 2020-04-14 DIAGNOSIS — Z95.2 HEART VALVE REPLACED: ICD-10-CM

## 2020-04-14 DIAGNOSIS — Z79.01 LONG TERM CURRENT USE OF ANTICOAGULANTS WITH INR GOAL OF 2.0-3.0: ICD-10-CM

## 2020-04-14 DIAGNOSIS — E78.5 DYSLIPIDEMIA: ICD-10-CM

## 2020-04-14 LAB
CHOLEST SERPL-MCNC: 194 MG/DL
HDLC SERPL-MCNC: 46 MG/DL
INR PPP: 2.7 (ref 0.86–1.14)
LDLC SERPL CALC-MCNC: 94 MG/DL
NONHDLC SERPL-MCNC: 148 MG/DL
TRIGL SERPL-MCNC: 270 MG/DL

## 2020-04-14 PROCEDURE — 36415 COLL VENOUS BLD VENIPUNCTURE: CPT | Performed by: INTERNAL MEDICINE

## 2020-04-14 PROCEDURE — 99207 ZZC NO CHARGE LOS: CPT

## 2020-04-14 PROCEDURE — 85610 PROTHROMBIN TIME: CPT | Performed by: INTERNAL MEDICINE

## 2020-04-14 PROCEDURE — 80061 LIPID PANEL: CPT | Performed by: INTERNAL MEDICINE

## 2020-04-14 NOTE — PROGRESS NOTES
ANTICOAGULATION FOLLOW-UP CLINIC VISIT    Patient Name:  Richard Ball  Date:  2020  Contact Type:  Telephone    SUBJECTIVE:  Patient Findings         Clinical Outcomes     Negatives:   Major bleeding event, Thromboembolic event, Anticoagulation-related hospital admission, Anticoagulation-related ED visit, Anticoagulation-related fatality           OBJECTIVE    INR   Date Value Ref Range Status   2020 2.70 (H) 0.86 - 1.14 Final     Comment:     This test is intended for monitoring Coumadin therapy.  Results are not   accurate in patients with prolonged INR due to factor deficiency.         ASSESSMENT / PLAN  INR assessment THER    Recheck INR In: 5 WEEKS    INR Location Outside lab      Anticoagulation Summary  As of 2020    INR goal:   2.0-3.0   TTR:   60.6 % (1 y)   INR used for dosin.70 (2020)   Warfarin maintenance plan:   5 mg (5 mg x 1) every Mon, Wed, Fri; 2.5 mg (5 mg x 0.5) all other days   Full warfarin instructions:   5 mg every Mon, Wed, Fri; 2.5 mg all other days   Weekly warfarin total:   25 mg   No change documented:   Constance Chacko RN   Plan last modified:   Teresa Espino RN (10/15/2019)   Next INR check:   2020   Target end date:   Indefinite    Indications    Heart valve replaced [Z95.2] [Z95.2]  Long term current use of anticoagulants with INR goal of 2.0-3.0 [Z79.01]             Anticoagulation Episode Summary     INR check location:       Preferred lab:       Send INR reminders to:   [facility] HEART INR NURSE    Comments:   AVR in , Lovenox needed per Dr. Garcia due to possible TIA in       Anticoagulation Care Providers     Provider Role Specialty Phone number    March, Sundar Cantu MD Responsible Cardiology [phone]            See the Encounter Report to view Anticoagulation Flowsheet and Dosing Calendar (Go to Encounters tab in chart review, and find the Anticoagulation Therapy Visit)    INR 2.70 No change in meds or diet. Denies abnormal  bleeding or bruising. Will continue current dosing of 5 mg MWF and 2.5 mg all other days with recheck in 5 weeks.    Constance Chacko RN

## 2020-05-20 ENCOUNTER — ANTICOAGULATION THERAPY VISIT (OUTPATIENT)
Dept: CARDIOLOGY | Facility: CLINIC | Age: 60
End: 2020-05-20
Payer: COMMERCIAL

## 2020-05-20 DIAGNOSIS — Z79.01 LONG TERM CURRENT USE OF ANTICOAGULANTS WITH INR GOAL OF 2.0-3.0: ICD-10-CM

## 2020-05-20 DIAGNOSIS — Z95.2 HEART VALVE REPLACED: ICD-10-CM

## 2020-05-20 LAB
CAPILLARY BLOOD COLLECTION: NORMAL
INR PPP: 2.6 (ref 0.86–1.14)

## 2020-05-20 PROCEDURE — 85610 PROTHROMBIN TIME: CPT | Performed by: INTERNAL MEDICINE

## 2020-05-20 PROCEDURE — 36416 COLLJ CAPILLARY BLOOD SPEC: CPT | Performed by: INTERNAL MEDICINE

## 2020-05-20 PROCEDURE — 99207 ZZC NO CHARGE NURSE ONLY: CPT | Performed by: INTERNAL MEDICINE

## 2020-05-20 NOTE — PROGRESS NOTES
ANTICOAGULATION FOLLOW-UP CLINIC VISIT    Patient Name:  Richard Ball  Date:  2020  Contact Type:  Telephone    SUBJECTIVE:  Patient Findings         Clinical Outcomes     Negatives:   Major bleeding event, Thromboembolic event, Anticoagulation-related hospital admission, Anticoagulation-related ED visit, Anticoagulation-related fatality           OBJECTIVE    Recent labs: (last 7 days)     20  0851   INR 2.60*       ASSESSMENT / PLAN  INR assessment THER    Recheck INR In: 5 WEEKS    INR Location Outside lab      Anticoagulation Summary  As of 2020    INR goal:   2.0-3.0   TTR:   66.5 % (1 y)   INR used for dosin.60 (2020)   Warfarin maintenance plan:   5 mg (5 mg x 1) every Mon, Wed, Fri; 2.5 mg (5 mg x 0.5) all other days   Full warfarin instructions:   5 mg every Mon, Wed, Fri; 2.5 mg all other days   Weekly warfarin total:   25 mg   No change documented:   Constance Chacko RN   Plan last modified:   Teresa Espino RN (10/15/2019)   Next INR check:   2020   Priority:   Maintenance   Target end date:   Indefinite    Indications    Heart valve replaced [Z95.2] [Z95.2]  Long term current use of anticoagulants with INR goal of 2.0-3.0 [Z79.01]             Anticoagulation Episode Summary     INR check location:       Preferred lab:       Send INR reminders to:   [facility] HEART INR NURSE    Comments:   AVR in , Lovenox needed per Dr. Garcia due to possible TIA in       Anticoagulation Care Providers     Provider Role Specialty Phone number    March, Sundar Cantu MD Responsible Cardiology [phone]            See the Encounter Report to view Anticoagulation Flowsheet and Dosing Calendar (Go to Encounters tab in chart review, and find the Anticoagulation Therapy Visit)    INR 2.60  No change in meds or diet. Denies abnormal bleeding or bruising. Will continue current dosing of 5 mg MWF and 2.5 mg all other days with recheck in 5 weeks.    Constance Chacko RN

## 2020-05-22 ENCOUNTER — TELEPHONE (OUTPATIENT)
Dept: CARDIOLOGY | Facility: CLINIC | Age: 60
End: 2020-05-22

## 2020-05-22 NOTE — TELEPHONE ENCOUNTER
Don left VM requesting a callback from nurse regarding a really bad cough that he can not get rid of.

## 2020-05-26 ENCOUNTER — TELEPHONE (OUTPATIENT)
Dept: CARDIOLOGY | Facility: CLINIC | Age: 60
End: 2020-05-26

## 2020-05-26 NOTE — TELEPHONE ENCOUNTER
Called and discussed Don's cough. He notes that it is a cough that starts in the spring with a cold-like symptoms, and then the cough lasts through summer and into the fall. This has happened multiple springs in a row. He is wondering if there is a better inhaler for this as he does not want to deal with a cough for 6 months, like he did last year. Suggested patient connect with primary care physician for recommendations. Patient notes that he does not have a PCP, but does have a name of one that he will reach out to this week. Instructed patient to call us with more questions or concerns. Patient is also due for follow up in June. Sent to scheduling. Patient verbalized understanding and is in agreement to the plan.

## 2020-05-29 DIAGNOSIS — Z95.0 CARDIAC PACEMAKER IN SITU: ICD-10-CM

## 2020-05-29 DIAGNOSIS — I44.30 AV BLOCK: Primary | ICD-10-CM

## 2020-06-03 ENCOUNTER — ANCILLARY PROCEDURE (OUTPATIENT)
Dept: CARDIOLOGY | Facility: CLINIC | Age: 60
End: 2020-06-03
Attending: INTERNAL MEDICINE
Payer: COMMERCIAL

## 2020-06-03 DIAGNOSIS — I44.30 AV BLOCK: ICD-10-CM

## 2020-06-03 DIAGNOSIS — Z95.0 CARDIAC PACEMAKER IN SITU: ICD-10-CM

## 2020-06-03 PROCEDURE — 93296 REM INTERROG EVL PM/IDS: CPT | Performed by: INTERNAL MEDICINE

## 2020-06-03 PROCEDURE — 93294 REM INTERROG EVL PM/LDLS PM: CPT | Performed by: INTERNAL MEDICINE

## 2020-06-05 ENCOUNTER — TELEPHONE (OUTPATIENT)
Dept: CARDIOLOGY | Facility: CLINIC | Age: 60
End: 2020-06-05

## 2020-06-05 NOTE — TELEPHONE ENCOUNTER
PATIENT WELLNESS TELEPHONE SCREENING     Step 1: Answer all screening questions.     1. In the past 3 weeks, have you been exposed to someone with a known positive illness below?  COVID-19 (known or suspected):  no  Chickenpox: no   Measles: no  Pertussis: no  2. Do you have any of the following new symptoms or symptoms that have started within the past 14 days?   Fever (subjective or >100.0)?: no   A new cough:no   Shortness of breath: no   Chills?no   New loss of taste or smell?no   Generalized body aches?no   New persistent headache?no   New sore throat?no   Nausea, vomiting or diarrhea: no    Step 2: If the patient is positive for symptoms, consult the ordering provider/consult IP to determine if the procedure is deemed necessary and inform the patient you will call them back.     Step 3 . If no symptoms, patient informed of the no visitor policy in place. yes    Step 4. If positive new symptoms, and the procedure is deemed necessary. Notify your manager/supervisor. The patient must be informed to call the procedural department.  A team member with the appropriate PPE will bring the mask to the patient at door 2. The patient will be brought to the procedural department and registered over the phone.

## 2020-06-08 ENCOUNTER — HOSPITAL ENCOUNTER (OUTPATIENT)
Dept: CARDIOLOGY | Facility: CLINIC | Age: 60
Discharge: HOME | End: 2020-06-08
Attending: INTERNAL MEDICINE | Admitting: INTERNAL MEDICINE
Payer: COMMERCIAL

## 2020-06-08 DIAGNOSIS — Z95.2 S/P AVR (AORTIC VALVE REPLACEMENT): ICD-10-CM

## 2020-06-08 DIAGNOSIS — Q87.40 MARFAN'S SYNDROME: ICD-10-CM

## 2020-06-08 DIAGNOSIS — I71.21 ASCENDING AORTIC ANEURYSM (H): ICD-10-CM

## 2020-06-08 PROCEDURE — 93306 TTE W/DOPPLER COMPLETE: CPT | Mod: 26 | Performed by: INTERNAL MEDICINE

## 2020-06-08 PROCEDURE — 93306 TTE W/DOPPLER COMPLETE: CPT

## 2020-06-10 LAB
MDC_IDC_LEAD_IMPLANT_DT: NORMAL
MDC_IDC_LEAD_IMPLANT_DT: NORMAL
MDC_IDC_LEAD_LOCATION: NORMAL
MDC_IDC_LEAD_LOCATION: NORMAL
MDC_IDC_LEAD_LOCATION_DETAIL_1: NORMAL
MDC_IDC_LEAD_LOCATION_DETAIL_1: NORMAL
MDC_IDC_LEAD_MFG: NORMAL
MDC_IDC_LEAD_MFG: NORMAL
MDC_IDC_LEAD_MODEL: NORMAL
MDC_IDC_LEAD_MODEL: NORMAL
MDC_IDC_LEAD_POLARITY_TYPE: NORMAL
MDC_IDC_LEAD_POLARITY_TYPE: NORMAL
MDC_IDC_LEAD_SERIAL: NORMAL
MDC_IDC_LEAD_SERIAL: NORMAL
MDC_IDC_MSMT_BATTERY_DTM: NORMAL
MDC_IDC_MSMT_BATTERY_IMPEDANCE: 2387 OHM
MDC_IDC_MSMT_BATTERY_REMAINING_LONGEVITY: 29 MO
MDC_IDC_MSMT_BATTERY_STATUS: NORMAL
MDC_IDC_MSMT_BATTERY_VOLTAGE: 2.74 V
MDC_IDC_MSMT_LEADCHNL_RA_IMPEDANCE_VALUE: 67 OHM
MDC_IDC_MSMT_LEADCHNL_RV_IMPEDANCE_VALUE: 504 OHM
MDC_IDC_MSMT_LEADCHNL_RV_PACING_THRESHOLD_AMPLITUDE: 0.62 V
MDC_IDC_MSMT_LEADCHNL_RV_PACING_THRESHOLD_PULSEWIDTH: 0.4 MS
MDC_IDC_PG_IMPLANT_DTM: NORMAL
MDC_IDC_PG_MFG: NORMAL
MDC_IDC_PG_MODEL: NORMAL
MDC_IDC_PG_SERIAL: NORMAL
MDC_IDC_PG_TYPE: NORMAL
MDC_IDC_SESS_CLINIC_NAME: NORMAL
MDC_IDC_SESS_DTM: NORMAL
MDC_IDC_SESS_TYPE: NORMAL
MDC_IDC_SET_BRADY_AT_MODE_SWITCH_MODE: NORMAL
MDC_IDC_SET_BRADY_AT_MODE_SWITCH_RATE: 150 {BEATS}/MIN
MDC_IDC_SET_BRADY_LOWRATE: 50 {BEATS}/MIN
MDC_IDC_SET_BRADY_MAX_SENSOR_RATE: 130 {BEATS}/MIN
MDC_IDC_SET_BRADY_MAX_TRACKING_RATE: 130 {BEATS}/MIN
MDC_IDC_SET_BRADY_MODE: NORMAL
MDC_IDC_SET_BRADY_SAV_DELAY_LOW: 120 MS
MDC_IDC_SET_LEADCHNL_RA_SENSING_POLARITY: NORMAL
MDC_IDC_SET_LEADCHNL_RA_SENSING_SENSITIVITY: 0.5 MV
MDC_IDC_SET_LEADCHNL_RV_PACING_AMPLITUDE: 2 V
MDC_IDC_SET_LEADCHNL_RV_PACING_CAPTURE_MODE: NORMAL
MDC_IDC_SET_LEADCHNL_RV_PACING_POLARITY: NORMAL
MDC_IDC_SET_LEADCHNL_RV_PACING_PULSEWIDTH: 0.4 MS
MDC_IDC_SET_LEADCHNL_RV_SENSING_POLARITY: NORMAL
MDC_IDC_SET_LEADCHNL_RV_SENSING_SENSITIVITY: 4 MV
MDC_IDC_SET_ZONE_DETECTION_INTERVAL: 400 MS
MDC_IDC_SET_ZONE_DETECTION_INTERVAL: 400 MS
MDC_IDC_SET_ZONE_TYPE: NORMAL
MDC_IDC_SET_ZONE_TYPE: NORMAL
MDC_IDC_STAT_AT_BURDEN_PERCENT: 0 %
MDC_IDC_STAT_AT_DTM_END: NORMAL
MDC_IDC_STAT_AT_DTM_START: NORMAL
MDC_IDC_STAT_AT_MODE_SW_COUNT: 1391
MDC_IDC_STAT_BRADY_AP_VP_PERCENT: 0 %
MDC_IDC_STAT_BRADY_AP_VS_PERCENT: 0 %
MDC_IDC_STAT_BRADY_AS_VP_PERCENT: 100 %
MDC_IDC_STAT_BRADY_AS_VS_PERCENT: 0 %
MDC_IDC_STAT_BRADY_DTM_END: NORMAL
MDC_IDC_STAT_BRADY_DTM_START: NORMAL
MDC_IDC_STAT_EPISODE_RECENT_COUNT: 0
MDC_IDC_STAT_EPISODE_RECENT_COUNT: 12
MDC_IDC_STAT_EPISODE_RECENT_COUNT_DTM_END: NORMAL
MDC_IDC_STAT_EPISODE_RECENT_COUNT_DTM_END: NORMAL
MDC_IDC_STAT_EPISODE_RECENT_COUNT_DTM_START: NORMAL
MDC_IDC_STAT_EPISODE_RECENT_COUNT_DTM_START: NORMAL
MDC_IDC_STAT_EPISODE_TYPE: NORMAL
MDC_IDC_STAT_EPISODE_TYPE: NORMAL

## 2020-06-12 ENCOUNTER — VIRTUAL VISIT (OUTPATIENT)
Dept: CARDIOLOGY | Facility: CLINIC | Age: 60
End: 2020-06-12
Attending: INTERNAL MEDICINE
Payer: COMMERCIAL

## 2020-06-12 DIAGNOSIS — Q87.40 MARFAN'S SYNDROME: ICD-10-CM

## 2020-06-12 DIAGNOSIS — E78.5 HYPERLIPIDEMIA LDL GOAL <70: Primary | ICD-10-CM

## 2020-06-12 DIAGNOSIS — Z95.2 S/P AORTIC VALVE REPLACEMENT: ICD-10-CM

## 2020-06-12 PROCEDURE — 99214 OFFICE O/P EST MOD 30 MIN: CPT | Mod: 95 | Performed by: INTERNAL MEDICINE

## 2020-06-12 RX ORDER — ATORVASTATIN CALCIUM 40 MG/1
40 TABLET, FILM COATED ORAL DAILY
Qty: 90 TABLET | Refills: 3 | Status: SHIPPED | OUTPATIENT
Start: 2020-06-12 | End: 2021-06-24

## 2020-06-12 ASSESSMENT — PAIN SCALES - GENERAL: PAINLEVEL: NO PAIN (0)

## 2020-06-12 NOTE — LETTER
2020      RE: Richard Ball  [id number] [facility] [address]       Dear Colleague,    Thank you for the opportunity to participate in the care of your patient, Richard Ball, at the [facility] HEART [facility]. Please see a copy of my visit note below.    Richard Ball is a 60 year old male who is being evaluated via a billable video visit.        CARDIOLOGY CONSULTATION:    Mr. Ball is a delightful 60-year-old gentleman who I met last year with a history of Marfan syndrome.  His dad likely also had Marfan syndrome and  of a cerebral aneurysm when he was 2 year old.  His mom  when he was 4 of ovarian cancer, and he was raised by an adoptive family.  He has 2 children, ages 29 and 30.  Neither of them have been screened for Marfan syndrome.      Mr. Ball was diagnosed at age 24.  He had genetic testing done at the [facility] in [place].  He underwent surgery in  at [facility] in [place] by Dr. Arriola.  His ascending aorta was 5.5 cm at the time, but we do not have those operative reports.  His aortic valve was replaced with a mechanical valve, and they reimplanted his coronary arteries.  Last year, we did a coronary CTA and there was concern about significant coronary artery disease. He went on to have a coronary angiogram, and that showed no significant disease with he did have some disease; and his calcium score was high placing him in the 95%. We need to be aggressive with his cholesterol as a result of this.     Over the course of the past year, he has been doing well.  His INR has for the most part been stable.  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  He does have a pacemaker that was placed in  due to heart block, and his upper lead failed.  With only his lower lead working, he is 100% paced.  He is interested in potentially having the device removed and getting an  MRI-compatible one in the future and would be interested in meeting with Dr. Morales, but is aware that this would likely require lead extraction and that would not be recommended just for this reason in most cases.      His cholesterol, his LDL is 90 and HDL is 50s.  He is on a baby aspirin daily. He is on Lovastatin 40.  He was on lipitor in the past but was changed to lovastatin because of cost.  He has not had any chest pain or discomfort.  He knows to watch his diet in terms of eating less fatty foods.      He did have a cerebral CTA in 2018 that showed no evidence of cerebral aneurysms.  He did have a history of a TIA in the past but no deficits.      On his echo 6/8/2020 his valve is working well.  His last chest CTA showed his aorta was normal dimensions in 2018.         PAST MEDICAL HISTORY:  Past Medical History:   Diagnosis Date     Heart abnormality     Artifical aortic graft - ascending aorta     Hemothorax      Marfan's syndrome 12/6/2011       CURRENT MEDICATIONS:  Current Outpatient Medications   Medication Sig Dispense Refill     acetaminophen (TYLENOL) 500 MG tablet Take 1,000 mg by mouth 2 times daily       albuterol (VENTOLIN HFA) 108 (90 Base) MCG/ACT inhaler Inhale 2 puffs into the lungs every 6 hours as needed for wheezing Reported on 3/24/2017 1 Inhaler 3     aspirin 81 MG chewable tablet Take 1 tablet (81 mg) by mouth daily 90 tablet 3     atenolol (TENORMIN) 50 MG tablet Take 1 tablet (50 mg) by mouth daily 90 tablet 1     Fexofenadine HCl (ALLEGRA PO) Take by mouth daily as needed for allergies       lisinopril (ZESTRIL) 5 MG tablet Take 1 tablet (5 mg) by mouth daily 90 tablet 1     lovastatin (MEVACOR) 40 MG tablet Take 1 tablet (40 mg) by mouth At Bedtime 90 tablet 1     sildenafil (VIAGRA) 100 MG tablet Take  mg one hour prior to activity 10 tablet 1     warfarin ANTICOAGULANT (COUMADIN ANTICOAGULANT) 5 MG tablet Take 1 tab on Mondays, Wednesdays and Fridays and take 1/2 tab all  other days or as directed by INR clinic 70 tablet 3       PAST SURGICAL HISTORY:  Past Surgical History:   Procedure Laterality Date     AORTIC VALVE REPLACEMENT  8/2/2001    Ascending aorta graft     HC REMOVE TONSILS/ADENOIDS,<13 Y/O      T & A <13y.o.     HC VASECTOMY UNILAT/BILAT W POSTOP SEMEN      Vasectomy     IMPLANT PACEMAKER         ALLERGIES  Ambien [zolpidem]    FAMILY HX:  Family History   Problem Relation Age of Onset     Asthma No family hx of      Diabetes No family hx of      Hypertension No family hx of      Breast Cancer No family hx of      Cancer - colorectal No family hx of      Prostate Cancer No family hx of      Lipids No family hx of      Musculoskeletal Disorder No family hx of      Neurologic Disorder No family hx of        SOCIAL HX:  Social History     Socioeconomic History     Marital status:      Spouse name: None     Number of children: 2     Years of education: 16     Highest education level: None   Occupational History     Employer: UNEMPLOYED     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   Social Needs     Financial resource strain: None     Food insecurity     Worry: None     Inability: None     Transportation needs     Medical: None     Non-medical: None   Tobacco Use     Smoking status: Never Smoker     Smokeless tobacco: Never Used   Substance and Sexual Activity     Alcohol use: Yes     Alcohol/week: 4.0 standard drinks     Types: 4 Standard drinks or equivalent per week     Comment: 2 drinks week     Drug use: No     Sexual activity: Never   Lifestyle     Physical activity     Days per week: None     Minutes per session: None     Stress: None   Relationships     Social connections     Talks on phone: None     Gets together: None     Attends Rastafari service: None     Active member of club or organization: None     Attends meetings of clubs or organizations: None     Relationship status: None     Intimate partner violence     Fear of current or ex partner: None      Emotionally abused: None     Physically abused: None     Forced sexual activity: None   Other Topics Concern     Parent/sibling w/ CABG, MI or angioplasty before 65F 55M? No      Service Not Asked     Blood Transfusions Not Asked     Caffeine Concern Not Asked     Occupational Exposure Not Asked     Hobby Hazards Not Asked     Sleep Concern Not Asked     Stress Concern Not Asked     Weight Concern Not Asked     Special Diet Not Asked     Back Care Not Asked     Exercise Yes     Bike Helmet Not Asked     Seat Belt Yes     Self-Exams Yes   Social History Narrative     None       ROS:  Constitutional: No fever, chills, or sweats. No weight gain/loss.   ENT: No visual disturbance, ear ache, epistaxis, sore throat.   Allergies/Immunologic: Negative.   Respiratory: No cough, hemoptysis.   Cardiovascular: As per HPI.   GI: No nausea, vomiting, hematemesis, melena, or hematochezia.   : No urinary frequency, dysuria, or hematuria.   Integument: Negative.   Psychiatric: Negative.   Neuro: Negative.   Endocrinology: Negative.   Musculoskeletal: No myalgia.    VITAL SIGNS:  There were no vitals taken for this visit.  There is no height or weight on file to calculate BMI.  Wt Readings from Last 2 Encounters:   08/15/19 73.2 kg (161 lb 4.8 oz)   07/12/19 73.8 kg (162 lb 12.8 oz)       PHYSICAL EXAM  Richard Ball IS A 60 year old male.in no acute distress.   General:  no apparent distress, normal body habitus, sitting upright.  ENT/Mouth:  membranes moist, no nasal discharge.  Normal head shape, no apparent injury or laceration.  Eyes:  no scleral icterus, normal conjunctivae.  No observed jaundice.  Neck:  no apparent neck swelling.   Chest/Lungs:  No breathing difficulty while speaking.  No audible wheezing.  No cough during conversation.  Cardiovascular:  No obviously elevated jugular venous pressure.  No apparent edema bilaterally in LE.   Extremities:  no apparent cyanosis.  Skin:  no xanthelasma.  No facial  lacerations.  Neurologic:  Normal arm motion bilateral, no tremors.    Psychiatric:  Alert and oriented x3, calm demeanor    The rest of the comprehensive physical examination is deferred due to public health emergency video visit restrictions.      LABS    Lab Results   Component Value Date    WBC 4.3 06/07/2019     Lab Results   Component Value Date    RBC 4.04 06/07/2019     Lab Results   Component Value Date    HGB 13.1 06/07/2019     Lab Results   Component Value Date    HCT 37.8 06/07/2019     No components found for: MCT  Lab Results   Component Value Date    MCV 94 06/07/2019     Lab Results   Component Value Date    MCH 32.4 06/07/2019     Lab Results   Component Value Date    MCHC 34.7 06/07/2019     Lab Results   Component Value Date    RDW 13.5 06/07/2019     Lab Results   Component Value Date     06/07/2019      Recent Labs   Lab Test 06/07/19  1231 06/27/18  1111    143   POTASSIUM 4.1 4.5   CHLORIDE 111* 110*   CO2 29 30   ANIONGAP 3 3   * 92   BUN 14 12   CR 0.83 0.92   CLINT 8.5 9.0     Recent Labs   Lab Test 04/14/20  0908 06/29/18  0818   CHOL 194 154   HDL 46 44   LDL 94 80   TRIG 270* 150*   NHDL 148* 110          IMPRESSION, REPORT, PLAN:   1.  Marfan syndrome.   2.  Aortic aneurysm, status post composite graft placement with a mechanical aortic valve at [facility] in [place] in 2001 by Dr. Arriola, functioning well.   3.  TIA 05/01/2018 with resolution of symptoms.  Negative CT of the head with no aneurysms found or bleeding.   4.  Hypertension, well controlled on atenolol and lisinopril.   5.  Hyperlipidemia   6.  Scoliosis.   7.  Need for family members screening for Marfan.   8.  Pacemaker placement in 2001 with failure of the atrial lead with 100% ventricular pacing.   9.  Coronary artery disease, nonobstructive, on cath 06/2018.        DISCUSSION:  It was a pleasure to see Mr. Ball in followup.  Clinically, he is doing well from a cardiovascular standpoint  without any concerning cardiac symptoms.  He has not had any chest pain or discomfort.  He is remaining active.      We will switch him to Lipitor for better cholesterol control with goal <70 and recheck lipids in 4-6 week (gave 40mg tabs and told to start with 1/2 tab daily).    We will plan to see him back in a year with an echo and have him see Dr. Morales at that time to discuss planning for device.      It was a pleasure to see him.  Please do not hesitate to contact me with any questions or concerns.      He will continue to see the ophthalmologist     Please do not hesitate to contact me if you have any questions/concerns.     Sincerely,     Sundar Garcia MD

## 2020-06-12 NOTE — PROGRESS NOTES
"Richard Ball is a 60 year old male who is being evaluated via a billable video visit.      The patient has been notified of following:     \"This video visit will be conducted via a call between you and your physician/provider. We have found that certain health care needs can be provided without the need for an in-person physical exam.  This service lets us provide the care you need with a video conversation.  If a prescription is necessary we can send it directly to your pharmacy.  If lab work is needed we can place an order for that and you can then stop by our lab to have the test done at a later time.    Video visits are billed at different rates depending on your insurance coverage.  Please reach out to your insurance provider with any questions.    If during the course of the call the physician/provider feels a video visit is not appropriate, you will not be charged for this service.\"    Patient has given verbal consent for Video visit? Yes    Will anyone else be joining your video visit? Yes:  possibly. How would they like to receive their invitation? Send to e-mail at: [email or web address]     AVS: MyChart       Medications were reconciled.     Anita Baum CMA    9:58 AM    Video platform: [employer]  Video duration:  27 min  Patient location: home  Provider location[place] [facility] [place]    CARDIOLOGY CONSULTATION:    Mr. Ball is a delightful 60-year-old gentleman who I met last year with a history of Marfan syndrome.  His dad likely also had Marfan syndrome and  of a cerebral aneurysm when he was 2 year old.  His mom  when he was 4 of ovarian cancer, and he was raised by an adoptive family.  He has 2 children, ages 29 and 30.  Neither of them have been screened for Marfan syndrome.      Mr. Ball was diagnosed at age 24.  He had genetic testing done at [facility] in [place].  He underwent surgery in  at [facility] in [place] by Dr. Arriola.  His ascending aorta was 5.5 cm at the " time, but we do not have those operative reports.  His aortic valve was replaced with a mechanical valve, and they reimplanted his coronary arteries.  Last year, we did a coronary CTA and there was concern about significant coronary artery disease. He went on to have a coronary angiogram, and that showed no significant disease with he did have some disease; and his calcium score was high placing him in the 95%. We need to be aggressive with his cholesterol as a result of this.     Over the course of the past year, he has been doing well.  His INR has for the most part been stable.  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  He does have a pacemaker that was placed in 2001 due to heart block, and his upper lead failed.  With only his lower lead working, he is 100% paced.  He is interested in potentially having the device removed and getting an MRI-compatible one in the future and would be interested in meeting with Dr. Morales, but is aware that this would likely require lead extraction and that would not be recommended just for this reason in most cases.      His cholesterol, his LDL is 90 and HDL is 50s.  He is on a baby aspirin daily. He is on Lovastatin 40.  He was on lipitor in the past but was changed to lovastatin because of cost.  He has not had any chest pain or discomfort.  He knows to watch his diet in terms of eating less fatty foods.      He did have a cerebral CTA in 2018 that showed no evidence of cerebral aneurysms.  He did have a history of a TIA in the past but no deficits.      On his echo 6/8/2020 his valve is working well.  His last chest CTA showed his aorta was normal dimensions in 2018.         PAST MEDICAL HISTORY:  Past Medical History:   Diagnosis Date     Heart abnormality     Artifical aortic graft - ascending aorta     Hemothorax      Marfan's syndrome 12/6/2011       CURRENT MEDICATIONS:  Current Outpatient Medications   Medication Sig Dispense Refill      acetaminophen (TYLENOL) 500 MG tablet Take 1,000 mg by mouth 2 times daily       albuterol (VENTOLIN HFA) 108 (90 Base) MCG/ACT inhaler Inhale 2 puffs into the lungs every 6 hours as needed for wheezing Reported on 3/24/2017 1 Inhaler 3     aspirin 81 MG chewable tablet Take 1 tablet (81 mg) by mouth daily 90 tablet 3     atenolol (TENORMIN) 50 MG tablet Take 1 tablet (50 mg) by mouth daily 90 tablet 1     Fexofenadine HCl (ALLEGRA PO) Take by mouth daily as needed for allergies       lisinopril (ZESTRIL) 5 MG tablet Take 1 tablet (5 mg) by mouth daily 90 tablet 1     lovastatin (MEVACOR) 40 MG tablet Take 1 tablet (40 mg) by mouth At Bedtime 90 tablet 1     sildenafil (VIAGRA) 100 MG tablet Take  mg one hour prior to activity 10 tablet 1     warfarin ANTICOAGULANT (COUMADIN ANTICOAGULANT) 5 MG tablet Take 1 tab on Mondays, Wednesdays and Fridays and take 1/2 tab all other days or as directed by INR clinic 70 tablet 3       PAST SURGICAL HISTORY:  Past Surgical History:   Procedure Laterality Date     AORTIC VALVE REPLACEMENT  8/2/2001    Ascending aorta graft     HC REMOVE TONSILS/ADENOIDS,<13 Y/O      T & A <13y.o.     HC VASECTOMY UNILAT/BILAT W POSTOP SEMEN      Vasectomy     IMPLANT PACEMAKER         ALLERGIES  Ambien [zolpidem]    FAMILY HX:  Family History   Problem Relation Age of Onset     Asthma No family hx of      Diabetes No family hx of      Hypertension No family hx of      Breast Cancer No family hx of      Cancer - colorectal No family hx of      Prostate Cancer No family hx of      Lipids No family hx of      Musculoskeletal Disorder No family hx of      Neurologic Disorder No family hx of        SOCIAL HX:  Social History     Socioeconomic History     Marital status:      Spouse name: None     Number of children: 2     Years of education: 16     Highest education level: None   Occupational History     Employer: UNEMPLOYED     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   Social Needs      Financial resource strain: None     Food insecurity     Worry: None     Inability: None     Transportation needs     Medical: None     Non-medical: None   Tobacco Use     Smoking status: Never Smoker     Smokeless tobacco: Never Used   Substance and Sexual Activity     Alcohol use: Yes     Alcohol/week: 4.0 standard drinks     Types: 4 Standard drinks or equivalent per week     Comment: 2 drinks week     Drug use: No     Sexual activity: Never   Lifestyle     Physical activity     Days per week: None     Minutes per session: None     Stress: None   Relationships     Social connections     Talks on phone: None     Gets together: None     Attends [employer]ish service: None     Active member of club or organization: None     Attends meetings of clubs or organizations: None     Relationship status: None     Intimate partner violence     Fear of current or ex partner: None     Emotionally abused: None     Physically abused: None     Forced sexual activity: None   Other Topics Concern     Parent/sibling w/ CABG, MI or angioplasty before 65F 55M? No      Service Not Asked     Blood Transfusions Not Asked     Caffeine Concern Not Asked     Occupational Exposure Not Asked     Hobby Hazards Not Asked     Sleep Concern Not Asked     Stress Concern Not Asked     Weight Concern Not Asked     Special Diet Not Asked     Back Care Not Asked     Exercise Yes     Bike Helmet Not Asked     Seat Belt Yes     Self-Exams Yes   Social History Narrative     None       ROS:  Constitutional: No fever, chills, or sweats. No weight gain/loss.   ENT: No visual disturbance, ear ache, epistaxis, sore throat.   Allergies/Immunologic: Negative.   Respiratory: No cough, hemoptysis.   Cardiovascular: As per HPI.   GI: No nausea, vomiting, hematemesis, melena, or hematochezia.   : No urinary frequency, dysuria, or hematuria.   Integument: Negative.   Psychiatric: Negative.   Neuro: Negative.   Endocrinology: Negative.   Musculoskeletal: No  myalgia.    VITAL SIGNS:  There were no vitals taken for this visit.  There is no height or weight on file to calculate BMI.  Wt Readings from Last 2 Encounters:   08/15/19 73.2 kg (161 lb 4.8 oz)   07/12/19 73.8 kg (162 lb 12.8 oz)       PHYSICAL EXAM  Richard Ball IS A 60 year old male.in no acute distress.   General:  no apparent distress, normal body habitus, sitting upright.  ENT/Mouth:  membranes moist, no nasal discharge.  Normal head shape, no apparent injury or laceration.  Eyes:  no scleral icterus, normal conjunctivae.  No observed jaundice.  Neck:  no apparent neck swelling.   Chest/Lungs:  No breathing difficulty while speaking.  No audible wheezing.  No cough during conversation.  Cardiovascular:  No obviously elevated jugular venous pressure.  No apparent edema bilaterally in LE.   Extremities:  no apparent cyanosis.  Skin:  no xanthelasma.  No facial lacerations.  Neurologic:  Normal arm motion bilateral, no tremors.    Psychiatric:  Alert and oriented x3, calm demeanor    The rest of the comprehensive physical examination is deferred due to public health emergency video visit restrictions.      LABS    Lab Results   Component Value Date    WBC 4.3 06/07/2019     Lab Results   Component Value Date    RBC 4.04 06/07/2019     Lab Results   Component Value Date    HGB 13.1 06/07/2019     Lab Results   Component Value Date    HCT 37.8 06/07/2019     No components found for: MCT  Lab Results   Component Value Date    MCV 94 06/07/2019     Lab Results   Component Value Date    MCH 32.4 06/07/2019     Lab Results   Component Value Date    MCHC 34.7 06/07/2019     Lab Results   Component Value Date    RDW 13.5 06/07/2019     Lab Results   Component Value Date     06/07/2019      Recent Labs   Lab Test 06/07/19  1231 06/27/18  1111    143   POTASSIUM 4.1 4.5   CHLORIDE 111* 110*   CO2 29 30   ANIONGAP 3 3   * 92   BUN 14 12   CR 0.83 0.92   CLINT 8.5 9.0     Recent Labs   Lab Test  04/14/20  0908 06/29/18  0818   CHOL 194 154   HDL 46 44   LDL 94 80   TRIG 270* 150*   NHDL 148* 110          IMPRESSION, REPORT, PLAN:   1.  Marfan syndrome.   2.  Aortic aneurysm, status post composite graft placement with a mechanical aortic valve at [facility] in [place] in 2001 by Dr. Arriola, functioning well.   3.  TIA 05/01/2018 with resolution of symptoms.  Negative CT of the head with no aneurysms found or bleeding.   4.  Hypertension, well controlled on atenolol and lisinopril.   5.  Hyperlipidemia   6.  Scoliosis.   7.  Need for family members screening for Marfan.   8.  Pacemaker placement in 2001 with failure of the atrial lead with 100% ventricular pacing.   9.  Coronary artery disease, nonobstructive, on cath 06/2018.        DISCUSSION:  It was a pleasure to see Mr. Ball in followup.  Clinically, he is doing well from a cardiovascular standpoint without any concerning cardiac symptoms.  He has not had any chest pain or discomfort.  He is remaining active.      We will switch him to Lipitor for better cholesterol control with goal <70 and recheck lipids in 4-6 week (gave 40mg tabs and told to start with 1/2 tab daily).    We will plan to see him back in a year with an echo and have him see Dr. Morales at that time to discuss planning for device.      It was a pleasure to see him.  Please do not hesitate to contact me with any questions or concerns.      He will continue to see the ophthalmologist        HARRY SAINI MD

## 2020-06-12 NOTE — PATIENT INSTRUCTIONS
You were seen today in the Adult Congenital and Cardiovascular Genetics Clinic at the [facility].    Cardiology Providers you saw during your visit:  LASHAWN Garcia MD    Diagnosis:  Marfans Syndrome    Results:  LASHAWN Garcia MD reviewed the results of your echo and device testing today in clinic.    Recommendations:    1. Continue to eat a heart healthy, low salt diet.  2. Continue to get 20-30 minutes of aerobic activity, 4-5 days per week.  Examples of aerobic activity include walking, running, swimming, cycling, etc.  3. Continue to observe good oral hygiene, with regular dental visits.  4. Begin taking Lipitor 20 mg daily.   5. Please have labs (fasting lipids) redrawn in 4-6 weeks. We will call you with those results.    SBE prophylaxis:   Yes____  No__X__    Lifelong Bacterial Endocarditis Prophylaxis:  YES____  NO____    If YES is checked, follow the recommendations outlined below:  1. Take antibiotic(s) prior to recommended dental procedures and procedures on the respiratory tract or with infected skin, muscle or bones. SBE prophylaxis is not needed for routine GI and  procedures (ie. Colonoscopy or vaginal delivery)  2. Observe good oral hygiene daily, as advised by your dentist. Get regular professional dental care.  3. Keep cuts clean.  4. Infections should be treated promptly.  5. Symptoms of Infective Endocarditis could include: fever lasting more than 4-5 days or a recurrent fever that initially resolves but returns within 1-2 days)    Exercise restrictions:   Yes__X__  No____         If yes, list restrictions:  Must be allowed to rest if fatigued or SOB    Work restrictions:  Yes____  No_X___         If yes, list restrictions:    FASTING CHOLESTEROL was checked in the last 5 years YES_X__  NO___ (2020)  Continue to eat a heart healthy, low salt diet.         ____ Fasting lipid panel order today         ____ No changes in medications          ____ I recommend the following changes  in your cholesterol medications.:          ____ Please follow up for cholesterol screening at your primary care physician      Follow-up:  Follow up with Dr. Garcia in one year with an echo prior.    If you have questions or concerns please contact us at:    Anita Webber, MSN, RN, CNL    Rachel Martin (Scheduling)  Nurse Care Coordinator     Clinic   Adult Congenital and CV Genetics   Adult Congenital and CV Genetic  [facility]   [facility]  (P) [phone]     (P) [phone]  [email or web address]   (F) [phone]        For after hours urgent needs, call [phone] and ask to speak to the Adult Congenital Physician on call.  Mention Job Code 0401.    For emergencies call 911.    [facility]  Mail Code [id number]  [address] [place] [address]

## 2020-06-24 ENCOUNTER — ANTICOAGULATION THERAPY VISIT (OUTPATIENT)
Dept: CARDIOLOGY | Facility: CLINIC | Age: 60
End: 2020-06-24

## 2020-06-24 DIAGNOSIS — Z79.01 LONG TERM CURRENT USE OF ANTICOAGULANTS WITH INR GOAL OF 2.0-3.0: ICD-10-CM

## 2020-06-24 DIAGNOSIS — Z95.2 S/P AORTIC VALVE REPLACEMENT: Primary | ICD-10-CM

## 2020-06-24 DIAGNOSIS — Z95.2 HEART VALVE REPLACED: ICD-10-CM

## 2020-06-24 LAB
CAPILLARY BLOOD COLLECTION: NORMAL
INR PPP: 2.9 (ref 0.86–1.14)

## 2020-06-24 PROCEDURE — 99207 ZZC NO CHARGE LOS: CPT

## 2020-06-24 PROCEDURE — 36416 COLLJ CAPILLARY BLOOD SPEC: CPT | Performed by: INTERNAL MEDICINE

## 2020-06-24 PROCEDURE — 85610 PROTHROMBIN TIME: CPT | Performed by: INTERNAL MEDICINE

## 2020-06-24 NOTE — PROGRESS NOTES
ANTICOAGULATION FOLLOW-UP CLINIC VISIT    Patient Name:  Richard Ball  Date:  2020  Contact Type:  Telephone    SUBJECTIVE:         OBJECTIVE    Recent labs: (last 7 days)     20  0906   INR 2.90*       ASSESSMENT / PLAN  INR assessment THER    Recheck INR In: 5 WEEKS    INR Location Outside lab      Anticoagulation Summary  As of 2020    INR goal:   2.0-3.0   TTR:   72.8 % (1 y)   INR used for dosin.90 (2020)   Warfarin maintenance plan:   5 mg (5 mg x 1) every Mon, Wed, Fri; 2.5 mg (5 mg x 0.5) all other days   Full warfarin instructions:   5 mg every Mon, Wed, Fri; 2.5 mg all other days   Weekly warfarin total:   25 mg   No change documented:   Constance Chacko, RN   Plan last modified:   Teresa Espino RN (10/15/2019)   Next INR check:      Priority:   Maintenance   Target end date:   Indefinite    Indications    Heart valve replaced [Z95.2] [Z95.2]  Long term current use of anticoagulants with INR goal of 2.0-3.0 [Z79.01]  S/P aortic valve replacement [Z95.2]             Anticoagulation Episode Summary     INR check location:       Preferred lab:       Send INR reminders to:   [facility] HEART INR NURSE    Comments:   AVR in , Lovenox needed per Dr. Garcia due to possible TIA in       Anticoagulation Care Providers     Provider Role Specialty Phone number    March, Sundar Cantu MD Referring Cardiology [phone]            See the Encounter Report to view Anticoagulation Flowsheet and Dosing Calendar (Go to Encounters tab in chart review, and find the Anticoagulation Therapy Visit)      INR 2.90 Left message asking pt to return the call to review results and ask if any change in meds, diet or bleeding issues. Left detailed instructions for pt to continue current dosing of 5 mg MWF and 2.5 mg all other days with recheck in 5 weeks. Asked that he call back with update and to verify that he received the instructions.  INR clinic referral renewal submitted for  signature.  Has the patient previously taken warfarin? yes  If yes, for what indication? S/p AVR    Does the patient have any of the following indications for a higher range of 2.5-3.5:    Mitral position mechanical valve? no    Jus-Shiley, Ball and Cage or Monoleaflet valve (regardless of position) no    Other (if yes, please explain) no    Constance Chacko RN    4:30 pm Left another detailed message with dosing and recheck instructions. Asked that he call back to verify that he got the instructions and let us know if he has had any changes. [facility]      Addendum 1645: Pt returned call, states no changes to diet/meds and no abnormal bleeding/bruising. Pt confirmed he will continue on his current dosing of 5mg MWF and 2.5mg all other days and recheck at the end of July.     Eloina DELA CRUZ

## 2020-07-24 ENCOUNTER — ANTICOAGULATION THERAPY VISIT (OUTPATIENT)
Dept: CARDIOLOGY | Facility: CLINIC | Age: 60
End: 2020-07-24

## 2020-07-24 DIAGNOSIS — E78.5 HYPERLIPIDEMIA LDL GOAL <70: ICD-10-CM

## 2020-07-24 DIAGNOSIS — Q87.40 MARFAN'S SYNDROME: ICD-10-CM

## 2020-07-24 DIAGNOSIS — Z79.01 LONG TERM CURRENT USE OF ANTICOAGULANTS WITH INR GOAL OF 2.0-3.0: ICD-10-CM

## 2020-07-24 DIAGNOSIS — Z95.2 S/P AORTIC VALVE REPLACEMENT: ICD-10-CM

## 2020-07-24 DIAGNOSIS — Z95.2 HEART VALVE REPLACED: ICD-10-CM

## 2020-07-24 LAB
CHOLEST SERPL-MCNC: 156 MG/DL
HDLC SERPL-MCNC: 38 MG/DL
INR PPP: 2.6 (ref 0.86–1.14)
LDLC SERPL CALC-MCNC: 80 MG/DL
NONHDLC SERPL-MCNC: 118 MG/DL
TRIGL SERPL-MCNC: 190 MG/DL

## 2020-07-24 PROCEDURE — 99207 ZZC NO CHARGE NURSE ONLY: CPT

## 2020-07-24 PROCEDURE — 36415 COLL VENOUS BLD VENIPUNCTURE: CPT | Performed by: INTERNAL MEDICINE

## 2020-07-24 PROCEDURE — 80061 LIPID PANEL: CPT | Performed by: INTERNAL MEDICINE

## 2020-07-24 PROCEDURE — 85610 PROTHROMBIN TIME: CPT | Performed by: INTERNAL MEDICINE

## 2020-07-24 NOTE — PROGRESS NOTES
ANTICOAGULATION FOLLOW-UP CLINIC VISIT    Patient Name:  Richard Ball  Date:  2020  Contact Type:  Telephone    SUBJECTIVE:  Patient Findings         Clinical Outcomes     Negatives:   Major bleeding event, Thromboembolic event, Anticoagulation-related hospital admission, Anticoagulation-related ED visit, Anticoagulation-related fatality           OBJECTIVE    Recent labs: (last 7 days)     20  0758   INR 2.60*       ASSESSMENT / PLAN  INR assessment THER    Recheck INR In: 5 WEEKS    INR Location Outside lab      Anticoagulation Summary  As of 2020    INR goal:   2.0-3.0   TTR:   81.0 % (1 y)   INR used for dosin.60 (2020)   Warfarin maintenance plan:   5 mg (5 mg x 1) every Mon, Wed, Fri; 2.5 mg (5 mg x 0.5) all other days   Full warfarin instructions:   5 mg every Mon, Wed, Fri; 2.5 mg all other days   Weekly warfarin total:   25 mg   No change documented:   Teresa Espino RN   Plan last modified:   Teresa Espino RN (10/15/2019)   Next INR check:   2020   Target end date:   Indefinite    Indications    Heart valve replaced [Z95.2] [Z95.2]  Long term current use of anticoagulants with INR goal of 2.0-3.0 [Z79.01]  S/P aortic valve replacement [Z95.2]             Anticoagulation Episode Summary     INR check location:       Preferred lab:       Send INR reminders to:   [facility] HEART INR NURSE    Comments:   AVR in , Lovenox needed per Dr. Garcia due to possible TIA in       Anticoagulation Care Providers     Provider Role Specialty Phone number    March, Sundar Cantu MD Referring Cardiology [phone]            See the Encounter Report to view Anticoagulation Flowsheet and Dosing Calendar (Go to Encounters tab in chart review, and find the Anticoagulation Therapy Visit)    INR 2.60.  Called and spoke to the patient.  NO changes.  Continue same schedule and recheck in 5 weeks.  Rios Espino RN

## 2020-07-30 ENCOUNTER — CARE COORDINATION (OUTPATIENT)
Dept: CARDIOLOGY | Facility: CLINIC | Age: 60
End: 2020-07-30

## 2020-08-11 ENCOUNTER — CARE COORDINATION (OUTPATIENT)
Dept: CARDIOLOGY | Facility: CLINIC | Age: 60
End: 2020-08-11

## 2020-08-11 ENCOUNTER — MEDICAL CORRESPONDENCE (OUTPATIENT)
Dept: HEALTH INFORMATION MANAGEMENT | Facility: CLINIC | Age: 60
End: 2020-08-11

## 2020-08-28 NOTE — TELEPHONE ENCOUNTER
Last Clinic Visit: 6/7/19     Luanne Sewell post total knee replacement follow up call. [address] coming today. Discussed using ice, distraction, and repositioning to manage pain besides using medication. Reminded patient not to get the wound wet and to cover it before bathing. Reminded patient the importance of doing exercises as shown. Reminded patient to change positions frequently and walking at least 3-4 times each day to promote circulation, decrease stiffness and soreness. Reinforced increased swelling, bruising and pain are normal after surgery when at home. Educated to lie down and raise leg while straight on pillows above heart level to help decrease swelling too. Reminded to healthy eat foods along with drinking plenty of fluids to promote healing. Reminded not  to take medication on an empty stomach to prevent nausea. Reminded to take a stool softener while taking a narcotic due to constipation being a side effect of anesthesia and narcotics. Taking medications as prescribed by provider. Luanne Sewell knows when their follow up appointment is.       Orthopaedic Navigator

## 2020-09-01 ENCOUNTER — ANTICOAGULATION THERAPY VISIT (OUTPATIENT)
Dept: CARDIOLOGY | Facility: CLINIC | Age: 60
End: 2020-09-01

## 2020-09-01 DIAGNOSIS — Z95.2 HEART VALVE REPLACED: ICD-10-CM

## 2020-09-01 DIAGNOSIS — Z95.2 S/P AORTIC VALVE REPLACEMENT: ICD-10-CM

## 2020-09-01 DIAGNOSIS — Z79.01 LONG TERM CURRENT USE OF ANTICOAGULANTS WITH INR GOAL OF 2.0-3.0: ICD-10-CM

## 2020-09-01 LAB
CAPILLARY BLOOD COLLECTION: NORMAL
INR PPP: 2.6 (ref 0.86–1.14)

## 2020-09-01 PROCEDURE — 85610 PROTHROMBIN TIME: CPT | Performed by: INTERNAL MEDICINE

## 2020-09-01 PROCEDURE — 36416 COLLJ CAPILLARY BLOOD SPEC: CPT | Performed by: INTERNAL MEDICINE

## 2020-09-01 NOTE — PROGRESS NOTES
ANTICOAGULATION FOLLOW-UP CLINIC VISIT    Patient Name:  Richard Ball  Date:  2020  Contact Type:  Telephone    SUBJECTIVE:  Patient Findings         Clinical Outcomes     Negatives:   Major bleeding event, Thromboembolic event, Anticoagulation-related hospital admission, Anticoagulation-related ED visit, Anticoagulation-related fatality           OBJECTIVE    Recent labs: (last 7 days)     20  0754   INR 2.60*       ASSESSMENT / PLAN  INR assessment THER    Recheck INR In: 5 WEEKS    INR Location Outside lab      Anticoagulation Summary  As of 2020    INR goal:   2.0-3.0   TTR:   89.1 % (1 y)   INR used for dosin.60 (2020)   Warfarin maintenance plan:   5 mg (5 mg x 1) every Mon, Wed, Fri; 2.5 mg (5 mg x 0.5) all other days   Full warfarin instructions:   5 mg every Mon, Wed, Fri; 2.5 mg all other days   Weekly warfarin total:   25 mg   No change documented:   Constance Chacko RN   Plan last modified:   Teresa Espino RN (10/15/2019)   Next INR check:   10/6/2020   Target end date:   Indefinite    Indications    Heart valve replaced [Z95.2] [Z95.2]  Long term current use of anticoagulants with INR goal of 2.0-3.0 [Z79.01]  S/P aortic valve replacement [Z95.2]             Anticoagulation Episode Summary     INR check location:       Preferred lab:       Send INR reminders to:   [facility] HEART INR NURSE    Comments:   AVR in , Lovenox needed per Dr. Garcia due to possible TIA in       Anticoagulation Care Providers     Provider Role Specialty Phone number    March, Sundar Cantu MD Referring Cardiology [phone]            See the Encounter Report to view Anticoagulation Flowsheet and Dosing Calendar (Go to Encounters tab in chart review, and find the Anticoagulation Therapy Visit)    INR 2.60 No change in meds or diet. Denies abnormal bleeding or bruising. WIll continue current dosing of 5 mg MWF and 2.5 mg all other days with recheck in 5 weeks.    Constance Chacko, RN

## 2020-09-04 ENCOUNTER — DOCUMENTATION ONLY (OUTPATIENT)
Dept: CARDIOLOGY | Facility: CLINIC | Age: 60
End: 2020-09-04

## 2020-09-04 NOTE — PROGRESS NOTES
Wellness Screening Tool    Symptom Screening:    Do you have one of the following NEW symptoms:      Fever (subjective or >100.0)?  No    New cough? No    Shortness of breath? No    Chills? No    New loss of taste or smell? No    Generalized body aches? No    New persistent headache? No    New sore throat? No    Nausea, vomiting or diarrhea? No    Within the past 2 weeks, have you been exposed to someone with a known positive illness below?      COVID - 19 (known or suspected) No    Chicken pox?  No    Measles? No    Pertussis? No    Have you had a positive COVID-19 diagnostic test (nasal swab test) in the last 14 days or are you currently   on self-quarantine restrictions (i.e.travel restriction, exposure, etc?) No        Patient notified of visitor restriction: Yes  Patient informed to wear a mask: Yes    Patient's appointment status: Patient will be seen in clinic as scheduled on 9/8/20 @ 3:15pm. Kianna BUTTS

## 2020-09-08 ENCOUNTER — ANCILLARY PROCEDURE (OUTPATIENT)
Dept: CARDIOLOGY | Facility: CLINIC | Age: 60
End: 2020-09-08
Attending: INTERNAL MEDICINE
Payer: COMMERCIAL

## 2020-09-08 DIAGNOSIS — Z95.0 CARDIAC PACEMAKER IN SITU: ICD-10-CM

## 2020-09-08 DIAGNOSIS — I44.30 AV BLOCK: ICD-10-CM

## 2020-09-08 PROCEDURE — 93280 PM DEVICE PROGR EVAL DUAL: CPT | Performed by: INTERNAL MEDICINE

## 2020-09-16 LAB
MDC_IDC_LEAD_IMPLANT_DT: NORMAL
MDC_IDC_LEAD_IMPLANT_DT: NORMAL
MDC_IDC_LEAD_LOCATION: NORMAL
MDC_IDC_LEAD_LOCATION: NORMAL
MDC_IDC_LEAD_LOCATION_DETAIL_1: NORMAL
MDC_IDC_LEAD_LOCATION_DETAIL_1: NORMAL
MDC_IDC_LEAD_MFG: NORMAL
MDC_IDC_LEAD_MFG: NORMAL
MDC_IDC_LEAD_MODEL: NORMAL
MDC_IDC_LEAD_MODEL: NORMAL
MDC_IDC_LEAD_POLARITY_TYPE: NORMAL
MDC_IDC_LEAD_POLARITY_TYPE: NORMAL
MDC_IDC_LEAD_SERIAL: NORMAL
MDC_IDC_LEAD_SERIAL: NORMAL
MDC_IDC_MSMT_BATTERY_DTM: NORMAL
MDC_IDC_MSMT_BATTERY_IMPEDANCE: 2585 OHM
MDC_IDC_MSMT_BATTERY_REMAINING_LONGEVITY: 28 MO
MDC_IDC_MSMT_BATTERY_STATUS: NORMAL
MDC_IDC_MSMT_BATTERY_VOLTAGE: 2.74 V
MDC_IDC_MSMT_LEADCHNL_RA_IMPEDANCE_VALUE: 67 OHM
MDC_IDC_MSMT_LEADCHNL_RV_IMPEDANCE_VALUE: 534 OHM
MDC_IDC_MSMT_LEADCHNL_RV_PACING_THRESHOLD_AMPLITUDE: 0.62 V
MDC_IDC_MSMT_LEADCHNL_RV_PACING_THRESHOLD_AMPLITUDE: 0.75 V
MDC_IDC_MSMT_LEADCHNL_RV_PACING_THRESHOLD_PULSEWIDTH: 0.4 MS
MDC_IDC_MSMT_LEADCHNL_RV_PACING_THRESHOLD_PULSEWIDTH: 0.4 MS
MDC_IDC_PG_IMPLANT_DTM: NORMAL
MDC_IDC_PG_MFG: NORMAL
MDC_IDC_PG_MODEL: NORMAL
MDC_IDC_PG_SERIAL: NORMAL
MDC_IDC_PG_TYPE: NORMAL
MDC_IDC_SESS_CLINIC_NAME: NORMAL
MDC_IDC_SESS_DTM: NORMAL
MDC_IDC_SESS_TYPE: NORMAL
MDC_IDC_SET_BRADY_AT_MODE_SWITCH_MODE: NORMAL
MDC_IDC_SET_BRADY_AT_MODE_SWITCH_RATE: 150 {BEATS}/MIN
MDC_IDC_SET_BRADY_LOWRATE: 50 {BEATS}/MIN
MDC_IDC_SET_BRADY_MAX_SENSOR_RATE: 130 {BEATS}/MIN
MDC_IDC_SET_BRADY_MAX_TRACKING_RATE: 130 {BEATS}/MIN
MDC_IDC_SET_BRADY_MODE: NORMAL
MDC_IDC_SET_BRADY_SAV_DELAY_LOW: 120 MS
MDC_IDC_SET_LEADCHNL_RA_SENSING_POLARITY: NORMAL
MDC_IDC_SET_LEADCHNL_RA_SENSING_SENSITIVITY: 0.5 MV
MDC_IDC_SET_LEADCHNL_RV_PACING_AMPLITUDE: 2 V
MDC_IDC_SET_LEADCHNL_RV_PACING_CAPTURE_MODE: NORMAL
MDC_IDC_SET_LEADCHNL_RV_PACING_POLARITY: NORMAL
MDC_IDC_SET_LEADCHNL_RV_PACING_PULSEWIDTH: 0.4 MS
MDC_IDC_SET_LEADCHNL_RV_SENSING_POLARITY: NORMAL
MDC_IDC_SET_LEADCHNL_RV_SENSING_SENSITIVITY: 4 MV
MDC_IDC_SET_ZONE_DETECTION_INTERVAL: 400 MS
MDC_IDC_SET_ZONE_DETECTION_INTERVAL: 400 MS
MDC_IDC_SET_ZONE_TYPE: NORMAL
MDC_IDC_SET_ZONE_TYPE: NORMAL
MDC_IDC_STAT_AT_BURDEN_PERCENT: 0 %
MDC_IDC_STAT_AT_DTM_END: NORMAL
MDC_IDC_STAT_AT_DTM_START: NORMAL
MDC_IDC_STAT_AT_MODE_SW_COUNT: 1828
MDC_IDC_STAT_BRADY_AP_VP_PERCENT: 0 %
MDC_IDC_STAT_BRADY_AP_VS_PERCENT: 0 %
MDC_IDC_STAT_BRADY_AS_VP_PERCENT: 100 %
MDC_IDC_STAT_BRADY_AS_VS_PERCENT: 0 %
MDC_IDC_STAT_BRADY_DTM_END: NORMAL
MDC_IDC_STAT_BRADY_DTM_START: NORMAL
MDC_IDC_STAT_EPISODE_RECENT_COUNT: 0
MDC_IDC_STAT_EPISODE_RECENT_COUNT: 13
MDC_IDC_STAT_EPISODE_RECENT_COUNT_DTM_END: NORMAL
MDC_IDC_STAT_EPISODE_RECENT_COUNT_DTM_END: NORMAL
MDC_IDC_STAT_EPISODE_RECENT_COUNT_DTM_START: NORMAL
MDC_IDC_STAT_EPISODE_RECENT_COUNT_DTM_START: NORMAL
MDC_IDC_STAT_EPISODE_TYPE: NORMAL
MDC_IDC_STAT_EPISODE_TYPE: NORMAL

## 2020-10-06 ENCOUNTER — ALLIED HEALTH/NURSE VISIT (OUTPATIENT)
Dept: FAMILY MEDICINE | Facility: CLINIC | Age: 60
End: 2020-10-06
Payer: COMMERCIAL

## 2020-10-06 ENCOUNTER — ANTICOAGULATION THERAPY VISIT (OUTPATIENT)
Dept: CARDIOLOGY | Facility: CLINIC | Age: 60
End: 2020-10-06
Payer: COMMERCIAL

## 2020-10-06 DIAGNOSIS — Z95.2 HEART VALVE REPLACED: ICD-10-CM

## 2020-10-06 DIAGNOSIS — Z23 NEED FOR PROPHYLACTIC VACCINATION AND INOCULATION AGAINST INFLUENZA: Primary | ICD-10-CM

## 2020-10-06 DIAGNOSIS — Z95.2 S/P AORTIC VALVE REPLACEMENT: ICD-10-CM

## 2020-10-06 DIAGNOSIS — Z79.01 LONG TERM CURRENT USE OF ANTICOAGULANTS WITH INR GOAL OF 2.0-3.0: ICD-10-CM

## 2020-10-06 DIAGNOSIS — Q87.40 MARFAN'S SYNDROME: ICD-10-CM

## 2020-10-06 LAB
CAPILLARY BLOOD COLLECTION: NORMAL
INR PPP: 3.3 (ref 0.86–1.14)

## 2020-10-06 PROCEDURE — 99207 PR NO CHARGE NURSE ONLY: CPT

## 2020-10-06 PROCEDURE — 90682 RIV4 VACC RECOMBINANT DNA IM: CPT

## 2020-10-06 PROCEDURE — 85610 PROTHROMBIN TIME: CPT | Performed by: INTERNAL MEDICINE

## 2020-10-06 PROCEDURE — 36416 COLLJ CAPILLARY BLOOD SPEC: CPT | Performed by: INTERNAL MEDICINE

## 2020-10-06 PROCEDURE — 90471 IMMUNIZATION ADMIN: CPT

## 2020-10-06 RX ORDER — ATENOLOL 25 MG/1
50 TABLET ORAL DAILY
Qty: 180 TABLET | Refills: 3 | Status: SHIPPED | OUTPATIENT
Start: 2020-10-06 | End: 2020-12-11

## 2020-10-06 RX ORDER — ATENOLOL 50 MG/1
50 TABLET ORAL DAILY
Qty: 90 TABLET | Refills: 1 | Status: SHIPPED | OUTPATIENT
Start: 2020-10-06 | End: 2020-10-06

## 2020-10-06 RX ORDER — LISINOPRIL 5 MG/1
5 TABLET ORAL DAILY
Qty: 90 TABLET | Refills: 1 | Status: SHIPPED | OUTPATIENT
Start: 2020-10-06 | End: 2021-04-07 | Stop reason: ALTCHOICE

## 2020-10-06 NOTE — PROGRESS NOTES
ANTICOAGULATION FOLLOW-UP CLINIC VISIT    Patient Name:  Richard Ball  Date:  10/6/2020  Contact Type:  Telephone    SUBJECTIVE:  Patient Findings         Clinical Outcomes     Negatives:  Major bleeding event, Thromboembolic event, Anticoagulation-related hospital admission, Anticoagulation-related ED visit, Anticoagulation-related fatality           OBJECTIVE    Recent labs: (last 7 days)     10/06/20  0731   INR 3.30*       ASSESSMENT / PLAN  INR assessment SUPRA    Recheck INR In: 3 WEEKS    INR Location Outside lab      Anticoagulation Summary  As of 10/6/2020    INR goal:  2.0-3.0   TTR:  90.3 % (1 y)   INR used for dosing:  3.30 (10/6/2020)   Warfarin maintenance plan:  5 mg (5 mg x 1) every Mon, Wed, Fri; 2.5 mg (5 mg x 0.5) all other days   Full warfarin instructions:  5 mg every Mon, Wed, Fri; 2.5 mg all other days   Weekly warfarin total:  25 mg   No change documented:  Teresa Espino RN   Plan last modified:  Teresa Espino RN (10/15/2019)   Next INR check:  10/28/2020   Target end date:  Indefinite    Indications    Heart valve replaced [Z95.2] [Z95.2]  Long term current use of anticoagulants with INR goal of 2.0-3.0 [Z79.01]  S/P aortic valve replacement [Z95.2]             Anticoagulation Episode Summary     INR check location:      Preferred lab:      Send INR reminders to:  [facility] HEART INR NURSE    Comments:  AVR in 2001, Lovenox needed per Dr. Garcia due to possible TIA in 05/18      Anticoagulation Care Providers     Provider Role Specialty Phone number    March, Sundar Cantu MD Referring Cardiology [phone]            See the Encounter Report to view Anticoagulation Flowsheet and Dosing Calendar (Go to Encounters tab in chart review, and find the Anticoagulation Therapy Visit)    INR 3.30.  Called and spoke to the patient.  In the past 1-2 months he has been taking 2-3 tabs of tylenol daily for allergies and back pain.  Flu shot today.  INRs have been very stable this year.   Less greens recently but he has salad in the frig to eat today.  He is due for smaller dose today so we will continue the same schedule and recheck in 3 weeks instead of 5 weeks.  NO bleeding.  Rios Espino RN

## 2020-10-28 ENCOUNTER — ANTICOAGULATION THERAPY VISIT (OUTPATIENT)
Dept: CARDIOLOGY | Facility: CLINIC | Age: 60
End: 2020-10-28
Payer: COMMERCIAL

## 2020-10-28 DIAGNOSIS — Z79.01 LONG TERM CURRENT USE OF ANTICOAGULANTS WITH INR GOAL OF 2.0-3.0: ICD-10-CM

## 2020-10-28 DIAGNOSIS — Z95.2 S/P AORTIC VALVE REPLACEMENT: ICD-10-CM

## 2020-10-28 DIAGNOSIS — Z95.2 HEART VALVE REPLACED: ICD-10-CM

## 2020-10-28 LAB
CAPILLARY BLOOD COLLECTION: NORMAL
INR PPP: 2.6 (ref 0.86–1.14)

## 2020-10-28 PROCEDURE — 99207 PR NO CHARGE NURSE ONLY: CPT | Performed by: INTERNAL MEDICINE

## 2020-10-28 PROCEDURE — 36416 COLLJ CAPILLARY BLOOD SPEC: CPT | Performed by: INTERNAL MEDICINE

## 2020-10-28 PROCEDURE — 85610 PROTHROMBIN TIME: CPT | Performed by: INTERNAL MEDICINE

## 2020-10-28 NOTE — PROGRESS NOTES
ANTICOAGULATION FOLLOW-UP CLINIC VISIT    Patient Name:  Richard Ball  Date:  10/28/2020  Contact Type:  Telephone    SUBJECTIVE:  Patient Findings         Clinical Outcomes     Negatives:  Major bleeding event, Thromboembolic event, Anticoagulation-related hospital admission, Anticoagulation-related ED visit, Anticoagulation-related fatality           OBJECTIVE    Recent labs: (last 7 days)     10/28/20  0731   INR 2.60*       ASSESSMENT / PLAN  INR assessment THER    Recheck INR In: 5 WEEKS    INR Location Outside lab      Anticoagulation Summary  As of 10/28/2020    INR goal:  2.0-3.0   TTR:  93.3 % (1 y)   INR used for dosin.60 (10/28/2020)   Warfarin maintenance plan:  5 mg (5 mg x 1) every Mon, Wed, Fri; 2.5 mg (5 mg x 0.5) all other days   Full warfarin instructions:  5 mg every Mon, Wed, Fri; 2.5 mg all other days   Weekly warfarin total:  25 mg   No change documented:  Jasmina Vegas RN   Plan last modified:  Teresa Espino RN (10/15/2019)   Next INR check:  2020   Target end date:  Indefinite    Indications    Heart valve replaced [Z95.2] [Z95.2]  Long term current use of anticoagulants with INR goal of 2.0-3.0 [Z79.01]  S/P aortic valve replacement [Z95.2]             Anticoagulation Episode Summary     INR check location:      Preferred lab:      Send INR reminders to:  [facility] HEART INR NURSE    Comments:  AVR in , Lovenox needed per Dr. Garcia due to possible TIA in       Anticoagulation Care Providers     Provider Role Specialty Phone number    March, Sundar Cantu MD Referring Cardiology [phone]            See the Encounter Report to view Anticoagulation Flowsheet and Dosing Calendar (Go to Encounters tab in chart review, and find the Anticoagulation Therapy Visit)    INR was 2.6 today. Denies any abnormal bleeding, bruising, recent illness, dietary or med changes. Will continue current dosing of 5 mg every MWF and 2.5 mg all other days of the week. Next INR check is  to be scheduled in 5 weeks-pt will call to schedule. Pt verbalized understanding.    Jasmina Vegas RN

## 2020-12-09 ENCOUNTER — ANTICOAGULATION THERAPY VISIT (OUTPATIENT)
Dept: CARDIOLOGY | Facility: CLINIC | Age: 60
End: 2020-12-09

## 2020-12-09 DIAGNOSIS — Z95.2 HEART VALVE REPLACED: ICD-10-CM

## 2020-12-09 DIAGNOSIS — Z95.2 S/P AORTIC VALVE REPLACEMENT: ICD-10-CM

## 2020-12-09 DIAGNOSIS — Z79.01 LONG TERM CURRENT USE OF ANTICOAGULANTS WITH INR GOAL OF 2.0-3.0: ICD-10-CM

## 2020-12-09 LAB
CAPILLARY BLOOD COLLECTION: NORMAL
INR PPP: 2.5 (ref 0.86–1.14)

## 2020-12-09 PROCEDURE — 85610 PROTHROMBIN TIME: CPT | Performed by: INTERNAL MEDICINE

## 2020-12-09 PROCEDURE — 99207 PR NO CHARGE NURSE ONLY: CPT | Performed by: INTERNAL MEDICINE

## 2020-12-09 PROCEDURE — 36416 COLLJ CAPILLARY BLOOD SPEC: CPT | Performed by: INTERNAL MEDICINE

## 2020-12-09 NOTE — PROGRESS NOTES
ANTICOAGULATION FOLLOW-UP CLINIC VISIT    Patient Name:  Richard Ball  Date:  2020  Contact Type:  Telephone    SUBJECTIVE:  Patient Findings         Clinical Outcomes     Negatives:  Major bleeding event, Thromboembolic event, Anticoagulation-related hospital admission, Anticoagulation-related ED visit, Anticoagulation-related fatality           OBJECTIVE    Recent labs: (last 7 days)     20  0807   INR 2.50*       ASSESSMENT / PLAN  INR assessment THER    Recheck INR In: 5 WEEKS    INR Location Outside lab      Anticoagulation Summary  As of 2020    INR goal:  2.0-3.0   TTR:  93.3 % (1 y)   INR used for dosin.50 (2020)   Warfarin maintenance plan:  5 mg (5 mg x 1) every Mon, Wed, Fri; 2.5 mg (5 mg x 0.5) all other days   Full warfarin instructions:  5 mg every Mon, Wed, Fri; 2.5 mg all other days   Weekly warfarin total:  25 mg   No change documented:  Constance Chacko RN   Plan last modified:  Teresa Espino RN (10/15/2019)   Next INR check:  2021   Target end date:  Indefinite    Indications    Heart valve replaced [Z95.2] [Z95.2]  Long term current use of anticoagulants with INR goal of 2.0-3.0 [Z79.01]  S/P aortic valve replacement [Z95.2]             Anticoagulation Episode Summary     INR check location:      Preferred lab:      Send INR reminders to:  [facility] HEART INR NURSE    Comments:  AVR in , Lovenox needed per Dr. Garcia due to possible TIA in       Anticoagulation Care Providers     Provider Role Specialty Phone number    March, Sundar Cantu MD Referring Interventional Cardiology [phone]            See the Encounter Report to view Anticoagulation Flowsheet and Dosing Calendar (Go to Encounters tab in chart review, and find the Anticoagulation Therapy Visit)    INR 2.50 No change in meds or diet. Denies abnormal bleeding or bruising. Will continue current dosing of 5 mg MWF and 2.5 mg all other days with recheck in 5 weeks.    Constance Chacko  RN

## 2020-12-11 ENCOUNTER — TELEPHONE (OUTPATIENT)
Dept: CARDIOLOGY | Facility: CLINIC | Age: 60
End: 2020-12-11

## 2020-12-11 ENCOUNTER — ANCILLARY PROCEDURE (OUTPATIENT)
Dept: CARDIOLOGY | Facility: CLINIC | Age: 60
End: 2020-12-11
Attending: INTERNAL MEDICINE
Payer: COMMERCIAL

## 2020-12-11 DIAGNOSIS — Z95.0 PACEMAKER: ICD-10-CM

## 2020-12-11 DIAGNOSIS — Z95.2 S/P AORTIC VALVE REPLACEMENT: Primary | ICD-10-CM

## 2020-12-11 DIAGNOSIS — Q87.40 MARFAN'S SYNDROME: ICD-10-CM

## 2020-12-11 PROCEDURE — 93296 REM INTERROG EVL PM/IDS: CPT | Performed by: INTERNAL MEDICINE

## 2020-12-11 PROCEDURE — 93294 REM INTERROG EVL PM/LDLS PM: CPT | Performed by: INTERNAL MEDICINE

## 2020-12-11 RX ORDER — ATENOLOL 25 MG/1
75 TABLET ORAL DAILY
Qty: 240 TABLET | Refills: 3 | Status: SHIPPED | OUTPATIENT
Start: 2020-12-11 | End: 2021-12-06

## 2020-12-11 NOTE — TELEPHONE ENCOUNTER
Date: 12/11/2020    Time of Call: 3:29 PM     Diagnosis:  Marfans, NSVT     [ TORB ] Ordering provider: Dr. Garcia  Order: increase atenolol to 75 mg daily, exercise stress echo, hold beta blocker prior, appt with Dr. Garcia     Order received by: LANIE Howard     Follow-up/additional notes: Called Don. Patient verbalized understanding and is in agreement to the plan.

## 2020-12-11 NOTE — TELEPHONE ENCOUNTER
Dr. Garcia,    FYI: Your patient had an episode of NSVT on today's device check. Episode lasted 22 beats, rates 120-210bpm. EF 60-65% (2020). No associated symptoms. Any changes?      [employer] Adapta (S) Remote PPM Device Check  : 100%  Mode: VDD  Presenting Rhythm: AS/  Heart Rate: Adequate heart rates per histogram   Sensing: stable   Pacing Threshold: stable   Impedance: stable   Battery Status: 7-42 months (25 months)  Atrial Arrhythmia: 515 mode switch episodes comprising <0.1% of the time. 1 EGM for review shows As>Vs for an atrial arrhythmia lasting 1 minute 53 seconds. Ventricular rates controlled.   Ventricular Arrhythmia: 1 ventricular high rate. EGM shows VS events suggestive of NSVT lasting 22 beats, rates 120-210bpm. EF 60-65% (2020). No associated symptoms. Reviewed with LANIE Haynes. Will notify Dr. Garcia of findings.     Care Plan: F/U PPM Carelink q 3 months. Gave results to pt over the phone. BENJAMÍN Milton

## 2020-12-15 LAB
MDC_IDC_LEAD_IMPLANT_DT: NORMAL
MDC_IDC_LEAD_IMPLANT_DT: NORMAL
MDC_IDC_LEAD_LOCATION: NORMAL
MDC_IDC_LEAD_LOCATION: NORMAL
MDC_IDC_LEAD_LOCATION_DETAIL_1: NORMAL
MDC_IDC_LEAD_LOCATION_DETAIL_1: NORMAL
MDC_IDC_LEAD_MFG: NORMAL
MDC_IDC_LEAD_MFG: NORMAL
MDC_IDC_LEAD_MODEL: NORMAL
MDC_IDC_LEAD_MODEL: NORMAL
MDC_IDC_LEAD_POLARITY_TYPE: NORMAL
MDC_IDC_LEAD_POLARITY_TYPE: NORMAL
MDC_IDC_LEAD_SERIAL: NORMAL
MDC_IDC_LEAD_SERIAL: NORMAL
MDC_IDC_MSMT_BATTERY_DTM: NORMAL
MDC_IDC_MSMT_BATTERY_IMPEDANCE: 2939 OHM
MDC_IDC_MSMT_BATTERY_REMAINING_LONGEVITY: 25 MO
MDC_IDC_MSMT_BATTERY_STATUS: NORMAL
MDC_IDC_MSMT_BATTERY_VOLTAGE: 2.73 V
MDC_IDC_MSMT_LEADCHNL_RA_IMPEDANCE_VALUE: 67 OHM
MDC_IDC_MSMT_LEADCHNL_RV_IMPEDANCE_VALUE: 537 OHM
MDC_IDC_MSMT_LEADCHNL_RV_PACING_THRESHOLD_AMPLITUDE: 0.62 V
MDC_IDC_MSMT_LEADCHNL_RV_PACING_THRESHOLD_PULSEWIDTH: 0.4 MS
MDC_IDC_PG_IMPLANT_DTM: NORMAL
MDC_IDC_PG_MFG: NORMAL
MDC_IDC_PG_MODEL: NORMAL
MDC_IDC_PG_SERIAL: NORMAL
MDC_IDC_PG_TYPE: NORMAL
MDC_IDC_SESS_CLINIC_NAME: NORMAL
MDC_IDC_SESS_DTM: NORMAL
MDC_IDC_SESS_TYPE: NORMAL
MDC_IDC_SET_BRADY_AT_MODE_SWITCH_MODE: NORMAL
MDC_IDC_SET_BRADY_AT_MODE_SWITCH_RATE: 150 {BEATS}/MIN
MDC_IDC_SET_BRADY_LOWRATE: 50 {BEATS}/MIN
MDC_IDC_SET_BRADY_MAX_SENSOR_RATE: 130 {BEATS}/MIN
MDC_IDC_SET_BRADY_MAX_TRACKING_RATE: 130 {BEATS}/MIN
MDC_IDC_SET_BRADY_MODE: NORMAL
MDC_IDC_SET_BRADY_SAV_DELAY_LOW: 120 MS
MDC_IDC_SET_LEADCHNL_RA_SENSING_POLARITY: NORMAL
MDC_IDC_SET_LEADCHNL_RA_SENSING_SENSITIVITY: 0.5 MV
MDC_IDC_SET_LEADCHNL_RV_PACING_AMPLITUDE: 2 V
MDC_IDC_SET_LEADCHNL_RV_PACING_CAPTURE_MODE: NORMAL
MDC_IDC_SET_LEADCHNL_RV_PACING_POLARITY: NORMAL
MDC_IDC_SET_LEADCHNL_RV_PACING_PULSEWIDTH: 0.4 MS
MDC_IDC_SET_LEADCHNL_RV_SENSING_POLARITY: NORMAL
MDC_IDC_SET_LEADCHNL_RV_SENSING_SENSITIVITY: 4 MV
MDC_IDC_SET_ZONE_DETECTION_INTERVAL: 400 MS
MDC_IDC_SET_ZONE_DETECTION_INTERVAL: 400 MS
MDC_IDC_SET_ZONE_TYPE: NORMAL
MDC_IDC_SET_ZONE_TYPE: NORMAL
MDC_IDC_STAT_AT_BURDEN_PERCENT: 0.1 %
MDC_IDC_STAT_AT_DTM_END: NORMAL
MDC_IDC_STAT_AT_DTM_START: NORMAL
MDC_IDC_STAT_AT_MODE_SW_COUNT: 514
MDC_IDC_STAT_BRADY_AP_VP_PERCENT: 0 %
MDC_IDC_STAT_BRADY_AP_VS_PERCENT: 0 %
MDC_IDC_STAT_BRADY_AS_VP_PERCENT: 100 %
MDC_IDC_STAT_BRADY_AS_VS_PERCENT: 0 %
MDC_IDC_STAT_BRADY_DTM_END: NORMAL
MDC_IDC_STAT_BRADY_DTM_START: NORMAL
MDC_IDC_STAT_EPISODE_RECENT_COUNT: 1
MDC_IDC_STAT_EPISODE_RECENT_COUNT: 1
MDC_IDC_STAT_EPISODE_RECENT_COUNT_DTM_END: NORMAL
MDC_IDC_STAT_EPISODE_RECENT_COUNT_DTM_END: NORMAL
MDC_IDC_STAT_EPISODE_RECENT_COUNT_DTM_START: NORMAL
MDC_IDC_STAT_EPISODE_RECENT_COUNT_DTM_START: NORMAL
MDC_IDC_STAT_EPISODE_TYPE: NORMAL
MDC_IDC_STAT_EPISODE_TYPE: NORMAL

## 2020-12-29 ENCOUNTER — MYC MEDICAL ADVICE (OUTPATIENT)
Dept: CARDIOLOGY | Facility: CLINIC | Age: 60
End: 2020-12-29

## 2020-12-29 DIAGNOSIS — Z95.0 PACEMAKER: ICD-10-CM

## 2020-12-29 DIAGNOSIS — Z95.2 S/P AORTIC VALVE REPLACEMENT: ICD-10-CM

## 2020-12-29 DIAGNOSIS — Z95.0 CARDIAC PACEMAKER IN SITU: ICD-10-CM

## 2020-12-29 DIAGNOSIS — Q87.40 MARFAN'S SYNDROME: Primary | ICD-10-CM

## 2021-01-06 ENCOUNTER — HOSPITAL ENCOUNTER (OUTPATIENT)
Dept: CARDIOLOGY | Facility: CLINIC | Age: 61
Discharge: HOME | End: 2021-01-06
Attending: INTERNAL MEDICINE | Admitting: INTERNAL MEDICINE
Payer: COMMERCIAL

## 2021-01-06 DIAGNOSIS — Z95.0 PACEMAKER: ICD-10-CM

## 2021-01-06 DIAGNOSIS — Q87.40 MARFAN'S SYNDROME: ICD-10-CM

## 2021-01-06 DIAGNOSIS — Z95.2 S/P AORTIC VALVE REPLACEMENT: ICD-10-CM

## 2021-01-06 PROCEDURE — 93325 DOPPLER ECHO COLOR FLOW MAPG: CPT | Mod: TC

## 2021-01-06 PROCEDURE — 93350 STRESS TTE ONLY: CPT | Mod: 26 | Performed by: INTERNAL MEDICINE

## 2021-01-06 PROCEDURE — 93325 DOPPLER ECHO COLOR FLOW MAPG: CPT | Mod: 26 | Performed by: INTERNAL MEDICINE

## 2021-01-06 PROCEDURE — 93321 DOPPLER ECHO F-UP/LMTD STD: CPT | Mod: 26 | Performed by: INTERNAL MEDICINE

## 2021-01-06 PROCEDURE — 93016 CV STRESS TEST SUPVJ ONLY: CPT | Performed by: INTERNAL MEDICINE

## 2021-01-06 PROCEDURE — 93018 CV STRESS TEST I&R ONLY: CPT | Performed by: INTERNAL MEDICINE

## 2021-01-06 PROCEDURE — 93350 STRESS TTE ONLY: CPT | Mod: TC

## 2021-01-13 ENCOUNTER — ANTICOAGULATION THERAPY VISIT (OUTPATIENT)
Dept: CARDIOLOGY | Facility: CLINIC | Age: 61
End: 2021-01-13
Payer: COMMERCIAL

## 2021-01-13 DIAGNOSIS — Z79.01 LONG TERM CURRENT USE OF ANTICOAGULANTS WITH INR GOAL OF 2.0-3.0: ICD-10-CM

## 2021-01-13 DIAGNOSIS — Z95.2 HEART VALVE REPLACED: ICD-10-CM

## 2021-01-13 DIAGNOSIS — Z95.2 S/P AORTIC VALVE REPLACEMENT: ICD-10-CM

## 2021-01-13 LAB
CAPILLARY BLOOD COLLECTION: NORMAL
INR PPP: 2.8 (ref 0.86–1.14)

## 2021-01-13 PROCEDURE — 85610 PROTHROMBIN TIME: CPT | Performed by: INTERNAL MEDICINE

## 2021-01-13 PROCEDURE — 36416 COLLJ CAPILLARY BLOOD SPEC: CPT | Performed by: INTERNAL MEDICINE

## 2021-01-13 PROCEDURE — 99207 PR NO CHARGE NURSE ONLY: CPT | Performed by: INTERNAL MEDICINE

## 2021-01-13 NOTE — TELEPHONE ENCOUNTER
Called Don to discuss MyChart message. Reviewed findings and noted that I will connect with Dr. Garcia regarding follow up. Inquired on how patient is currently feeling. He notes that since test, patient is noting abnormal rhythms, including speeding up, pausing, skipping. Discomfort in left arms for 20 minutes or so a few times has occurred. Will connect with Dr. Garcia and get back to patient. Patient verbalized understanding and is in agreement to the plan.

## 2021-01-13 NOTE — PROGRESS NOTES
ANTICOAGULATION FOLLOW-UP CLINIC VISIT    Patient Name:  Richard Ball  Date:  2021  Contact Type:  Telephone    SUBJECTIVE:  Patient Findings     Positives:  Change in medications (atenolol dose increased)        Clinical Outcomes     Negatives:  Major bleeding event, Thromboembolic event, Anticoagulation-related hospital admission, Anticoagulation-related ED visit, Anticoagulation-related fatality           OBJECTIVE    Recent labs: (last 7 days)     21  0748   INR 2.80*       ASSESSMENT / PLAN  INR assessment THER    Recheck INR In: 5 WEEKS    INR Location Outside lab      Anticoagulation Summary  As of 2021    INR goal:  2.0-3.0   TTR:  93.3 % (1 y)   INR used for dosin.80 (2021)   Warfarin maintenance plan:  5 mg (5 mg x 1) every Mon, Wed, Fri; 2.5 mg (5 mg x 0.5) all other days   Full warfarin instructions:  5 mg every Mon, Wed, Fri; 2.5 mg all other days   Weekly warfarin total:  25 mg   No change documented:  Constance Chacko RN   Plan last modified:  Teresa Espino RN (10/15/2019)   Next INR check:  2021   Target end date:  Indefinite    Indications    Heart valve replaced [Z95.2] [Z95.2]  Long term current use of anticoagulants with INR goal of 2.0-3.0 [Z79.01]  S/P aortic valve replacement [Z95.2]             Anticoagulation Episode Summary     INR check location:      Preferred lab:      Send INR reminders to:  [facility] HEART INR NURSE    Comments:  AVR in , Lovenox needed per Dr. Garcia due to possible TIA in       Anticoagulation Care Providers     Provider Role Specialty Phone number    March, Sundar Cantu MD Referring Interventional Cardiology [phone]            See the Encounter Report to view Anticoagulation Flowsheet and Dosing Calendar (Go to Encounters tab in chart review, and find the Anticoagulation Therapy Visit)    INR 2.80 Atenolol dose was increased in mid December because of NSVT noted on device check. No other med or diet changes.  Denies bleeding issues. Will continue current dosing of 5 mg MWF and 2.5 mg all other days with recheck in 5 weeks.     Constance Chacko RN

## 2021-01-14 NOTE — TELEPHONE ENCOUNTER
Date: 1/14/2021    Time of Call: 2:10 PM     Diagnosis:  Marfan's Syndrome     [ TORB ] Ordering provider:  March  Order: Referral to Dr. Morales to assess device and recent device check, device check prior     Order received by: LANIE Howard     Follow-up/additional notes: Sent to scheduling. Called and updated patient.

## 2021-01-15 ENCOUNTER — DOCUMENTATION ONLY (OUTPATIENT)
Dept: CARDIOLOGY | Facility: CLINIC | Age: 61
End: 2021-01-15

## 2021-01-15 DIAGNOSIS — I44.30 AV BLOCK: ICD-10-CM

## 2021-01-15 DIAGNOSIS — Z95.0 CARDIAC PACEMAKER IN SITU: Primary | ICD-10-CM

## 2021-01-15 NOTE — PROGRESS NOTES
Courtesy remote device check scheduled on 02/11/2021 per request prior to patient's visit with Dr. Morales on 02/12/2021.

## 2021-02-10 NOTE — PATIENT INSTRUCTIONS
You were seen today in the Adult Congenital and Cardiovascular Genetics Clinic at the [facility].    Cardiology Providers you saw during your visit:  Raman Morales MD    Diagnosis:  Marfan's    Results:  Raman Morales MD reviewed the results of your recent device testing today in clinic.    Recommendations:    1. Continue to eat a heart healthy, low salt diet.  2. Continue to get 20-30 minutes of aerobic activity, 4-5 days per week.  Examples of aerobic activity include walking, running, swimming, cycling, etc.  3. Continue to observe good oral hygiene, with regular dental visits.  4. We will place a 14 day Zio monitor today    Exercise restrictions:   Yes__X__  No____         If yes, list restrictions:  Must be allowed to rest if fatigued or SOB    Work restrictions:  Yes____  No_X___         If yes, list restrictions:    FASTING CHOLESTEROL was checked in the last 5 years YES_X__  NO___ (2020)  Continue to eat a heart healthy, low salt diet.         ____ Fasting lipid panel order today         ____ No changes in medications          ____ I recommend the following changes in your cholesterol medications.:          ____ Please follow up for cholesterol screening at your primary care physician      Follow-up:  Follow up with Dr Morales in one year with a device check.     If you have questions or concerns please contact us at:    nAita Webber, MSN, RN, CNL    Rachel Martin (Scheduling)  Nurse Care Coordinator     Clinic   Adult Congenital and CV Genetics   Adult Congenital and CV Genetic  [facility]  (P) [phone]     (P) [phone]  [email or web address]   (F) [phone]        For after hours urgent needs, call [phone] and ask to speak to the Adult Congenital Physician on call.  Mention Job Code 0401.    For emergencies call [phone].    [facility]    [facility]  Mail Code [id number]  [address] [place]  [address]

## 2021-02-11 ENCOUNTER — ANCILLARY PROCEDURE (OUTPATIENT)
Dept: CARDIOLOGY | Facility: CLINIC | Age: 61
End: 2021-02-11
Attending: INTERNAL MEDICINE
Payer: COMMERCIAL

## 2021-02-11 DIAGNOSIS — I44.30 AV BLOCK: ICD-10-CM

## 2021-02-11 DIAGNOSIS — Z95.0 CARDIAC PACEMAKER IN SITU: ICD-10-CM

## 2021-02-12 ENCOUNTER — ANCILLARY PROCEDURE (OUTPATIENT)
Dept: CARDIOLOGY | Facility: CLINIC | Age: 61
End: 2021-02-12
Attending: INTERNAL MEDICINE
Payer: COMMERCIAL

## 2021-02-12 VITALS
DIASTOLIC BLOOD PRESSURE: 75 MMHG | HEART RATE: 65 BPM | HEIGHT: 71 IN | WEIGHT: 174 LBS | BODY MASS INDEX: 24.36 KG/M2 | OXYGEN SATURATION: 97 % | SYSTOLIC BLOOD PRESSURE: 116 MMHG

## 2021-02-12 DIAGNOSIS — Z95.0 PACEMAKER: ICD-10-CM

## 2021-02-12 DIAGNOSIS — R00.2 PALPITATIONS: Primary | ICD-10-CM

## 2021-02-12 DIAGNOSIS — I44.30 AV BLOCK: ICD-10-CM

## 2021-02-12 DIAGNOSIS — E78.5 DYSLIPIDEMIA: ICD-10-CM

## 2021-02-12 DIAGNOSIS — R00.2 PALPITATIONS: ICD-10-CM

## 2021-02-12 LAB
MDC_IDC_LEAD_IMPLANT_DT: NORMAL
MDC_IDC_LEAD_IMPLANT_DT: NORMAL
MDC_IDC_LEAD_LOCATION: NORMAL
MDC_IDC_LEAD_LOCATION: NORMAL
MDC_IDC_LEAD_LOCATION_DETAIL_1: NORMAL
MDC_IDC_LEAD_LOCATION_DETAIL_1: NORMAL
MDC_IDC_LEAD_MFG: NORMAL
MDC_IDC_LEAD_MFG: NORMAL
MDC_IDC_LEAD_MODEL: NORMAL
MDC_IDC_LEAD_MODEL: NORMAL
MDC_IDC_LEAD_POLARITY_TYPE: NORMAL
MDC_IDC_LEAD_POLARITY_TYPE: NORMAL
MDC_IDC_LEAD_SERIAL: NORMAL
MDC_IDC_LEAD_SERIAL: NORMAL
MDC_IDC_MSMT_BATTERY_DTM: NORMAL
MDC_IDC_MSMT_BATTERY_IMPEDANCE: 3345 OHM
MDC_IDC_MSMT_BATTERY_REMAINING_LONGEVITY: 22 MO
MDC_IDC_MSMT_BATTERY_STATUS: NORMAL
MDC_IDC_MSMT_BATTERY_VOLTAGE: 2.72 V
MDC_IDC_MSMT_LEADCHNL_RA_IMPEDANCE_VALUE: 67 OHM
MDC_IDC_MSMT_LEADCHNL_RV_IMPEDANCE_VALUE: 587 OHM
MDC_IDC_MSMT_LEADCHNL_RV_PACING_THRESHOLD_AMPLITUDE: 0.62 V
MDC_IDC_MSMT_LEADCHNL_RV_PACING_THRESHOLD_PULSEWIDTH: 0.4 MS
MDC_IDC_PG_IMPLANT_DTM: NORMAL
MDC_IDC_PG_MFG: NORMAL
MDC_IDC_PG_MODEL: NORMAL
MDC_IDC_PG_SERIAL: NORMAL
MDC_IDC_PG_TYPE: NORMAL
MDC_IDC_SESS_CLINIC_NAME: NORMAL
MDC_IDC_SESS_DTM: NORMAL
MDC_IDC_SESS_TYPE: NORMAL
MDC_IDC_SET_BRADY_AT_MODE_SWITCH_MODE: NORMAL
MDC_IDC_SET_BRADY_AT_MODE_SWITCH_RATE: 150 {BEATS}/MIN
MDC_IDC_SET_BRADY_LOWRATE: 50 {BEATS}/MIN
MDC_IDC_SET_BRADY_MAX_SENSOR_RATE: 140 {BEATS}/MIN
MDC_IDC_SET_BRADY_MAX_TRACKING_RATE: 140 {BEATS}/MIN
MDC_IDC_SET_BRADY_MODE: NORMAL
MDC_IDC_SET_BRADY_SAV_DELAY_LOW: 120 MS
MDC_IDC_SET_LEADCHNL_RA_SENSING_POLARITY: NORMAL
MDC_IDC_SET_LEADCHNL_RA_SENSING_SENSITIVITY: 0.5 MV
MDC_IDC_SET_LEADCHNL_RV_PACING_AMPLITUDE: 2 V
MDC_IDC_SET_LEADCHNL_RV_PACING_CAPTURE_MODE: NORMAL
MDC_IDC_SET_LEADCHNL_RV_PACING_POLARITY: NORMAL
MDC_IDC_SET_LEADCHNL_RV_PACING_PULSEWIDTH: 0.4 MS
MDC_IDC_SET_LEADCHNL_RV_SENSING_POLARITY: NORMAL
MDC_IDC_SET_LEADCHNL_RV_SENSING_SENSITIVITY: 4 MV
MDC_IDC_SET_ZONE_DETECTION_INTERVAL: 400 MS
MDC_IDC_SET_ZONE_DETECTION_INTERVAL: 400 MS
MDC_IDC_SET_ZONE_TYPE: NORMAL
MDC_IDC_SET_ZONE_TYPE: NORMAL
MDC_IDC_STAT_AT_BURDEN_PERCENT: 0.1 %
MDC_IDC_STAT_AT_DTM_END: NORMAL
MDC_IDC_STAT_AT_DTM_START: NORMAL
MDC_IDC_STAT_AT_MODE_SW_COUNT: 918
MDC_IDC_STAT_BRADY_AP_VP_PERCENT: 0 %
MDC_IDC_STAT_BRADY_AP_VS_PERCENT: 0 %
MDC_IDC_STAT_BRADY_AS_VP_PERCENT: 100 %
MDC_IDC_STAT_BRADY_AS_VS_PERCENT: 0 %
MDC_IDC_STAT_BRADY_DTM_END: NORMAL
MDC_IDC_STAT_BRADY_DTM_START: NORMAL
MDC_IDC_STAT_EPISODE_RECENT_COUNT: 1
MDC_IDC_STAT_EPISODE_RECENT_COUNT: 6
MDC_IDC_STAT_EPISODE_RECENT_COUNT_DTM_END: NORMAL
MDC_IDC_STAT_EPISODE_RECENT_COUNT_DTM_END: NORMAL
MDC_IDC_STAT_EPISODE_RECENT_COUNT_DTM_START: NORMAL
MDC_IDC_STAT_EPISODE_RECENT_COUNT_DTM_START: NORMAL
MDC_IDC_STAT_EPISODE_TYPE: NORMAL
MDC_IDC_STAT_EPISODE_TYPE: NORMAL

## 2021-02-12 PROCEDURE — G0463 HOSPITAL OUTPT CLINIC VISIT: HCPCS

## 2021-02-12 PROCEDURE — 99204 OFFICE O/P NEW MOD 45 MIN: CPT | Performed by: INTERNAL MEDICINE

## 2021-02-12 RX ORDER — ATENOLOL 50 MG/1
50 TABLET ORAL DAILY
COMMUNITY
Start: 2021-01-30 | End: 2021-12-06

## 2021-02-12 RX ORDER — AMOXICILLIN 500 MG/1
CAPSULE ORAL
COMMUNITY
Start: 2020-08-19

## 2021-02-12 ASSESSMENT — PAIN SCALES - GENERAL: PAINLEVEL: NO PAIN (0)

## 2021-02-12 ASSESSMENT — MIFFLIN-ST. JEOR: SCORE: 1618.45

## 2021-02-12 NOTE — PROGRESS NOTES
"CV Genetics Electrophysiology Clinic Note  HPI:   Mr. Ball is a 60 year old male who has a past medical history significant for Marfan syndrome, s/p mechanical AVR and reimplantation of coronary arteries , TIA, CHB s/p PPM  and gen change in .      He was diagnosed with Marfan syndrome at age 24.  He had genetic testing done at the [facility] in [place].  He underwent surgery in  at [facility] in [place] by Dr. Arriola.  His ascending aorta was 5.5 cm at the time, but we do not have those operative reports.  His aortic valve was replaced with a mechanical valve, and they reimplanted his coronary arteries. Appears he had post operative CHB and required PPM implant. His RA lead has had noise and failed in 2018 and has been programmed VDD since. He has been 100% . He is interested in having RA lead replaced and values maintaining MRI compatibility. In 2018, he had a CTA and there was concern about significant coronary artery disease. He went on to have a coronary angiogram, and that showed no significant disease with he did have some disease; and his calcium score was high placing him in the 95% so he is having his cholesterol aggressively controlled. He did have a cerebral CTA in 2018 that showed no evidence of cerebral aneurysms.  He did have a history of a TIA in the past but no deficits. His last chest CTA showed his aorta was normal dimensions in 2018. A recent exercise echo showed normal prosthetic AV function, low workload was achieved, at peak exercise reported to have \"dropped\" V at around 130 bpm.   His dad likely also had Marfan syndrome and  of a cerebral aneurysm when he was 2 year old.  His mom  when he was 4 of ovarian cancer, and he was raised by an adoptive family.  He has 2 children, ages 29 and 30.  Neither of them have been screened for Marfan syndrome.      He reports feeling well. He endorses having some palpitations up to 1 minutes on a daily " basis. He denies any chest pain/pressures, dizziness, lightheadedness, worsening shortness of breath, leg/ankle swelling, PND, orthopnea, or syncopal symptoms. Recent device transmission showed 100% , stable RV parameters, 400 mode switch episodes and 5 AHR, EGMs show noise. Current cardiac medications include: ASA, Atenolol, Lipitor, Lisinopril, Lovastatin, and Warfarin.     PAST MEDICAL HISTORY:  Past Medical History:   Diagnosis Date     Heart abnormality     Artifical aortic graft - ascending aorta     Hemothorax      Marfan's syndrome 12/6/2011       CURRENT MEDICATIONS:  Current Outpatient Medications   Medication Sig Dispense Refill     acetaminophen (TYLENOL) 500 MG tablet Take 1,000 mg by mouth 2 times daily       albuterol (VENTOLIN HFA) 108 (90 Base) MCG/ACT inhaler Inhale 2 puffs into the lungs every 6 hours as needed for wheezing Reported on 3/24/2017 1 Inhaler 3     aspirin 81 MG chewable tablet Take 1 tablet (81 mg) by mouth daily 90 tablet 3     atenolol (TENORMIN) 25 MG tablet Take 3 tablets (75 mg) by mouth daily 240 tablet 3     atorvastatin (LIPITOR) 40 MG tablet Take 1 tablet (40 mg) by mouth daily 90 tablet 3     Fexofenadine HCl (ALLEGRA PO) Take by mouth daily as needed for allergies       lisinopril (ZESTRIL) 5 MG tablet Take 1 tablet (5 mg) by mouth daily 90 tablet 1     lovastatin (MEVACOR) 40 MG tablet Take 1 tablet (40 mg) by mouth At Bedtime 90 tablet 1     sildenafil (VIAGRA) 100 MG tablet Take  mg one hour prior to activity 10 tablet 1     warfarin ANTICOAGULANT (COUMADIN ANTICOAGULANT) 5 MG tablet Take 1 tab on Mondays, Wednesdays and Fridays and take 1/2 tab all other days or as directed by INR clinic 70 tablet 3       PAST SURGICAL HISTORY:  Past Surgical History:   Procedure Laterality Date     AORTIC VALVE REPLACEMENT  8/2/2001    Ascending aorta graft     HC REMOVE TONSILS/ADENOIDS,<11 Y/O      T & A <13y.o.     HC VASECTOMY UNILAT/BILAT W POSTOP SEMEN      Vasectomy      "IMPLANT PACEMAKER         ALLERGIES:     Allergies   Allergen Reactions     Ambien [Zolpidem] Other (See Comments)     Hallucinations/ thrashing       FAMILY HISTORY:  Family History   Problem Relation Age of Onset     Asthma No family hx of      Diabetes No family hx of      Hypertension No family hx of      Breast Cancer No family hx of      Cancer - colorectal No family hx of      Prostate Cancer No family hx of      Lipids No family hx of      Musculoskeletal Disorder No family hx of      Neurologic Disorder No family hx of        SOCIAL HISTORY:  Social History     Tobacco Use     Smoking status: Never Smoker     Smokeless tobacco: Never Used   Substance Use Topics     Alcohol use: Yes     Alcohol/week: 4.0 standard drinks     Types: 4 Standard drinks or equivalent per week     Comment: 2 drinks week     Drug use: No       ROS:   A comprehensive 10 point review of systems negative other than as mentioned in HPI.  Exam:  /75 (BP Location: Right arm, Patient Position: Chair, Cuff Size: Adult Regular)   Pulse 65   Ht 1.791 m (5' 10.5\")   Wt 78.9 kg (174 lb)   SpO2 97%   BMI 24.61 kg/m    GENERAL APPEARANCE: alert and no distress  HEENT: MMM. PERRLA.  NECK: supple.   RESPIRATORY: lungs clear to auscultation - no rales, rhonchi or wheezes, no use of accessory muscles, no retractions, respirations are unlabored, normal respiratory rate  CARDIOVASCULAR: valve click.   ABDOMEN: soft, NT, ND, +BS.  EXTREMITIES: peripheral pulses normal, no edema  NEURO: alert and oriented to person/place/time, normal speech, gait and affect  SKIN: no ecchymoses, no rashes  PSYCH: normal affect, cooperative    Labs:  Reviewed.     Testing/Procedures:  1/6/21  EXERCISE ECHOCARDIOGRAM:   Interpretation Summary  The gradient is normal for this prosthetic aortic valve.  Baseline ECG appears A sense, V pace  Abnormal distal septal motion at rest noted-suspect this is due to pacing and  not ischemia  A low workload was " "achieved.  The patient exhibited no chest pain during exercise.     I suspect his pacer is set to upper rate approximately 130  At peak exercise there are \"dropped\" V (paced) at rate around 130  Although this may be due to PAC's /PAT that I'm not seeing I suspect this is  really upper rate pacing and after the atrial rate exceeded 130 he  wenckebached or dropped beats.     The distal septal motion is abnormal rest and exercise but is thickening--  suspect this is due to V pacing/IVCD and not ischemia. (cannot totally exclude  ischemia)     Pt had low work rate, consider changing upper rate pacing to see if exercise  time improves    6/2018 CTA:  IMPRESSION:     1. Total Agatston score 463.56, placing the patient in the 94th  percentile when compared to age and gender matched control group.     2. There are 2 lesions noted on the cardiac CTA angiogram. The ostium  of the right coronary artery has a greater than 70% stenosis without  evidence of calcification. This may represent postsurgical obstruction  of the reimplanted coronary artery versus atherosclerotic coronary  disease. The second lesion is a 50-70% calcified proximal LAD  stenosis. The ostium of the left coronary artery implanting into the  ascending aorta appears normal.     3. Please see the separate report evaluating the patient's thoracic,  abdominal, and pelvic angiogram.     4.   Please review Radiology report for incidental noncardiac findings  that will follow separately.    Assessment and Plan:   Mr. Ball is a 60 year old male who has a past medical history significant for Marfan syndrome, s/p mechanical AVR and reimplantation of coronary arteries 2001, TIA, CHB s/p PPM 2001 and gen change in 2011.        Complete Heart Block s/p PPM 2001 with dysfunctional RA lead:   His RA lead has had noise and failed in 2018 and has been programmed VDD since. He has been 100% . He is interested in having RA lead replaced and values maintaining MRI " compatibility. We discussed lead management in detail including indications, risks, and benefits of each approach. We discussed extraction could be considered at time of next generator change, but we would not necessarily consider at this time. Given that he was exhibiting upper rate behavior of device during recent exercise test, we will have his upper rate limit increased from 130 bpm to 140 bpm.    Given his report of palpitations and RA lead with noise limiting ability to determine arrhyhtmias , we will have him undergo a zio patch monitor.     Follow up in 1 year.   The patient states understanding and is agreeable with plan.   This patient was seen and evaluated with Dr. Morales. The above note reflects our joint assessment and plan.   ELIJAH Sanchez CNP  Pager: [phone]    EP STAFF NOTE  I have seen and examined the patient as part of a shared visit with ALEX Sanchez NP.  I agree with the note above. I reviewed today's vital signs and medications. I have reviewed and discussed with the advanced practice provider their physical examination, assessment, and plan   Briefly, upper rate pacer behavior, atrial lead noise  My key history/exam findings are: RRR.   The key management decisions made by me: agree with VDD for now, patient feels well with minimal limitations at higher HR, will increase URL to 140, discussed options to manage the atrial lead at the time of generator change, or earlier if the atrial lead problem is related to symptoms .    Raman Morales MD [facility]  Cardiology - Electrophysiology

## 2021-02-12 NOTE — LETTER
"2/12/2021      RE: Richard Ball  [address]  [place] [address]       Dear Colleague,    Thank you for the opportunity to participate in the care of your patient, Richard Ball, at the [facility] [place] at [facility]. Please see a copy of my visit note below.    CV Genetics Electrophysiology Clinic Note  HPI:   Mr. Ball is a 60 year old male who has a past medical history significant for Marfan syndrome, s/p mechanical AVR and reimplantation of coronary arteries 2001, TIA, CHB s/p PPM 2001 and gen change in 2011.      He was diagnosed with Marfan syndrome at age 24.  He had genetic testing done at the [facility] in [place].  He underwent surgery in 2001 at [facility] in [place] by Dr. Arriola.  His ascending aorta was 5.5 cm at the time, but we do not have those operative reports.  His aortic valve was replaced with a mechanical valve, and they reimplanted his coronary arteries. Appears he had post operative CHB and required PPM implant. His RA lead has had noise and failed in 2018 and has been programmed VDD since. He has been 100% . He is interested in having RA lead replaced and values maintaining MRI compatibility. In 2018, he had a CTA and there was concern about significant coronary artery disease. He went on to have a coronary angiogram, and that showed no significant disease with he did have some disease; and his calcium score was high placing him in the 95% so he is having his cholesterol aggressively controlled. He did have a cerebral CTA in 2018 that showed no evidence of cerebral aneurysms.  He did have a history of a TIA in the past but no deficits. His last chest CTA showed his aorta was normal dimensions in 2018. A recent exercise echo showed normal prosthetic AV function, low workload was achieved, at peak exercise reported to have \"dropped\" V at around 130 bpm.   His dad likely also had " Marfan syndrome and  of a cerebral aneurysm when he was 2 year old.  His mom  when he was 4 of ovarian cancer, and he was raised by an adoptive family.  He has 2 children, ages 29 and 30.  Neither of them have been screened for Marfan syndrome.      He reports feeling well. He endorses having some palpitations up to 1 minutes on a daily basis. He denies any chest pain/pressures, dizziness, lightheadedness, worsening shortness of breath, leg/ankle swelling, PND, orthopnea, or syncopal symptoms. Recent device transmission showed 100% , stable RV parameters, 400 mode switch episodes and 5 AHR, EGMs show noise. Current cardiac medications include: ASA, Atenolol, Lipitor, Lisinopril, Lovastatin, and Warfarin.     PAST MEDICAL HISTORY:  Past Medical History:   Diagnosis Date     Heart abnormality     Artifical aortic graft - ascending aorta     Hemothorax      Marfan's syndrome 2011       CURRENT MEDICATIONS:  Current Outpatient Medications   Medication Sig Dispense Refill     acetaminophen (TYLENOL) 500 MG tablet Take 1,000 mg by mouth 2 times daily       albuterol (VENTOLIN HFA) 108 (90 Base) MCG/ACT inhaler Inhale 2 puffs into the lungs every 6 hours as needed for wheezing Reported on 3/24/2017 1 Inhaler 3     aspirin 81 MG chewable tablet Take 1 tablet (81 mg) by mouth daily 90 tablet 3     atenolol (TENORMIN) 25 MG tablet Take 3 tablets (75 mg) by mouth daily 240 tablet 3     atorvastatin (LIPITOR) 40 MG tablet Take 1 tablet (40 mg) by mouth daily 90 tablet 3     Fexofenadine HCl (ALLEGRA PO) Take by mouth daily as needed for allergies       lisinopril (ZESTRIL) 5 MG tablet Take 1 tablet (5 mg) by mouth daily 90 tablet 1     lovastatin (MEVACOR) 40 MG tablet Take 1 tablet (40 mg) by mouth At Bedtime 90 tablet 1     sildenafil (VIAGRA) 100 MG tablet Take  mg one hour prior to activity 10 tablet 1     warfarin ANTICOAGULANT (COUMADIN ANTICOAGULANT) 5 MG tablet Take 1 tab on ,  and  "Fridays and take 1/2 tab all other days or as directed by INR clinic 70 tablet 3       PAST SURGICAL HISTORY:  Past Surgical History:   Procedure Laterality Date     AORTIC VALVE REPLACEMENT  8/2/2001    Ascending aorta graft     HC REMOVE TONSILS/ADENOIDS,<11 Y/O      T & A <13y.o.     HC VASECTOMY UNILAT/BILAT W POSTOP SEMEN      Vasectomy     IMPLANT PACEMAKER         ALLERGIES:     Allergies   Allergen Reactions     Ambien [Zolpidem] Other (See Comments)     Hallucinations/ thrashing       FAMILY HISTORY:  Family History   Problem Relation Age of Onset     Asthma No family hx of      Diabetes No family hx of      Hypertension No family hx of      Breast Cancer No family hx of      Cancer - colorectal No family hx of      Prostate Cancer No family hx of      Lipids No family hx of      Musculoskeletal Disorder No family hx of      Neurologic Disorder No family hx of        SOCIAL HISTORY:  Social History     Tobacco Use     Smoking status: Never Smoker     Smokeless tobacco: Never Used   Substance Use Topics     Alcohol use: Yes     Alcohol/week: 4.0 standard drinks     Types: 4 Standard drinks or equivalent per week     Comment: 2 drinks week     Drug use: No       ROS:   A comprehensive 10 point review of systems negative other than as mentioned in HPI.  Exam:  /75 (BP Location: Right arm, Patient Position: Chair, Cuff Size: Adult Regular)   Pulse 65   Ht 1.791 m (5' 10.5\")   Wt 78.9 kg (174 lb)   SpO2 97%   BMI 24.61 kg/m    GENERAL APPEARANCE: alert and no distress  HEENT: MMM. PERRLA.  NECK: supple.   RESPIRATORY: lungs clear to auscultation - no rales, rhonchi or wheezes, no use of accessory muscles, no retractions, respirations are unlabored, normal respiratory rate  CARDIOVASCULAR: valve click.   ABDOMEN: soft, NT, ND, +BS.  EXTREMITIES: peripheral pulses normal, no edema  NEURO: alert and oriented to person/place/time, normal speech, gait and affect  SKIN: no ecchymoses, no rashes  PSYCH: " "normal affect, cooperative    Labs:  Reviewed.     Testing/Procedures:  1/6/21  EXERCISE ECHOCARDIOGRAM:   Interpretation Summary  The gradient is normal for this prosthetic aortic valve.  Baseline ECG appears A sense, V pace  Abnormal distal septal motion at rest noted-suspect this is due to pacing and  not ischemia  A low workload was achieved.  The patient exhibited no chest pain during exercise.     I suspect his pacer is set to upper rate approximately 130  At peak exercise there are \"dropped\" V (paced) at rate around 130  Although this may be due to PAC's /PAT that I'm not seeing I suspect this is  really upper rate pacing and after the atrial rate exceeded 130 he  wenckebached or dropped beats.     The distal septal motion is abnormal rest and exercise but is thickening--  suspect this is due to V pacing/IVCD and not ischemia. (cannot totally exclude  ischemia)     Pt had low work rate, consider changing upper rate pacing to see if exercise  time improves    6/2018 CTA:  IMPRESSION:     1. Total Agatston score 463.56, placing the patient in the 94th  percentile when compared to age and gender matched control group.     2. There are 2 lesions noted on the cardiac CTA angiogram. The ostium  of the right coronary artery has a greater than 70% stenosis without  evidence of calcification. This may represent postsurgical obstruction  of the reimplanted coronary artery versus atherosclerotic coronary  disease. The second lesion is a 50-70% calcified proximal LAD  stenosis. The ostium of the left coronary artery implanting into the  ascending aorta appears normal.     3. Please see the separate report evaluating the patient's thoracic,  abdominal, and pelvic angiogram.     4.   Please review Radiology report for incidental noncardiac findings  that will follow separately.    Assessment and Plan:   Mr. Ball is a 60 year old male who has a past medical history significant for Marfan syndrome, s/p mechanical AVR and " reimplantation of coronary arteries 2001, TIA, CHB s/p PPM 2001 and gen change in 2011.        Complete Heart Block s/p PPM 2001 with dysfunctional RA lead:   His RA lead has had noise and failed in 2018 and has been programmed VDD since. He has been 100% . He is interested in having RA lead replaced and values maintaining MRI compatibility. We discussed lead management in detail including indications, risks, and benefits of each approach. We discussed extraction could be considered at time of next generator change, but we would not necessarily consider at this time. Given that he was exhibiting upper rate behavior of device during recent exercise test, we will have his upper rate limit increased from 130 bpm to 140 bpm.    Given his report of palpitations and RA lead with noise limiting ability to determine arrhyhtmias , we will have him undergo a zio patch monitor.     Follow up in 1 year.   The patient states understanding and is agreeable with plan.   This patient was seen and evaluated with Dr. Morales. The above note reflects our joint assessment and plan.   ELIJAH Sanchez CNP  Pager: [phone]    EP STAFF NOTE  I have seen and examined the patient as part of a shared visit with ALEX Sanchez NP.  I agree with the note above. I reviewed today's vital signs and medications. I have reviewed and discussed with the advanced practice provider their physical examination, assessment, and plan   Briefly, upper rate pacer behavior, atrial lead noise  My key history/exam findings are: RRR.   The key management decisions made by me: agree with VDD for now, patient feels well with minimal limitations at higher HR, will increase URL to 140, discussed options to manage the atrial lead at the time of generator change, or earlier if the atrial lead problem is related to symptoms .    Raman Morales MD [facility]  Cardiology - Electrophysiology        Please do not hesitate to contact me if you have any questions/concerns.      Sincerely,     Raman Morales MD

## 2021-02-12 NOTE — NURSING NOTE
Chief Complaint   Patient presents with     New Patient     history of Marfans Syndrome s/p aortic aneurysm repair with graft and mechanical AVR and PPM placement presenting for evaluation to review device   .  Vitals were taken and medication reconciled.    ERIN العلي  4:17 PM

## 2021-02-15 ENCOUNTER — ANCILLARY PROCEDURE (OUTPATIENT)
Dept: CARDIOLOGY | Facility: CLINIC | Age: 61
End: 2021-02-15
Attending: INTERNAL MEDICINE
Payer: COMMERCIAL

## 2021-02-15 DIAGNOSIS — R00.2 PALPITATIONS: ICD-10-CM

## 2021-02-15 PROCEDURE — 93246 EXT ECG>7D<15D RECORDING: CPT

## 2021-02-15 PROCEDURE — 93248 EXT ECG>7D<15D REV&INTERPJ: CPT | Performed by: INTERNAL MEDICINE

## 2021-02-15 NOTE — PROGRESS NOTES
Per Dr. bennett, patient to have 14 day ziopatch monitor placed.  Diagnosis: palpitations  Monitor placed: Yes  Patient Instructed: Yes  Patient verbalized understanding: Yes  Holter # [id number]    Placed By Estephanie

## 2021-02-16 NOTE — NURSING NOTE
Cardiac Monitors: Patient was instructed regarding the indication, function, care and prompt return of a 14 day Zio monitor. The monitor was placed on the patient with instructions regarding care of the skin electrodes and monitor, as well as documentation in the patient diary. Patient demonstrated understanding of this information and agreed to call with further questions or concerns.    Med Reconcile: Reviewed and verified all current medications with the patient. The updated medication list was printed and given to the patient.    Return Appointment: Follow up with Dr Morales in one year with device check. Patient given instructions regarding scheduling next clinic visit. Patient demonstrated understanding of this information and agreed to call with further questions or concerns.    Patient stated he understood all health information given and agreed to call with further questions or concerns.    Nahun Galindo, RN  Cardiology RN Care Coordinator  [phone]

## 2021-02-17 LAB
MDC_IDC_LEAD_IMPLANT_DT: NORMAL
MDC_IDC_LEAD_IMPLANT_DT: NORMAL
MDC_IDC_LEAD_LOCATION: NORMAL
MDC_IDC_LEAD_LOCATION: NORMAL
MDC_IDC_LEAD_LOCATION_DETAIL_1: NORMAL
MDC_IDC_LEAD_LOCATION_DETAIL_1: NORMAL
MDC_IDC_LEAD_MFG: NORMAL
MDC_IDC_LEAD_MFG: NORMAL
MDC_IDC_LEAD_MODEL: NORMAL
MDC_IDC_LEAD_MODEL: NORMAL
MDC_IDC_LEAD_POLARITY_TYPE: NORMAL
MDC_IDC_LEAD_POLARITY_TYPE: NORMAL
MDC_IDC_LEAD_SERIAL: NORMAL
MDC_IDC_LEAD_SERIAL: NORMAL
MDC_IDC_MSMT_BATTERY_DTM: NORMAL
MDC_IDC_MSMT_BATTERY_IMPEDANCE: 3069 OHM
MDC_IDC_MSMT_BATTERY_REMAINING_LONGEVITY: 24 MO
MDC_IDC_MSMT_BATTERY_STATUS: NORMAL
MDC_IDC_MSMT_BATTERY_VOLTAGE: 2.72 V
MDC_IDC_MSMT_LEADCHNL_RA_IMPEDANCE_VALUE: 67 OHM
MDC_IDC_MSMT_LEADCHNL_RV_IMPEDANCE_VALUE: 548 OHM
MDC_IDC_MSMT_LEADCHNL_RV_PACING_THRESHOLD_AMPLITUDE: 0.62 V
MDC_IDC_MSMT_LEADCHNL_RV_PACING_THRESHOLD_PULSEWIDTH: 0.4 MS
MDC_IDC_PG_IMPLANT_DTM: NORMAL
MDC_IDC_PG_MFG: NORMAL
MDC_IDC_PG_MODEL: NORMAL
MDC_IDC_PG_SERIAL: NORMAL
MDC_IDC_PG_TYPE: NORMAL
MDC_IDC_SESS_CLINIC_NAME: NORMAL
MDC_IDC_SESS_DTM: NORMAL
MDC_IDC_SESS_TYPE: NORMAL
MDC_IDC_SET_BRADY_AT_MODE_SWITCH_MODE: NORMAL
MDC_IDC_SET_BRADY_AT_MODE_SWITCH_RATE: 150 {BEATS}/MIN
MDC_IDC_SET_BRADY_LOWRATE: 50 {BEATS}/MIN
MDC_IDC_SET_BRADY_MAX_SENSOR_RATE: 130 {BEATS}/MIN
MDC_IDC_SET_BRADY_MAX_TRACKING_RATE: 130 {BEATS}/MIN
MDC_IDC_SET_BRADY_MODE: NORMAL
MDC_IDC_SET_BRADY_SAV_DELAY_LOW: 120 MS
MDC_IDC_SET_LEADCHNL_RA_SENSING_POLARITY: NORMAL
MDC_IDC_SET_LEADCHNL_RA_SENSING_SENSITIVITY: 0.5 MV
MDC_IDC_SET_LEADCHNL_RV_PACING_AMPLITUDE: 2 V
MDC_IDC_SET_LEADCHNL_RV_PACING_CAPTURE_MODE: NORMAL
MDC_IDC_SET_LEADCHNL_RV_PACING_POLARITY: NORMAL
MDC_IDC_SET_LEADCHNL_RV_PACING_PULSEWIDTH: 0.4 MS
MDC_IDC_SET_LEADCHNL_RV_SENSING_POLARITY: NORMAL
MDC_IDC_SET_LEADCHNL_RV_SENSING_SENSITIVITY: 4 MV
MDC_IDC_SET_ZONE_DETECTION_INTERVAL: 400 MS
MDC_IDC_SET_ZONE_DETECTION_INTERVAL: 400 MS
MDC_IDC_SET_ZONE_TYPE: NORMAL
MDC_IDC_SET_ZONE_TYPE: NORMAL
MDC_IDC_STAT_AT_BURDEN_PERCENT: 0.1 %
MDC_IDC_STAT_AT_DTM_END: NORMAL
MDC_IDC_STAT_AT_DTM_START: NORMAL
MDC_IDC_STAT_AT_MODE_SW_COUNT: 914
MDC_IDC_STAT_BRADY_AP_VP_PERCENT: 0 %
MDC_IDC_STAT_BRADY_AP_VS_PERCENT: 0 %
MDC_IDC_STAT_BRADY_AS_VP_PERCENT: 100 %
MDC_IDC_STAT_BRADY_AS_VS_PERCENT: 0 %
MDC_IDC_STAT_BRADY_DTM_END: NORMAL
MDC_IDC_STAT_BRADY_DTM_START: NORMAL
MDC_IDC_STAT_EPISODE_RECENT_COUNT: 1
MDC_IDC_STAT_EPISODE_RECENT_COUNT: 6
MDC_IDC_STAT_EPISODE_RECENT_COUNT_DTM_END: NORMAL
MDC_IDC_STAT_EPISODE_RECENT_COUNT_DTM_END: NORMAL
MDC_IDC_STAT_EPISODE_RECENT_COUNT_DTM_START: NORMAL
MDC_IDC_STAT_EPISODE_RECENT_COUNT_DTM_START: NORMAL
MDC_IDC_STAT_EPISODE_TYPE: NORMAL
MDC_IDC_STAT_EPISODE_TYPE: NORMAL

## 2021-02-18 ENCOUNTER — ANTICOAGULATION THERAPY VISIT (OUTPATIENT)
Dept: CARDIOLOGY | Facility: CLINIC | Age: 61
End: 2021-02-18

## 2021-02-18 DIAGNOSIS — Z95.2 S/P AORTIC VALVE REPLACEMENT: ICD-10-CM

## 2021-02-18 DIAGNOSIS — Z95.2 HEART VALVE REPLACED: ICD-10-CM

## 2021-02-18 DIAGNOSIS — Z79.01 LONG TERM CURRENT USE OF ANTICOAGULANTS WITH INR GOAL OF 2.0-3.0: ICD-10-CM

## 2021-02-18 LAB
CAPILLARY BLOOD COLLECTION: NORMAL
INR PPP: 2.7 (ref 0.86–1.14)

## 2021-02-18 PROCEDURE — 99207 PR NO CHARGE NURSE ONLY: CPT | Performed by: INTERNAL MEDICINE

## 2021-02-18 PROCEDURE — 85610 PROTHROMBIN TIME: CPT | Performed by: INTERNAL MEDICINE

## 2021-02-18 PROCEDURE — 36416 COLLJ CAPILLARY BLOOD SPEC: CPT | Performed by: INTERNAL MEDICINE

## 2021-02-18 NOTE — PROGRESS NOTES
ANTICOAGULATION FOLLOW-UP CLINIC VISIT    Patient Name:  Richard Ball  Date:  2021  Contact Type:  Telephone    SUBJECTIVE:  Patient Findings         Clinical Outcomes     Negatives:  Major bleeding event, Thromboembolic event, Anticoagulation-related hospital admission, Anticoagulation-related ED visit, Anticoagulation-related fatality           OBJECTIVE    Recent labs: (last 7 days)     21  1620   INR 2.70*       ASSESSMENT / PLAN  INR assessment THER    Recheck INR In: 5 WEEKS    INR Location Outside lab      Anticoagulation Summary  As of 2021    INR goal:  2.0-3.0   TTR:  93.3 % (1 y)   INR used for dosin.70 (2021)   Warfarin maintenance plan:  5 mg (5 mg x 1) every Mon, Wed, Fri; 2.5 mg (5 mg x 0.5) all other days   Full warfarin instructions:  5 mg every Mon, Wed, Fri; 2.5 mg all other days   Weekly warfarin total:  25 mg   No change documented:  Teresa Espino RN   Plan last modified:  Teresa Espino RN (10/15/2019)   Next INR check:  3/25/2021   Target end date:  Indefinite    Indications    Heart valve replaced [Z95.2] [Z95.2]  Long term current use of anticoagulants with INR goal of 2.0-3.0 [Z79.01]  S/P aortic valve replacement [Z95.2]             Anticoagulation Episode Summary     INR check location:      Preferred lab:      Send INR reminders to:  [facility] HEART INR NURSE    Comments:  AVR in , Lovenox needed per  March due to possible TIA in       Anticoagulation Care Providers     Provider Role Specialty Phone number    March, Sundar Cantu MD Referring Interventional Cardiology [phone]            See the Encounter Report to view Anticoagulation Flowsheet and Dosing Calendar (Go to Encounters tab in chart review, and find the Anticoagulation Therapy Visit)    INR 2.7.  Called and spoke to the patient.  NO changes.  Continue same schedule and recheck in 5 weeks.  Rios Espino RN

## 2021-03-23 ENCOUNTER — MYC MEDICAL ADVICE (OUTPATIENT)
Dept: CARDIOLOGY | Facility: CLINIC | Age: 61
End: 2021-03-23

## 2021-03-23 DIAGNOSIS — Z95.2 S/P AORTIC VALVE REPLACEMENT: Primary | ICD-10-CM

## 2021-03-23 DIAGNOSIS — Q87.40 MARFAN'S SYNDROME: ICD-10-CM

## 2021-03-23 DIAGNOSIS — Z95.2 S/P AVR (AORTIC VALVE REPLACEMENT): ICD-10-CM

## 2021-03-23 DIAGNOSIS — Z95.2 HEART VALVE REPLACED: ICD-10-CM

## 2021-03-25 ENCOUNTER — ANTICOAGULATION THERAPY VISIT (OUTPATIENT)
Dept: CARDIOLOGY | Facility: CLINIC | Age: 61
End: 2021-03-25
Payer: COMMERCIAL

## 2021-03-25 DIAGNOSIS — Z79.01 LONG TERM CURRENT USE OF ANTICOAGULANTS WITH INR GOAL OF 2.0-3.0: ICD-10-CM

## 2021-03-25 DIAGNOSIS — Z95.2 HEART VALVE REPLACED: ICD-10-CM

## 2021-03-25 DIAGNOSIS — Z95.2 S/P AORTIC VALVE REPLACEMENT: ICD-10-CM

## 2021-03-25 LAB
CAPILLARY BLOOD COLLECTION: NORMAL
INR PPP: 1.1 (ref 0.86–1.14)

## 2021-03-25 PROCEDURE — 36416 COLLJ CAPILLARY BLOOD SPEC: CPT | Performed by: INTERNAL MEDICINE

## 2021-03-25 PROCEDURE — 85610 PROTHROMBIN TIME: CPT | Performed by: INTERNAL MEDICINE

## 2021-03-25 PROCEDURE — 99207 PR NO CHARGE NURSE ONLY: CPT

## 2021-03-25 NOTE — PROGRESS NOTES
ANTICOAGULATION FOLLOW-UP CLINIC VISIT    Patient Name:  Richard Ball  Date:  3/25/2021  Contact Type:  Telephone    SUBJECTIVE:         OBJECTIVE    Recent labs: (last 7 days)     21  0743   INR 1.10       ASSESSMENT / PLAN  INR assessment SUB    Recheck INR In: 4 DAYS    INR Location Outside lab      Anticoagulation Summary  As of 3/25/2021    INR goal:  2.0-3.0   TTR:  88.0 % (1 y)   INR used for dosin.10 (3/25/2021)   Warfarin maintenance plan:  5 mg (5 mg x 1) every Mon, Wed, Fri; 2.5 mg (5 mg x 0.5) all other days   Full warfarin instructions:  5 mg every Mon, Wed, Fri; 2.5 mg all other days   Weekly warfarin total:  25 mg   Plan last modified:  Teresa Espino RN (10/15/2019)   Next INR check:  3/29/2021   Target end date:  Indefinite    Indications    Heart valve replaced [Z95.2] [Z95.2]  Long term current use of anticoagulants with INR goal of 2.0-3.0 [Z79.01]  S/P aortic valve replacement [Z95.2]             Anticoagulation Episode Summary     INR check location:      Preferred lab:      Send INR reminders to:  [facility] HEART INR NURSE    Comments:  AVR in , Lovenox needed per Dr. Garcia due to possible TIA in       Anticoagulation Care Providers     Provider Role Specialty Phone number    March, Sundar Cantu MD Referring Interventional Cardiology [phone]            See the Encounter Report to view Anticoagulation Flowsheet and Dosing Calendar (Go to Encounters tab in chart review, and find the Anticoagulation Therapy Visit)    INR 1.10 He missed setting up the warfarin tablet in his pillbox for last week (unknown number of days). He did take it on MTuW this week. No change in other meds or diet. Denies abnormal bleeding or clotting or neuro symptoms. Will take 5 mg ThFSa and 2.5 mg Tyson with recheck on Monday. He has to bridge with Lovenox when INR is below 2 so will start Lovenox 80 mg subcutaneous every 12 hours. Reviewed Lovenox injection technique and Rx escripted and  called to [facility] (verified that they had it in stock).  Dosage adjustment made based on physician directed care plan.    Constance Chacko RN

## 2021-03-29 ENCOUNTER — ANTICOAGULATION THERAPY VISIT (OUTPATIENT)
Dept: CARDIOLOGY | Facility: CLINIC | Age: 61
End: 2021-03-29
Payer: COMMERCIAL

## 2021-03-29 DIAGNOSIS — Z79.01 LONG TERM CURRENT USE OF ANTICOAGULANTS WITH INR GOAL OF 2.0-3.0: ICD-10-CM

## 2021-03-29 DIAGNOSIS — Z95.2 HEART VALVE REPLACED: ICD-10-CM

## 2021-03-29 DIAGNOSIS — Z95.2 S/P AORTIC VALVE REPLACEMENT: ICD-10-CM

## 2021-03-29 LAB
CAPILLARY BLOOD COLLECTION: NORMAL
INR PPP: 2.3 (ref 0.86–1.14)

## 2021-03-29 PROCEDURE — 36416 COLLJ CAPILLARY BLOOD SPEC: CPT | Performed by: INTERNAL MEDICINE

## 2021-03-29 PROCEDURE — 85610 PROTHROMBIN TIME: CPT | Performed by: INTERNAL MEDICINE

## 2021-03-29 PROCEDURE — 99207 PR NO CHARGE NURSE ONLY: CPT | Performed by: INTERNAL MEDICINE

## 2021-03-29 NOTE — PROGRESS NOTES
ANTICOAGULATION FOLLOW-UP CLINIC VISIT    Patient Name:  Richard Ball  Date:  3/29/2021  Contact Type:  Telephone    SUBJECTIVE:  Patient Findings         Clinical Outcomes     Negatives:  Major bleeding event, Thromboembolic event, Anticoagulation-related hospital admission, Anticoagulation-related ED visit, Anticoagulation-related fatality           OBJECTIVE    Recent labs: (last 7 days)     21  1027   INR 2.30*       ASSESSMENT / PLAN  INR assessment THER    Recheck INR In: 4 WEEKS    INR Location Outside lab      Anticoagulation Summary  As of 3/29/2021    INR goal:  2.0-3.0   TTR:  87.1 % (1 y)   INR used for dosin.30 (3/29/2021)   Warfarin maintenance plan:  5 mg (5 mg x 1) every Mon, Wed, Fri; 2.5 mg (5 mg x 0.5) all other days   Full warfarin instructions:  5 mg every Mon, Wed, Fri; 2.5 mg all other days   Weekly warfarin total:  25 mg   No change documented:  Teresa Espino RN   Plan last modified:  Teresa Espino RN (10/15/2019)   Next INR check:  2021   Target end date:  Indefinite    Indications    Heart valve replaced [Z95.2] [Z95.2]  Long term current use of anticoagulants with INR goal of 2.0-3.0 [Z79.01]  S/P aortic valve replacement [Z95.2]             Anticoagulation Episode Summary     INR check location:      Preferred lab:      Send INR reminders to:  [facility] HEART INR NURSE    Comments:  AVR in , Lovenox needed per Dr. Garcia due to possible TIA in       Anticoagulation Care Providers     Provider Role Specialty Phone number    March, Sundar Cantu MD Referring Interventional Cardiology [phone]            See the Encounter Report to view Anticoagulation Flowsheet and Dosing Calendar (Go to Encounters tab in chart review, and find the Anticoagulation Therapy Visit)    INR 2.3.  INR back in range, stop Lovenox.  INR was low last week due to no warfarin in pill box for 1 week by accident.  INRs are normally very stable.  Continue normal dosing  schedule and recheck in 4 weeks.  Patient will call to schedule.  Rios Espino RN

## 2021-04-07 RX ORDER — LOSARTAN POTASSIUM 25 MG/1
25 TABLET ORAL DAILY
Qty: 90 TABLET | Refills: 3 | Status: SHIPPED | OUTPATIENT
Start: 2021-04-07 | End: 2022-03-09

## 2021-04-07 NOTE — TELEPHONE ENCOUNTER
Date: 4/7/2021    Time of Call: 2:24 PM     Diagnosis:  Marfans Syndrome     [ TORB ] Ordering provider:  March  Order: Stop lisinopril and switch to Losartan 25 mg daily     Order received by: LANIE Howard     Follow-up/additional notes: Sent patient [employer] message with updated plan. New Rx sent.

## 2021-04-08 ENCOUNTER — CARE COORDINATION (OUTPATIENT)
Dept: CARDIOLOGY | Facility: CLINIC | Age: 61
End: 2021-04-08

## 2021-04-09 ENCOUNTER — ANCILLARY PROCEDURE (OUTPATIENT)
Dept: CARDIOLOGY | Facility: CLINIC | Age: 61
End: 2021-04-09
Attending: INTERNAL MEDICINE
Payer: COMMERCIAL

## 2021-04-09 DIAGNOSIS — Z95.0 CARDIAC PACEMAKER IN SITU: ICD-10-CM

## 2021-04-09 PROCEDURE — 93296 REM INTERROG EVL PM/IDS: CPT | Performed by: INTERNAL MEDICINE

## 2021-04-09 PROCEDURE — 93294 REM INTERROG EVL PM/LDLS PM: CPT | Performed by: INTERNAL MEDICINE

## 2021-04-13 LAB
MDC_IDC_LEAD_IMPLANT_DT: NORMAL
MDC_IDC_LEAD_IMPLANT_DT: NORMAL
MDC_IDC_LEAD_LOCATION: NORMAL
MDC_IDC_LEAD_LOCATION: NORMAL
MDC_IDC_LEAD_LOCATION_DETAIL_1: NORMAL
MDC_IDC_LEAD_LOCATION_DETAIL_1: NORMAL
MDC_IDC_LEAD_MFG: NORMAL
MDC_IDC_LEAD_MFG: NORMAL
MDC_IDC_LEAD_MODEL: NORMAL
MDC_IDC_LEAD_MODEL: NORMAL
MDC_IDC_LEAD_POLARITY_TYPE: NORMAL
MDC_IDC_LEAD_POLARITY_TYPE: NORMAL
MDC_IDC_LEAD_SERIAL: NORMAL
MDC_IDC_LEAD_SERIAL: NORMAL
MDC_IDC_MSMT_BATTERY_DTM: NORMAL
MDC_IDC_MSMT_BATTERY_IMPEDANCE: 3979 OHM
MDC_IDC_MSMT_BATTERY_REMAINING_LONGEVITY: 18 MO
MDC_IDC_MSMT_BATTERY_STATUS: NORMAL
MDC_IDC_MSMT_BATTERY_VOLTAGE: 2.7 V
MDC_IDC_MSMT_LEADCHNL_RA_IMPEDANCE_VALUE: 67 OHM
MDC_IDC_MSMT_LEADCHNL_RV_IMPEDANCE_VALUE: 512 OHM
MDC_IDC_MSMT_LEADCHNL_RV_PACING_THRESHOLD_AMPLITUDE: 0.62 V
MDC_IDC_MSMT_LEADCHNL_RV_PACING_THRESHOLD_PULSEWIDTH: 0.4 MS
MDC_IDC_PG_IMPLANT_DTM: NORMAL
MDC_IDC_PG_MFG: NORMAL
MDC_IDC_PG_MODEL: NORMAL
MDC_IDC_PG_SERIAL: NORMAL
MDC_IDC_PG_TYPE: NORMAL
MDC_IDC_SESS_CLINIC_NAME: NORMAL
MDC_IDC_SESS_DTM: NORMAL
MDC_IDC_SESS_TYPE: NORMAL
MDC_IDC_SET_BRADY_AT_MODE_SWITCH_MODE: NORMAL
MDC_IDC_SET_BRADY_AT_MODE_SWITCH_RATE: 150 {BEATS}/MIN
MDC_IDC_SET_BRADY_LOWRATE: 50 {BEATS}/MIN
MDC_IDC_SET_BRADY_MAX_SENSOR_RATE: 140 {BEATS}/MIN
MDC_IDC_SET_BRADY_MAX_TRACKING_RATE: 140 {BEATS}/MIN
MDC_IDC_SET_BRADY_MODE: NORMAL
MDC_IDC_SET_BRADY_SAV_DELAY_LOW: 120 MS
MDC_IDC_SET_LEADCHNL_RA_SENSING_POLARITY: NORMAL
MDC_IDC_SET_LEADCHNL_RA_SENSING_SENSITIVITY: 0.5 MV
MDC_IDC_SET_LEADCHNL_RV_PACING_AMPLITUDE: 2 V
MDC_IDC_SET_LEADCHNL_RV_PACING_CAPTURE_MODE: NORMAL
MDC_IDC_SET_LEADCHNL_RV_PACING_POLARITY: NORMAL
MDC_IDC_SET_LEADCHNL_RV_PACING_PULSEWIDTH: 0.4 MS
MDC_IDC_SET_LEADCHNL_RV_SENSING_POLARITY: NORMAL
MDC_IDC_SET_LEADCHNL_RV_SENSING_SENSITIVITY: 4 MV
MDC_IDC_SET_ZONE_DETECTION_INTERVAL: 400 MS
MDC_IDC_SET_ZONE_DETECTION_INTERVAL: 400 MS
MDC_IDC_SET_ZONE_TYPE: NORMAL
MDC_IDC_SET_ZONE_TYPE: NORMAL
MDC_IDC_STAT_AT_BURDEN_PERCENT: 0.1 %
MDC_IDC_STAT_AT_DTM_END: NORMAL
MDC_IDC_STAT_AT_DTM_START: NORMAL
MDC_IDC_STAT_AT_MODE_SW_COUNT: 287
MDC_IDC_STAT_BRADY_AP_VP_PERCENT: 0 %
MDC_IDC_STAT_BRADY_AP_VS_PERCENT: 0 %
MDC_IDC_STAT_BRADY_AS_VP_PERCENT: 100 %
MDC_IDC_STAT_BRADY_AS_VS_PERCENT: 0 %
MDC_IDC_STAT_BRADY_DTM_END: NORMAL
MDC_IDC_STAT_BRADY_DTM_START: NORMAL
MDC_IDC_STAT_EPISODE_RECENT_COUNT: 0
MDC_IDC_STAT_EPISODE_RECENT_COUNT: 0
MDC_IDC_STAT_EPISODE_RECENT_COUNT_DTM_END: NORMAL
MDC_IDC_STAT_EPISODE_RECENT_COUNT_DTM_END: NORMAL
MDC_IDC_STAT_EPISODE_RECENT_COUNT_DTM_START: NORMAL
MDC_IDC_STAT_EPISODE_RECENT_COUNT_DTM_START: NORMAL
MDC_IDC_STAT_EPISODE_TYPE: NORMAL
MDC_IDC_STAT_EPISODE_TYPE: NORMAL

## 2021-04-18 ENCOUNTER — HEALTH MAINTENANCE LETTER (OUTPATIENT)
Age: 61
End: 2021-04-18

## 2021-04-19 DIAGNOSIS — Q87.40 MARFAN'S SYNDROME: ICD-10-CM

## 2021-04-19 DIAGNOSIS — Z95.2 S/P AORTIC VALVE REPLACEMENT: ICD-10-CM

## 2021-04-19 RX ORDER — WARFARIN SODIUM 5 MG/1
TABLET ORAL
Qty: 70 TABLET | Refills: 1 | Status: SHIPPED | OUTPATIENT
Start: 2021-04-19 | End: 2021-06-24

## 2021-04-19 NOTE — TELEPHONE ENCOUNTER
Don calling in today stating that he is on his last dose of warfarin today. Please send [facility] in [place].

## 2021-04-28 ENCOUNTER — ANTICOAGULATION THERAPY VISIT (OUTPATIENT)
Dept: CARDIOLOGY | Facility: CLINIC | Age: 61
End: 2021-04-28
Payer: COMMERCIAL

## 2021-04-28 DIAGNOSIS — Z79.01 LONG TERM CURRENT USE OF ANTICOAGULANTS WITH INR GOAL OF 2.0-3.0: ICD-10-CM

## 2021-04-28 DIAGNOSIS — Z95.2 S/P AORTIC VALVE REPLACEMENT: ICD-10-CM

## 2021-04-28 DIAGNOSIS — Z95.2 HEART VALVE REPLACED: ICD-10-CM

## 2021-04-28 LAB
CAPILLARY BLOOD COLLECTION: NORMAL
INR PPP: 3.3 (ref 0.86–1.14)

## 2021-04-28 PROCEDURE — 99207 PR NO CHARGE NURSE ONLY: CPT

## 2021-04-28 PROCEDURE — 85610 PROTHROMBIN TIME: CPT | Performed by: INTERNAL MEDICINE

## 2021-04-28 PROCEDURE — 36416 COLLJ CAPILLARY BLOOD SPEC: CPT | Performed by: INTERNAL MEDICINE

## 2021-04-28 NOTE — PROGRESS NOTES
ANTICOAGULATION FOLLOW-UP CLINIC VISIT    Patient Name:  Richard Ball  Date:  4/28/2021  Contact Type:  Telephone    SUBJECTIVE:  Patient Findings     Positives:  Change in medications (Pt states he has been using more Tylenol and Sudafed recently for seasonal allergies)        Clinical Outcomes     Negatives:  Major bleeding event, Thromboembolic event, Anticoagulation-related hospital admission, Anticoagulation-related ED visit, Anticoagulation-related fatality           OBJECTIVE    Recent labs: (last 7 days)     04/28/21  1510   INR 3.30*       ASSESSMENT / PLAN  INR assessment SUPRA    Recheck INR In: 2 WEEKS    INR Location Outside lab      Anticoagulation Summary  As of 4/28/2021    INR goal:  2.0-3.0   TTR:  84.7 % (1 y)   INR used for dosing:  3.30 (4/28/2021)   Warfarin maintenance plan:  5 mg (5 mg x 1) every Mon, Wed, Fri; 2.5 mg (5 mg x 0.5) all other days   Full warfarin instructions:  4/28: 2.5 mg; Otherwise 5 mg every Mon, Wed, Fri; 2.5 mg all other days   Weekly warfarin total:  25 mg   Plan last modified:  Teresa Espino RN (10/15/2019)   Next INR check:  5/12/2021   Target end date:  Indefinite    Indications    Heart valve replaced [Z95.2] [Z95.2]  Long term current use of anticoagulants with INR goal of 2.0-3.0 [Z79.01]  S/P aortic valve replacement [Z95.2]             Anticoagulation Episode Summary     INR check location:      Preferred lab:      Send INR reminders to:  [facility] HEART INR NURSE    Comments:  AVR in 2001, Lovenox needed per Dr. Garcia due to possible TIA in 05/18      Anticoagulation Care Providers     Provider Role Specialty Phone number    March, Sundar Cantu MD Referring Interventional Cardiology [phone]            See the Encounter Report to view Anticoagulation Flowsheet and Dosing Calendar (Go to Encounters tab in chart review, and find the Anticoagulation Therapy Visit)    INR was 3.3 today. Writer attempted to call pt to discuss INR results, but no  answer. VM left instructing pt to call back to let us know if he has had any abnormal bleeding or bruising, any recent medication or dietary changes or recent illness. Instructions left to decrease today's dose from 5 mg to 2.5 mg and then continue current dosing of 5 mg MWF and 2.5 mg all other days of the week. Instructed to eat a serving of greens today. Next INR check is to be scheduled in 2 weeks. Dosage adjustment made based on physician directed care plan.    1554: Writer called pt back and verified instructions as above correctly. Denies any abnormal bleeding or bruising. States has been taking Sudafed and Tylenol PRN for seasonal allergies-up to 3 x daily. Per [employer]romedex, no drug interaction between Sudafed and warfarin. Instructed pt to eat a serving of greens on days that he is taking increased Tylenol. Pt verbalized understanding without further questions. Next INR has been scheduled in 2 weeks.    Jasmina Vegas RN

## 2021-05-12 ENCOUNTER — ANTICOAGULATION THERAPY VISIT (OUTPATIENT)
Dept: CARDIOLOGY | Facility: CLINIC | Age: 61
End: 2021-05-12
Payer: COMMERCIAL

## 2021-05-12 DIAGNOSIS — Z95.2 S/P AORTIC VALVE REPLACEMENT: ICD-10-CM

## 2021-05-12 DIAGNOSIS — Z95.2 HEART VALVE REPLACED: ICD-10-CM

## 2021-05-12 DIAGNOSIS — Z79.01 LONG TERM CURRENT USE OF ANTICOAGULANTS WITH INR GOAL OF 2.0-3.0: ICD-10-CM

## 2021-05-12 LAB
CAPILLARY BLOOD COLLECTION: NORMAL
INR PPP: 3.6 (ref 0.86–1.14)

## 2021-05-12 PROCEDURE — 85610 PROTHROMBIN TIME: CPT | Performed by: INTERNAL MEDICINE

## 2021-05-12 PROCEDURE — 99207 PR NO CHARGE NURSE ONLY: CPT

## 2021-05-12 PROCEDURE — 36416 COLLJ CAPILLARY BLOOD SPEC: CPT | Performed by: INTERNAL MEDICINE

## 2021-05-12 NOTE — PROGRESS NOTES
ANTICOAGULATION FOLLOW-UP CLINIC VISIT    Patient Name:  Richard Ball  Date:  5/12/2021  Contact Type:  Telephone    SUBJECTIVE:  Patient Findings     Positives:  Change in medications (taking Tylenol 2x/day (less than 2 weeks ago)), Change in diet/appetite (only ate 2 salads)        Clinical Outcomes     Negatives:  Major bleeding event, Thromboembolic event, Anticoagulation-related hospital admission, Anticoagulation-related ED visit, Anticoagulation-related fatality           OBJECTIVE    Recent labs: (last 7 days)     05/12/21  1512   INR 3.60*       ASSESSMENT / PLAN  INR assessment SUPRA    Recheck INR In: 2 WEEKS    INR Location Outside lab      Anticoagulation Summary  As of 5/12/2021    INR goal:  2.0-3.0   TTR:  80.8 % (1 y)   INR used for dosing:  3.60 (5/12/2021)   Warfarin maintenance plan:  5 mg (5 mg x 1) every Mon, Wed, Fri; 2.5 mg (5 mg x 0.5) all other days   Full warfarin instructions:  5/12: 2.5 mg; Otherwise 5 mg every Mon, Wed, Fri; 2.5 mg all other days   Weekly warfarin total:  25 mg   Plan last modified:  Teresa Espino, RN (10/15/2019)   Next INR check:  5/26/2021   Target end date:  Indefinite    Indications    Heart valve replaced [Z95.2] [Z95.2]  Long term current use of anticoagulants with INR goal of 2.0-3.0 [Z79.01]  S/P aortic valve replacement [Z95.2]             Anticoagulation Episode Summary     INR check location:      Preferred lab:      Send INR reminders to:  [facility] HEART INR NURSE    Comments:  AVR in 2001, Lovenox needed per Dr. Garcia due to possible TIA in 05/18      Anticoagulation Care Providers     Provider Role Specialty Phone number    March, Sundar Cantu MD Referring Interventional Cardiology [phone]            See the Encounter Report to view Anticoagulation Flowsheet and Dosing Calendar (Go to Encounters tab in chart review, and find the Anticoagulation Therapy Visit)    INR 3.60 Still taking Tylenol for allergy symptoms but only twice a day  instead of 3x/day. He is no longer taking Sudafed daily. He didn't increase his intake of greens (only had 2 salads). Denies abnormal bleeding or bruising. Will decrease today's dose to 2.5 mg then resume usual dosing of 5 mg MWF and 2.5 mg all other days with recheck in 2 weeks. He will increase intake of mixed green salads to twice a week. Dosage adjustment made based on physician directed care plan.  INR clinic referral renewal submitted for signature. New INR standing lab order submitted.  Has the patient previously taken warfarin? yes  If yes, for what indication? S/p AVR    Does the patient have any of the following indications for a higher range of 2.5-3.5:    Mitral position mechanical valve? no    Jus-Shiley, Ball and Cage or Monoleaflet valve (regardless of position) no    Other (if yes, please explain) no    Constance Chacko RN

## 2021-05-20 ENCOUNTER — MYC MEDICAL ADVICE (OUTPATIENT)
Dept: CARDIOLOGY | Facility: CLINIC | Age: 61
End: 2021-05-20

## 2021-05-21 NOTE — TELEPHONE ENCOUNTER
Received request to update patient on next remote device check date.  Pt is scheduled for 7/30/21.  Called and updated patient who was appreciative of call and in agreement with plan.  Device clinic phone number provided for future pacemaker questions.  LANIE Haynes

## 2021-05-26 ENCOUNTER — ANTICOAGULATION THERAPY VISIT (OUTPATIENT)
Dept: CARDIOLOGY | Facility: CLINIC | Age: 61
End: 2021-05-26

## 2021-05-26 DIAGNOSIS — Z79.01 LONG TERM CURRENT USE OF ANTICOAGULANTS WITH INR GOAL OF 2.0-3.0: ICD-10-CM

## 2021-05-26 DIAGNOSIS — Z95.2 HEART VALVE REPLACED: ICD-10-CM

## 2021-05-26 DIAGNOSIS — Z95.2 S/P AORTIC VALVE REPLACEMENT: ICD-10-CM

## 2021-05-26 LAB
CAPILLARY BLOOD COLLECTION: NORMAL
INR PPP: 2.3 (ref 0.86–1.14)

## 2021-05-26 PROCEDURE — 36416 COLLJ CAPILLARY BLOOD SPEC: CPT | Performed by: INTERNAL MEDICINE

## 2021-05-26 PROCEDURE — 85610 PROTHROMBIN TIME: CPT | Performed by: INTERNAL MEDICINE

## 2021-05-26 NOTE — PROGRESS NOTES
ANTICOAGULATION FOLLOW-UP CLINIC VISIT    Patient Name:  Richard Ball  Date:  2021  Contact Type:  Telephone    SUBJECTIVE:  Patient Findings     Positives:  Missed doses (Missed 2.5  mg dose on 21)        Clinical Outcomes     Negatives:  Major bleeding event, Thromboembolic event, Anticoagulation-related hospital admission, Anticoagulation-related ED visit, Anticoagulation-related fatality           OBJECTIVE    Recent labs: (last 7 days)     21  0716   INR 2.30*       ASSESSMENT / PLAN  INR assessment THER    Recheck INR In: 3 WEEKS    INR Location Outside lab      Anticoagulation Summary  As of 2021    INR goal:  2.0-3.0   TTR:  79.1 % (1 y)   INR used for dosin.30 (2021)   Warfarin maintenance plan:  5 mg (5 mg x 1) every Mon, Wed, Fri; 2.5 mg (5 mg x 0.5) all other days   Full warfarin instructions:  5 mg every Mon, Wed, Fri; 2.5 mg all other days   Weekly warfarin total:  25 mg   No change documented:  Jasmina Vegas RN   Plan last modified:  Teresa Espino RN (10/15/2019)   Next INR check:  2021   Target end date:  Indefinite    Indications    Heart valve replaced [Z95.2] [Z95.2]  Long term current use of anticoagulants with INR goal of 2.0-3.0 [Z79.01]  S/P aortic valve replacement [Z95.2]             Anticoagulation Episode Summary     INR check location:      Preferred lab:      Send INR reminders to:  [facility] HEART INR NURSE    Comments:  AVR in , Lovenox needed per Dr. Garcia due to possible TIA in       Anticoagulation Care Providers     Provider Role Specialty Phone number    March, Sundar Cantu MD Referring Interventional Cardiology [phone]            See the Encounter Report to view Anticoagulation Flowsheet and Dosing Calendar (Go to Encounters tab in chart review, and find the Anticoagulation Therapy Visit)    INR was 2.3 today. Pt denies any abnormal bleeding or bruising. Pt had been taking Sudafed and increased Tylenol use for  seasonal allergy symptoms. Denies continued Sudafed use. States he has been taking Tylenol 1000 mg BID for months for chronic back pain, and is not taking Allegra once daily. Per Micromedex, no interaction between Allergra and Warfarin. Missed a 2.5 mg dose of warfarin on 5/20/21. Eats approximately 2 servings of greens weekly. Will continue current dosing of 5 mg every MWF and 2.5 mg all other days of the week. Pt is leaving for a month long stay in [place] the month of July. Next INR check is scheduled in 2 weeks and again on 6/25/21-prior to leaving for [place] on 6/28/21. Pt verbalized understanding.    Jasmina Vegas RN

## 2021-06-11 ENCOUNTER — ANTICOAGULATION THERAPY VISIT (OUTPATIENT)
Dept: CARDIOLOGY | Facility: CLINIC | Age: 61
End: 2021-06-11
Payer: COMMERCIAL

## 2021-06-11 DIAGNOSIS — Z79.01 LONG TERM CURRENT USE OF ANTICOAGULANTS WITH INR GOAL OF 2.0-3.0: ICD-10-CM

## 2021-06-11 DIAGNOSIS — Z95.2 HEART VALVE REPLACED: ICD-10-CM

## 2021-06-11 DIAGNOSIS — Z95.2 S/P AORTIC VALVE REPLACEMENT: ICD-10-CM

## 2021-06-11 LAB
CAPILLARY BLOOD COLLECTION: NORMAL
INR PPP: 3.1 (ref 0.86–1.14)

## 2021-06-11 PROCEDURE — 99207 PR NO CHARGE NURSE ONLY: CPT | Performed by: INTERNAL MEDICINE

## 2021-06-11 PROCEDURE — 85610 PROTHROMBIN TIME: CPT | Performed by: INTERNAL MEDICINE

## 2021-06-11 PROCEDURE — 36416 COLLJ CAPILLARY BLOOD SPEC: CPT | Performed by: INTERNAL MEDICINE

## 2021-06-11 NOTE — PROGRESS NOTES
ANTICOAGULATION FOLLOW-UP CLINIC VISIT    Patient Name:  Richard Ball  Date:  6/11/2021  Contact Type:  Telephone    SUBJECTIVE:  Patient Findings         Clinical Outcomes     Negatives:  Major bleeding event, Thromboembolic event, Anticoagulation-related hospital admission, Anticoagulation-related ED visit, Anticoagulation-related fatality           OBJECTIVE    Recent labs: (last 7 days)     06/11/21  0734   INR 3.10*       ASSESSMENT / PLAN  INR assessment SUPRA    Recheck INR In: 2 WEEKS    INR Location Outside lab      Anticoagulation Summary  As of 6/11/2021    INR goal:  2.0-3.0   TTR:  78.5 % (1 y)   INR used for dosing:  3.10 (6/11/2021)   Warfarin maintenance plan:  5 mg (5 mg x 1) every Mon, Wed, Fri; 2.5 mg (5 mg x 0.5) all other days   Full warfarin instructions:  5 mg every Mon, Wed, Fri; 2.5 mg all other days   Weekly warfarin total:  25 mg   No change documented:  Teresa Espino, RN   Plan last modified:  Teresa Espino RN (10/15/2019)   Next INR check:  6/25/2021   Target end date:  Indefinite    Indications    Heart valve replaced [Z95.2] [Z95.2]  Long term current use of anticoagulants with INR goal of 2.0-3.0 [Z79.01]  S/P aortic valve replacement [Z95.2]             Anticoagulation Episode Summary     INR check location:      Preferred lab:      Send INR reminders to:  [facility] HEART INR NURSE    Comments:  AVR in 2001, Lovenox needed per Dr. Garcia due to possible TIA in 05/18      Anticoagulation Care Providers     Provider Role Specialty Phone number    March, Sundar Cantu MD Referring Interventional Cardiology [phone]            See the Encounter Report to view Anticoagulation Flowsheet and Dosing Calendar (Go to Encounters tab in chart review, and find the Anticoagulation Therapy Visit)    INR 3.1.  Called and spoke to the patient.  He will be in [place] for the month of July.  Recheck in 2 weeks already scheduled.  He said he is a patient at a clinic in [place] if  needed.  Continue same schedule and recheck in 2 weeks.  Rios Espino, RN

## 2021-06-24 DIAGNOSIS — Z95.2 S/P AORTIC VALVE REPLACEMENT: ICD-10-CM

## 2021-06-24 DIAGNOSIS — Q87.40 MARFAN'S SYNDROME: ICD-10-CM

## 2021-06-24 DIAGNOSIS — E78.5 HYPERLIPIDEMIA LDL GOAL <70: ICD-10-CM

## 2021-06-24 RX ORDER — WARFARIN SODIUM 5 MG/1
TABLET ORAL
Qty: 70 TABLET | Refills: 1 | Status: SHIPPED | OUTPATIENT
Start: 2021-06-24 | End: 2022-03-09

## 2021-06-24 RX ORDER — WARFARIN SODIUM 5 MG/1
TABLET ORAL
Qty: 70 TABLET | Refills: 1 | Status: CANCELLED | OUTPATIENT
Start: 2021-06-24

## 2021-06-24 RX ORDER — ATORVASTATIN CALCIUM 40 MG/1
40 TABLET, FILM COATED ORAL DAILY
Qty: 90 TABLET | Refills: 3 | Status: SHIPPED | OUTPATIENT
Start: 2021-06-24 | End: 2022-09-22

## 2021-06-24 NOTE — CONFIDENTIAL NOTE
Don calling and needing his refill sent in either today or tomorrow as he will be leaving for out of town for a month. Please call pt to update once the refill has been sent.    Pt needs atorvastatin sent to [facility] and warfarin sent to [facility].

## 2021-06-24 NOTE — TELEPHONE ENCOUNTER
Called and spoke with Don. He will get updated labs tomorrow with INR draw. Patient headed out of town for a week so will call us upon his return to schedule. Patient verbalized understanding and is in agreement to the plan.

## 2021-06-25 ENCOUNTER — ANTICOAGULATION THERAPY VISIT (OUTPATIENT)
Dept: CARDIOLOGY | Facility: CLINIC | Age: 61
End: 2021-06-25
Payer: COMMERCIAL

## 2021-06-25 DIAGNOSIS — Z95.2 S/P AORTIC VALVE REPLACEMENT: ICD-10-CM

## 2021-06-25 DIAGNOSIS — Z79.01 LONG TERM CURRENT USE OF ANTICOAGULANTS WITH INR GOAL OF 2.0-3.0: ICD-10-CM

## 2021-06-25 DIAGNOSIS — E78.5 HYPERLIPIDEMIA LDL GOAL <70: ICD-10-CM

## 2021-06-25 DIAGNOSIS — Z95.2 HEART VALVE REPLACED: ICD-10-CM

## 2021-06-25 DIAGNOSIS — Q87.40 MARFAN'S SYNDROME: ICD-10-CM

## 2021-06-25 LAB
ALBUMIN SERPL-MCNC: 3.5 G/DL (ref 3.4–5)
ALP SERPL-CCNC: 73 U/L (ref 40–150)
ALT SERPL W P-5'-P-CCNC: 29 U/L (ref 0–70)
ANION GAP SERPL CALCULATED.3IONS-SCNC: 4 MMOL/L (ref 3–14)
AST SERPL W P-5'-P-CCNC: 23 U/L (ref 0–45)
BILIRUB SERPL-MCNC: 0.6 MG/DL (ref 0.2–1.3)
BUN SERPL-MCNC: 14 MG/DL (ref 7–30)
CALCIUM SERPL-MCNC: 8.6 MG/DL (ref 8.5–10.1)
CAPILLARY BLOOD COLLECTION: NORMAL
CHLORIDE SERPL-SCNC: 107 MMOL/L (ref 94–109)
CHOLEST SERPL-MCNC: 131 MG/DL
CO2 SERPL-SCNC: 28 MMOL/L (ref 20–32)
CREAT SERPL-MCNC: 0.89 MG/DL (ref 0.66–1.25)
GFR SERPL CREATININE-BSD FRML MDRD: >90 ML/MIN/{1.73_M2}
GLUCOSE SERPL-MCNC: 96 MG/DL (ref 70–99)
HDLC SERPL-MCNC: 42 MG/DL
INR PPP: 1.7 (ref 0.86–1.14)
LDLC SERPL CALC-MCNC: 59 MG/DL
NONHDLC SERPL-MCNC: 89 MG/DL
POTASSIUM SERPL-SCNC: 4.4 MMOL/L (ref 3.4–5.3)
PROT SERPL-MCNC: 6.3 G/DL (ref 6.8–8.8)
SODIUM SERPL-SCNC: 139 MMOL/L (ref 133–144)
TRIGL SERPL-MCNC: 149 MG/DL

## 2021-06-25 PROCEDURE — 80053 COMPREHEN METABOLIC PANEL: CPT | Performed by: INTERNAL MEDICINE

## 2021-06-25 PROCEDURE — 85610 PROTHROMBIN TIME: CPT | Performed by: INTERNAL MEDICINE

## 2021-06-25 PROCEDURE — 36415 COLL VENOUS BLD VENIPUNCTURE: CPT | Performed by: INTERNAL MEDICINE

## 2021-06-25 PROCEDURE — 99207 PR NO CHARGE NURSE ONLY: CPT | Performed by: INTERNAL MEDICINE

## 2021-06-25 PROCEDURE — 80061 LIPID PANEL: CPT | Performed by: INTERNAL MEDICINE

## 2021-06-25 NOTE — PROGRESS NOTES
ANTICOAGULATION FOLLOW-UP CLINIC VISIT    Patient Name:  Richard Ball  Date:  2021  Contact Type:  Telephone    SUBJECTIVE:  Patient Findings     Positives:  Missed doses, Change in diet/appetite        Clinical Outcomes     Negatives:  Major bleeding event, Thromboembolic event, Anticoagulation-related hospital admission, Anticoagulation-related ED visit, Anticoagulation-related fatality           OBJECTIVE    Recent labs: (last 7 days)     21  0727   INR 1.70*       ASSESSMENT / PLAN  INR assessment SUB    Recheck INR In: 4 DAYS    INR Location Outside lab      Anticoagulation Summary  As of 2021    INR goal:  2.0-3.0   TTR:  77.5 % (1 y)   INR used for dosin.70 (2021)   Warfarin maintenance plan:  5 mg (5 mg x 1) every Mon, Wed, Fri; 2.5 mg (5 mg x 0.5) all other days   Full warfarin instructions:  : 7.5 mg; Otherwise 5 mg every Mon, Wed, Fri; 2.5 mg all other days   Weekly warfarin total:  25 mg   Plan last modified:  Teresa Espino RN (10/15/2019)   Next INR check:  2021   Target end date:  Indefinite    Indications    Heart valve replaced [Z95.2] [Z95.2]  Long term current use of anticoagulants with INR goal of 2.0-3.0 [Z79.01]  S/P aortic valve replacement [Z95.2]             Anticoagulation Episode Summary     INR check location:      Preferred lab:      Send INR reminders to:  [facility] HEART INR NURSE    Comments:  AVR in , Lovenox needed per Dr. Garcia due to possible TIA in       Anticoagulation Care Providers     Provider Role Specialty Phone number    March, Sundar Cantu MD Referring Interventional Cardiology [phone]            See the Encounter Report to view Anticoagulation Flowsheet and Dosing Calendar (Go to Encounters tab in chart review, and find the Anticoagulation Therapy Visit)    INR 1.7.  Called and spoke to the patient.  He missed his 5mg dose on Wednesday and he took it the next day on Thursday.  He ate broccoli last night in  addition to a salad earlier in the week.  He is leaving early Monday morning for [place] and staying in [place] for the month of July.  He has 1 syringe of Lovenox left.  The patient said he doesn't feel good when he does the Lovenox so he said he doesn't want to and will not do any Lovenox.  Encouraged patient to at least use the one syringe today when INR was 1.7.  His INR should be back in range by Monday when he is flying.  He took 7.5mg last night.  Boost today from 5mg to 7.5mg x1 and he will take that now instead of tonight.  Avoid greens today and this weekend.  He will call the clinic in [place] that he has been to a few years ago to get the phone and fax number so we can fax an INR order.  He should check INR on 6/29 or 6/30.  We will send a standing order so he could check an INR in July also.  Patient verbalized understanding.  Rios DELA CRUZ    INR order to be faxed to [facility] in [place].  Phone number:  [phone] and fax number: [phone].  LANIE Davis will fax order from the [place] office.    Rios Espino RN

## 2021-06-30 ENCOUNTER — ANTICOAGULATION THERAPY VISIT (OUTPATIENT)
Dept: CARDIOLOGY | Facility: CLINIC | Age: 61
End: 2021-06-30
Payer: COMMERCIAL

## 2021-06-30 ENCOUNTER — TRANSFERRED RECORDS (OUTPATIENT)
Dept: HEALTH INFORMATION MANAGEMENT | Facility: CLINIC | Age: 61
End: 2021-06-30

## 2021-06-30 DIAGNOSIS — Z95.2 HEART VALVE REPLACED: ICD-10-CM

## 2021-06-30 DIAGNOSIS — Z79.01 LONG TERM CURRENT USE OF ANTICOAGULANTS WITH INR GOAL OF 2.0-3.0: ICD-10-CM

## 2021-06-30 DIAGNOSIS — Z95.2 S/P AORTIC VALVE REPLACEMENT: ICD-10-CM

## 2021-06-30 LAB — INR PPP: 2.8 (ref 0.9–1.1)

## 2021-06-30 NOTE — PROGRESS NOTES
ANTICOAGULATION FOLLOW-UP CLINIC VISIT    Patient Name:  Richard Ball  Date:  2021  Contact Type:  Telephone    SUBJECTIVE:  Patient Findings         Clinical Outcomes     Negatives:  Major bleeding event, Thromboembolic event, Anticoagulation-related hospital admission, Anticoagulation-related ED visit, Anticoagulation-related fatality           OBJECTIVE    Recent labs: (last 7 days)     21   INR 2.8*       ASSESSMENT / PLAN  INR assessment THER    Recheck INR In: 3 WEEKS    INR Location Outside lab      Anticoagulation Summary  As of 2021    INR goal:  2.0-3.0   TTR:  77.1 % (1 y)   INR used for dosin.8 (2021)   Warfarin maintenance plan:  5 mg (5 mg x 1) every Mon, Wed, Fri; 2.5 mg (5 mg x 0.5) all other days   Full warfarin instructions:  5 mg every Mon, Wed, Fri; 2.5 mg all other days   Weekly warfarin total:  25 mg   No change documented:  Constance Chacko RN   Plan last modified:  Teresa Espino RN (10/15/2019)   Next INR check:  2021   Target end date:  Indefinite    Indications    Heart valve replaced [Z95.2] [Z95.2]  Long term current use of anticoagulants with INR goal of 2.0-3.0 [Z79.01]  S/P aortic valve replacement [Z95.2]             Anticoagulation Episode Summary     INR check location:      Preferred lab:      Send INR reminders to:  [facility] HEART INR NURSE    Comments:  AVR in , Lovenox needed per Dr. Garcia due to possible TIA in       Anticoagulation Care Providers     Provider Role Specialty Phone number    March, Sundar Cantu MD Referring Interventional Cardiology [phone]            See the Encounter Report to view Anticoagulation Flowsheet and Dosing Calendar (Go to Encounters tab in chart review, and find the Anticoagulation Therapy Visit)    INR 2.8 (done at [facility] in [place], [place]). Pt is spending a month in [place]. INR back in range after making up a missed dose last week. Left message asking pt to call back to  review result, ask if any change in other meds, diet or bleeding issues. Left detailed instructions to continue usual dosing of 5 mg MWF and 2.5 mg all other days with recheck in 3 weeks. The order that was previously faxed to [facility] was a standing order for INR's.  If a 3 week recheck is not possible then could recheck in 4 weeks in [place]. Asked that he call back to confirm that he received the instructions, update us on his status and discuss date of next INR recheck.  1:25pm Pt left message that he received the message with dosing instructions. No changes. He will be in [place] on 7/27 and asked to schedule the INR appt on 7/28. Appt scheduled and left message for pt with next appt information.   Constance Chacko RN

## 2021-07-05 DIAGNOSIS — Z79.01 LONG TERM CURRENT USE OF ANTICOAGULANT THERAPY: ICD-10-CM

## 2021-07-05 DIAGNOSIS — Z95.2 S/P AORTIC VALVE REPLACEMENT: Primary | ICD-10-CM

## 2021-07-05 DIAGNOSIS — Z95.2 HEART VALVE REPLACED: ICD-10-CM

## 2021-07-28 ENCOUNTER — ANTICOAGULATION THERAPY VISIT (OUTPATIENT)
Dept: CARDIOLOGY | Facility: CLINIC | Age: 61
End: 2021-07-28

## 2021-07-28 ENCOUNTER — ANCILLARY PROCEDURE (OUTPATIENT)
Dept: CARDIOLOGY | Facility: CLINIC | Age: 61
End: 2021-07-28
Attending: INTERNAL MEDICINE
Payer: COMMERCIAL

## 2021-07-28 ENCOUNTER — LAB (OUTPATIENT)
Dept: LAB | Facility: CLINIC | Age: 61
End: 2021-07-28
Payer: COMMERCIAL

## 2021-07-28 DIAGNOSIS — Z95.2 S/P AORTIC VALVE REPLACEMENT: ICD-10-CM

## 2021-07-28 DIAGNOSIS — Z79.01 LONG TERM CURRENT USE OF ANTICOAGULANTS WITH INR GOAL OF 2.0-3.0: ICD-10-CM

## 2021-07-28 DIAGNOSIS — Z95.2 HEART VALVE REPLACED: Primary | ICD-10-CM

## 2021-07-28 DIAGNOSIS — Z79.01 LONG TERM CURRENT USE OF ANTICOAGULANT THERAPY: ICD-10-CM

## 2021-07-28 DIAGNOSIS — Z95.0 CARDIAC PACEMAKER IN SITU: ICD-10-CM

## 2021-07-28 DIAGNOSIS — Z95.2 HEART VALVE REPLACED: ICD-10-CM

## 2021-07-28 LAB — INR BLD: 3.6 (ref 0.9–1.1)

## 2021-07-28 PROCEDURE — 36416 COLLJ CAPILLARY BLOOD SPEC: CPT

## 2021-07-28 PROCEDURE — 85610 PROTHROMBIN TIME: CPT

## 2021-07-28 PROCEDURE — 93296 REM INTERROG EVL PM/IDS: CPT | Performed by: INTERNAL MEDICINE

## 2021-07-28 PROCEDURE — 93294 REM INTERROG EVL PM/LDLS PM: CPT | Performed by: INTERNAL MEDICINE

## 2021-07-28 NOTE — PROGRESS NOTES
ANTICOAGULATION FOLLOW-UP CLINIC VISIT    Patient Name:  Richard Ball  Date:  7/28/2021  Contact Type:  Telephone    SUBJECTIVE:  Patient Findings     Positives:  Change in diet/appetite (No greens the past few weeks while in [place]; usually broccoli/spinach salad 2x/wk)             OBJECTIVE    Recent labs: (last 7 days)     07/28/21  0825   INR 3.6*       ASSESSMENT / PLAN  INR assessment SUPRA    Recheck INR In: 2 WEEKS    INR Location Outside lab      Anticoagulation Summary  As of 7/28/2021    INR goal:  2.0-3.0   TTR:  71.3 % (1 y)   INR used for dosing:  3.6 (7/28/2021)   Warfarin maintenance plan:  5 mg (5 mg x 1) every Mon, Wed, Fri; 2.5 mg (5 mg x 0.5) all other days   Full warfarin instructions:  7/28: 2.5 mg; Otherwise 5 mg every Mon, Wed, Fri; 2.5 mg all other days   Weekly warfarin total:  25 mg   Plan last modified:  Teresa Espino RN (10/15/2019)   Next INR check:  8/11/2021   Target end date:  Indefinite    Indications    Heart valve replaced [Z95.2] [Z95.2]  Long term current use of anticoagulants with INR goal of 2.0-3.0 [Z79.01]  S/P aortic valve replacement [Z95.2]             Anticoagulation Episode Summary     INR check location:      Preferred lab:      Send INR reminders to:  [facility] HEART INR NURSE    Comments:  AVR in 2001, Lovenox needed per Dr. Garcia due to possible TIA in 05/18      Anticoagulation Care Providers     Provider Role Specialty Phone number    March, Sundar Cantu MD Referring Interventional Cardiology [phone]            See the Encounter Report to view Anticoagulation Flowsheet and Dosing Calendar (Go to Encounters tab in chart review, and find the Anticoagulation Therapy Visit)    INR 3.6 today at outside clinic. Spoke to patient, no abnormal bleeding/bruising. He just got back last night from an extended stay in [place], states he did not have many greens while there. Usually has broccoli/spinach salad a few times a week. No changes to ETOH. No med changes. Will  have patient take 2.5mg today and then resume normal maintenance dosing of 5mg MWF and 2.5mg all other days. Recheck in 2wks. Pt will also resume normal diet going forward.     Amita Obando RN

## 2021-08-02 LAB
MDC_IDC_LEAD_IMPLANT_DT: NORMAL
MDC_IDC_LEAD_IMPLANT_DT: NORMAL
MDC_IDC_LEAD_LOCATION: NORMAL
MDC_IDC_LEAD_LOCATION: NORMAL
MDC_IDC_LEAD_LOCATION_DETAIL_1: NORMAL
MDC_IDC_LEAD_LOCATION_DETAIL_1: NORMAL
MDC_IDC_LEAD_MFG: NORMAL
MDC_IDC_LEAD_MFG: NORMAL
MDC_IDC_LEAD_MODEL: NORMAL
MDC_IDC_LEAD_MODEL: NORMAL
MDC_IDC_LEAD_POLARITY_TYPE: NORMAL
MDC_IDC_LEAD_POLARITY_TYPE: NORMAL
MDC_IDC_LEAD_SERIAL: NORMAL
MDC_IDC_LEAD_SERIAL: NORMAL
MDC_IDC_MSMT_BATTERY_DTM: NORMAL
MDC_IDC_MSMT_BATTERY_IMPEDANCE: 4844 OHM
MDC_IDC_MSMT_BATTERY_REMAINING_LONGEVITY: 14 MO
MDC_IDC_MSMT_BATTERY_STATUS: NORMAL
MDC_IDC_MSMT_BATTERY_VOLTAGE: 2.67 V
MDC_IDC_MSMT_LEADCHNL_RA_IMPEDANCE_VALUE: 67 OHM
MDC_IDC_MSMT_LEADCHNL_RV_IMPEDANCE_VALUE: 508 OHM
MDC_IDC_MSMT_LEADCHNL_RV_PACING_THRESHOLD_AMPLITUDE: 0.62 V
MDC_IDC_MSMT_LEADCHNL_RV_PACING_THRESHOLD_PULSEWIDTH: 0.4 MS
MDC_IDC_PG_IMPLANT_DTM: NORMAL
MDC_IDC_PG_MFG: NORMAL
MDC_IDC_PG_MODEL: NORMAL
MDC_IDC_PG_SERIAL: NORMAL
MDC_IDC_PG_TYPE: NORMAL
MDC_IDC_SESS_CLINIC_NAME: NORMAL
MDC_IDC_SESS_DTM: NORMAL
MDC_IDC_SESS_TYPE: NORMAL
MDC_IDC_SET_BRADY_AT_MODE_SWITCH_MODE: NORMAL
MDC_IDC_SET_BRADY_AT_MODE_SWITCH_RATE: 150 {BEATS}/MIN
MDC_IDC_SET_BRADY_LOWRATE: 50 {BEATS}/MIN
MDC_IDC_SET_BRADY_MAX_SENSOR_RATE: 140 {BEATS}/MIN
MDC_IDC_SET_BRADY_MAX_TRACKING_RATE: 140 {BEATS}/MIN
MDC_IDC_SET_BRADY_MODE: NORMAL
MDC_IDC_SET_BRADY_SAV_DELAY_LOW: 120 MS
MDC_IDC_SET_LEADCHNL_RA_SENSING_POLARITY: NORMAL
MDC_IDC_SET_LEADCHNL_RA_SENSING_SENSITIVITY: 0.5 MV
MDC_IDC_SET_LEADCHNL_RV_PACING_AMPLITUDE: 2 V
MDC_IDC_SET_LEADCHNL_RV_PACING_CAPTURE_MODE: NORMAL
MDC_IDC_SET_LEADCHNL_RV_PACING_POLARITY: NORMAL
MDC_IDC_SET_LEADCHNL_RV_PACING_PULSEWIDTH: 0.4 MS
MDC_IDC_SET_LEADCHNL_RV_SENSING_POLARITY: NORMAL
MDC_IDC_SET_LEADCHNL_RV_SENSING_SENSITIVITY: 4 MV
MDC_IDC_SET_ZONE_DETECTION_INTERVAL: 400 MS
MDC_IDC_SET_ZONE_DETECTION_INTERVAL: 400 MS
MDC_IDC_SET_ZONE_TYPE: NORMAL
MDC_IDC_SET_ZONE_TYPE: NORMAL
MDC_IDC_STAT_AT_BURDEN_PERCENT: 0.1 %
MDC_IDC_STAT_AT_DTM_END: NORMAL
MDC_IDC_STAT_AT_DTM_START: NORMAL
MDC_IDC_STAT_AT_MODE_SW_COUNT: 984
MDC_IDC_STAT_BRADY_AP_VP_PERCENT: 0 %
MDC_IDC_STAT_BRADY_AP_VS_PERCENT: 0 %
MDC_IDC_STAT_BRADY_AS_VP_PERCENT: 100 %
MDC_IDC_STAT_BRADY_AS_VS_PERCENT: 0 %
MDC_IDC_STAT_BRADY_DTM_END: NORMAL
MDC_IDC_STAT_BRADY_DTM_START: NORMAL
MDC_IDC_STAT_EPISODE_RECENT_COUNT: 0
MDC_IDC_STAT_EPISODE_RECENT_COUNT: 0
MDC_IDC_STAT_EPISODE_RECENT_COUNT_DTM_END: NORMAL
MDC_IDC_STAT_EPISODE_RECENT_COUNT_DTM_END: NORMAL
MDC_IDC_STAT_EPISODE_RECENT_COUNT_DTM_START: NORMAL
MDC_IDC_STAT_EPISODE_RECENT_COUNT_DTM_START: NORMAL
MDC_IDC_STAT_EPISODE_TYPE: NORMAL
MDC_IDC_STAT_EPISODE_TYPE: NORMAL

## 2021-08-11 ENCOUNTER — ANTICOAGULATION THERAPY VISIT (OUTPATIENT)
Dept: CARDIOLOGY | Facility: CLINIC | Age: 61
End: 2021-08-11
Payer: COMMERCIAL

## 2021-08-11 ENCOUNTER — TELEPHONE (OUTPATIENT)
Dept: CARDIOLOGY | Facility: CLINIC | Age: 61
End: 2021-08-11

## 2021-08-11 ENCOUNTER — LAB (OUTPATIENT)
Dept: LAB | Facility: CLINIC | Age: 61
End: 2021-08-11
Payer: COMMERCIAL

## 2021-08-11 DIAGNOSIS — Z95.2 S/P AORTIC VALVE REPLACEMENT: ICD-10-CM

## 2021-08-11 DIAGNOSIS — Z79.01 LONG TERM CURRENT USE OF ANTICOAGULANT THERAPY: ICD-10-CM

## 2021-08-11 DIAGNOSIS — Z95.2 HEART VALVE REPLACED: ICD-10-CM

## 2021-08-11 DIAGNOSIS — Z95.2 HEART VALVE REPLACED: Primary | ICD-10-CM

## 2021-08-11 DIAGNOSIS — Z79.01 LONG TERM CURRENT USE OF ANTICOAGULANTS WITH INR GOAL OF 2.0-3.0: ICD-10-CM

## 2021-08-11 LAB — INR BLD: 3 (ref 0.9–1.1)

## 2021-08-11 PROCEDURE — 99207 PR NO CHARGE NURSE ONLY: CPT

## 2021-08-11 PROCEDURE — 36416 COLLJ CAPILLARY BLOOD SPEC: CPT

## 2021-08-11 PROCEDURE — 85610 PROTHROMBIN TIME: CPT

## 2021-08-11 NOTE — TELEPHONE ENCOUNTER
Pt scheduled for late-day INR today, 4:30pm. Left message for patient that INR clinic may not call him until tomorrow to review his result if it results after clinic hours. Left message for patient notifying him of this information, asked that he call back if he would like to reschedule to an earlier time or different day that falls within clinic hours.

## 2021-09-02 ENCOUNTER — ANTICOAGULATION THERAPY VISIT (OUTPATIENT)
Dept: CARDIOLOGY | Facility: CLINIC | Age: 61
End: 2021-09-02
Payer: COMMERCIAL

## 2021-09-02 ENCOUNTER — LAB (OUTPATIENT)
Dept: LAB | Facility: CLINIC | Age: 61
End: 2021-09-02
Payer: COMMERCIAL

## 2021-09-02 DIAGNOSIS — Z79.01 LONG TERM CURRENT USE OF ANTICOAGULANT THERAPY: ICD-10-CM

## 2021-09-02 DIAGNOSIS — Z95.2 S/P AORTIC VALVE REPLACEMENT: ICD-10-CM

## 2021-09-02 DIAGNOSIS — Z79.01 LONG TERM CURRENT USE OF ANTICOAGULANTS WITH INR GOAL OF 2.0-3.0: ICD-10-CM

## 2021-09-02 DIAGNOSIS — Z95.2 HEART VALVE REPLACED: ICD-10-CM

## 2021-09-02 DIAGNOSIS — Z95.2 HEART VALVE REPLACED: Primary | ICD-10-CM

## 2021-09-02 LAB — INR BLD: 2.6 (ref 0.9–1.1)

## 2021-09-02 PROCEDURE — 99207 PR NO CHARGE NURSE ONLY: CPT

## 2021-09-02 PROCEDURE — 36416 COLLJ CAPILLARY BLOOD SPEC: CPT

## 2021-09-02 PROCEDURE — 85610 PROTHROMBIN TIME: CPT

## 2021-09-02 NOTE — PROGRESS NOTES
ANTICOAGULATION FOLLOW-UP CLINIC VISIT    Patient Name:  Richard Ball  Date:  2021  Contact Type:  Telephone    SUBJECTIVE:  Patient Findings         Clinical Outcomes     Negatives:  Major bleeding event, Thromboembolic event, Anticoagulation-related hospital admission, Anticoagulation-related ED visit, Anticoagulation-related fatality           OBJECTIVE    Recent labs: (last 7 days)     21  0732   INR 2.6*       ASSESSMENT / PLAN  INR assessment THER    Recheck INR In: 4 WEEKS    INR Location Outside lab      Anticoagulation Summary  As of 2021    INR goal:  2.0-3.0   TTR:  67.4 % (1 y)   INR used for dosin.6 (2021)   Warfarin maintenance plan:  5 mg (5 mg x 1) every Mon, Wed, Fri; 2.5 mg (5 mg x 0.5) all other days   Full warfarin instructions:  5 mg every Mon, Wed, Fri; 2.5 mg all other days   Weekly warfarin total:  25 mg   No change documented:  Teresa Espino RN   Plan last modified:  Teresa Espino RN (10/15/2019)   Next INR check:  2021   Priority:  Maintenance   Target end date:  Indefinite    Indications    Heart valve replaced [Z95.2] [Z95.2]  Long term current use of anticoagulants with INR goal of 2.0-3.0 [Z79.01]  S/P aortic valve replacement [Z95.2]             Anticoagulation Episode Summary     INR check location:      Preferred lab:      Send INR reminders to:  [facility] HEART INR NURSE    Comments:  AVR in , Lovenox needed per Dr. Garcia due to possible TIA in       Anticoagulation Care Providers     Provider Role Specialty Phone number    March, Sundar Cantu MD Referring Interventional Cardiology [phone]            See the Encounter Report to view Anticoagulation Flowsheet and Dosing Calendar (Go to Encounters tab in chart review, and find the Anticoagulation Therapy Visit)    INR 2.6.  Called and spoke to the patient.  He said he took antibiotics a few weeks ago for cellulitis but has been done with the antibiotics for a couple of  weeks. Continue same warfarin dosing and recheck in 4 weeks.  Rios Espino RN

## 2021-09-30 ENCOUNTER — ALLIED HEALTH/NURSE VISIT (OUTPATIENT)
Dept: FAMILY MEDICINE | Facility: CLINIC | Age: 61
End: 2021-09-30
Payer: COMMERCIAL

## 2021-09-30 ENCOUNTER — ANTICOAGULATION THERAPY VISIT (OUTPATIENT)
Dept: CARDIOLOGY | Facility: CLINIC | Age: 61
End: 2021-09-30
Payer: COMMERCIAL

## 2021-09-30 DIAGNOSIS — Z23 NEED FOR PROPHYLACTIC VACCINATION AND INOCULATION AGAINST INFLUENZA: Primary | ICD-10-CM

## 2021-09-30 DIAGNOSIS — Z95.2 HEART VALVE REPLACED: Primary | ICD-10-CM

## 2021-09-30 DIAGNOSIS — Z79.01 LONG TERM CURRENT USE OF ANTICOAGULANTS WITH INR GOAL OF 2.0-3.0: ICD-10-CM

## 2021-09-30 DIAGNOSIS — Z95.2 S/P AORTIC VALVE REPLACEMENT: ICD-10-CM

## 2021-09-30 PROCEDURE — 90682 RIV4 VACC RECOMBINANT DNA IM: CPT

## 2021-09-30 PROCEDURE — 99207 PR NO CHARGE NURSE ONLY: CPT

## 2021-09-30 PROCEDURE — 90471 IMMUNIZATION ADMIN: CPT

## 2021-09-30 NOTE — PROGRESS NOTES
ANTICOAGULATION FOLLOW-UP CLINIC VISIT    Patient Name:  Richard Ball  Date:  9/30/2021  Contact Type:  Telephone    SUBJECTIVE:  Patient Findings         Clinical Outcomes     Negatives:  Major bleeding event, Thromboembolic event, Anticoagulation-related hospital admission, Anticoagulation-related ED visit, Anticoagulation-related fatality           OBJECTIVE    Recent labs: (last 7 days)     09/30/21  0731   INR 3.0*       ASSESSMENT / PLAN  INR assessment THER    Recheck INR In: 4 WEEKS    INR Location Outside lab      Anticoagulation Summary  As of 9/30/2021    INR goal:  2.0-3.0   TTR:  69.8 % (1 y)   INR used for dosing:  3.0 (9/30/2021)   Warfarin maintenance plan:  5 mg (5 mg x 1) every Mon, Wed, Fri; 2.5 mg (5 mg x 0.5) all other days   Full warfarin instructions:  5 mg every Mon, Wed, Fri; 2.5 mg all other days   Weekly warfarin total:  25 mg   No change documented:  Jasmina Vegas RN   Plan last modified:  Teresa Espino RN (10/15/2019)   Next INR check:  10/28/2021   Priority:  Maintenance   Target end date:  Indefinite    Indications    Heart valve replaced [Z95.2] [Z95.2]  Long term current use of anticoagulants with INR goal of 2.0-3.0 [Z79.01]  S/P aortic valve replacement [Z95.2]             Anticoagulation Episode Summary     INR check location:      Preferred lab:      Send INR reminders to:  [facility] HEART INR NURSE    Comments:  AVR in 2001, Lovenox needed per Dr. Garcia due to possible TIA in 05/18      Anticoagulation Care Providers     Provider Role Specialty Phone number    March, Sundar Cantu MD Referring Interventional Cardiology [phone]            See the Encounter Report to view Anticoagulation Flowsheet and Dosing Calendar (Go to Encounters tab in chart review, and find the Anticoagulation Therapy Visit)    INR was 3.0 today. Pt denies any abnormal bleeding or bruising. Denies any recent medication or dietary changes or recent illness. Instructed to eat a serving of  greens today. Will continue current dosing of 5 mg every MWF and 2.5 mg all other days of the week. Next INR check is scheduled in 4 weeks. Pt verbalized understanding.    Jamsina Vegas RN

## 2021-10-28 ENCOUNTER — LAB (OUTPATIENT)
Dept: LAB | Facility: CLINIC | Age: 61
End: 2021-10-28
Payer: COMMERCIAL

## 2021-10-28 ENCOUNTER — ANTICOAGULATION THERAPY VISIT (OUTPATIENT)
Dept: CARDIOLOGY | Facility: CLINIC | Age: 61
End: 2021-10-28
Payer: COMMERCIAL

## 2021-10-28 DIAGNOSIS — Z79.01 LONG TERM CURRENT USE OF ANTICOAGULANT THERAPY: ICD-10-CM

## 2021-10-28 DIAGNOSIS — Z79.01 LONG TERM CURRENT USE OF ANTICOAGULANTS WITH INR GOAL OF 2.0-3.0: ICD-10-CM

## 2021-10-28 DIAGNOSIS — Z95.2 HEART VALVE REPLACED: Primary | ICD-10-CM

## 2021-10-28 DIAGNOSIS — Z95.2 HEART VALVE REPLACED: ICD-10-CM

## 2021-10-28 DIAGNOSIS — Z95.2 S/P AORTIC VALVE REPLACEMENT: ICD-10-CM

## 2021-10-28 LAB — INR BLD: 3.3 (ref 0.9–1.1)

## 2021-10-28 PROCEDURE — 85610 PROTHROMBIN TIME: CPT

## 2021-10-28 PROCEDURE — 36416 COLLJ CAPILLARY BLOOD SPEC: CPT

## 2021-10-28 PROCEDURE — 99207 PR NO CHARGE NURSE ONLY: CPT

## 2021-10-28 NOTE — PROGRESS NOTES
ANTICOAGULATION FOLLOW-UP CLINIC VISIT    Patient Name:  Richard Ball  Date:  10/28/2021  Contact Type:  Telephone    SUBJECTIVE:  Patient Findings         Clinical Outcomes     Negatives:  Major bleeding event, Thromboembolic event, Anticoagulation-related hospital admission, Anticoagulation-related ED visit, Anticoagulation-related fatality           OBJECTIVE    Recent labs: (last 7 days)     10/28/21  0735   INR 3.3*       ASSESSMENT / PLAN  INR assessment SUPRA    Recheck INR In: 3 WEEKS    INR Location Outside lab      Anticoagulation Summary  As of 10/28/2021    INR goal:  2.0-3.0   TTR:  66.4 % (1 y)   INR used for dosing:  3.3 (10/28/2021)   Warfarin maintenance plan:  5 mg (5 mg x 1) every Mon, Wed, Fri; 2.5 mg (5 mg x 0.5) all other days   Full warfarin instructions:  5 mg every Mon, Wed, Fri; 2.5 mg all other days   Weekly warfarin total:  25 mg   No change documented:  Teresa Espino RN   Plan last modified:  Teresa Espino RN (10/15/2019)   Next INR check:  11/18/2021   Priority:  Maintenance   Target end date:  Indefinite    Indications    Heart valve replaced [Z95.2] [Z95.2]  Long term current use of anticoagulants with INR goal of 2.0-3.0 [Z79.01]  S/P aortic valve replacement [Z95.2]             Anticoagulation Episode Summary     INR check location:      Preferred lab:      Send INR reminders to:  [facility] HEART INR NURSE    Comments:  AVR in 2001, Lovenox needed per Dr. Garcia due to possible TIA in 05/18      Anticoagulation Care Providers     Provider Role Specialty Phone number    March, Sundar Cantu MD Referring Interventional Cardiology [phone]            See the Encounter Report to view Anticoagulation Flowsheet and Dosing Calendar (Go to Encounters tab in chart review, and find the Anticoagulation Therapy Visit)    INR 3.3.  Called and spoke to the patient.  He is due for smaller dose today.  Continue the same schedule and recheck in 3 weeks.  He is already planning  on having greens with dinner tonight.  He thinks he will keep up with the greens including broccoli and brussel sprouts during the winter too.  Rios Espino RN

## 2021-11-03 ENCOUNTER — ANCILLARY PROCEDURE (OUTPATIENT)
Dept: CARDIOLOGY | Facility: CLINIC | Age: 61
End: 2021-11-03
Attending: INTERNAL MEDICINE
Payer: COMMERCIAL

## 2021-11-03 DIAGNOSIS — Z95.0 CARDIAC PACEMAKER IN SITU: ICD-10-CM

## 2021-11-03 DIAGNOSIS — Z95.0 PACEMAKER: ICD-10-CM

## 2021-11-03 DIAGNOSIS — Z95.2 S/P AORTIC VALVE REPLACEMENT: ICD-10-CM

## 2021-11-03 DIAGNOSIS — Q87.40 MARFAN'S SYNDROME: ICD-10-CM

## 2021-11-03 PROCEDURE — 93279 PRGRMG DEV EVAL PM/LDLS PM: CPT | Performed by: INTERNAL MEDICINE

## 2021-11-10 LAB
MDC_IDC_LEAD_IMPLANT_DT: NORMAL
MDC_IDC_LEAD_IMPLANT_DT: NORMAL
MDC_IDC_LEAD_LOCATION: NORMAL
MDC_IDC_LEAD_LOCATION: NORMAL
MDC_IDC_LEAD_LOCATION_DETAIL_1: NORMAL
MDC_IDC_LEAD_LOCATION_DETAIL_1: NORMAL
MDC_IDC_LEAD_MFG: NORMAL
MDC_IDC_LEAD_MFG: NORMAL
MDC_IDC_LEAD_MODEL: NORMAL
MDC_IDC_LEAD_MODEL: NORMAL
MDC_IDC_LEAD_POLARITY_TYPE: NORMAL
MDC_IDC_LEAD_POLARITY_TYPE: NORMAL
MDC_IDC_LEAD_SERIAL: NORMAL
MDC_IDC_LEAD_SERIAL: NORMAL
MDC_IDC_MSMT_BATTERY_DTM: NORMAL
MDC_IDC_MSMT_BATTERY_IMPEDANCE: 6190 OHM
MDC_IDC_MSMT_BATTERY_REMAINING_LONGEVITY: 9 MO
MDC_IDC_MSMT_BATTERY_STATUS: NORMAL
MDC_IDC_MSMT_BATTERY_VOLTAGE: 2.62 V
MDC_IDC_MSMT_LEADCHNL_RA_IMPEDANCE_VALUE: 67 OHM
MDC_IDC_MSMT_LEADCHNL_RV_IMPEDANCE_VALUE: 538 OHM
MDC_IDC_MSMT_LEADCHNL_RV_PACING_THRESHOLD_AMPLITUDE: 0.5 V
MDC_IDC_MSMT_LEADCHNL_RV_PACING_THRESHOLD_AMPLITUDE: 0.62 V
MDC_IDC_MSMT_LEADCHNL_RV_PACING_THRESHOLD_PULSEWIDTH: 0.4 MS
MDC_IDC_MSMT_LEADCHNL_RV_PACING_THRESHOLD_PULSEWIDTH: 0.4 MS
MDC_IDC_PG_IMPLANT_DTM: NORMAL
MDC_IDC_PG_MFG: NORMAL
MDC_IDC_PG_MODEL: NORMAL
MDC_IDC_PG_SERIAL: NORMAL
MDC_IDC_PG_TYPE: NORMAL
MDC_IDC_SESS_CLINIC_NAME: NORMAL
MDC_IDC_SESS_DTM: NORMAL
MDC_IDC_SESS_TYPE: NORMAL
MDC_IDC_SET_BRADY_AT_MODE_SWITCH_MODE: NORMAL
MDC_IDC_SET_BRADY_AT_MODE_SWITCH_RATE: 150 {BEATS}/MIN
MDC_IDC_SET_BRADY_LOWRATE: 50 {BEATS}/MIN
MDC_IDC_SET_BRADY_MAX_SENSOR_RATE: 140 {BEATS}/MIN
MDC_IDC_SET_BRADY_MAX_TRACKING_RATE: 140 {BEATS}/MIN
MDC_IDC_SET_BRADY_MODE: NORMAL
MDC_IDC_SET_BRADY_SAV_DELAY_LOW: 120 MS
MDC_IDC_SET_LEADCHNL_RA_SENSING_POLARITY: NORMAL
MDC_IDC_SET_LEADCHNL_RA_SENSING_SENSITIVITY: 0.5 MV
MDC_IDC_SET_LEADCHNL_RV_PACING_AMPLITUDE: 2 V
MDC_IDC_SET_LEADCHNL_RV_PACING_CAPTURE_MODE: NORMAL
MDC_IDC_SET_LEADCHNL_RV_PACING_POLARITY: NORMAL
MDC_IDC_SET_LEADCHNL_RV_PACING_PULSEWIDTH: 0.4 MS
MDC_IDC_SET_LEADCHNL_RV_SENSING_POLARITY: NORMAL
MDC_IDC_SET_LEADCHNL_RV_SENSING_SENSITIVITY: 4 MV
MDC_IDC_SET_ZONE_DETECTION_INTERVAL: 400 MS
MDC_IDC_SET_ZONE_DETECTION_INTERVAL: 400 MS
MDC_IDC_SET_ZONE_TYPE: NORMAL
MDC_IDC_SET_ZONE_TYPE: NORMAL
MDC_IDC_STAT_AT_BURDEN_PERCENT: 0.1 %
MDC_IDC_STAT_AT_DTM_END: NORMAL
MDC_IDC_STAT_AT_DTM_START: NORMAL
MDC_IDC_STAT_AT_MODE_SW_COUNT: 1551
MDC_IDC_STAT_BRADY_AP_VP_PERCENT: 0 %
MDC_IDC_STAT_BRADY_AP_VS_PERCENT: 0 %
MDC_IDC_STAT_BRADY_AS_VP_PERCENT: 100 %
MDC_IDC_STAT_BRADY_AS_VS_PERCENT: 0 %
MDC_IDC_STAT_BRADY_DTM_END: NORMAL
MDC_IDC_STAT_BRADY_DTM_START: NORMAL
MDC_IDC_STAT_EPISODE_RECENT_COUNT: 0
MDC_IDC_STAT_EPISODE_RECENT_COUNT: 3
MDC_IDC_STAT_EPISODE_RECENT_COUNT_DTM_END: NORMAL
MDC_IDC_STAT_EPISODE_RECENT_COUNT_DTM_END: NORMAL
MDC_IDC_STAT_EPISODE_RECENT_COUNT_DTM_START: NORMAL
MDC_IDC_STAT_EPISODE_RECENT_COUNT_DTM_START: NORMAL
MDC_IDC_STAT_EPISODE_TYPE: NORMAL
MDC_IDC_STAT_EPISODE_TYPE: NORMAL

## 2021-11-18 ENCOUNTER — ANTICOAGULATION THERAPY VISIT (OUTPATIENT)
Dept: CARDIOLOGY | Facility: CLINIC | Age: 61
End: 2021-11-18
Payer: COMMERCIAL

## 2021-11-18 ENCOUNTER — IMMUNIZATION (OUTPATIENT)
Dept: FAMILY MEDICINE | Facility: CLINIC | Age: 61
End: 2021-11-18
Payer: COMMERCIAL

## 2021-11-18 ENCOUNTER — LAB (OUTPATIENT)
Dept: LAB | Facility: CLINIC | Age: 61
End: 2021-11-18

## 2021-11-18 DIAGNOSIS — Z95.2 S/P AORTIC VALVE REPLACEMENT: ICD-10-CM

## 2021-11-18 DIAGNOSIS — Z79.01 LONG TERM CURRENT USE OF ANTICOAGULANTS WITH INR GOAL OF 2.0-3.0: ICD-10-CM

## 2021-11-18 DIAGNOSIS — Z79.01 LONG TERM CURRENT USE OF ANTICOAGULANT THERAPY: ICD-10-CM

## 2021-11-18 DIAGNOSIS — Z95.2 HEART VALVE REPLACED: ICD-10-CM

## 2021-11-18 DIAGNOSIS — Z95.2 HEART VALVE REPLACED: Primary | ICD-10-CM

## 2021-11-18 LAB — INR BLD: 2.1 (ref 0.9–1.1)

## 2021-11-18 PROCEDURE — 85610 PROTHROMBIN TIME: CPT

## 2021-11-18 PROCEDURE — 91306 COVID-19,PF,MODERNA (18+ YRS BOOSTER .25ML): CPT

## 2021-11-18 PROCEDURE — 0064A COVID-19,PF,MODERNA (18+ YRS BOOSTER .25ML): CPT

## 2021-11-18 PROCEDURE — 99207 PR NO CHARGE NURSE ONLY: CPT | Performed by: INTERNAL MEDICINE

## 2021-11-18 PROCEDURE — 36416 COLLJ CAPILLARY BLOOD SPEC: CPT

## 2021-11-18 NOTE — PROGRESS NOTES
ANTICOAGULATION FOLLOW-UP CLINIC VISIT    Patient Name:  Richard Ball  Date:  2021  Contact Type:  Telephone    SUBJECTIVE:  Patient Findings         Clinical Outcomes     Negatives:  Major bleeding event, Thromboembolic event, Anticoagulation-related hospital admission, Anticoagulation-related ED visit, Anticoagulation-related fatality           OBJECTIVE    Recent labs: (last 7 days)     21  0741   INR 2.1*       ASSESSMENT / PLAN  INR assessment THER    Recheck INR In: 4 WEEKS    INR Location Outside lab      Anticoagulation Summary  As of 2021    INR goal:  2.0-3.0   TTR:  65.0 % (1 y)   INR used for dosin.1 (2021)   Warfarin maintenance plan:  5 mg (5 mg x 1) every Mon, Wed, Fri; 2.5 mg (5 mg x 0.5) all other days   Full warfarin instructions:  5 mg every Mon, Wed, Fri; 2.5 mg all other days   Weekly warfarin total:  25 mg   No change documented:  Teresa Epsino RN   Plan last modified:  Teresa Espino RN (10/15/2019)   Next INR check:  2021   Target end date:  Indefinite    Indications    Heart valve replaced [Z95.2] [Z95.2]  Long term current use of anticoagulants with INR goal of 2.0-3.0 [Z79.01]  S/P aortic valve replacement [Z95.2]             Anticoagulation Episode Summary     INR check location:      Preferred lab:      Send INR reminders to:  [facility] HEART INR NURSE    Comments:  AVR in , Lovenox needed per  March due to possible TIA in       Anticoagulation Care Providers     Provider Role Specialty Phone number    March, Sundar Cantu MD Referring Interventional Cardiology [phone]            See the Encounter Report to view Anticoagulation Flowsheet and Dosing Calendar (Go to Encounters tab in chart review, and find the Anticoagulation Therapy Visit)    INR 2.1.  Called and spoke to the patient.  Since INR was 3.3 last time he increased his greens.  He ate broccoli and a salad since .  NO missed doses.  I told him no more  broccoli this week unless we boost his warfarin today but he said he would not eat broccoli for awhile.  Continue same dosing and recheck in about 4 weeks then he goes to [place] for 3 weeks.  Rios Espino RN

## 2021-12-06 ENCOUNTER — TELEPHONE (OUTPATIENT)
Dept: CARDIOLOGY | Facility: CLINIC | Age: 61
End: 2021-12-06
Payer: COMMERCIAL

## 2021-12-06 DIAGNOSIS — Q87.40 MARFAN'S SYNDROME: ICD-10-CM

## 2021-12-06 RX ORDER — ATENOLOL 50 MG/1
50 TABLET ORAL DAILY
Qty: 90 TABLET | Refills: 3 | Status: SHIPPED | OUTPATIENT
Start: 2021-12-06 | End: 2022-12-09

## 2021-12-06 RX ORDER — ATENOLOL 25 MG/1
25 TABLET ORAL DAILY
Qty: 90 TABLET | Refills: 3 | Status: SHIPPED | OUTPATIENT
Start: 2021-12-06 | End: 2023-03-07

## 2021-12-06 NOTE — TELEPHONE ENCOUNTER
Called and spoke with Don. Patient is needing a refill on his inhaler, which was prescribed by a provider who is no longer with [facility]. Encouraged patient to reach out to PCP. Patient would also like his prescription for atenolol to be a 50 mg tablets and 25 mg tablet instead of three 25 mg tablets. Resent prescription to [facility] in [place]. Patient verbalized understanding and is in agreement to the plan.

## 2021-12-14 ENCOUNTER — LAB (OUTPATIENT)
Dept: LAB | Facility: CLINIC | Age: 61
End: 2021-12-14
Payer: COMMERCIAL

## 2021-12-14 ENCOUNTER — ANTICOAGULATION THERAPY VISIT (OUTPATIENT)
Dept: CARDIOLOGY | Facility: CLINIC | Age: 61
End: 2021-12-14
Payer: COMMERCIAL

## 2021-12-14 DIAGNOSIS — Z95.2 HEART VALVE REPLACED: Primary | ICD-10-CM

## 2021-12-14 DIAGNOSIS — Z95.2 HEART VALVE REPLACED: ICD-10-CM

## 2021-12-14 DIAGNOSIS — Z95.2 S/P AORTIC VALVE REPLACEMENT: ICD-10-CM

## 2021-12-14 DIAGNOSIS — Z79.01 LONG TERM CURRENT USE OF ANTICOAGULANTS WITH INR GOAL OF 2.0-3.0: ICD-10-CM

## 2021-12-14 DIAGNOSIS — Z79.01 LONG TERM CURRENT USE OF ANTICOAGULANT THERAPY: ICD-10-CM

## 2021-12-14 LAB — INR BLD: 2.6 (ref 0.9–1.1)

## 2021-12-14 PROCEDURE — 85610 PROTHROMBIN TIME: CPT

## 2021-12-14 PROCEDURE — 99207 PR NO CHARGE NURSE ONLY: CPT

## 2021-12-14 PROCEDURE — 36416 COLLJ CAPILLARY BLOOD SPEC: CPT

## 2021-12-14 NOTE — PROGRESS NOTES
ANTICOAGULATION FOLLOW-UP CLINIC VISIT    Patient Name:  Richard Ball  Date:  2021  Contact Type:  Telephone    SUBJECTIVE:  Patient Findings         Clinical Outcomes     Negatives:  Major bleeding event, Thromboembolic event, Anticoagulation-related hospital admission, Anticoagulation-related ED visit, Anticoagulation-related fatality           OBJECTIVE    Recent labs: (last 7 days)     21  0737   INR 2.6*       ASSESSMENT / PLAN  INR assessment THER    Recheck INR In: 4 WEEKS    INR Location Outside lab      Anticoagulation Summary  As of 2021    INR goal:  2.0-3.0   TTR:  65.0 % (1 y)   INR used for dosin.6 (2021)   Warfarin maintenance plan:  5 mg (5 mg x 1) every Mon, Wed, Fri; 2.5 mg (5 mg x 0.5) all other days   Full warfarin instructions:  5 mg every Mon, Wed, Fri; 2.5 mg all other days   Weekly warfarin total:  25 mg   No change documented:  Teresa Espino RN   Plan last modified:  Teresa Espino RN (10/15/2019)   Next INR check:  2022   Priority:  Maintenance   Target end date:  Indefinite    Indications    Heart valve replaced [Z95.2] [Z95.2]  Long term current use of anticoagulants with INR goal of 2.0-3.0 [Z79.01]  S/P aortic valve replacement [Z95.2]             Anticoagulation Episode Summary     INR check location:      Preferred lab:      Send INR reminders to:  [facility] HEART INR NURSE    Comments:  AVR in , Lovenox needed per Dr. Garcia due to possible TIA in       Anticoagulation Care Providers     Provider Role Specialty Phone number    March, Sundar Cantu MD Referring Interventional Cardiology [phone]            See the Encounter Report to view Anticoagulation Flowsheet and Dosing Calendar (Go to Encounters tab in chart review, and find the Anticoagulation Therapy Visit)    INR 2.6.  Called and spoke to the patient.  No changes.  Continue same dosing and recheck in 4 weeks.  He will be going to [place] now for 3 weeks.  Rios  RN    Teresa Espino, RN

## 2022-01-14 ENCOUNTER — LAB (OUTPATIENT)
Dept: LAB | Facility: CLINIC | Age: 62
End: 2022-01-14
Payer: COMMERCIAL

## 2022-01-14 ENCOUNTER — ANTICOAGULATION THERAPY VISIT (OUTPATIENT)
Dept: ANTICOAGULATION | Facility: CLINIC | Age: 62
End: 2022-01-14

## 2022-01-14 DIAGNOSIS — Z95.2 S/P AORTIC VALVE REPLACEMENT: ICD-10-CM

## 2022-01-14 DIAGNOSIS — Z79.01 LONG TERM CURRENT USE OF ANTICOAGULANT THERAPY: ICD-10-CM

## 2022-01-14 DIAGNOSIS — Z79.01 LONG TERM CURRENT USE OF ANTICOAGULANTS WITH INR GOAL OF 2.0-3.0: ICD-10-CM

## 2022-01-14 DIAGNOSIS — Z95.2 HEART VALVE REPLACED: ICD-10-CM

## 2022-01-14 DIAGNOSIS — Z95.2 HEART VALVE REPLACED: Primary | ICD-10-CM

## 2022-01-14 LAB — INR BLD: 3.6 (ref 0.9–1.1)

## 2022-01-14 PROCEDURE — 85610 PROTHROMBIN TIME: CPT

## 2022-01-14 PROCEDURE — 36415 COLL VENOUS BLD VENIPUNCTURE: CPT

## 2022-01-14 NOTE — PROGRESS NOTES
ANTICOAGULATION MANAGEMENT     Richard Ball 61 year old male is on warfarin with supratherapeutic INR result. (Goal INR 2.0-3.0)    Recent labs: (last 7 days)     01/14/22  0729   INR 3.6*       ASSESSMENT     Source(s): Chart Review and Patient/Caregiver Call       Warfarin doses taken: Warfarin taken as instructed    Diet: No new diet changes identified    New illness, injury, or hospitalization: Yes: head cold since back from [place] ( been back since 1/5/22)- advised to call PCP for worsening of symptoms.    Medication/supplement changes: Sudafed as needed use No interaction anticipated    Signs or symptoms of bleeding or clotting: No    Previous INR: Therapeutic last 2(+) visits    Additional findings: None     PLAN     Recommended plan for temporary change(s) affecting INR     Dosing Instructions: Partial hold then continue your current warfarin dose with next INR in 1- 2 weeks       Summary  As of 1/14/2022    Full warfarin instructions:  1/14: 2.5 mg; Otherwise 5 mg every Mon, Wed, Fri; 2.5 mg all other days   Next INR check:  1/28/2022             Telephone call with Richard who verbalizes understanding and agrees to plan    Lab visit scheduled    Education provided: When to seek medical attention/emergency care and Contact [phone] with any changes, questions or concerns.     Plan made per [facility] anticoagulation protocol    Shannon Sheridan RN  Anticoagulation Clinic  1/14/2022    _______________________________________________________________________     Anticoagulation Episode Summary     Current INR goal:  2.0-3.0   TTR:  59.9 % (1 y)   Target end date:  Indefinite   Send INR reminders to:  [facility] HEART INR NURSE    Indications    Heart valve replaced [Z95.2] [Z95.2]  Long term current use of anticoagulants with INR goal of 2.0-3.0 [Z79.01]  S/P aortic valve replacement [Z95.2]           Comments:  AVR in 2001, Lovenox needed per  March due to possible TIA in 05/18         Anticoagulation Care  Providers     Provider Role Specialty Phone number    March, Sundar Cantu MD Referring Interventional Cardiology [phone]

## 2022-01-28 ENCOUNTER — LAB (OUTPATIENT)
Dept: LAB | Facility: CLINIC | Age: 62
End: 2022-01-28
Payer: COMMERCIAL

## 2022-01-28 ENCOUNTER — ANTICOAGULATION THERAPY VISIT (OUTPATIENT)
Dept: ANTICOAGULATION | Facility: CLINIC | Age: 62
End: 2022-01-28

## 2022-01-28 DIAGNOSIS — Z79.01 LONG TERM CURRENT USE OF ANTICOAGULANTS WITH INR GOAL OF 2.0-3.0: ICD-10-CM

## 2022-01-28 DIAGNOSIS — Z95.2 HEART VALVE REPLACED: ICD-10-CM

## 2022-01-28 DIAGNOSIS — Z95.2 S/P AORTIC VALVE REPLACEMENT: ICD-10-CM

## 2022-01-28 DIAGNOSIS — Z95.2 HEART VALVE REPLACED: Primary | ICD-10-CM

## 2022-01-28 DIAGNOSIS — Z79.01 LONG TERM CURRENT USE OF ANTICOAGULANT THERAPY: ICD-10-CM

## 2022-01-28 LAB — INR BLD: 2.6 (ref 0.9–1.1)

## 2022-01-28 PROCEDURE — 85610 PROTHROMBIN TIME: CPT

## 2022-01-28 PROCEDURE — 36416 COLLJ CAPILLARY BLOOD SPEC: CPT

## 2022-01-28 NOTE — PROGRESS NOTES
ANTICOAGULATION MANAGEMENT     Richard Ball 61 year old male is on warfarin with therapeutic INR result. (Goal INR 2.0-3.0)    Recent labs: (last 7 days)     01/28/22  0723   INR 2.6*       ASSESSMENT     Source(s): Chart Review and Patient/Caregiver Call       Warfarin doses taken: Warfarin taken as instructed    Diet: No new diet changes identified    New illness, injury, or hospitalization: No    Medication/supplement changes: None noted    Signs or symptoms of bleeding or clotting: No    Previous INR: Supratherapeutic most likely d/t head cold since coming back from trip.    Additional findings: None     PLAN     Recommended plan for no diet, medication or health factor changes affecting INR     Dosing Instructions: Continue your current warfarin dose with next INR in 2-3 weeks       Summary  As of 1/28/2022    Full warfarin instructions:  5 mg every Mon, Wed, Fri; 2.5 mg all other days   Next INR check:  2/18/2022             Telephone call with Richard who verbalizes understanding and agrees to plan    Lab visit scheduled    Education provided: Importance of following up at instructed interval    Plan made per [facility] anticoagulation protocol    Shannon Sheridan RN  Anticoagulation Clinic  1/28/2022    _______________________________________________________________________     Anticoagulation Episode Summary     Current INR goal:  2.0-3.0   TTR:  57.6 % (1 y)   Target end date:  Indefinite   Send INR reminders to:  [facility] HEART INR NURSE    Indications    Heart valve replaced [Z95.2] [Z95.2]  Long term current use of anticoagulants with INR goal of 2.0-3.0 [Z79.01]  S/P aortic valve replacement [Z95.2]           Comments:  AVR in 2001, Lovenox needed per Dr. Garcia due to possible TIA in 05/18         Anticoagulation Care Providers     Provider Role Specialty Phone number    March, Sundar Cantu MD Referring Cardiovascular Disease [phone]

## 2022-02-15 ENCOUNTER — TELEPHONE (OUTPATIENT)
Dept: CARDIOLOGY | Facility: CLINIC | Age: 62
End: 2022-02-15
Payer: COMMERCIAL

## 2022-02-15 NOTE — TELEPHONE ENCOUNTER
Pt called with questions about his new 4G cellular adapter for his remote monitor. He knows he is scheduled for a remote check on 2/17/2022, but he asked if he could send today or tomorrow instead. I told pt it has been >91 days since his last check, so he can send any time, but we schedule remote checks based on staffing, so we may not call with results for a day or two if he sends early. Pt states understanding.

## 2022-02-17 ENCOUNTER — TELEPHONE (OUTPATIENT)
Dept: CARDIOLOGY | Facility: CLINIC | Age: 62
End: 2022-02-17

## 2022-02-17 ENCOUNTER — ANCILLARY PROCEDURE (OUTPATIENT)
Dept: CARDIOLOGY | Facility: CLINIC | Age: 62
End: 2022-02-17
Attending: INTERNAL MEDICINE
Payer: COMMERCIAL

## 2022-02-17 DIAGNOSIS — Z95.0 CARDIAC PACEMAKER IN SITU: ICD-10-CM

## 2022-02-17 LAB
MDC_IDC_LEAD_IMPLANT_DT: NORMAL
MDC_IDC_LEAD_IMPLANT_DT: NORMAL
MDC_IDC_LEAD_LOCATION: NORMAL
MDC_IDC_LEAD_LOCATION: NORMAL
MDC_IDC_LEAD_LOCATION_DETAIL_1: NORMAL
MDC_IDC_LEAD_LOCATION_DETAIL_1: NORMAL
MDC_IDC_LEAD_MFG: NORMAL
MDC_IDC_LEAD_MFG: NORMAL
MDC_IDC_LEAD_MODEL: NORMAL
MDC_IDC_LEAD_MODEL: NORMAL
MDC_IDC_LEAD_POLARITY_TYPE: NORMAL
MDC_IDC_LEAD_POLARITY_TYPE: NORMAL
MDC_IDC_LEAD_SERIAL: NORMAL
MDC_IDC_LEAD_SERIAL: NORMAL
MDC_IDC_MSMT_BATTERY_DTM: NORMAL
MDC_IDC_MSMT_BATTERY_IMPEDANCE: NORMAL OHM
MDC_IDC_MSMT_BATTERY_STATUS: NORMAL
MDC_IDC_MSMT_BATTERY_VOLTAGE: 2.55 V
MDC_IDC_MSMT_LEADCHNL_RA_IMPEDANCE_VALUE: 67 OHM
MDC_IDC_MSMT_LEADCHNL_RV_IMPEDANCE_VALUE: 466 OHM
MDC_IDC_PG_IMPLANT_DTM: NORMAL
MDC_IDC_PG_MFG: NORMAL
MDC_IDC_PG_MODEL: NORMAL
MDC_IDC_PG_SERIAL: NORMAL
MDC_IDC_PG_TYPE: NORMAL
MDC_IDC_SESS_CLINIC_NAME: NORMAL
MDC_IDC_SESS_DTM: NORMAL
MDC_IDC_SESS_TYPE: NORMAL
MDC_IDC_SET_BRADY_HYSTRATE: NORMAL
MDC_IDC_SET_BRADY_LOWRATE: 65 {BEATS}/MIN
MDC_IDC_SET_BRADY_MAX_TRACKING_RATE: 115 {BEATS}/MIN
MDC_IDC_SET_BRADY_MODE: NORMAL
MDC_IDC_SET_LEADCHNL_RV_PACING_AMPLITUDE: 2 V
MDC_IDC_SET_LEADCHNL_RV_PACING_CAPTURE_MODE: NORMAL
MDC_IDC_SET_LEADCHNL_RV_PACING_POLARITY: NORMAL
MDC_IDC_SET_LEADCHNL_RV_PACING_PULSEWIDTH: 0.4 MS
MDC_IDC_SET_LEADCHNL_RV_SENSING_POLARITY: NORMAL
MDC_IDC_SET_LEADCHNL_RV_SENSING_SENSITIVITY: 4 MV
MDC_IDC_SET_ZONE_DETECTION_INTERVAL: 400 MS
MDC_IDC_SET_ZONE_TYPE: NORMAL
MDC_IDC_SET_ZONE_TYPE: NORMAL
MDC_IDC_STAT_AT_BURDEN_PERCENT: 0 %
MDC_IDC_STAT_AT_DTM_END: NORMAL
MDC_IDC_STAT_AT_DTM_START: NORMAL
MDC_IDC_STAT_AT_MODE_SW_COUNT: 151
MDC_IDC_STAT_BRADY_DTM_END: NORMAL
MDC_IDC_STAT_BRADY_DTM_START: NORMAL
MDC_IDC_STAT_BRADY_RV_PERCENT_PACED: 100 %
MDC_IDC_STAT_EPISODE_RECENT_COUNT: 0
MDC_IDC_STAT_EPISODE_RECENT_COUNT: 0
MDC_IDC_STAT_EPISODE_RECENT_COUNT_DTM_END: NORMAL
MDC_IDC_STAT_EPISODE_RECENT_COUNT_DTM_END: NORMAL
MDC_IDC_STAT_EPISODE_RECENT_COUNT_DTM_START: NORMAL
MDC_IDC_STAT_EPISODE_RECENT_COUNT_DTM_START: NORMAL
MDC_IDC_STAT_EPISODE_TYPE: NORMAL
MDC_IDC_STAT_EPISODE_TYPE: NORMAL

## 2022-02-17 PROCEDURE — 93294 REM INTERROG EVL PM/LDLS PM: CPT | Performed by: INTERNAL MEDICINE

## 2022-02-17 PROCEDURE — 93296 REM INTERROG EVL PM/IDS: CPT | Performed by: INTERNAL MEDICINE

## 2022-02-17 NOTE — TELEPHONE ENCOUNTER
"Patient had remote device check done today, 02/17/2022 showing his [employer] Adapta Pacemaker device triggered BOB on 11/25/2021. Patient is dependent with an underlying rhythm of SR with CHB and no junctional escape at VVI 30 as of 11/03/2021.     Patient has device managed and checked by [facility] device clinic but follows with EP MD Dr. Morales at the [facility]. Patient's last EP office visit with Dr. Morales was on 02/12/2021 and his note stated, \"His RA lead has had noise and failed in 2018 and has been programmed VDD since. He has been 100% . He is interested in having RA lead replaced and values maintaining MRI compatibility. We discussed lead management in detail including indications, risks, and benefits of each approach. We discussed extraction could be considered at time of next generator change, but we would not necessarily consider at this time.\"    Called patient and did review that we like the generator change to occur within three months of triggering BOB and that given his trigger date of 11/03/2021 we would like to get that done ASAP as those three months are almost up. Patient stated he understood and did say that he would like this done by Dr. Morales and his team in hopes that his RA lead can be fixed for MRI compatibility. Did call the [facility] device clinic and spoke with Smitha who recommended sending a message to Laura Marte, EP NP for assistance with this given the timeframe.     "

## 2022-02-18 ENCOUNTER — LAB (OUTPATIENT)
Dept: LAB | Facility: CLINIC | Age: 62
End: 2022-02-18
Payer: COMMERCIAL

## 2022-02-18 ENCOUNTER — ANTICOAGULATION THERAPY VISIT (OUTPATIENT)
Dept: ANTICOAGULATION | Facility: CLINIC | Age: 62
End: 2022-02-18

## 2022-02-18 DIAGNOSIS — Z95.2 S/P AORTIC VALVE REPLACEMENT: ICD-10-CM

## 2022-02-18 DIAGNOSIS — Z79.01 LONG TERM CURRENT USE OF ANTICOAGULANT THERAPY: ICD-10-CM

## 2022-02-18 DIAGNOSIS — Z95.2 HEART VALVE REPLACED: ICD-10-CM

## 2022-02-18 DIAGNOSIS — Z79.01 LONG TERM CURRENT USE OF ANTICOAGULANTS WITH INR GOAL OF 2.0-3.0: ICD-10-CM

## 2022-02-18 DIAGNOSIS — Z95.2 HEART VALVE REPLACED: Primary | ICD-10-CM

## 2022-02-18 LAB — INR BLD: 2.2 (ref 0.9–1.1)

## 2022-02-18 PROCEDURE — 36416 COLLJ CAPILLARY BLOOD SPEC: CPT

## 2022-02-18 PROCEDURE — 85610 PROTHROMBIN TIME: CPT

## 2022-02-18 NOTE — PROGRESS NOTES
ANTICOAGULATION MANAGEMENT     Richard Ball 62 year old male is on warfarin with therapeutic INR result. (Goal INR 2.0-3.0)    Recent labs: (last 7 days)     02/18/22  0727   INR 2.2*       ASSESSMENT     Source(s): Chart Review and Patient/Caregiver Call       Warfarin doses taken: Warfarin taken as instructed    Diet: No new diet changes identified    New illness, injury, or hospitalization: No    Medication/supplement changes: None noted    Signs or symptoms of bleeding or clotting: No    Previous INR: Therapeutic last visit; previously outside of goal range    Additional findings: pacemaker triggered BOB in 11/21 and needs generator change ASAP, wants done by Dr. Morales at the [place]     PLAN     Recommended plan for no diet, medication or health factor changes affecting INR     Dosing Instructions: Continue your current warfarin dose with next INR in 4 weeks.  Patient is working on getting gen change scheduled.  He will ask the provider if warfarin needs to be held and how many days.  Patient needs to bridge with Lovenox if holding warfarin per Dr. Garcia.  Once patient has date for gen change and information about warfarin he will call the INR clinic to discuss further.       Summary  As of 2/18/2022    Full warfarin instructions:  5 mg every Mon, Wed, Fri; 2.5 mg all other days   Next INR check:  3/18/2022             Telephone call with Richard who verbalizes understanding and agrees to plan    Contact [phone] to schedule and with any changes, questions or concerns.     Education provided: Goal range and significance of current result, Importance of notifying clinic of upcoming surgeries and procedures 2 weeks in advance and Contact [phone] with any changes, questions or concerns.     Plan made per [facility] anticoagulation protocol    Teresa Espino, RN  Anticoagulation Clinic  2/18/2022    _______________________________________________________________________     Anticoagulation Episode  Summary     Current INR goal:  2.0-3.0   TTR:  57.5 % (1 y)   Target end date:  Indefinite   Send INR reminders to:  [facility] HEART INR NURSE    Indications    Heart valve replaced [Z95.2] [Z95.2]  Long term current use of anticoagulants with INR goal of 2.0-3.0 [Z79.01]  S/P aortic valve replacement [Z95.2]           Comments:  AVR in 2001, Lovenox needed per Dr. Garcia due to possible TIA in 05/18         Anticoagulation Care Providers     Provider Role Specialty Phone number    March, Sundar Cantu MD Referring Cardiovascular Disease [phone]

## 2022-02-22 ASSESSMENT — ENCOUNTER SYMPTOMS
SPUTUM PRODUCTION: 0
SYNCOPE: 0
ARTHRALGIAS: 0
EXERCISE INTOLERANCE: 0
MYALGIAS: 0
EYE WATERING: 1
JAUNDICE: 0
BLOOD IN STOOL: 0
CONSTIPATION: 0
LEG PAIN: 0
HYPOTENSION: 0
ORTHOPNEA: 0
HYPERTENSION: 0
ABDOMINAL PAIN: 0
NECK PAIN: 0
SLEEP DISTURBANCES DUE TO BREATHING: 0
MUSCLE WEAKNESS: 0
MUSCLE CRAMPS: 0
BOWEL INCONTINENCE: 0
BACK PAIN: 1
EYE PAIN: 0
SHORTNESS OF BREATH: 0
RECTAL PAIN: 0
WHEEZING: 0
PALPITATIONS: 0
JOINT SWELLING: 0
COUGH DISTURBING SLEEP: 0
HEMOPTYSIS: 0
VOMITING: 0
BLOATING: 0
EYE REDNESS: 0
DIARRHEA: 0
NAUSEA: 0
COUGH: 1
DYSPNEA ON EXERTION: 0
STIFFNESS: 1
SNORES LOUDLY: 0
DOUBLE VISION: 0
HEARTBURN: 0
POSTURAL DYSPNEA: 0
EYE IRRITATION: 1
LIGHT-HEADEDNESS: 0

## 2022-02-23 ENCOUNTER — VIRTUAL VISIT (OUTPATIENT)
Dept: CARDIOLOGY | Facility: CLINIC | Age: 62
End: 2022-02-23
Attending: INTERNAL MEDICINE
Payer: COMMERCIAL

## 2022-02-23 DIAGNOSIS — Z95.2 S/P AORTIC VALVE REPLACEMENT: ICD-10-CM

## 2022-02-23 DIAGNOSIS — Q87.40 MARFAN'S SYNDROME: Primary | ICD-10-CM

## 2022-02-23 DIAGNOSIS — Z95.0 PACEMAKER: ICD-10-CM

## 2022-02-23 PROCEDURE — 99215 OFFICE O/P EST HI 40 MIN: CPT | Mod: 95 | Performed by: INTERNAL MEDICINE

## 2022-02-23 RX ORDER — CEFAZOLIN SODIUM 2 G/50ML
2 SOLUTION INTRAVENOUS
Status: CANCELLED | OUTPATIENT
Start: 2022-02-23

## 2022-02-23 RX ORDER — SODIUM CHLORIDE 9 MG/ML
INJECTION, SOLUTION INTRAVENOUS CONTINUOUS
Status: CANCELLED | OUTPATIENT
Start: 2022-02-23

## 2022-02-23 RX ORDER — LIDOCAINE 40 MG/G
CREAM TOPICAL
Status: CANCELLED | OUTPATIENT
Start: 2022-02-23

## 2022-02-23 NOTE — LETTER
"2/23/2022      RE: Richard Ball  [address] [facility] [facility] [id number]       Dear Colleague,    Thank you for the opportunity to participate in the care of your patient, Richard Ball, at the [facility] HEART CLINIC [facility]. Please see a copy of my visit note below.      ELECTROPHYSIOLOGY VIRTUAL CLINIC VISIT  Mr. Richard Ball is a 62 year old male who is being evaluated via a billable video visit.      The patient has been notified of following:     \"This video visit will be conducted via a call between you and your physician/provider. We have found that certain health care needs can be provided without the need for an in-person physical exam.  This service lets us provide the care you need with a video conversation.  If a prescription is necessary we can send it directly to your pharmacy.  If lab work is needed we can place an order for that and you can then stop by our lab to have the test done at a later time.    If during the course of the call the physician/provider feels a video visit is not appropriate, you will not be charged for this service.\"     Physician/provider has received verbal consent for a Video Visit from the patient? Yes    Assessment/Recommendations   Assessment/Plan:    Mr. Ball is a 62 year old male who has a past medical history significant for Marfan syndrome, s/p mechanical AVR and reimplantation of coronary arteries 2001, TIA, CHB s/p PPM 2001 and gen change in 2011. He is following up today. He reports feeling well. He has some intermittent palpitations. He denies chest discomfort, abdominal fullness/bloating or peripheral edema, shortness of breath, paroxysmal nocturnal dyspnea, orthopnea, lightheadedness, dizziness, pre-syncope, or syncope. His PPM triggered BOB on 11/25/21. Current cardiac medications include: ASA, Atenolol, Lipitor, Lisinopril, Lovastatin, and Warfarin.      Complete Heart Block s/p " PPM 2001 with dysfunctional RA lead:   His RA lead has had noise and failed in 2018 and has been programmed VDD since. He has been 100% . He is interested in having RA lead replaced and values maintaining MRI compatibility. His device has now triggered BOB. We discussed lead management in detail including indications, risks, and benefits of each approach. We discussed extraction vs placement of new RA lead and old RA lead retention. He strongly prefers extraction. We had a long discussion with the patient about lead extraction.  We discussed the indications for lead extraction, the extraction procedure and the risks of extraction.  These risks include vascular tear, cardiac tear, clotting and occlusion of a vessel, infection, damage to other components of the implanted device, bleeding, death, and need for emergency cardiopulmonary bypass, sternotomy or thoracotomy. He would like to pursue extraction and generator change. We aso discussed potentially replacing the RV lead since it is old, however, the lead is functional and we will extract it and replace it if it gets damaged during RA lead extraction, but will discuss further after getting imaging. We will get a CT neck/chest to evaluate course of leads and plan for extraction.    Follow up 3 months after extraction.        History of Present Illness/Subjective    Mr. Richard Ball is a 62 year old male who comes in today for EP follow-up of PPM cares.    Mr. Ball is a 62 year old male who has a past medical history significant for Marfan syndrome, s/p mechanical AVR and reimplantation of coronary arteries 2001, TIA, CHB s/p PPM 2001 and gen change in 2011.      He was diagnosed with Marfan syndrome at age 24.  He had genetic testing done at the [facility] in [place].  He underwent surgery in 2001 at [facility] in [place] by Dr. Arriola.  His ascending aorta was 5.5 cm at the time, but we do not have those operative reports.  His aortic  "valve was replaced with a mechanical valve, and they reimplanted his coronary arteries. Appears he had post operative CHB and required PPM implant. His RA lead has had noise and failed in 2018 and has been programmed VDD since. He has been 100% . He is interested in having RA lead replaced and values maintaining MRI compatibility. In 2018, he had a CTA and there was concern about significant coronary artery disease. He went on to have a coronary angiogram, and that showed no significant disease with he did have some disease; and his calcium score was high placing him in the 95% so he is having his cholesterol aggressively controlled. He did have a cerebral CTA in 2018 that showed no evidence of cerebral aneurysms.  He did have a history of a TIA in the past but no deficits. His last chest CTA showed his aorta was normal dimensions in 2018. A recent exercise echo showed normal prosthetic AV function, low workload was achieved, at peak exercise reported to have \"dropped\" V at around 130 bpm.   His dad likely also had Marfan syndrome and  of a cerebral aneurysm when he was 2 year old.  His mom  when he was 4 of ovarian cancer, and he was raised by an adoptive family.  He has 2 children, ages 29 and 30.  Neither of them have been screened for Marfan syndrome.      EP Visit 21: He reports feeling well. He endorses having some palpitations up to 1 minutes on a daily basis. He denies any chest pain/pressures, dizziness, lightheadedness, worsening shortness of breath, leg/ankle swelling, PND, orthopnea, or syncopal symptoms. Recent device transmission showed 100% , stable RV parameters, 400 mode switch episodes and 5 AHR, EGMs show noise. Current cardiac medications include: ASA, Atenolol, Lipitor, Lisinopril, Lovastatin, and Warfarin.      He is following up today. He reports feeling well. He has some intermittent palpitations. He denies chest discomfort, abdominal fullness/bloating or peripheral edema, " "shortness of breath, paroxysmal nocturnal dyspnea, orthopnea, lightheadedness, dizziness, pre-syncope, or syncope. His PPM triggered BOB on 11/25/21. Current cardiac medications include: ASA, Atenolol, Lipitor, Lisinopril, Lovastatin, and Warfarin.       I have reviewed and updated the patient's Past Medical History, Social History, Family History and Medication List.     Cardiographics (Personally Reviewed) :   1/6/21  EXERCISE ECHOCARDIOGRAM:   Interpretation Summary  The gradient is normal for this prosthetic aortic valve.  Baseline ECG appears A sense, V pace  Abnormal distal septal motion at rest noted-suspect this is due to pacing and  not ischemia  A low workload was achieved.  The patient exhibited no chest pain during exercise.     I suspect his pacer is set to upper rate approximately 130  At peak exercise there are \"dropped\" V (paced) at rate around 130  Although this may be due to PAC's /PAT that I'm not seeing I suspect this is  really upper rate pacing and after the atrial rate exceeded 130 he  wenckebached or dropped beats.     The distal septal motion is abnormal rest and exercise but is thickening--  suspect this is due to V pacing/IVCD and not ischemia. (cannot totally exclude  ischemia)     Pt had low work rate, consider changing upper rate pacing to see if exercise  time improves     6/2018 CTA:  IMPRESSION:     1. Total Agatston score 463.56, placing the patient in the 94th  percentile when compared to age and gender matched control group.     2. There are 2 lesions noted on the cardiac CTA angiogram. The ostium  of the right coronary artery has a greater than 70% stenosis without  evidence of calcification. This may represent postsurgical obstruction  of the reimplanted coronary artery versus atherosclerotic coronary  disease. The second lesion is a 50-70% calcified proximal LAD  stenosis. The ostium of the left coronary artery implanting into the  ascending aorta appears normal.     3. Please " see the separate report evaluating the patient's thoracic,  abdominal, and pelvic angiogram.     4.   Please review Radiology report for incidental noncardiac findings  that will follow separately.       Physical Examination   There were no vitals taken for this visit.  Wt Readings from Last 3 Encounters:   02/12/21 78.9 kg (174 lb)   08/15/19 73.2 kg (161 lb 4.8 oz)   07/12/19 73.8 kg (162 lb 12.8 oz)     The rest of a comprehensive physical examination is deferred due to public health emergency video visit restrictions.  CONSITUTIONAL: no acute distress  HEENT: no icterus, no redness or discharge, neck supple  CV: no visible edema of visualized extremities. No JVD.   RESPIRATORY: respirations nonlabored, no cough  NEURO: AA&Ox3, speech fluent/appropriate, motor grossly nonfocal  PSYCH: cooperative, affect appropriate  DERM: no rashes on visualized face/neck/upper extremities       Medications  Allergies   Current Outpatient Medications   Medication Sig Dispense Refill     acetaminophen (TYLENOL) 500 MG tablet Take 1,000 mg by mouth 2 times daily       albuterol (VENTOLIN HFA) 108 (90 Base) MCG/ACT inhaler Inhale 2 puffs into the lungs every 6 hours as needed for wheezing Reported on 3/24/2017 1 Inhaler 3     amoxicillin (AMOXIL) 500 MG capsule TAKE 4 CAPSULES BY MOUTH ONE HOUR PRIOR TO DENTAL APPOINTMENT       aspirin 81 MG chewable tablet Take 1 tablet (81 mg) by mouth daily 90 tablet 3     atenolol (TENORMIN) 25 MG tablet Take 1 tablet (25 mg) by mouth daily With one 50 mg tablet by mouth daily for a total of 75 mg daily 90 tablet 3     atenolol (TENORMIN) 50 MG tablet Take 1 tablet (50 mg) by mouth daily And take one 25 mg by mouth daily for a total of 75 mg daily 90 tablet 3     atorvastatin (LIPITOR) 40 MG tablet Take 1 tablet (40 mg) by mouth daily 90 tablet 3     enoxaparin ANTICOAGULANT (LOVENOX ANTICOAGULANT) 80 MG/0.8ML syringe Inject 80 mg subcutaneous every 12 hours until INR 2.0 or above as directed  by INR clinic 8 mL 0     losartan (COZAAR) 25 MG tablet Take 1 tablet (25 mg) by mouth daily 90 tablet 3     warfarin ANTICOAGULANT (COUMADIN ANTICOAGULANT) 5 MG tablet Take 1 tab on Mondays, Wednesdays and Fridays and take 1/2 tab all other days or as directed by INR clinic 70 tablet 1    Allergies   Allergen Reactions     Ambien [Zolpidem] Other (See Comments)     Hallucinations/ thrashing         Lab Results (Personally Reviewed)    Chemistry/lipid CBC Cardiac Enzymes/BNP/TSH/INR   Lab Results   Component Value Date    BUN 14 06/25/2021     06/25/2021    CO2 28 06/25/2021     Creatinine   Date Value Ref Range Status   06/25/2021 0.89 0.66 - 1.25 mg/dL Final       Lab Results   Component Value Date    CHOL 131 06/25/2021    HDL 42 06/25/2021    LDL 59 06/25/2021      Lab Results   Component Value Date    WBC 4.3 06/07/2019    HGB 13.1 (L) 06/07/2019    HCT 37.8 (L) 06/07/2019    MCV 94 06/07/2019     (L) 06/07/2019    Lab Results   Component Value Date    TSH 3.06 06/29/2018    INR 2.2 (H) 02/18/2022          Video-Visit Details    Type of service:  Video Visit    Video Start Time: 837am    Video End Time:856am    Originating Location (pt. Location): Home    Distant Location (provider location):  [facility]     Platform used for Video Visit: [employer]    I have reviewed the note as documented above.  This accurately captures the substance of my virtual visit with the patient. The patient states understanding and is agreeable with the plan.   Raman Morales MD [facility]  Cardiology - Electrophysiology    Total time spent on patient visit, reviewing notes, imaging, labs, orders, and completing necessary documentation: 45 minutes.

## 2022-02-23 NOTE — NURSING NOTE
Med Reconcile: Reviewed and verified all current medications with the patient. The updated medication list was printed and given to the patient.    Return Appointment: Patient given instructions regarding scheduling next clinic visit. Patient demonstrated understanding of this information and agreed to call with further questions or concerns.    Patient stated he understood all health information given and agreed to call with further questions or concerns.

## 2022-02-23 NOTE — PATIENT INSTRUCTIONS
Abena Haywood is requesting a refill of 'Negative COVID Universal Screen'    for a {30_90 day Supply:779800} and would like {pharm/mail order:[id number]}, ***.      You were seen today in the Adult Congenital and Cardiovascular Genetics Clinic at the [facility].    Cardiology Providers you saw during your visit:  Raman Morales MD    Diagnosis:  Marfan's with permanent pace maker    Recommendations:    1. Continue to eat a heart healthy, low salt diet.  2. Continue to get 20-30 minutes of aerobic activity, 4-5 days per week.  Examples of aerobic activity include walking, running, swimming, cycling, etc.  3. Continue to observe good oral hygiene, with regular dental visits.  4. We will schedule a neck CT and chest to plan for lead extraction. Planning to extract RA lead for now, but depending on what CT says, he may extract both  5. We will schedule you for lead extraction and reimplant of a dual chamber pacemaker.      SBE prophylaxis:   Yes____  No_x___    Lifelong Bacterial Endocarditis Prophylaxis:  YES____  NO____    If YES is checked, follow the recommendations outlined below:  1. Take antibiotic(s) prior to recommended dental procedures and procedures on the respiratory tract or with infected skin, muscle or bones. SBE prophylaxis is not needed for routine GI and  procedures (ie. Colonoscopy or vaginal delivery)  2. Observe good oral hygiene daily, as advised by your dentist. Get regular professional dental care.  3. Keep cuts clean.  4. Infections should be treated promptly.  5. Symptoms of Infective Endocarditis could include: fever lasting more than 4-5 days or a recurrent fever that initially resolves but returns within 1-2 days)      Exercise restrictions:   Yes__X__  No____         If yes, list restrictions:  Must be allowed to rest if fatigued or SOB      Work restrictions:  Yes____  No_X___         If yes, list restrictions:    FASTING CHOLESTEROL was checked in the last 5 years YES_x__  NO___ (2021)  Continue to eat a heart healthy, low salt diet.         ____ Fasting lipid panel order today         ____ No changes in medications          ____ I  recommend the following changes in your cholesterol medications.:          ____ Please follow up for cholesterol screening at your primary care physician      Follow-up:  Follow up with Dr. Morales or Laura Marte 3 months after reimplantation    If you have questions or concerns please contact us at:    Ketty Brown, NAYEN, RN    Rachel Martin (Scheduling)  Nurse Care Coordinator     Clinic   Adult Congenital and CV Genetics   Adult Congenital and CV Genetic  [facility]   [facility]  (P) [phone]     (P) [phone]         (F) [phone]        For after hours urgent needs, call [phone] and ask to speak to the Adult Congenital Physician on call.  Mention Job Code 0401.    For emergencies call 911.    [facility]  [facility]   Clinics and Surgery Center  Mail Code [id number]  [address] [place]  [address]

## 2022-02-23 NOTE — NURSING NOTE
Chief Complaint   Patient presents with     Follow Up     Pt states he is here to discuss pre pacemaker lead replacement. Reviewed medications with patient, no cirilo.      Lee Greenberg, Visit Facilitator/MA.

## 2022-02-23 NOTE — PROGRESS NOTES
"Hernik is a 62 year old who is being evaluated via a billable video visit.      How would you like to obtain your AVS? MyChart  If the video visit is dropped, the invitation should be resent by: Send to e-mail at: [email or web address]  Will anyone else be joining your video visit? Yes:  Baron will be joining visit today.. How would they like to receive their invitation? Send to e-mail at: [email or web address]       Lee Greenberg, Visit Facilitator/MA.          ELECTROPHYSIOLOGY VIRTUAL CLINIC VISIT  Mr. Richard Ball is a 62 year old male who is being evaluated via a billable video visit.      The patient has been notified of following:     \"This video visit will be conducted via a call between you and your physician/provider. We have found that certain health care needs can be provided without the need for an in-person physical exam.  This service lets us provide the care you need with a video conversation.  If a prescription is necessary we can send it directly to your pharmacy.  If lab work is needed we can place an order for that and you can then stop by our lab to have the test done at a later time.    If during the course of the call the physician/provider feels a video visit is not appropriate, you will not be charged for this service.\"     Physician/provider has received verbal consent for a Video Visit from the patient? Yes    Assessment/Recommendations   Assessment/Plan:    Mr. Ball is a 62 year old male who has a past medical history significant for Marfan syndrome, s/p mechanical AVR and reimplantation of coronary arteries 2001, TIA, CHB s/p PPM 2001 and gen change in 2011. He is following up today. He reports feeling well. He has some intermittent palpitations. He denies chest discomfort, abdominal fullness/bloating or peripheral edema, shortness of breath, paroxysmal nocturnal dyspnea, orthopnea, lightheadedness, dizziness, pre-syncope, or syncope. His PPM triggered BOB on 11/25/21. Current " cardiac medications include: ASA, Atenolol, Lipitor, Lisinopril, Lovastatin, and Warfarin.      Complete Heart Block s/p PPM 2001 with dysfunctional RA lead:   His RA lead has had noise and failed in 2018 and has been programmed VDD since. He has been 100% . He is interested in having RA lead replaced and values maintaining MRI compatibility. His device has now triggered BOB. We discussed lead management in detail including indications, risks, and benefits of each approach. We discussed extraction vs placement of new RA lead and old RA lead retention. He strongly prefers extraction. We had a long discussion with the patient about lead extraction.  We discussed the indications for lead extraction, the extraction procedure and the risks of extraction.  These risks include vascular tear, cardiac tear, clotting and occlusion of a vessel, infection, damage to other components of the implanted device, bleeding, death, and need for emergency cardiopulmonary bypass, sternotomy or thoracotomy. He would like to pursue extraction and generator change. We aso discussed potentially replacing the RV lead since it is old, however, the lead is functional and we will extract it and replace it if it gets damaged during RA lead extraction, but will discuss further after getting imaging. We will get a CT neck/chest to evaluate course of leads and plan for extraction.    Follow up 3 months after extraction.        History of Present Illness/Subjective    Mr. Richard Ball is a 62 year old male who comes in today for EP follow-up of PPM cares.    Mr. Ball is a 62 year old male who has a past medical history significant for Marfan syndrome, s/p mechanical AVR and reimplantation of coronary arteries 2001, TIA, CHB s/p PPM 2001 and gen change in 2011.      He was diagnosed with Marfan syndrome at age 24.  He had genetic testing done at the [facility] in [place].  He underwent surgery in 2001 at [facility] in  [place] by Dr. Arriola.  His ascending aorta was 5.5 cm at the time, but we do not have those operative reports.  His aortic valve was replaced with a mechanical valve, and they reimplanted his coronary arteries. Appears he had post operative CHB and required PPM implant. His RA lead has had noise and failed in 2018 and has been programmed VDD since. He has been 100% . He is interested in having RA lead replaced and values maintaining MRI compatibility. In 2018, he had a CTA and there was concern about significant coronary artery disease. He went on to have a coronary angiogram, and that showed no significant disease with he did have some disease; and his calcium score was high placing him in the 95% so he is having his cholesterol aggressively controlled. He did have a cerebral CTA in 2018 that showed no evidence of cerebral aneurysms.  He did have a history of a TIA in the past but no deficits. His last chest CTA showed his aorta was normal dimensions in 2018. A recent exercise echo showed normal prosthetic AV function, low workload was achieved, at peak exercise reported to have \"dropped\" V at around 130 bpm.   His dad likely also had Marfan syndrome and  of a cerebral aneurysm when he was 2 year old.  His mom  when he was 4 of ovarian cancer, and he was raised by an adoptive family.  He has 2 children, ages 29 and 30.  Neither of them have been screened for Marfan syndrome.      EP Visit 21: He reports feeling well. He endorses having some palpitations up to 1 minutes on a daily basis. He denies any chest pain/pressures, dizziness, lightheadedness, worsening shortness of breath, leg/ankle swelling, PND, orthopnea, or syncopal symptoms. Recent device transmission showed 100% , stable RV parameters, 400 mode switch episodes and 5 AHR, EGMs show noise. Current cardiac medications include: ASA, Atenolol, Lipitor, Lisinopril, Lovastatin, and Warfarin.      He is following up today. He reports feeling " "well. He has some intermittent palpitations. He denies chest discomfort, abdominal fullness/bloating or peripheral edema, shortness of breath, paroxysmal nocturnal dyspnea, orthopnea, lightheadedness, dizziness, pre-syncope, or syncope. His PPM triggered BOB on 11/25/21. Current cardiac medications include: ASA, Atenolol, Lipitor, Lisinopril, Lovastatin, and Warfarin.       I have reviewed and updated the patient's Past Medical History, Social History, Family History and Medication List.     Cardiographics (Personally Reviewed) :   1/6/21  EXERCISE ECHOCARDIOGRAM:   Interpretation Summary  The gradient is normal for this prosthetic aortic valve.  Baseline ECG appears A sense, V pace  Abnormal distal septal motion at rest noted-suspect this is due to pacing and  not ischemia  A low workload was achieved.  The patient exhibited no chest pain during exercise.     I suspect his pacer is set to upper rate approximately 130  At peak exercise there are \"dropped\" V (paced) at rate around 130  Although this may be due to PAC's /PAT that I'm not seeing I suspect this is  really upper rate pacing and after the atrial rate exceeded 130 he  wenckebached or dropped beats.     The distal septal motion is abnormal rest and exercise but is thickening--  suspect this is due to V pacing/IVCD and not ischemia. (cannot totally exclude  ischemia)     Pt had low work rate, consider changing upper rate pacing to see if exercise  time improves     6/2018 CTA:  IMPRESSION:     1. Total Agatston score 463.56, placing the patient in the 94th  percentile when compared to age and gender matched control group.     2. There are 2 lesions noted on the cardiac CTA angiogram. The ostium  of the right coronary artery has a greater than 70% stenosis without  evidence of calcification. This may represent postsurgical obstruction  of the reimplanted coronary artery versus atherosclerotic coronary  disease. The second lesion is a 50-70% calcified proximal " LAD  stenosis. The ostium of the left coronary artery implanting into the  ascending aorta appears normal.     3. Please see the separate report evaluating the patient's thoracic,  abdominal, and pelvic angiogram.     4.   Please review Radiology report for incidental noncardiac findings  that will follow separately.       Physical Examination   There were no vitals taken for this visit.  Wt Readings from Last 3 Encounters:   02/12/21 78.9 kg (174 lb)   08/15/19 73.2 kg (161 lb 4.8 oz)   07/12/19 73.8 kg (162 lb 12.8 oz)     The rest of a comprehensive physical examination is deferred due to [facility] emergency video visit restrictions.  CONSITUTIONAL: no acute distress  HEENT: no icterus, no redness or discharge, neck supple  CV: no visible edema of visualized extremities. No JVD.   RESPIRATORY: respirations nonlabored, no cough  NEURO: AA&Ox3, speech fluent/appropriate, motor grossly nonfocal  PSYCH: cooperative, affect appropriate  DERM: no rashes on visualized face/neck/upper extremities       Medications  Allergies   Current Outpatient Medications   Medication Sig Dispense Refill     acetaminophen (TYLENOL) 500 MG tablet Take 1,000 mg by mouth 2 times daily       albuterol (VENTOLIN HFA) 108 (90 Base) MCG/ACT inhaler Inhale 2 puffs into the lungs every 6 hours as needed for wheezing Reported on 3/24/2017 1 Inhaler 3     amoxicillin (AMOXIL) 500 MG capsule TAKE 4 CAPSULES BY MOUTH ONE HOUR PRIOR TO DENTAL APPOINTMENT       aspirin 81 MG chewable tablet Take 1 tablet (81 mg) by mouth daily 90 tablet 3     atenolol (TENORMIN) 25 MG tablet Take 1 tablet (25 mg) by mouth daily With one 50 mg tablet by mouth daily for a total of 75 mg daily 90 tablet 3     atenolol (TENORMIN) 50 MG tablet Take 1 tablet (50 mg) by mouth daily And take one 25 mg by mouth daily for a total of 75 mg daily 90 tablet 3     atorvastatin (LIPITOR) 40 MG tablet Take 1 tablet (40 mg) by mouth daily 90 tablet 3     enoxaparin ANTICOAGULANT  (LOVENOX ANTICOAGULANT) 80 MG/0.8ML syringe Inject 80 mg subcutaneous every 12 hours until INR 2.0 or above as directed by INR clinic 8 mL 0     losartan (COZAAR) 25 MG tablet Take 1 tablet (25 mg) by mouth daily 90 tablet 3     warfarin ANTICOAGULANT (COUMADIN ANTICOAGULANT) 5 MG tablet Take 1 tab on Mondays, Wednesdays and Fridays and take 1/2 tab all other days or as directed by INR clinic 70 tablet 1    Allergies   Allergen Reactions     Ambien [Zolpidem] Other (See Comments)     Hallucinations/ thrashing         Lab Results (Personally Reviewed)    Chemistry/lipid CBC Cardiac Enzymes/BNP/TSH/INR   Lab Results   Component Value Date    BUN 14 06/25/2021     06/25/2021    CO2 28 06/25/2021     Creatinine   Date Value Ref Range Status   06/25/2021 0.89 0.66 - 1.25 mg/dL Final       Lab Results   Component Value Date    CHOL 131 06/25/2021    HDL 42 06/25/2021    LDL 59 06/25/2021      Lab Results   Component Value Date    WBC 4.3 06/07/2019    HGB 13.1 (L) 06/07/2019    HCT 37.8 (L) 06/07/2019    MCV 94 06/07/2019     (L) 06/07/2019    Lab Results   Component Value Date    TSH 3.06 06/29/2018    INR 2.2 (H) 02/18/2022          Video-Visit Details    Type of service:  Video Visit    Video Start Time: 837am    Video End Time:856am    Originating Location (pt. Location): Home    Distant Location (provider location):  [facility]     Platform used for Video Visit: Alka    I have reviewed the note as documented above.  This accurately captures the substance of my virtual visit with the patient. The patient states understanding and is agreeable with the plan.   Raman Morales MD [facility]  Cardiology - Electrophysiology    Total time spent on patient visit, reviewing notes, imaging, labs, orders, and completing necessary documentation: 45 minutes.

## 2022-03-01 ENCOUNTER — ANCILLARY PROCEDURE (OUTPATIENT)
Dept: CT IMAGING | Facility: CLINIC | Age: 62
End: 2022-03-01
Attending: INTERNAL MEDICINE
Payer: COMMERCIAL

## 2022-03-01 ENCOUNTER — CARE COORDINATION (OUTPATIENT)
Dept: CARDIOLOGY | Facility: CLINIC | Age: 62
End: 2022-03-01

## 2022-03-01 DIAGNOSIS — Z11.59 ENCOUNTER FOR SCREENING FOR OTHER VIRAL DISEASES: Primary | ICD-10-CM

## 2022-03-01 DIAGNOSIS — Z95.2 S/P AORTIC VALVE REPLACEMENT: ICD-10-CM

## 2022-03-01 DIAGNOSIS — Z95.0 PACEMAKER: ICD-10-CM

## 2022-03-01 DIAGNOSIS — Q87.40 MARFAN'S SYNDROME: Primary | ICD-10-CM

## 2022-03-01 DIAGNOSIS — Z79.01 LONG TERM CURRENT USE OF ANTICOAGULANT THERAPY: ICD-10-CM

## 2022-03-01 DIAGNOSIS — Q87.40 MARFAN'S SYNDROME: ICD-10-CM

## 2022-03-01 PROCEDURE — 71250 CT THORAX DX C-: CPT | Mod: GC | Performed by: RADIOLOGY

## 2022-03-01 PROCEDURE — 70490 CT SOFT TISSUE NECK W/O DYE: CPT | Mod: GC | Performed by: RADIOLOGY

## 2022-03-03 ENCOUNTER — TELEPHONE (OUTPATIENT)
Dept: CARDIOLOGY | Facility: CLINIC | Age: 62
End: 2022-03-03
Payer: COMMERCIAL

## 2022-03-03 ENCOUNTER — TELEPHONE (OUTPATIENT)
Dept: SCHEDULING | Facility: CLINIC | Age: 62
End: 2022-03-03

## 2022-03-03 NOTE — TELEPHONE ENCOUNTER
Called and spoke to the patient.  He is scheduled for pacemaker change on Monday.  He was just told to hold his warfarin dose on Monday morning but he takes the Warfarin in the evening.  Called Rachel Martin at [phone] from Dr. Morales's office and left message to clarify if and when warfarin needs to be held.  Scheduled patient for INR check tomorrow and will review dosing details with patient tomorrow.  Rios DELA CRUZ

## 2022-03-03 NOTE — TELEPHONE ENCOUNTER
Rios from INR clinic calling to discuss warfarin clarification. Per Rios pt says he was instructed to hold warfarin in the AM but he takes it in the evening. She is wondering if he needs to hold it Sunday night, Monday night or if he needs to hold it at all?    P; [phone]  [facility] [phone]

## 2022-03-03 NOTE — TELEPHONE ENCOUNTER
Reason for Call:  Other questions     Detailed comments: Discuss going off his Coumadin or Warfarin prior to surgery on Monday March 7th   Surgeon paperwork says hold the medication the morning of, but he takes this at night.     Phone Number Patient can be reached at: Cell number on file:    Telephone Information:   Mobile [phone]       Best Time: any    Can we leave a detailed message on this number? YES    Call taken on 3/3/2022 at 12:09 PM by Jasmina Pickard

## 2022-03-03 NOTE — TELEPHONE ENCOUNTER
Date: 3/3/2022    Time of Call: 5:59 PM     Diagnosis:  Marfans, warfarin     [ TORB ] Ordering provider: ELIJAH Alcaraz  Order: Hold warfarin three days prior to procedure. No bridging needed.      Order received by: LANIE Howard     Follow-up/additional notes: Sent to Rios via epic.

## 2022-03-04 ENCOUNTER — LAB (OUTPATIENT)
Dept: LAB | Facility: CLINIC | Age: 62
End: 2022-03-04
Payer: COMMERCIAL

## 2022-03-04 ENCOUNTER — ANESTHESIA EVENT (OUTPATIENT)
Dept: SURGERY | Facility: CLINIC | Age: 62
DRG: 261 | End: 2022-03-04
Payer: COMMERCIAL

## 2022-03-04 ENCOUNTER — LAB (OUTPATIENT)
Dept: LAB | Facility: CLINIC | Age: 62
End: 2022-03-04

## 2022-03-04 ENCOUNTER — ANTICOAGULATION THERAPY VISIT (OUTPATIENT)
Dept: ANTICOAGULATION | Facility: CLINIC | Age: 62
End: 2022-03-04

## 2022-03-04 DIAGNOSIS — Z95.2 HEART VALVE REPLACED: ICD-10-CM

## 2022-03-04 DIAGNOSIS — Z79.01 LONG TERM CURRENT USE OF ANTICOAGULANT THERAPY: ICD-10-CM

## 2022-03-04 DIAGNOSIS — Z95.2 S/P AORTIC VALVE REPLACEMENT: ICD-10-CM

## 2022-03-04 DIAGNOSIS — Z11.59 ENCOUNTER FOR SCREENING FOR OTHER VIRAL DISEASES: ICD-10-CM

## 2022-03-04 DIAGNOSIS — Z79.01 LONG TERM CURRENT USE OF ANTICOAGULANTS WITH INR GOAL OF 2.0-3.0: ICD-10-CM

## 2022-03-04 DIAGNOSIS — Z95.2 HEART VALVE REPLACED: Primary | ICD-10-CM

## 2022-03-04 LAB — INR BLD: 2.2 (ref 0.9–1.1)

## 2022-03-04 PROCEDURE — U0005 INFEC AGEN DETEC AMPLI PROBE: HCPCS

## 2022-03-04 PROCEDURE — 85610 PROTHROMBIN TIME: CPT

## 2022-03-04 PROCEDURE — 36415 COLL VENOUS BLD VENIPUNCTURE: CPT

## 2022-03-04 PROCEDURE — U0003 INFECTIOUS AGENT DETECTION BY NUCLEIC ACID (DNA OR RNA); SEVERE ACUTE RESPIRATORY SYNDROME CORONAVIRUS 2 (SARS-COV-2) (CORONAVIRUS DISEASE [COVID-19]), AMPLIFIED PROBE TECHNIQUE, MAKING USE OF HIGH THROUGHPUT TECHNOLOGIES AS DESCRIBED BY CMS-2020-01-R: HCPCS

## 2022-03-04 NOTE — TELEPHONE ENCOUNTER
From: Anita Webber RN  Sent: 3/3/2022   6:05 PM CST  To: Teresa Espino, RN, *  Subject: warfarin plan                                    Robert Nation,    I confirmed with Laura Lara, our APRN with EP. Don should hold his warfarin three days prior to procedure. If he takes in evening, then no dose Friday, Saturday, nor Sunday prior to Monday procedure. No bridging required.    Thanks so much!  Anita DIAS [facility] CV Genetics RNCC

## 2022-03-05 LAB — SARS-COV-2 RNA RESP QL NAA+PROBE: NEGATIVE

## 2022-03-06 NOTE — ANESTHESIA PREPROCEDURE EVALUATION
Anesthesia Pre-Procedure Evaluation    Patient: Richard Ball   MRN: [id number] : 1960        Procedure : Procedure(s):  EP Pacemaker  EP  Laser Lead Extractions- Level 1          Past Medical History:   Diagnosis Date     Heart abnormality     Artifical aortic graft - ascending aorta     Hemothorax      Marfan's syndrome 2011      Past Surgical History:   Procedure Laterality Date     AORTIC VALVE REPLACEMENT  2001    Ascending aorta graft     HC REMOVE TONSILS/ADENOIDS,<12 Y/O      T & A <13y.o.     HC VASECTOMY UNILAT/BILAT W POSTOP SEMEN      Vasectomy     IMPLANT PACEMAKER        Allergies   Allergen Reactions     Ambien [Zolpidem] Other (See Comments)     Hallucinations/ thrashing      Social History     Tobacco Use     Smoking status: Never Smoker     Smokeless tobacco: Never Used   Substance Use Topics     Alcohol use: Yes     Alcohol/week: 4.0 standard drinks     Types: 4 Standard drinks or equivalent per week     Comment: 2 drinks week      Wt Readings from Last 1 Encounters:   22 77.6 kg (171 lb 1.2 oz)        Anesthesia Evaluation   Pt has had prior anesthetic. Type: General.    No history of anesthetic complications       ROS/MED HX  ENT/Pulmonary:  - neg pulmonary ROS     Neurologic:     (+) TIA,     Cardiovascular: Comment: s/p mechanical AVR and reimplantation of coronary arteries     PPM: RA lead has had noise and failed in 2018 and has been programmed VDD since. He has been 100%     (+) Dyslipidemia hypertension-----Taking blood thinners Pt has received instructions: pacemaker, Reason placed: CHB. type: Medtronic Adapta , settings: VVI 65, - Patient is dependent on pacemaker. Previous cardiac testing   Echo: Date: 2020 Results:    Known Marfan Syndrome and aortic aneurysm, status post composite graft placement with a mechanical aortic valve at [facility] in [place] in . Valve details not known.     1. The mechanical aortic valve is well-seated.  Satisfactory mean systolic  gradient of 5.6 mmHg. Closing jets visualized. No abnormal regurgitation.  2. Ascending aorta composite graft not well visualized.  3. Normal left ventricular size and systolic function. LVEF 60-65%. Septal wall motion consistent with pacemaker activation.  4. Normal right ventricular size with mildly decreased systolic function,possibly related to pacemaker activation.  5. Pacemaker leads visualized in the right atrium and right ventricle.  6. Trace tricuspid and mitral valve regurgitation.  Stress Test: Date: 1/6/21 Results:  The gradient is normal for this prosthetic aortic valve.  Baseline ECG appears A sense, V pace  Abnormal distal septal motion at rest noted-suspect this is due to pacing and  not ischemia  A low workload was achieved.  The patient exhibited no chest pain during exercise.     The distal septal motion is abnormal rest and exercise but is thickening--  suspect this is due to V pacing/IVCD and not ischemia. (cannot totally exclude  ischemia)  ECG Reviewed: Date: Results:    Cath:  Date: 6/2018 Results:  Focal CAD with no flow-limiting lesions  FFR of the ostial RCA negative for significant stenosis      METS/Exercise Tolerance:     Hematologic:  - neg hematologic  ROS  (-) anemia   Musculoskeletal: Comment: Marfan's Syndrome    Multilevel degenerative changes of the cervical spine with disc space narrowing    Severe scoliosis of the thoracic spine       GI/Hepatic:  - neg GI/hepatic ROS     Renal/Genitourinary:  - neg Renal ROS     Endo:  - neg endo ROS     Psychiatric/Substance Use:  - neg psychiatric ROS     Infectious Disease: Comment: COVID NEGATIVE 3/4/22 - neg infectious disease ROS  (-) Recent Fever   Malignancy:  - neg malignancy ROS     Other:  - neg other ROS          Physical Exam    Airway        Mallampati: I   TM distance: > 3 FB   Neck ROM: full   Mouth opening: > 3 cm    Respiratory Devices and Support         Dental  no notable dental history          Cardiovascular          Rhythm and rate: irregular and normal   (+) murmur       Pulmonary   pulmonary exam normal        breath sounds clear to auscultation           OUTSIDE LABS:  CBC:   Lab Results   Component Value Date    WBC 4.3 06/07/2019    WBC 4.0 06/27/2018    HGB 13.1 (L) 06/07/2019    HGB 14.8 06/27/2018    HCT 37.8 (L) 06/07/2019    HCT 42.1 06/27/2018     (L) 06/07/2019     (L) 06/27/2018     BMP:   Lab Results   Component Value Date     06/25/2021     06/07/2019    POTASSIUM 4.4 06/25/2021    POTASSIUM 4.1 06/07/2019    CHLORIDE 107 06/25/2021    CHLORIDE 111 (H) 06/07/2019    CO2 28 06/25/2021    CO2 29 06/07/2019    BUN 14 06/25/2021    BUN 14 06/07/2019    CR 0.89 06/25/2021    CR 0.83 06/07/2019    GLC 99 03/07/2022    GLC 96 06/25/2021     COAGS:   Lab Results   Component Value Date    PTT 36 06/27/2018    INR 2.2 (H) 03/04/2022     POC: No results found for: BGM, HCG, HCGS  HEPATIC:   Lab Results   Component Value Date    ALBUMIN 3.5 06/25/2021    PROTTOTAL 6.3 (L) 06/25/2021    ALT 29 06/25/2021    AST 23 06/25/2021    ALKPHOS 73 06/25/2021    BILITOTAL 0.6 06/25/2021     OTHER:   Lab Results   Component Value Date    CLINT 8.6 06/25/2021    TSH 3.06 06/29/2018       Anesthesia Plan    ASA Status:  3   NPO Status:  NPO Appropriate    Anesthesia Type: General.     - Airway: ETT   Induction: Intravenous.   Maintenance: Balanced.   Techniques and Equipment:     - Lines/Monitors: 2nd IV, BIS, JESSE, Arterial Line            JESSE Absolute Contra-indication: NONE            JESSE Relative Contra-indication: NONE     - Blood: T&S     - Drips/Meds: Phenylephrine     Consents    Anesthesia Plan(s) and associated risks, benefits, and realistic alternatives discussed. Questions answered and patient/representative(s) expressed understanding.    - Discussed:     - Discussed with:  Patient      - Extended Intubation/Ventilatory Support Discussed: No.      - Patient is DNR/DNI  Status: No    Use of blood products discussed: Yes.     - Discussed with: Patient.     - Consented: consented to blood products            Reason for refusal: other.     Postoperative Care    Pain management: Multi-modal analgesia.   PONV prophylaxis: Ondansetron (or other 5HT-3), Dexamethasone or Solumedrol     Comments:                Andrea Milner MD

## 2022-03-07 ENCOUNTER — ANESTHESIA (OUTPATIENT)
Dept: SURGERY | Facility: CLINIC | Age: 62
DRG: 261 | End: 2022-03-07
Payer: COMMERCIAL

## 2022-03-07 ENCOUNTER — APPOINTMENT (OUTPATIENT)
Dept: GENERAL RADIOLOGY | Facility: CLINIC | Age: 62
DRG: 261 | End: 2022-03-07
Attending: INTERNAL MEDICINE
Payer: COMMERCIAL

## 2022-03-07 ENCOUNTER — HOSPITAL ENCOUNTER (INPATIENT)
Facility: CLINIC | Age: 62
Setting detail: SURGERY ADMIT
LOS: 1 days | Discharge: HOME | DRG: 261 | End: 2022-03-07
Attending: INTERNAL MEDICINE | Admitting: INTERNAL MEDICINE
Payer: COMMERCIAL

## 2022-03-07 ENCOUNTER — ANCILLARY PROCEDURE (OUTPATIENT)
Dept: CARDIOLOGY | Facility: CLINIC | Age: 62
End: 2022-03-07
Attending: INTERNAL MEDICINE
Payer: COMMERCIAL

## 2022-03-07 VITALS
DIASTOLIC BLOOD PRESSURE: 75 MMHG | HEIGHT: 71 IN | WEIGHT: 171.08 LBS | SYSTOLIC BLOOD PRESSURE: 123 MMHG | RESPIRATION RATE: 14 BRPM | OXYGEN SATURATION: 94 % | TEMPERATURE: 96.3 F | HEART RATE: 63 BPM | BODY MASS INDEX: 23.95 KG/M2

## 2022-03-07 DIAGNOSIS — Z95.0 PACEMAKER: ICD-10-CM

## 2022-03-07 DIAGNOSIS — I45.9 HEART BLOCK: Primary | ICD-10-CM

## 2022-03-07 DIAGNOSIS — Z95.2 S/P AORTIC VALVE REPLACEMENT: ICD-10-CM

## 2022-03-07 DIAGNOSIS — I45.9 HEART BLOCK: ICD-10-CM

## 2022-03-07 DIAGNOSIS — Q87.40 MARFAN'S SYNDROME: ICD-10-CM

## 2022-03-07 LAB
ABO/RH(D): NORMAL
ANION GAP SERPL CALCULATED.3IONS-SCNC: 4 MMOL/L (ref 3–14)
ANTIBODY SCREEN: NEGATIVE
BLD PROD TYP BPU: NORMAL
BLD PROD TYP BPU: NORMAL
BLOOD COMPONENT TYPE: NORMAL
BLOOD COMPONENT TYPE: NORMAL
BUN SERPL-MCNC: 13 MG/DL (ref 7–30)
CALCIUM SERPL-MCNC: 8.8 MG/DL (ref 8.5–10.1)
CHLORIDE BLD-SCNC: 112 MMOL/L (ref 94–109)
CO2 SERPL-SCNC: 24 MMOL/L (ref 20–32)
CODING SYSTEM: NORMAL
CODING SYSTEM: NORMAL
CREAT SERPL-MCNC: 0.87 MG/DL (ref 0.66–1.25)
CROSSMATCH: NORMAL
CROSSMATCH: NORMAL
ERYTHROCYTE [DISTWIDTH] IN BLOOD BY AUTOMATED COUNT: 13.4 % (ref 10–15)
GFR SERPL CREATININE-BSD FRML MDRD: >90 ML/MIN/1.73M2
GLUCOSE BLD-MCNC: 104 MG/DL (ref 70–99)
GLUCOSE BLDC GLUCOMTR-MCNC: 99 MG/DL (ref 70–99)
HCT VFR BLD AUTO: 41.2 % (ref 40–53)
HGB BLD-MCNC: 14 G/DL (ref 13.3–17.7)
INR PPP: 1.14 (ref 0.85–1.15)
ISSUE DATE AND TIME: NORMAL
ISSUE DATE AND TIME: NORMAL
MCH RBC QN AUTO: 30.5 PG (ref 26.5–33)
MCHC RBC AUTO-ENTMCNC: 34 G/DL (ref 31.5–36.5)
MCV RBC AUTO: 90 FL (ref 78–100)
PLATELET # BLD AUTO: 105 10E3/UL (ref 150–450)
POTASSIUM BLD-SCNC: 4.2 MMOL/L (ref 3.4–5.3)
RBC # BLD AUTO: 4.59 10E6/UL (ref 4.4–5.9)
SODIUM SERPL-SCNC: 140 MMOL/L (ref 133–144)
SPECIMEN EXPIRATION DATE: NORMAL
UNIT ABO/RH: NORMAL
UNIT ABO/RH: NORMAL
UNIT NUMBER: NORMAL
UNIT NUMBER: NORMAL
UNIT STATUS: NORMAL
UNIT STATUS: NORMAL
UNIT TYPE ISBT: 9500
UNIT TYPE ISBT: 9500
WBC # BLD AUTO: 4 10E3/UL (ref 4–11)

## 2022-03-07 PROCEDURE — 250N000011 HC RX IP 250 OP 636: Performed by: STUDENT IN AN ORGANIZED HEALTH CARE EDUCATION/TRAINING PROGRAM

## 2022-03-07 PROCEDURE — 71045 X-RAY EXAM CHEST 1 VIEW: CPT | Mod: 26 | Performed by: RADIOLOGY

## 2022-03-07 PROCEDURE — 02H63MZ INSERTION OF CARDIAC LEAD INTO RIGHT ATRIUM, PERCUTANEOUS APPROACH: ICD-10-PCS | Performed by: THORACIC SURGERY (CARDIOTHORACIC VASCULAR SURGERY)

## 2022-03-07 PROCEDURE — 99207 CARDIAC DEVICE CHECK - INPATIENT: CPT | Mod: 26 | Performed by: INTERNAL MEDICINE

## 2022-03-07 PROCEDURE — C1785 PMKR, DUAL, RATE-RESP: HCPCS | Performed by: INTERNAL MEDICINE

## 2022-03-07 PROCEDURE — 999N000141 HC STATISTIC PRE-PROCEDURE NURSING ASSESSMENT: Performed by: INTERNAL MEDICINE

## 2022-03-07 PROCEDURE — 999N000015 HC STATISTIC ARTERIAL MONITORING DAILY

## 2022-03-07 PROCEDURE — 88300 SURGICAL PATH GROSS: CPT | Mod: 26 | Performed by: PATHOLOGY

## 2022-03-07 PROCEDURE — C1894 INTRO/SHEATH, NON-LASER: HCPCS | Performed by: INTERNAL MEDICINE

## 2022-03-07 PROCEDURE — 93280 PM DEVICE PROGR EVAL DUAL: CPT

## 2022-03-07 PROCEDURE — C1730 CATH, EP, 19 OR FEW ELECT: HCPCS | Performed by: INTERNAL MEDICINE

## 2022-03-07 PROCEDURE — 250N000009 HC RX 250: Performed by: NURSE ANESTHETIST, CERTIFIED REGISTERED

## 2022-03-07 PROCEDURE — C1769 GUIDE WIRE: HCPCS | Performed by: INTERNAL MEDICINE

## 2022-03-07 PROCEDURE — 250N000011 HC RX IP 250 OP 636: Performed by: INTERNAL MEDICINE

## 2022-03-07 PROCEDURE — 710N000010 HC RECOVERY PHASE 1, LEVEL 2, PER MIN: Performed by: INTERNAL MEDICINE

## 2022-03-07 PROCEDURE — C1753 CATH, INTRAVAS ULTRASOUND: HCPCS | Performed by: INTERNAL MEDICINE

## 2022-03-07 PROCEDURE — 36415 COLL VENOUS BLD VENIPUNCTURE: CPT | Performed by: INTERNAL MEDICINE

## 2022-03-07 PROCEDURE — 410N000003 HC PER-PERFUSION 1ST 30 MIN: Performed by: INTERNAL MEDICINE

## 2022-03-07 PROCEDURE — 86923 COMPATIBILITY TEST ELECTRIC: CPT | Performed by: INTERNAL MEDICINE

## 2022-03-07 PROCEDURE — 250N000011 HC RX IP 250 OP 636: Performed by: NURSE ANESTHETIST, CERTIFIED REGISTERED

## 2022-03-07 PROCEDURE — 02PA3MZ REMOVAL OF CARDIAC LEAD FROM HEART, PERCUTANEOUS APPROACH: ICD-10-PCS | Performed by: THORACIC SURGERY (CARDIOTHORACIC VASCULAR SURGERY)

## 2022-03-07 PROCEDURE — 80048 BASIC METABOLIC PNL TOTAL CA: CPT | Performed by: INTERNAL MEDICINE

## 2022-03-07 PROCEDURE — 88300 SURGICAL PATH GROSS: CPT | Mod: TC | Performed by: INTERNAL MEDICINE

## 2022-03-07 PROCEDURE — 214N000001 HC R&B CCU UMMC

## 2022-03-07 PROCEDURE — P9016 RBC LEUKOCYTES REDUCED: HCPCS | Performed by: INTERNAL MEDICINE

## 2022-03-07 PROCEDURE — 258N000003 HC RX IP 258 OP 636: Performed by: STUDENT IN AN ORGANIZED HEALTH CARE EDUCATION/TRAINING PROGRAM

## 2022-03-07 PROCEDURE — 370N000017 HC ANESTHESIA TECHNICAL FEE, PER MIN: Performed by: INTERNAL MEDICINE

## 2022-03-07 PROCEDURE — 999N000134 HC STATISTIC PP CARE STAGE 3

## 2022-03-07 PROCEDURE — 86850 RBC ANTIBODY SCREEN: CPT | Performed by: INTERNAL MEDICINE

## 2022-03-07 PROCEDURE — C1887 CATHETER, GUIDING: HCPCS | Performed by: INTERNAL MEDICINE

## 2022-03-07 PROCEDURE — 999N000065 XR CHEST PORT 1 VIEW

## 2022-03-07 PROCEDURE — 93010 ELECTROCARDIOGRAM REPORT: CPT | Mod: 76 | Performed by: INTERNAL MEDICINE

## 2022-03-07 PROCEDURE — 272N000001 HC OR GENERAL SUPPLY STERILE: Performed by: INTERNAL MEDICINE

## 2022-03-07 PROCEDURE — 85027 COMPLETE CBC AUTOMATED: CPT | Performed by: INTERNAL MEDICINE

## 2022-03-07 PROCEDURE — 93005 ELECTROCARDIOGRAM TRACING: CPT

## 2022-03-07 PROCEDURE — 85610 PROTHROMBIN TIME: CPT | Performed by: INTERNAL MEDICINE

## 2022-03-07 PROCEDURE — 250N000009 HC RX 250: Performed by: STUDENT IN AN ORGANIZED HEALTH CARE EDUCATION/TRAINING PROGRAM

## 2022-03-07 PROCEDURE — C1898 LEAD, PMKR, OTHER THAN TRANS: HCPCS | Performed by: INTERNAL MEDICINE

## 2022-03-07 PROCEDURE — 360N000086 HC SURGERY LEVEL 6 W/ FLUORO, PER MIN: Performed by: INTERNAL MEDICINE

## 2022-03-07 PROCEDURE — 250N000013 HC RX MED GY IP 250 OP 250 PS 637: Performed by: STUDENT IN AN ORGANIZED HEALTH CARE EDUCATION/TRAINING PROGRAM

## 2022-03-07 PROCEDURE — 250N000025 HC SEVOFLURANE, PER MIN: Performed by: INTERNAL MEDICINE

## 2022-03-07 PROCEDURE — 250N000009 HC RX 250: Performed by: INTERNAL MEDICINE

## 2022-03-07 PROCEDURE — 999N000157 HC STATISTIC RCP TIME EA 10 MIN

## 2022-03-07 DEVICE — PACEMAKER AZURE MRI XT DR: Type: IMPLANTABLE DEVICE | Site: HEART | Status: FUNCTIONAL

## 2022-03-07 DEVICE — IMP LEAD PACING BIPOLAR CAPSUREFIX NOVUS 52CM 5076-52: Type: IMPLANTABLE DEVICE | Site: HEART | Status: FUNCTIONAL

## 2022-03-07 RX ORDER — SODIUM CHLORIDE, SODIUM GLUCONATE, SODIUM ACETATE, POTASSIUM CHLORIDE AND MAGNESIUM CHLORIDE 526; 502; 368; 37; 30 MG/100ML; MG/100ML; MG/100ML; MG/100ML; MG/100ML
INJECTION, SOLUTION INTRAVENOUS CONTINUOUS PRN
Status: DISCONTINUED | OUTPATIENT
Start: 2022-03-07 | End: 2022-03-07

## 2022-03-07 RX ORDER — LIDOCAINE HYDROCHLORIDE 20 MG/ML
INJECTION, SOLUTION INFILTRATION; PERINEURAL PRN
Status: DISCONTINUED | OUTPATIENT
Start: 2022-03-07 | End: 2022-03-07

## 2022-03-07 RX ORDER — FLUMAZENIL 0.1 MG/ML
0.2 INJECTION, SOLUTION INTRAVENOUS
Status: DISCONTINUED | OUTPATIENT
Start: 2022-03-07 | End: 2022-03-07 | Stop reason: HOSPADM

## 2022-03-07 RX ORDER — NALOXONE HYDROCHLORIDE 0.4 MG/ML
0.2 INJECTION, SOLUTION INTRAMUSCULAR; INTRAVENOUS; SUBCUTANEOUS
Status: DISCONTINUED | OUTPATIENT
Start: 2022-03-07 | End: 2022-03-07 | Stop reason: HOSPADM

## 2022-03-07 RX ORDER — PHENYLEPHRINE HCL IN 0.9% NACL 50MG/250ML
0.5-6 PLASTIC BAG, INJECTION (ML) INTRAVENOUS CONTINUOUS
Status: DISCONTINUED | OUTPATIENT
Start: 2022-03-07 | End: 2022-03-07 | Stop reason: HOSPADM

## 2022-03-07 RX ORDER — SODIUM CHLORIDE, SODIUM LACTATE, POTASSIUM CHLORIDE, CALCIUM CHLORIDE 600; 310; 30; 20 MG/100ML; MG/100ML; MG/100ML; MG/100ML
INJECTION, SOLUTION INTRAVENOUS CONTINUOUS PRN
Status: DISCONTINUED | OUTPATIENT
Start: 2022-03-07 | End: 2022-03-07

## 2022-03-07 RX ORDER — NALOXONE HYDROCHLORIDE 0.4 MG/ML
0.2 INJECTION, SOLUTION INTRAMUSCULAR; INTRAVENOUS; SUBCUTANEOUS
Status: DISCONTINUED | OUTPATIENT
Start: 2022-03-07 | End: 2022-03-07

## 2022-03-07 RX ORDER — SODIUM CHLORIDE, SODIUM LACTATE, POTASSIUM CHLORIDE, CALCIUM CHLORIDE 600; 310; 30; 20 MG/100ML; MG/100ML; MG/100ML; MG/100ML
INJECTION, SOLUTION INTRAVENOUS CONTINUOUS
Status: DISCONTINUED | OUTPATIENT
Start: 2022-03-07 | End: 2022-03-07 | Stop reason: HOSPADM

## 2022-03-07 RX ORDER — FENTANYL CITRATE 50 UG/ML
INJECTION, SOLUTION INTRAMUSCULAR; INTRAVENOUS PRN
Status: DISCONTINUED | OUTPATIENT
Start: 2022-03-07 | End: 2022-03-07

## 2022-03-07 RX ORDER — FENTANYL CITRATE 50 UG/ML
25-50 INJECTION, SOLUTION INTRAMUSCULAR; INTRAVENOUS
Status: DISCONTINUED | OUTPATIENT
Start: 2022-03-07 | End: 2022-03-07 | Stop reason: HOSPADM

## 2022-03-07 RX ORDER — ACETAMINOPHEN 325 MG/1
975 TABLET ORAL ONCE
Status: COMPLETED | OUTPATIENT
Start: 2022-03-07 | End: 2022-03-07

## 2022-03-07 RX ORDER — OXYCODONE HYDROCHLORIDE 5 MG/1
5 TABLET ORAL EVERY 4 HOURS PRN
Status: DISCONTINUED | OUTPATIENT
Start: 2022-03-07 | End: 2022-03-07 | Stop reason: HOSPADM

## 2022-03-07 RX ORDER — NALOXONE HYDROCHLORIDE 0.4 MG/ML
0.4 INJECTION, SOLUTION INTRAMUSCULAR; INTRAVENOUS; SUBCUTANEOUS
Status: DISCONTINUED | OUTPATIENT
Start: 2022-03-07 | End: 2022-03-07

## 2022-03-07 RX ORDER — ONDANSETRON 2 MG/ML
INJECTION INTRAMUSCULAR; INTRAVENOUS PRN
Status: DISCONTINUED | OUTPATIENT
Start: 2022-03-07 | End: 2022-03-07

## 2022-03-07 RX ORDER — LIDOCAINE 40 MG/G
CREAM TOPICAL
Status: DISCONTINUED | OUTPATIENT
Start: 2022-03-07 | End: 2022-03-07 | Stop reason: HOSPADM

## 2022-03-07 RX ORDER — ONDANSETRON 4 MG/1
4 TABLET, ORALLY DISINTEGRATING ORAL EVERY 30 MIN PRN
Status: DISCONTINUED | OUTPATIENT
Start: 2022-03-07 | End: 2022-03-07 | Stop reason: HOSPADM

## 2022-03-07 RX ORDER — ATROPINE SULFATE 0.1 MG/ML
0.5 INJECTION INTRAVENOUS EVERY 5 MIN PRN
Status: DISCONTINUED | OUTPATIENT
Start: 2022-03-07 | End: 2022-03-07 | Stop reason: HOSPADM

## 2022-03-07 RX ORDER — OXYCODONE AND ACETAMINOPHEN 5; 325 MG/1; MG/1
1 TABLET ORAL EVERY 4 HOURS PRN
Status: DISCONTINUED | OUTPATIENT
Start: 2022-03-07 | End: 2022-03-07 | Stop reason: HOSPADM

## 2022-03-07 RX ORDER — IOPAMIDOL 755 MG/ML
INJECTION, SOLUTION INTRAVASCULAR PRN
Status: DISCONTINUED | OUTPATIENT
Start: 2022-03-07 | End: 2022-03-07 | Stop reason: HOSPADM

## 2022-03-07 RX ORDER — NALOXONE HYDROCHLORIDE 0.4 MG/ML
0.4 INJECTION, SOLUTION INTRAMUSCULAR; INTRAVENOUS; SUBCUTANEOUS
Status: DISCONTINUED | OUTPATIENT
Start: 2022-03-07 | End: 2022-03-07 | Stop reason: HOSPADM

## 2022-03-07 RX ORDER — SODIUM CHLORIDE 9 MG/ML
INJECTION, SOLUTION INTRAVENOUS CONTINUOUS
Status: DISCONTINUED | OUTPATIENT
Start: 2022-03-07 | End: 2022-03-07 | Stop reason: HOSPADM

## 2022-03-07 RX ORDER — ONDANSETRON 2 MG/ML
4 INJECTION INTRAMUSCULAR; INTRAVENOUS EVERY 30 MIN PRN
Status: DISCONTINUED | OUTPATIENT
Start: 2022-03-07 | End: 2022-03-07 | Stop reason: HOSPADM

## 2022-03-07 RX ORDER — CEPHALEXIN 500 MG/1
500 CAPSULE ORAL 2 TIMES DAILY
Qty: 6 CAPSULE | Refills: 0 | Status: SHIPPED | OUTPATIENT
Start: 2022-03-07 | End: 2022-03-10

## 2022-03-07 RX ORDER — FENTANYL CITRATE 50 UG/ML
25 INJECTION, SOLUTION INTRAMUSCULAR; INTRAVENOUS EVERY 5 MIN PRN
Status: DISCONTINUED | OUTPATIENT
Start: 2022-03-07 | End: 2022-03-07 | Stop reason: HOSPADM

## 2022-03-07 RX ORDER — PROPOFOL 10 MG/ML
INJECTION, EMULSION INTRAVENOUS PRN
Status: DISCONTINUED | OUTPATIENT
Start: 2022-03-07 | End: 2022-03-07

## 2022-03-07 RX ORDER — CEFAZOLIN SODIUM/WATER 2 G/20 ML
2 SYRINGE (ML) INTRAVENOUS
Status: COMPLETED | OUTPATIENT
Start: 2022-03-07 | End: 2022-03-07

## 2022-03-07 RX ORDER — HYDROMORPHONE HCL IN WATER/PF 6 MG/30 ML
0.2 PATIENT CONTROLLED ANALGESIA SYRINGE INTRAVENOUS EVERY 5 MIN PRN
Status: DISCONTINUED | OUTPATIENT
Start: 2022-03-07 | End: 2022-03-07 | Stop reason: HOSPADM

## 2022-03-07 RX ADMIN — Medication 2 G: at 08:55

## 2022-03-07 RX ADMIN — ROCURONIUM BROMIDE 50 MG: 50 INJECTION, SOLUTION INTRAVENOUS at 08:26

## 2022-03-07 RX ADMIN — PHENYLEPHRINE HYDROCHLORIDE 100 MCG: 10 INJECTION INTRAVENOUS at 10:57

## 2022-03-07 RX ADMIN — PHENYLEPHRINE HYDROCHLORIDE 200 MCG: 10 INJECTION INTRAVENOUS at 11:00

## 2022-03-07 RX ADMIN — HYDROMORPHONE HYDROCHLORIDE 0.2 MG: 0.2 INJECTION, SOLUTION INTRAMUSCULAR; INTRAVENOUS; SUBCUTANEOUS at 13:40

## 2022-03-07 RX ADMIN — ROCURONIUM BROMIDE 30 MG: 50 INJECTION, SOLUTION INTRAVENOUS at 09:53

## 2022-03-07 RX ADMIN — PHENYLEPHRINE HYDROCHLORIDE 50 MCG: 10 INJECTION INTRAVENOUS at 09:08

## 2022-03-07 RX ADMIN — PHENYLEPHRINE HYDROCHLORIDE 100 MCG: 10 INJECTION INTRAVENOUS at 11:33

## 2022-03-07 RX ADMIN — SODIUM CHLORIDE, POTASSIUM CHLORIDE, SODIUM LACTATE AND CALCIUM CHLORIDE: 600; 310; 30; 20 INJECTION, SOLUTION INTRAVENOUS at 08:13

## 2022-03-07 RX ADMIN — SUGAMMADEX 200 MG: 100 INJECTION, SOLUTION INTRAVENOUS at 12:02

## 2022-03-07 RX ADMIN — PHENYLEPHRINE HYDROCHLORIDE 100 MCG: 10 INJECTION INTRAVENOUS at 10:58

## 2022-03-07 RX ADMIN — FENTANYL CITRATE 50 MCG: 50 INJECTION, SOLUTION INTRAMUSCULAR; INTRAVENOUS at 11:05

## 2022-03-07 RX ADMIN — ONDANSETRON 4 MG: 2 INJECTION INTRAMUSCULAR; INTRAVENOUS at 11:26

## 2022-03-07 RX ADMIN — HYDROMORPHONE HYDROCHLORIDE 0.3 MG: 1 INJECTION, SOLUTION INTRAMUSCULAR; INTRAVENOUS; SUBCUTANEOUS at 12:28

## 2022-03-07 RX ADMIN — SODIUM CHLORIDE, SODIUM GLUCONATE, SODIUM ACETATE, POTASSIUM CHLORIDE AND MAGNESIUM CHLORIDE: 526; 502; 368; 37; 30 INJECTION, SOLUTION INTRAVENOUS at 11:04

## 2022-03-07 RX ADMIN — ROCURONIUM BROMIDE 20 MG: 50 INJECTION, SOLUTION INTRAVENOUS at 09:18

## 2022-03-07 RX ADMIN — SODIUM CHLORIDE, SODIUM GLUCONATE, SODIUM ACETATE, POTASSIUM CHLORIDE AND MAGNESIUM CHLORIDE: 526; 502; 368; 37; 30 INJECTION, SOLUTION INTRAVENOUS at 08:40

## 2022-03-07 RX ADMIN — ROCURONIUM BROMIDE 50 MG: 50 INJECTION, SOLUTION INTRAVENOUS at 10:19

## 2022-03-07 RX ADMIN — ACETAMINOPHEN 975 MG: 325 TABLET ORAL at 07:29

## 2022-03-07 RX ADMIN — PHENYLEPHRINE HYDROCHLORIDE 200 MCG: 10 INJECTION INTRAVENOUS at 11:13

## 2022-03-07 RX ADMIN — PROPOFOL 140 MG: 10 INJECTION, EMULSION INTRAVENOUS at 08:26

## 2022-03-07 RX ADMIN — MIDAZOLAM 2 MG: 1 INJECTION INTRAMUSCULAR; INTRAVENOUS at 08:22

## 2022-03-07 RX ADMIN — PHENYLEPHRINE HYDROCHLORIDE 0.2 MCG/KG/MIN: 10 INJECTION INTRAVENOUS at 09:08

## 2022-03-07 RX ADMIN — FENTANYL CITRATE 50 MCG: 50 INJECTION, SOLUTION INTRAMUSCULAR; INTRAVENOUS at 09:50

## 2022-03-07 RX ADMIN — LIDOCAINE HYDROCHLORIDE 40 MG: 20 INJECTION, SOLUTION INFILTRATION; PERINEURAL at 08:25

## 2022-03-07 RX ADMIN — FENTANYL CITRATE 100 MCG: 50 INJECTION, SOLUTION INTRAMUSCULAR; INTRAVENOUS at 09:53

## 2022-03-07 ASSESSMENT — ACTIVITIES OF DAILY LIVING (ADL)
ADLS_ACUITY_SCORE: 4
ADLS_ACUITY_SCORE: 12
ADLS_ACUITY_SCORE: 4
ADLS_ACUITY_SCORE: 12

## 2022-03-07 NOTE — ANESTHESIA PROCEDURE NOTES
Airway       Patient location during procedure: OR       Procedure Start/Stop Times: 3/7/2022 8:29 AM  Staff -        Anesthesiologist:  Kali Barba MD       Resident/Fellow: Radha Moreno MD       Performed By: resident and with residents       Procedure performed by resident/fellow/CRNA in presence of a teaching physician.    Consent for Airway        Urgency: elective  Indications and Patient Condition       Indications for airway management: silviano-procedural       Induction type:intravenous       Mask difficulty assessment: 1 - vent by mask    Final Airway Details       Final airway type: endotracheal airway       Successful airway: ETT - single  Endotracheal Airway Details        ETT size (mm): 8.0       Successful intubation technique: direct laryngoscopy       DL Blade Type: Gonzalez 2       Grade View of Cords: 1       Adjucts: stylet       Position: Right       Measured from: gums/teeth       Secured at (cm): 24       Bite block used: None    Post intubation assessment        Number of attempts at approach: 1       Secured with: pink tape       Ease of procedure: easy       Dentition: Intact and Unchanged

## 2022-03-07 NOTE — PROGRESS NOTES
Patient arrived to unit 3C, bay 31, via litter from PACU s/p ICD lead extraction and exchange. Bilateral groin sites with sutures in place, CDI. Left upper chest incision covered with Primapore, drainage marked. Patient is alert and oriented and able to make needs known. Declines offer of food at this time but did accept a lemon/lime soda.

## 2022-03-07 NOTE — H&P
"    Cardiac Electrophysiology History and Physical      Reason For Admission:      HPI:  Mr. Ball is a 60 year old male who has a past medical history significant for Marfan syndrome, s/p mechanical AVR and reimplantation of coronary arteries 2001, TIA, CHB s/p PPM 2001 and gen change in 2011.      He was diagnosed with Marfan syndrome at age 24.  He had genetic testing done at the [facility] in [place].  He underwent surgery in 2001 at [facility] in [place] by Dr. Arriola.  His ascending aorta was 5.5 cm at the time, but we do not have those operative reports.  His aortic valve was replaced with a mechanical valve, and they reimplanted his coronary arteries. Appears he had post operative CHB and required PPM implant. His RA lead has had noise and failed in 2018 and has been programmed VDD since. He has been 100% . He is interested in having RA lead replaced and values maintaining MRI compatibility. In 2018, he had a CTA and there was concern about significant coronary artery disease. He went on to have a coronary angiogram, and that showed no significant disease with he did have some disease; and his calcium score was high placing him in the 95% so he is having his cholesterol aggressively controlled. He did have a cerebral CTA in 2018 that showed no evidence of cerebral aneurysms.  He did have a history of a TIA in the past but no deficits. His last chest CTA showed his aorta was normal dimensions in 2018. A recent exercise echo showed normal prosthetic AV function, low workload was achieved, at peak exercise reported to have \"dropped\" V at around 130 bpm.   He has an RA lead that is not functioning and is 100% V paced at VDD.  He presents today for RA lead extraction and reimplantation.     Past Medical History:      Past Medical History:   Diagnosis Date    Heart abnormality     Artifical aortic graft - ascending aorta    Hemothorax     Marfan's syndrome 12/6/2011       Past Surgical  " History:      Past Surgical History:   Procedure Laterality Date    AORTIC VALVE REPLACEMENT  8/2/2001    Ascending aorta graft    HC REMOVE TONSILS/ADENOIDS,<13 Y/O      T & A <13y.o.    HC VASECTOMY UNILAT/BILAT W POSTOP SEMEN      Vasectomy    IMPLANT PACEMAKER         Family History:      Family History   Problem Relation Age of Onset    Asthma No family hx of     Diabetes No family hx of     Hypertension No family hx of     Breast Cancer No family hx of     Cancer - colorectal No family hx of     Prostate Cancer No family hx of     Lipids No family hx of     Musculoskeletal Disorder No family hx of     Neurologic Disorder No family hx of        Past Medical History:      Social History     Socioeconomic History    Marital status:      Spouse name: Not on file    Number of children: 2    Years of education: 16    Highest education level: Not on file   Occupational History     Employer: UNEMPLOYED     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 No     Service Not Asked    Blood Transfusions Not Asked    Caffeine Concern Not Asked    Occupational Exposure Not Asked    Hobby Hazards Not Asked    Sleep Concern Not Asked    Stress Concern Not Asked    Weight Concern Not Asked    Special Diet Not Asked    Back Care Not Asked    Exercise Yes    Bike Helmet Not Asked    Seat Belt Yes    Self-Exams Yes   Social History Narrative    Not on file     Social Determinants of Health     Financial Resource Strain: Not on file   Food Insecurity: Not on file   Transportation Needs: Not on file   Physical Activity: Not on file   Stress: Not on file  "  Social Connections: Not on file   Intimate Partner Violence: Not on file   Housing Stability: Not on file       Past Medical History:    A complete review of systems is negative except as noted above in the HPI.    Physical Exam:   Vitals: BP (!) 130/96 (BP Location: Left arm)   Pulse 65   Temp 98.3  F (36.8  C) (Oral)   Resp 15   Ht 1.791 m (5' 10.5\")   Wt 77.6 kg (171 lb 1.2 oz)   SpO2 95%   BMI 24.20 kg/m    Gen: Well appearing and in no distress  HEENT: NC AT  Neck: Supple without JVD  Cardiovascular: RRR  Abd: No tenderness  Neuro: A&OX4         Relevant labs, imaging, medications and procedures were reviewed.    Assessment and Recommendations:     62M with Marfans s/p aortic valve replacement and PPM implanted in 2001 with generator change in 2011.  His RA lead does not work and he presents today for RA lead extraction and reimplantation.      Risks and benefits discussed in detail.  All questions answered.    Madhu Darling MD   Cardiac electrophysiology fellow      "

## 2022-03-07 NOTE — ANESTHESIA PROCEDURE NOTES
Arterial Line Procedure Note    Pre-Procedure   Staff -        Anesthesiologist:  Kali Barba MD       Resident/Fellow: Radha Moreno MD       Performed By: resident and with residents       Procedure performed by resident/fellow/CRNA in presence of a teaching physician.         Pre-Anesthestic Checklist: patient identified, IV checked, risks and benefits discussed, informed consent, monitors and equipment checked, pre-op evaluation and at physician/surgeon's request  Timeout:       Correct Patient: Yes        Correct Procedure: Yes        Correct Site: Yes        Correct Position: Yes   Line Placement:   This line was placed Post Induction  Procedure   Procedure: arterial line       Laterality: left       Insertion Site: radial.  Sterile Prep        Standard elements of sterile barrier followed       Skin prep: Chloraprep  Insertion/Injection        Technique: ultrasound guided        1. Ultrasound was used to evaluate the access site.       2. Artery evaluated via ultrasound for patency/adequacy.       3. Using real-time ultrasound the needle/catheter was observed entering the artery/vein.       Catheter Type/Size: 20 G, 12 cm  Narrative         Secured by: suture       Tegaderm dressing used.       Complications: None apparent,

## 2022-03-07 NOTE — OP NOTE
Procedure Date: 2022    OPERATING AREA:  Room 24.    PROCEDURES PERFORMED:  Standby for removal of a right atrial lead without laser.    ATTENDING SURGEON:  Jia Langston MD.    EP CARDIOLOGIST:  Raman Morales MD.    ANESTHESIA:  General endotracheal.    SKIN PREP:  Betadine and DuraPrep.    INCISIONS:  Per Dr. Morales.    DRAINS:  None.    CULTURES:  None.    CLOSURE:  Routine.    PREOPERATIVE DIAGNOSES:    1.  Right atrial lead present since .  2.  Marfan's syndrome.  3.  Status post root replacement with a mechanical valved conduit in .  4.  Complete heart block.    POSTOPERATIVE DIAGNOSES:    1.  Right atrial lead present since .  2.  Marfan syndrome.  3.  Status post root replacement with a mechanical valved conduit in .  4.  Complete heart block.    BRIEF HISTORY:  Mr. Ball is a 62-year-old gentleman who has a pacemaker in place for complete heart block.  His right atrial lead is no longer functioning well and needs to be replaced. The ventricular lead is entirely functional. The above procedure was planned.    FINDINGS OF THE OPERATION:  The left infraclavicular pocket was opened and the generator removed.  A stylet was placed down the right atrial lead.  In making the preparations to place the laser over the lead, the right atrial lead came out without difficulty.  There was no pericardial effusion.  The patient remained hemodynamically stable.  I was present in the operating room, standing by from 8:15-8:45 and from 9:45-11:00 a.m.  The estimated blood loss was 100 mL.  Mr. Ball was brought to the PACU in satisfactory condition.    Jia Langston MD        D: 2022   T: 2022   MT: [id number]    Name:     KAVON BALL  MRN:      [id number]        Account:        [id number]   :      1960           Procedure Date: 2022     Document: [id number]    
room air

## 2022-03-07 NOTE — BRIEF OP NOTE
[facility]    Brief Operative Note    Pre-operative diagnosis: Marfan's syndrome [Q87.40]  Pacemaker [Z95.0]  S/P aortic valve replacement [Z95.2]  Post-operative diagnosis: Same    Procedure: Procedure(s):  EP Pacemaker  EP  Laser Lead Extractions- Level 1  Surgeon: Surgeon(s) and Role:     * Raman Morales MD - Primary     * Jia Langston MD - Assisting     * Madhu Darling MD - Fellow - Assisting  Anesthesia: General   Estimated Blood Loss: 100 mL  Drains: None  Specimens: None  Findings: Right atrial lead was removed with the Laser.  Complications: None  Implants:   Implant Name Type Inv. Item Serial No.  Lot No. LRB No. Used Action   IMP LEAD PACING BIPOLAR CAPSUREFIX NOVUS 52CM 5076-52 - [id number] Leads IMP LEAD PACING BIPOLAR CAPSUREFIX NOVUS 52CM 5076-52 [id number] [employer] NA N/A 1 Implanted   PACEMAKER DULCE MRI XT DR Tristan [id number] Pacemaker PACEMAKER DULCE MRI XT  [id number] [employer]  N/A 1 Implanted   [employer]* [id number] TENDRIL DX [id number] Leads  [id number] [employer]   1 Explanted

## 2022-03-07 NOTE — OR NURSING
Provider notified completing of CXR.  Dr. Darling called to say CXR was appropriate and pt would be discharged from  today after bedrest ends at 1430pm.

## 2022-03-07 NOTE — ANESTHESIA PROCEDURE NOTES
Chief Complaint   Patient presents with   • Knee Pain     Pt states both knees are hurting         Allergies   Allergen Reactions   • Contrast Dye Anaphylaxis   • Sulfa Antibiotics Rash       HPI: Jordy Mir is a 52 y.o. female presents today with complaints of bilateral knee pain without injury.  Says that her knees have been bothering her for more than 6 months.  Can not kneel without excruciating pain.  Says she has grinding and popping.   She loves to walk however it is grinding and popping and causes pain.  Rates the pain 2/10 and says that she hasn't been up much today so they are not to bad.      Past Medical History:   Diagnosis Date   • Disease of thyroid gland    • GERD (gastroesophageal reflux disease)    • Narcolepsy      Past Surgical History:   Procedure Laterality Date   •  SECTION     • CHOLECYSTECTOMY     • COLONOSCOPY     • COLONOSCOPY N/A 2018    Procedure: COLONOSCOPY WITH ANESTHESIA;  Surgeon: Mary Segura MD;  Location: [facility] ENDOSCOPY;  Service: Gastroenterology   • HYSTERECTOMY     • OOPHORECTOMY       Social History     Social History   • Marital status:      Spouse name: N/A   • Number of children: N/A   • Years of education: N/A     Occupational History   • Not on file.     Social History Main Topics   • Smoking status: Former Smoker   • Smokeless tobacco: Never Used      Comment: Quit in    • Alcohol use No   • Drug use: No   • Sexual activity: Defer     Other Topics Concern   • Not on file     Social History Narrative   • No narrative on file     Family History   Problem Relation Age of Onset   • Arthritis Mother    • Colon polyps Mother    • Arthritis Father    • No Known Problems Sister    • Narcolepsy Daughter    • Depression Maternal Grandmother    • Depression Paternal Grandfather        Current Outpatient Prescriptions on File Prior to Visit   Medication Sig Dispense Refill   • ergocalciferol (ERGOCALCIFEROL) 88895 units capsule Take 1 capsule by  Perioperative JESSE Procedure Note    Staff -        Anesthesiologist:  Kali Barba MD       Performed By: anesthesiologist  Preanesthesia Checklist:  Patient identified, IV assessed, risks and benefits discussed, monitors and equipment assessed, procedure being performed at surgeon's request and anesthesia consent obtained.    JESSE Probe Insertion    Probe Status PRE Insertion: NO obvious damage  Probe type:  Adult 3D  Bite block used:   Oral Airway  Insertion Technique: Easy, no oropharyngeal manipulation  Insertion complications: None obvious  Billing Report:JESSE report by Anesthesiologist (See Separate Report note)  Probe Status POST Removal: NO obvious damage    JESSE Report  General Procedure Information  Images for this study have been archived.    Post Intervention Findings  Procedure(s) performed:  Other (see comments). Regional wall motion:. Surgeon(s) notified of all postintervention findings: Yes.       Echocardiogram Comments  Echocardiogram comments: Limited echocardiogram for evaluation of pericardial effusion post pacing wire removal. Small pericardial effusion seen in anterolateral apical segment of LV and RV apex. Pericardial effusion unchanged post pacing wire removal. Good biventricular function. Mechanical aortic valve seen. Low gradients across the valve peak of 7 mm Hg and mean of 3 mm Hg. Two washing jets seen. Only one leaflet visualized to move on JESSE, both leaflets were seen moving on fluoroscopy. Mild TR seen around pacing wires. Pacing wires seen in RA and RV..         "mouth 1 (One) Time Per Week. 12 capsule 1   • levothyroxine (SYNTHROID, LEVOTHROID) 175 MCG tablet Take 1 tablet by mouth Daily. 90 tablet 0   • omeprazole (priLOSEC) 20 MG capsule Take 1 capsule by mouth Daily. 90 capsule 1     No current facility-administered medications on file prior to visit.         REVIEW OF SYMPTOMS: (Positives bolded)  General:  weight loss, fever, chills, night sweats, fatigue, appetite loss  Cardiovascular:  chest pain, PND, palpitation, edema, orthopnea, syncope, swelling of extremities  Musckelo: Arthralgia, myalgia, muscle weakness, joint swelling, NSAID use  Skin: rash, pruritis, sores, nail changes, skin thickening, change in wart/mole, itching, rash, new lesions, pruritus, nail changes  Neuro:  Migraine, numbness, ataxia, tremor, vertigo, weakness, memory loss, Irritability, \"All other systems reviewed and negative, except as listed above.”    OBJECTIVE:  Constitutional:  Alert, oriented x 3, well developed, well nourished. Consistent with stated age. Not in acute distress.  Has normal posture. Gait abnormal and limping. Behavior appropriate. Patient is pleasant and cooperative with the interview and exam.    Skin: No rashes, no visible scars or suspicious moles noted.  Skin is warm to touch. Normal appropriate skin turgor.  Capillary refill is normal bilateral Upper and lower extremity.     Head/Neck: Head is normocephalic and atraumatic.  Neck without visible/palpable lumps.  No thyromegaly.    Nose: External appearance normal/midline Bilateral nares normal, without purulent discharge     CHEST/LUNG: No use of accessory muscles, chest non-tender on palpation.  Breath sounds normal throughout all lung fields.  No wheezes, rales, rhonchi.    CARDIOVASCULAR:  Inspection: Carotid artery bilateral normal, no bruits, pulse regular/irregular.  Palpation/Percussion Bilateral normal pulsations.  Auscultation: Regular rate and rhythm. No murmur noted in sitting, supine, standing or squatting " positions.    MUSCULOSKELETAL:   Lower extremity:   Knee Exam:    Inspection: Gait, limping. Alignment and contour of knees bilaterally intact.  Quadriceps musculature tight bilaterally and intact.  No atrophy.  Patellar hollows appear intact. 2+ swelling bilateral knees.    Palpation: palpation of bilateral knees tender.  There is crepitus bilateral. Bony landmarks evaluated.  Patellar tenderness. Medial and lateral femoral condyle non tender.  Tibial plateau not tender along medial/lateral aspect.  Tibial tubercle not tender, no evidence of Osgood-Briones. Ligaments palpated and no tenderness along MCL/LCL.  Patient moved to supine position. Patellar grind test with smooth motion bilateral. Knee flexed to 90 degrees and foot rested on the table.  Pes Anserine bursa palpated and non tender bilaterally.  No bakers cyst.  Bulge sign:  Has fluid wave and bulge.   Balloon sign: absent     Varus Stress Test: normal    Valgus Stress Test:   normal    Posterior Drawer Test: negative    Anterior Drawer Test: negative    Lachmans Test:  negative    McMurrays Test: Painful    Patellar Grind Test:  Grinding bilaterally      Stability: ROM decreased, difficulty sitting  style     Assessment  Jordy was seen today for knee pain.    Diagnoses and all orders for this visit:    Chronic pain of both knees  -     XR Knee 1 or 2 View Bilateral (In Office):  IMPRESSION: Mild degenerative changes of both knees, most advanced at the medial compartments and greater on the right. This report was finalized on 06/22/2018 14:15 by Dr. Anshu Luque MD.   -     Ambulatory Referral to Orthopedic Surgery        -     diclofenac (VOLTAREN) 75 MG EC tablet; Take 1 tablet by mouth 2 (Two) Times a Day.    Rest, compress, ice and elevate      Reflux esophagitis  -     Ambulatory Referral to Gastroenterology      Differential: ddx chondromalacis patellae, Osgood-Schlatter disease, patellar tendinitis, stress fractures        Return in about 2  weeks (around 7/6/2018).    Binta Mayo, TISHA, APRN-BC  06/22/2018

## 2022-03-07 NOTE — PROGRESS NOTES
Almonte catheter removed. Bilateral groin sutures removed. Right groin has a small skin tear from the suture and was bleeding. Quick-clot gauze applied. Patient resting in bed and will let RN know when he is ready to ambulate. Will continue to monitor.

## 2022-03-07 NOTE — PROGRESS NOTES
Device RN saw patient as well as Dr. Morales. Patient is ready for discharge. PIV removed x 2. Patient and spouse verbalized understanding of all discharge instructions, all questions answered. Patient getting dressed and will be go to main entrance via wheelchair with all belongings, including antibiotic. Patient discharged to home with spouse.

## 2022-03-07 NOTE — ANESTHESIA CARE TRANSFER NOTE
Patient: Richard Ball    Procedure: Procedure(s):  EP Pacemaker  EP  Laser Lead Extractions- Level 1       Diagnosis: Marfan's syndrome [Q87.40]  Pacemaker [Z95.0]  S/P aortic valve replacement [Z95.2]  Diagnosis Additional Information: No value filed.    Anesthesia Type:   General     Note:    Oropharynx: oropharynx clear of all foreign objects and spontaneously breathing  Level of Consciousness: awake  Oxygen Supplementation: face mask  Level of Supplemental Oxygen (L/min / FiO2): 10  Independent Airway: airway patency satisfactory and stable  Dentition: dentition unchanged  Vital Signs Stable: post-procedure vital signs reviewed and stable  Report to RN Given: handoff report given  Patient transferred to: PACU    Handoff Report: Identifed the Patient, Identified the Reponsible Provider, Reviewed the pertinent medical history, Discussed the surgical course, Reviewed Intra-OP anesthesia mangement and issues during anesthesia, Set expectations for post-procedure period and Allowed opportunity for questions and acknowledgement of understanding      Vitals:  Vitals Value Taken Time   /84 03/07/22 1215   Temp     Pulse 61 03/07/22 1215   Resp     SpO2 99 % 03/07/22 1216   Vitals shown include unvalidated device data.    Electronically Signed By: Luz Marina Galindo CRNA, APRN CRNA  March 7, 2022  12:18 PM

## 2022-03-07 NOTE — DISCHARGE INSTRUCTIONS
Groin Site Care  For 24 hours:         Have an adult stay with you for 24 hours.         Relax and take it easy.         Drink plenty of fluids.         You may eat your normal diet, unless your doctor tells you otherwise.         Do NOT make any important or legal decisions.         Do NOT drive or operate machines at home or at work.         Do NOT drink alcohol.      Do NOT smoke.    Care of groin site:         Remove the Band-Aid after 24 hours. If there is minor oozing, apply another Band-aid and remove it after 12 hours.          Do NOT take a bath, or use a hot tub or pool for at least 3 days. You may shower.          It is normal to have a small bruise or lump at the site.         Do not scrub the site.         Do not use lotion or powder near the puncture site for 3 days.         For the first 2 days: Do not stoop or squat. When you cough, sneeze or move your bowels, hold your hand over the puncture site and press gently.         Do not lift more than 10 pounds for at least 3 to 5 days.         For 2 days, do NOT have sex or do any heavy exercise.     If you start bleeding from the site in your groin:  Lie down flat and press firmly on the site.  Call your physician immediately, or, come to the emergency room.    Call 911 right away if you have bleeding that is heavy or does not stop.     Call your doctor if:         You have a large or growing hard lump around the site.         The site is red, swollen, hot or tender.         Blood or fluid is draining from the site.         You have chills or a fever greater than 101 F (38 C).         Your leg or arm turns bluish, feels numb or cool.         You have hives, a rash or unusual itching.     [facility] at [place]:   [phone] (7 days a week)      Cardiology Fellow on call (24 hours per day) at [facility], [place]:   [phone] (ask for Cardiology Fellow on call)         Home Care after an ICD implant    Wound care:  Keep your  incision (surgery wound) dry for 3 days.  After 3 days, you may remove the outer bandage.  Keep the strips of tape on.  They will be removed at your clinic visit.  Check for signs of infection each day.  These include increased redness, swelling, drainage or a fever over 101 F (38.3 C).  Call us immediately if you see any of these signs.  If there are no signs of infection, you may shower in 3 days.  Do not submerge the incision (in a bath tub, hot tub, or swimming pool) until fully healed.  Pain:   You may have mild to moderate pain for 3 to 5 days.  Take acetaminophen (Tylenol) or ibuprofen (Advil) for the pain.  Call us if the pain is severe or lasts more than 5 days.    Activity:  You should slowly go back to your normal activities after 24 hours.  Healing will take 4 to 6 weeks.  No driving for 3 days  Avoid climbing a ladder alone.  It is best to stay within 4 feet of the ground.  Avoid anything that may cause rough contact or a hard hit to your chest.  This includes football, hockey, and other contact sports.  Do not go swimming or boating alone.      For at least 4 weeks:  Do not raise your affected arm above your shoulder.  Do not use your affected arm to push, pull, or lift anything over 10 pounds.  Avoid repetitive upper body activities for 6 weeks (ie: golf, swimming, and weight lifting)    Follow Up Visits:  Return to the clinic in 7 to 10 days to have your device and wound checked.    Telling others about your device:  Before you have any medical tests or treatments, tell the doctors, dentists, and other care providers about your device.  There are a few tests and treatments that may interfere with your device.  (These include MRI, radiation therapy, electrocautery, and others.)  Your care team may need to take special steps to keep you safe.  Before you leave the hospital, you will receive a temporary ID card.  A permanent card will be mailed to you about 6 to 8 weeks later.  Always carry the ID card  with you.  It has important details about your device.  You should also get a MedicAlert ID.  Please ask us for a MedicAlert brochure, or go to [email or web address].    Safety near electrical equipment:  All of these are safe to use when in good repair:    Microwaves    Radios    Cordless phone    Remote controls    Small electrical tools  Cell phones: Keep cell phones at least 6 inches from your device.  Do not carry the phone in a pocket near your device.  Security mazariegos: It is okay to walk through security mazariegos at the airports and department stores.  Tell airport security that you have a defibrillator.  They should keep the screening wand at least 6 inches from your device.  Full-body scanners are safe.    Avoid the following:    MRI tests in the hospital unless you have a MRI safe defibrillator.    Arc welding, chain saws and high-powered industrial or commercial tools.    Power lines, power plants and large power generators.    Electric body fat scales.    Magnetic mattress pads or pillow.    What to do after a shock from your ICD:  1. Stop what you re doing and rest.  2. If you feel fine before and after the shock, call the device clinic.  3. If you feel unwell or receive more than one shock, call 911 or go to the emergency room.  A shock could mean that your condition has changed and you may need to see a doctor.    Questions?  Please call [facility]    Device Nurse:          Business Hours:  [phone]    After Hours:  [phone]   Choose option 4, then ask for the on-call device nurse at job code [phone].  [facility]  [facility] - Clinic [address]  [address] [place] [address]  [phone]

## 2022-03-07 NOTE — PROGRESS NOTES
Patient ambulated in the hallway and back. He successfully voided without issue, tolerated crackers and lemon/lime soda, does not want anything more than crackers to eat. Bilateral groin sites remain soft and flat to palpation with no bleeding or hematoma. Left upper chest has no change. Patient resting. Will continue to monitor.

## 2022-03-08 ENCOUNTER — DOCUMENTATION ONLY (OUTPATIENT)
Dept: ANTICOAGULATION | Facility: CLINIC | Age: 62
End: 2022-03-08
Payer: COMMERCIAL

## 2022-03-08 LAB
ATRIAL RATE - MUSE: 55 BPM
ATRIAL RATE - MUSE: 60 BPM
DIASTOLIC BLOOD PRESSURE - MUSE: NORMAL MMHG
DIASTOLIC BLOOD PRESSURE - MUSE: NORMAL MMHG
INTERPRETATION ECG - MUSE: NORMAL
INTERPRETATION ECG - MUSE: NORMAL
P AXIS - MUSE: -63 DEGREES
P AXIS - MUSE: 67 DEGREES
PR INTERVAL - MUSE: 184 MS
PR INTERVAL - MUSE: NORMAL MS
QRS DURATION - MUSE: 166 MS
QRS DURATION - MUSE: 168 MS
QT - MUSE: 492 MS
QT - MUSE: 550 MS
QTC - MUSE: 511 MS
QTC - MUSE: 550 MS
R AXIS - MUSE: -76 DEGREES
R AXIS - MUSE: -76 DEGREES
SYSTOLIC BLOOD PRESSURE - MUSE: NORMAL MMHG
SYSTOLIC BLOOD PRESSURE - MUSE: NORMAL MMHG
T AXIS - MUSE: 85 DEGREES
T AXIS - MUSE: 87 DEGREES
VENTRICULAR RATE- MUSE: 60 BPM
VENTRICULAR RATE- MUSE: 65 BPM

## 2022-03-08 NOTE — PROGRESS NOTES
ANTICOAGULATION  MANAGEMENT: Discharge Review    Richard Ball chart reviewed for anticoagulation continuity of care    Outpatient surgery/procedure on 3/7 for pacemaker placement.    Discharge disposition: Home    Results:  Recent labs: (last 7 days)     03/04/22  1309 03/07/22  0706   INR 2.2* 1.14     Anticoagulation inpatient management:     not applicable     Anticoagulation discharge instructions:     Warfarin dosing: home regimen continued   Bridging: No   INR goal change: No      Medication changes affecting anticoagulation: No    Additional factors affecting anticoagulation: No    Plan     No adjustment to anticoagulation plan needed    Recommended follow up is scheduled  Patient not contacted    No adjustment to Anticoagulation Calendar was required       Elda Wong RN

## 2022-03-08 NOTE — ANESTHESIA POSTPROCEDURE EVALUATION
Patient: Richard Ball    Procedure: Procedure(s):  EP Pacemaker  EP  Laser Lead Extractions- Level 1       Anesthesia Type:  General    Note:  Disposition: Outpatient   Postop Pain Control: Uneventful            Sign Out: Well controlled pain   PONV: No   Neuro/Psych: Uneventful            Sign Out: Acceptable/Baseline neuro status   Airway/Respiratory: Uneventful            Sign Out: Acceptable/Baseline resp. status   CV/Hemodynamics: Uneventful            Sign Out: Acceptable CV status; No obvious hypovolemia; No obvious fluid overload   Other NRE: NONE   DID A NON-ROUTINE EVENT OCCUR? No           Last vitals:  Vitals Value Taken Time   /69 03/07/22 1430   Temp 35.7  C (96.3  F) 03/07/22 1230   Pulse 60 03/07/22 1430   Resp 12 03/07/22 1345   SpO2 97 % 03/07/22 1431   Vitals shown include unvalidated device data.    Electronically Signed By: Kali Barba MD  March 8, 2022  4:24 PM

## 2022-03-09 ENCOUNTER — MYC MEDICAL ADVICE (OUTPATIENT)
Dept: CARDIOLOGY | Facility: CLINIC | Age: 62
End: 2022-03-09
Payer: COMMERCIAL

## 2022-03-09 ENCOUNTER — CARE COORDINATION (OUTPATIENT)
Dept: CARDIOLOGY | Facility: CLINIC | Age: 62
End: 2022-03-09
Payer: COMMERCIAL

## 2022-03-09 DIAGNOSIS — Z95.2 S/P AORTIC VALVE REPLACEMENT: ICD-10-CM

## 2022-03-09 DIAGNOSIS — Z95.2 HEART VALVE REPLACED: ICD-10-CM

## 2022-03-09 DIAGNOSIS — Q87.40 MARFAN'S SYNDROME: ICD-10-CM

## 2022-03-09 DIAGNOSIS — Z95.2 S/P AVR (AORTIC VALVE REPLACEMENT): ICD-10-CM

## 2022-03-09 RX ORDER — LOSARTAN POTASSIUM 25 MG/1
25 TABLET ORAL DAILY
Qty: 90 TABLET | Refills: 3 | Status: SHIPPED | OUTPATIENT
Start: 2022-03-09 | End: 2022-12-22

## 2022-03-09 RX ORDER — WARFARIN SODIUM 5 MG/1
TABLET ORAL
Qty: 70 TABLET | Refills: 1 | Status: SHIPPED | OUTPATIENT
Start: 2022-03-09 | End: 2022-12-06

## 2022-03-09 NOTE — PROGRESS NOTES
ANTICOAGULATION MANAGEMENT     Richard Ball 62 year old male is on warfarin with therapeutic INR result. (Goal INR 2.0-3.0)    Recent labs: (last 7 days)     03/04/22  1309   INR 2.2*       ASSESSMENT       Source(s): Chart Review and Patient/Caregiver Call       Warfarin doses taken: Missed dose(s) may be affecting INR, missed Sunday 2.5mg dose    Diet: No new diet changes identified    New illness, injury, or hospitalization: No    Medication/supplement changes: None noted    Signs or symptoms of bleeding or clotting: No    Previous INR: Therapeutic last 2(+) visits    Additional findings: Upcoming surgery/procedure 3/7/22 pacemaker change by Dr. Morales at [facility], hold warfarin 3 days and no bridging per EP provider       PLAN     Recommended plan for temporary change(s) affecting INR     Dosing Instructions: Start 3 day hold today, resume warfarin on 3/7 with booster dose of 7.5mg x1 then normal dosing and recheck INR 7-10 days later.      Summary  As of 3/4/2022    Full warfarin instructions:  3/4: Hold; 3/5: Hold; 3/6: Hold; 3/7: 7.5 mg; Otherwise 5 mg every Mon, Wed, Fri; 2.5 mg all other days   Next INR check:  3/16/2022             Telephone call with Richard who verbalizes understanding and agrees to plan    Lab visit scheduled    Education provided: Goal range and significance of current result and Contact [phone] with any changes, questions or concerns.     Plan made per [facility] anticoagulation protocol    Teresa Espino, RN  Anticoagulation Clinic  3/4/2022    _______________________________________________________________________     Anticoagulation Episode Summary     Current INR goal:  2.0-3.0   TTR:  57.6 % (1 y)   Target end date:  Indefinite   Send INR reminders to:  [facility] INR NURSE    Indications    Heart valve replaced [Z95.2] [Z95.2]  Long term current use of anticoagulants with INR goal of 2.0-3.0 [Z79.01]  S/P aortic valve replacement [Z95.2]           Comments:  AVR in 2001,  Lovenox needed per Dr. Garcia due to possible TIA in 05/18         Anticoagulation Care Providers     Provider Role Specialty Phone number    March, Sundar Cantu MD Referring Cardiovascular Disease [phone]             Paged by nurse that pt had 10 runs of wide complex beats @Hr 120, patient was seen at bedside , asymptomatic ,was asleep, awakened by writer, fully AAO, denied having SOB , chest pain , palpitation, dizziness, had audible wheezing, patient requested for bed adjustment. tele checked, 10 beats of wide complex appreciated.  primary team to monitor and adjust electrolytes as needed, avoid hypoxemia, stric I&os , daily weight.   VS:  will continue to monitor on tele. Paged by nurse that pt had 10 runs of wide complex beats @Hr 120, patient was seen at bedside , asymptomatic ,was asleep, awakened by writer, fully AAO, denied having SOB , chest pain , palpitation, dizziness, had audible wheezing, patient requested for bed adjustment. tele checked, 10 beats of wide complex appreciated.  primary team to monitor and adjust electrolytes as needed, avoid hypoxemia, stric I&os , daily weight.   VS: BP : 99/55 , Hr:82, pulse -ox 99% on 3 Liter NC  will continue to monitor on tele.

## 2022-03-10 LAB
PATH REPORT.COMMENTS IMP SPEC: NORMAL
PATH REPORT.COMMENTS IMP SPEC: NORMAL
PATH REPORT.FINAL DX SPEC: NORMAL
PATH REPORT.GROSS SPEC: NORMAL
PATH REPORT.RELEVANT HX SPEC: NORMAL
PHOTO IMAGE: NORMAL

## 2022-03-11 DIAGNOSIS — Z95.2 S/P AORTIC VALVE REPLACEMENT: Primary | ICD-10-CM

## 2022-03-11 DIAGNOSIS — Q87.40 MARFAN'S SYNDROME: ICD-10-CM

## 2022-03-11 DIAGNOSIS — Z95.0 PACEMAKER: ICD-10-CM

## 2022-03-13 LAB
MDC_IDC_LEAD_IMPLANT_DT: NORMAL
MDC_IDC_LEAD_IMPLANT_DT: NORMAL
MDC_IDC_LEAD_LOCATION: NORMAL
MDC_IDC_LEAD_LOCATION: NORMAL
MDC_IDC_LEAD_LOCATION_DETAIL_1: NORMAL
MDC_IDC_LEAD_LOCATION_DETAIL_1: NORMAL
MDC_IDC_LEAD_MFG: NORMAL
MDC_IDC_LEAD_MFG: NORMAL
MDC_IDC_LEAD_MODEL: NORMAL
MDC_IDC_LEAD_MODEL: NORMAL
MDC_IDC_LEAD_POLARITY_TYPE: NORMAL
MDC_IDC_LEAD_POLARITY_TYPE: NORMAL
MDC_IDC_LEAD_SERIAL: NORMAL
MDC_IDC_LEAD_SERIAL: NORMAL
MDC_IDC_LEAD_SPECIAL_FUNCTION: NORMAL
MDC_IDC_LEAD_SPECIAL_FUNCTION: NORMAL
MDC_IDC_MSMT_BATTERY_DTM: NORMAL
MDC_IDC_MSMT_BATTERY_RRT_TRIGGER: 2.62
MDC_IDC_MSMT_BATTERY_STATUS: NORMAL
MDC_IDC_MSMT_BATTERY_VOLTAGE: 3.21 V
MDC_IDC_MSMT_LEADCHNL_RA_IMPEDANCE_VALUE: 589 OHM
MDC_IDC_MSMT_LEADCHNL_RA_IMPEDANCE_VALUE: 665 OHM
MDC_IDC_MSMT_LEADCHNL_RA_PACING_THRESHOLD_AMPLITUDE: 0.5 V
MDC_IDC_MSMT_LEADCHNL_RA_PACING_THRESHOLD_PULSEWIDTH: 0.4 MS
MDC_IDC_MSMT_LEADCHNL_RA_SENSING_INTR_AMPL: 4.62 MV
MDC_IDC_MSMT_LEADCHNL_RV_IMPEDANCE_VALUE: 342 OHM
MDC_IDC_MSMT_LEADCHNL_RV_IMPEDANCE_VALUE: 437 OHM
MDC_IDC_MSMT_LEADCHNL_RV_PACING_THRESHOLD_AMPLITUDE: 0.5 V
MDC_IDC_MSMT_LEADCHNL_RV_PACING_THRESHOLD_PULSEWIDTH: 0.4 MS
MDC_IDC_PG_IMPLANT_DTM: NORMAL
MDC_IDC_PG_MFG: NORMAL
MDC_IDC_PG_MODEL: NORMAL
MDC_IDC_PG_SERIAL: NORMAL
MDC_IDC_PG_TYPE: NORMAL
MDC_IDC_SESS_CLINIC_NAME: NORMAL
MDC_IDC_SESS_DTM: NORMAL
MDC_IDC_SESS_TYPE: NORMAL
MDC_IDC_SET_BRADY_AT_MODE_SWITCH_RATE: 171 {BEATS}/MIN
MDC_IDC_SET_BRADY_HYSTRATE: NORMAL
MDC_IDC_SET_BRADY_LOWRATE: 60 {BEATS}/MIN
MDC_IDC_SET_BRADY_MAX_SENSOR_RATE: 130 {BEATS}/MIN
MDC_IDC_SET_BRADY_MAX_TRACKING_RATE: 130 {BEATS}/MIN
MDC_IDC_SET_BRADY_MODE: NORMAL
MDC_IDC_SET_BRADY_PAV_DELAY_LOW: 180 MS
MDC_IDC_SET_BRADY_SAV_DELAY_LOW: 150 MS
MDC_IDC_SET_LEADCHNL_RA_PACING_AMPLITUDE: 3.5 V
MDC_IDC_SET_LEADCHNL_RA_PACING_ANODE_ELECTRODE_1: NORMAL
MDC_IDC_SET_LEADCHNL_RA_PACING_ANODE_LOCATION_1: NORMAL
MDC_IDC_SET_LEADCHNL_RA_PACING_CAPTURE_MODE: NORMAL
MDC_IDC_SET_LEADCHNL_RA_PACING_CATHODE_ELECTRODE_1: NORMAL
MDC_IDC_SET_LEADCHNL_RA_PACING_CATHODE_LOCATION_1: NORMAL
MDC_IDC_SET_LEADCHNL_RA_PACING_POLARITY: NORMAL
MDC_IDC_SET_LEADCHNL_RA_PACING_PULSEWIDTH: 0.4 MS
MDC_IDC_SET_LEADCHNL_RA_SENSING_ANODE_ELECTRODE_1: NORMAL
MDC_IDC_SET_LEADCHNL_RA_SENSING_ANODE_LOCATION_1: NORMAL
MDC_IDC_SET_LEADCHNL_RA_SENSING_CATHODE_ELECTRODE_1: NORMAL
MDC_IDC_SET_LEADCHNL_RA_SENSING_CATHODE_LOCATION_1: NORMAL
MDC_IDC_SET_LEADCHNL_RA_SENSING_POLARITY: NORMAL
MDC_IDC_SET_LEADCHNL_RA_SENSING_SENSITIVITY: 0.3 MV
MDC_IDC_SET_LEADCHNL_RV_PACING_AMPLITUDE: 3.5 V
MDC_IDC_SET_LEADCHNL_RV_PACING_ANODE_ELECTRODE_1: NORMAL
MDC_IDC_SET_LEADCHNL_RV_PACING_ANODE_LOCATION_1: NORMAL
MDC_IDC_SET_LEADCHNL_RV_PACING_CAPTURE_MODE: NORMAL
MDC_IDC_SET_LEADCHNL_RV_PACING_CATHODE_ELECTRODE_1: NORMAL
MDC_IDC_SET_LEADCHNL_RV_PACING_CATHODE_LOCATION_1: NORMAL
MDC_IDC_SET_LEADCHNL_RV_PACING_POLARITY: NORMAL
MDC_IDC_SET_LEADCHNL_RV_PACING_PULSEWIDTH: 0.4 MS
MDC_IDC_SET_LEADCHNL_RV_SENSING_ANODE_ELECTRODE_1: NORMAL
MDC_IDC_SET_LEADCHNL_RV_SENSING_ANODE_LOCATION_1: NORMAL
MDC_IDC_SET_LEADCHNL_RV_SENSING_CATHODE_ELECTRODE_1: NORMAL
MDC_IDC_SET_LEADCHNL_RV_SENSING_CATHODE_LOCATION_1: NORMAL
MDC_IDC_SET_LEADCHNL_RV_SENSING_POLARITY: NORMAL
MDC_IDC_SET_LEADCHNL_RV_SENSING_SENSITIVITY: 0.9 MV
MDC_IDC_SET_ZONE_DETECTION_INTERVAL: 350 MS
MDC_IDC_SET_ZONE_DETECTION_INTERVAL: 400 MS
MDC_IDC_SET_ZONE_TYPE: NORMAL
MDC_IDC_STAT_AT_BURDEN_PERCENT: 0 %
MDC_IDC_STAT_AT_DTM_END: NORMAL
MDC_IDC_STAT_AT_DTM_START: NORMAL
MDC_IDC_STAT_BRADY_AP_VP_PERCENT: 96.83 %
MDC_IDC_STAT_BRADY_AP_VS_PERCENT: 0 %
MDC_IDC_STAT_BRADY_AS_VP_PERCENT: 3.14 %
MDC_IDC_STAT_BRADY_AS_VS_PERCENT: 0.03 %
MDC_IDC_STAT_BRADY_DTM_END: NORMAL
MDC_IDC_STAT_BRADY_DTM_START: NORMAL
MDC_IDC_STAT_BRADY_RA_PERCENT_PACED: 96.82 %
MDC_IDC_STAT_BRADY_RV_PERCENT_PACED: 99.97 %
MDC_IDC_STAT_EPISODE_RECENT_COUNT: 0
MDC_IDC_STAT_EPISODE_RECENT_COUNT_DTM_END: NORMAL
MDC_IDC_STAT_EPISODE_RECENT_COUNT_DTM_START: NORMAL
MDC_IDC_STAT_EPISODE_TOTAL_COUNT: 0
MDC_IDC_STAT_EPISODE_TOTAL_COUNT_DTM_END: NORMAL
MDC_IDC_STAT_EPISODE_TOTAL_COUNT_DTM_START: NORMAL
MDC_IDC_STAT_EPISODE_TYPE: NORMAL

## 2022-03-15 ENCOUNTER — ANCILLARY PROCEDURE (OUTPATIENT)
Dept: CARDIOLOGY | Facility: CLINIC | Age: 62
End: 2022-03-15
Payer: COMMERCIAL

## 2022-03-15 DIAGNOSIS — I44.30 AV BLOCK: ICD-10-CM

## 2022-03-15 DIAGNOSIS — Z95.0 PACEMAKER: ICD-10-CM

## 2022-03-15 PROCEDURE — 93280 PM DEVICE PROGR EVAL DUAL: CPT | Performed by: INTERNAL MEDICINE

## 2022-03-15 NOTE — PATIENT INSTRUCTIONS
Home Care after a Pacemaker Implant    Wound care:  Keep your incision (surgery wound) dry for 3 days.  After 3 days, you may remove the outer bandage.  Keep the strips of tape on.  They will be removed at your clinic visit.  Check for signs of infection each day.  These include increased redness, swelling, drainage, bleeding or a fever over 101 F (38.3 C).  Call us immediately if you see any of these signs.  If there are no signs of infection, you may shower in 3 days.  Do not submerge the incision (in a bath tub, hot tub, or swimming pool) until fully healed.    Pain:   You may have mild to moderate pain for 3 to 5 days.  Take acetaminophen (Tylenol) or ibuprofen (Advil) for the pain.  Call us if the pain is severe or lasts more than 5 days.    Activity:  After 24 hours, slowly return to your normal activities.   Healing will take 4 to 6 weeks.  No driving for 3 days  Avoid climbing a ladder alone.  It is best to stay within 4 feet of the ground.  Avoid anything that may cause rough contact or a hard hit to your chest.  This includes football, hockey, and other contact sports.  For at least 4 weeks:  Do not raise your affected arm above your shoulder.  Do not use your affected arm to push, pull, or lift anything over 10 pounds.  Avoid repetitive upper body activities for 6 weeks (ie: golf, swimming, and weight lifting)    Follow Up Visits:  Return to the clinic in 7 to 10 days to have your device and wound checked.      Telling others about your device:  Before you have any medical tests or treatments, tell the doctors, dentists, and other care providers about your device.  There are a few tests and treatments that may interfere with your device.  (These include MRI, radiation therapy, electrocautery, and others.)  Your care team may need to take special steps to keep you safe.  Before you leave the hospital, you will receive a temporary ID card.  A permanent card will be mailed to you about 6 to 8 weeks later.   Always carry the ID card with you.  It has important details about your device.  You should also get a MedicAlert ID.  Please ask us for a MedicAlert brochure, or go to [email or web address].    Safety near electrical equipment:  All of these are safe to use when in good repair:    Microwaves    Radios    Cordless phone    Remote controls    Small electrical tools  Cell phones: Keep cell phones at least 6 inches from your device.  Do not carry the phone in a pocket near your device.  Security mazariegos: It is okay to walk through security mazariegos at the airports and department stores.  Tell airport security that you have a pacemaker.  They should keep the screening wand at least 6 inches from your device.  Full-body scanners are safe.  Avoid the following:    ***MRI tests in the hospital unless you have a MRI safe pacemaker.           Arc welding, chain saws and high-powered industrial or commercial tools.    Power lines, power plants and large power generators.    Electric body fat scales.    Magnetic mattress pads or pillow.    Questions?  Please call [facility].    Device Nurse:          Business Hours:  [phone]                       After Hours:  [phone]   Choose option 4, then ask for the                                                  on-call device nurse at job code [phone].    Your next device clinic appointment is scheduled on:     ___________________________ at _____________.            [facility]  [facility] - Clinic [address]  [address] [place] [address]        It was a pleasure to see you in clinic today.  Please do not hesitate to call with any questions or concerns.  We look forward to seeing you in clinic at your next device check in 3 months.    Betzaida Sommer, RN, MS, CCRN  Electrophysiology Nurse Clinician  [facility]    During Business Hours Please Call:  [phone]  After Hours Please Call:   [phone] - select option #4 and ask for job code [phone]

## 2022-03-16 ENCOUNTER — ANTICOAGULATION THERAPY VISIT (OUTPATIENT)
Dept: ANTICOAGULATION | Facility: CLINIC | Age: 62
End: 2022-03-16

## 2022-03-16 ENCOUNTER — LAB (OUTPATIENT)
Dept: LAB | Facility: CLINIC | Age: 62
End: 2022-03-16
Payer: COMMERCIAL

## 2022-03-16 DIAGNOSIS — Z95.2 S/P AORTIC VALVE REPLACEMENT: ICD-10-CM

## 2022-03-16 DIAGNOSIS — Z79.01 LONG TERM CURRENT USE OF ANTICOAGULANT THERAPY: ICD-10-CM

## 2022-03-16 DIAGNOSIS — Z95.2 HEART VALVE REPLACED: ICD-10-CM

## 2022-03-16 LAB
INR BLD: 2 (ref 0.9–1.1)
MDC_IDC_LEAD_IMPLANT_DT: NORMAL
MDC_IDC_LEAD_IMPLANT_DT: NORMAL
MDC_IDC_LEAD_LOCATION: NORMAL
MDC_IDC_LEAD_LOCATION: NORMAL
MDC_IDC_LEAD_LOCATION_DETAIL_1: NORMAL
MDC_IDC_LEAD_LOCATION_DETAIL_1: NORMAL
MDC_IDC_LEAD_MFG: NORMAL
MDC_IDC_LEAD_MFG: NORMAL
MDC_IDC_LEAD_MODEL: NORMAL
MDC_IDC_LEAD_MODEL: NORMAL
MDC_IDC_LEAD_POLARITY_TYPE: NORMAL
MDC_IDC_LEAD_POLARITY_TYPE: NORMAL
MDC_IDC_LEAD_SERIAL: NORMAL
MDC_IDC_LEAD_SERIAL: NORMAL
MDC_IDC_LEAD_SPECIAL_FUNCTION: NORMAL
MDC_IDC_LEAD_SPECIAL_FUNCTION: NORMAL
MDC_IDC_MSMT_BATTERY_DTM: NORMAL
MDC_IDC_MSMT_BATTERY_REMAINING_LONGEVITY: 123 MO
MDC_IDC_MSMT_BATTERY_RRT_TRIGGER: 2.62
MDC_IDC_MSMT_BATTERY_STATUS: NORMAL
MDC_IDC_MSMT_BATTERY_VOLTAGE: 3.21 V
MDC_IDC_MSMT_LEADCHNL_RA_IMPEDANCE_VALUE: 475 OHM
MDC_IDC_MSMT_LEADCHNL_RA_IMPEDANCE_VALUE: 608 OHM
MDC_IDC_MSMT_LEADCHNL_RA_PACING_THRESHOLD_AMPLITUDE: 0.5 V
MDC_IDC_MSMT_LEADCHNL_RA_PACING_THRESHOLD_PULSEWIDTH: 0.4 MS
MDC_IDC_MSMT_LEADCHNL_RA_SENSING_INTR_AMPL: 5.38 MV
MDC_IDC_MSMT_LEADCHNL_RA_SENSING_INTR_AMPL: 5.38 MV
MDC_IDC_MSMT_LEADCHNL_RV_IMPEDANCE_VALUE: 380 OHM
MDC_IDC_MSMT_LEADCHNL_RV_IMPEDANCE_VALUE: 475 OHM
MDC_IDC_MSMT_LEADCHNL_RV_PACING_THRESHOLD_AMPLITUDE: 0.5 V
MDC_IDC_MSMT_LEADCHNL_RV_PACING_THRESHOLD_PULSEWIDTH: 0.4 MS
MDC_IDC_MSMT_LEADCHNL_RV_SENSING_INTR_AMPL: 8.12 MV
MDC_IDC_PG_IMPLANT_DTM: NORMAL
MDC_IDC_PG_MFG: NORMAL
MDC_IDC_PG_MODEL: NORMAL
MDC_IDC_PG_SERIAL: NORMAL
MDC_IDC_PG_TYPE: NORMAL
MDC_IDC_SESS_CLINIC_NAME: NORMAL
MDC_IDC_SESS_DTM: NORMAL
MDC_IDC_SESS_TYPE: NORMAL
MDC_IDC_SET_BRADY_AT_MODE_SWITCH_RATE: 171 {BEATS}/MIN
MDC_IDC_SET_BRADY_HYSTRATE: NORMAL
MDC_IDC_SET_BRADY_LOWRATE: 60 {BEATS}/MIN
MDC_IDC_SET_BRADY_MAX_SENSOR_RATE: 130 {BEATS}/MIN
MDC_IDC_SET_BRADY_MAX_TRACKING_RATE: 130 {BEATS}/MIN
MDC_IDC_SET_BRADY_MODE: NORMAL
MDC_IDC_SET_BRADY_PAV_DELAY_LOW: 180 MS
MDC_IDC_SET_BRADY_SAV_DELAY_LOW: 150 MS
MDC_IDC_SET_LEADCHNL_RA_PACING_AMPLITUDE: 3.5 V
MDC_IDC_SET_LEADCHNL_RA_PACING_ANODE_ELECTRODE_1: NORMAL
MDC_IDC_SET_LEADCHNL_RA_PACING_ANODE_LOCATION_1: NORMAL
MDC_IDC_SET_LEADCHNL_RA_PACING_CAPTURE_MODE: NORMAL
MDC_IDC_SET_LEADCHNL_RA_PACING_CATHODE_ELECTRODE_1: NORMAL
MDC_IDC_SET_LEADCHNL_RA_PACING_CATHODE_LOCATION_1: NORMAL
MDC_IDC_SET_LEADCHNL_RA_PACING_POLARITY: NORMAL
MDC_IDC_SET_LEADCHNL_RA_PACING_PULSEWIDTH: 0.4 MS
MDC_IDC_SET_LEADCHNL_RA_SENSING_ANODE_ELECTRODE_1: NORMAL
MDC_IDC_SET_LEADCHNL_RA_SENSING_ANODE_LOCATION_1: NORMAL
MDC_IDC_SET_LEADCHNL_RA_SENSING_CATHODE_ELECTRODE_1: NORMAL
MDC_IDC_SET_LEADCHNL_RA_SENSING_CATHODE_LOCATION_1: NORMAL
MDC_IDC_SET_LEADCHNL_RA_SENSING_POLARITY: NORMAL
MDC_IDC_SET_LEADCHNL_RA_SENSING_SENSITIVITY: 0.3 MV
MDC_IDC_SET_LEADCHNL_RV_PACING_AMPLITUDE: 3.5 V
MDC_IDC_SET_LEADCHNL_RV_PACING_ANODE_ELECTRODE_1: NORMAL
MDC_IDC_SET_LEADCHNL_RV_PACING_ANODE_LOCATION_1: NORMAL
MDC_IDC_SET_LEADCHNL_RV_PACING_CAPTURE_MODE: NORMAL
MDC_IDC_SET_LEADCHNL_RV_PACING_CATHODE_ELECTRODE_1: NORMAL
MDC_IDC_SET_LEADCHNL_RV_PACING_CATHODE_LOCATION_1: NORMAL
MDC_IDC_SET_LEADCHNL_RV_PACING_POLARITY: NORMAL
MDC_IDC_SET_LEADCHNL_RV_PACING_PULSEWIDTH: 0.4 MS
MDC_IDC_SET_LEADCHNL_RV_SENSING_ANODE_ELECTRODE_1: NORMAL
MDC_IDC_SET_LEADCHNL_RV_SENSING_ANODE_LOCATION_1: NORMAL
MDC_IDC_SET_LEADCHNL_RV_SENSING_CATHODE_ELECTRODE_1: NORMAL
MDC_IDC_SET_LEADCHNL_RV_SENSING_CATHODE_LOCATION_1: NORMAL
MDC_IDC_SET_LEADCHNL_RV_SENSING_POLARITY: NORMAL
MDC_IDC_SET_LEADCHNL_RV_SENSING_SENSITIVITY: 0.9 MV
MDC_IDC_SET_ZONE_DETECTION_INTERVAL: 350 MS
MDC_IDC_SET_ZONE_DETECTION_INTERVAL: 400 MS
MDC_IDC_SET_ZONE_TYPE: NORMAL
MDC_IDC_STAT_AT_BURDEN_PERCENT: 0 %
MDC_IDC_STAT_AT_DTM_END: NORMAL
MDC_IDC_STAT_AT_DTM_START: NORMAL
MDC_IDC_STAT_BRADY_AP_VP_PERCENT: 93.35 %
MDC_IDC_STAT_BRADY_AP_VS_PERCENT: 0 %
MDC_IDC_STAT_BRADY_AS_VP_PERCENT: 6.64 %
MDC_IDC_STAT_BRADY_AS_VS_PERCENT: 0.01 %
MDC_IDC_STAT_BRADY_DTM_END: NORMAL
MDC_IDC_STAT_BRADY_DTM_START: NORMAL
MDC_IDC_STAT_BRADY_RA_PERCENT_PACED: 93.34 %
MDC_IDC_STAT_BRADY_RV_PERCENT_PACED: 99.99 %
MDC_IDC_STAT_EPISODE_RECENT_COUNT: 0
MDC_IDC_STAT_EPISODE_RECENT_COUNT_DTM_END: NORMAL
MDC_IDC_STAT_EPISODE_RECENT_COUNT_DTM_START: NORMAL
MDC_IDC_STAT_EPISODE_TOTAL_COUNT: 0
MDC_IDC_STAT_EPISODE_TOTAL_COUNT_DTM_END: NORMAL
MDC_IDC_STAT_EPISODE_TOTAL_COUNT_DTM_START: NORMAL
MDC_IDC_STAT_EPISODE_TYPE: NORMAL

## 2022-03-16 PROCEDURE — 85610 PROTHROMBIN TIME: CPT

## 2022-03-16 PROCEDURE — 36415 COLL VENOUS BLD VENIPUNCTURE: CPT

## 2022-03-16 NOTE — PROGRESS NOTES
ANTICOAGULATION MANAGEMENT     Richard Ball 62 year old male is on warfarin with therapeutic INR result. (Goal INR 2.0-3.0)    Recent labs: (last 7 days)     03/16/22  0735   INR 2.0*       ASSESSMENT       Source(s): Chart Review and Patient/Caregiver Call       Warfarin doses taken: Warfarin taken as instructed    Diet: No new diet changes identified    New illness, injury, or hospitalization: Yes: Pacemaker placement on 3/7/22-pt states that he is recovering well    Medication/supplement changes: None noted    Signs or symptoms of bleeding or clotting: No    Previous INR: Subtherapeutic    Additional findings: Pt recently held warfarin for pacemaker placement       PLAN     Recommended plan for temporary change(s) affecting INR     Dosing Instructions: Continue your current warfarin dose with next INR in 2 weeks       Summary  As of 3/16/2022    Full warfarin instructions:  5 mg every Mon, Wed, Fri; 2.5 mg all other days   Next INR check:  3/30/2022             Telephone call with Richard who agrees to plan and repeated back plan correctly    Lab visit scheduled    Education provided: Importance of notifying clinic for changes in medications; a sooner lab recheck maybe needed. and Contact [phone] with any changes, questions or concerns.     Plan made per [facility] anticoagulation protocol    Xin Barcenas RN  Anticoagulation Clinic  3/16/2022    _______________________________________________________________________     Anticoagulation Episode Summary     Current INR goal:  2.0-3.0   TTR:  57.3 % (1 y)   Target end date:  Indefinite   Send INR reminders to:  [facility] INR NURSE    Indications    Heart valve replaced [Z95.2] [Z95.2]  Long term current use of anticoagulants with INR goal of 2.0-3.0 [Z79.01]  S/P aortic valve replacement [Z95.2]           Comments:  AVR in 2001, Lovenox needed per  March due to possible TIA in 05/18         Anticoagulation Care Providers     Provider Role Specialty Phone  number    March, Sundar Cantu MD Referring Cardiovascular Disease [phone]

## 2022-03-31 ENCOUNTER — ANTICOAGULATION THERAPY VISIT (OUTPATIENT)
Dept: ANTICOAGULATION | Facility: CLINIC | Age: 62
End: 2022-03-31

## 2022-03-31 ENCOUNTER — LAB (OUTPATIENT)
Dept: LAB | Facility: CLINIC | Age: 62
End: 2022-03-31
Payer: COMMERCIAL

## 2022-03-31 DIAGNOSIS — Z95.2 S/P AORTIC VALVE REPLACEMENT: ICD-10-CM

## 2022-03-31 DIAGNOSIS — Z95.2 HEART VALVE REPLACED: Primary | ICD-10-CM

## 2022-03-31 DIAGNOSIS — Z79.01 LONG TERM CURRENT USE OF ANTICOAGULANTS WITH INR GOAL OF 2.0-3.0: ICD-10-CM

## 2022-03-31 DIAGNOSIS — Z95.2 HEART VALVE REPLACED: ICD-10-CM

## 2022-03-31 DIAGNOSIS — Z79.01 LONG TERM CURRENT USE OF ANTICOAGULANT THERAPY: ICD-10-CM

## 2022-03-31 LAB — INR BLD: 2.8 (ref 0.9–1.1)

## 2022-03-31 PROCEDURE — 85610 PROTHROMBIN TIME: CPT

## 2022-03-31 PROCEDURE — 36416 COLLJ CAPILLARY BLOOD SPEC: CPT

## 2022-03-31 NOTE — PROGRESS NOTES
ANTICOAGULATION MANAGEMENT     Richard Ball 62 year old male is on warfarin with therapeutic INR result. (Goal INR 2.0-3.0)    Recent labs: (last 7 days)     03/31/22  0921   INR 2.8*       ASSESSMENT       Source(s): Chart Review and Patient/Caregiver Call       Warfarin doses taken: Warfarin taken as instructed    Diet: No new diet changes identified    New illness, injury, or hospitalization: No    Medication/supplement changes: said he took some ibuprofen PRN, recommended tylenol instead    Signs or symptoms of bleeding or clotting: No    Previous INR: Therapeutic last visit; previously outside of goal range    Additional findings: 3/7/22 pacemaker was changed        PLAN     Recommended plan for no diet, medication or health factor changes affecting INR     Dosing Instructions: continue your current warfarin dose with next INR in 4 weeks       Summary  As of 3/31/2022    Full warfarin instructions:  5 mg every Mon, Wed, Fri; 2.5 mg all other days   Next INR check:  4/28/2022             Telephone call with Henrik who verbalizes understanding and agrees to plan    Lab visit scheduled    Education provided: Goal range and significance of current result and Contact [phone] with any changes, questions or concerns.     Plan made per [facility] anticoagulation protocol    Teresa Espino, RN  Anticoagulation Clinic  3/31/2022    _______________________________________________________________________     Anticoagulation Episode Summary     Current INR goal:  2.0-3.0   TTR:  60.7 % (1 y)   Target end date:  Indefinite   Send INR reminders to:  [facility] INR NURSE    Indications    Heart valve replaced [Z95.2] [Z95.2]  Long term current use of anticoagulants with INR goal of 2.0-3.0 [Z79.01]  S/P aortic valve replacement [Z95.2]           Comments:  AVR in 2001, Lovenox needed per Dr. Garcia due to possible TIA in 05/18         Anticoagulation Care Providers     Provider Role Specialty Phone number    March,  Sundar Cantu MD Referring Cardiovascular Disease [phone]

## 2022-04-04 ENCOUNTER — OFFICE VISIT (OUTPATIENT)
Dept: FAMILY MEDICINE | Facility: CLINIC | Age: 62
End: 2022-04-04
Payer: COMMERCIAL

## 2022-04-04 VITALS
DIASTOLIC BLOOD PRESSURE: 80 MMHG | HEIGHT: 71 IN | SYSTOLIC BLOOD PRESSURE: 126 MMHG | OXYGEN SATURATION: 98 % | WEIGHT: 176 LBS | BODY MASS INDEX: 24.64 KG/M2 | HEART RATE: 64 BPM

## 2022-04-04 DIAGNOSIS — J45.40 MODERATE PERSISTENT ASTHMA, UNSPECIFIED WHETHER COMPLICATED: ICD-10-CM

## 2022-04-04 DIAGNOSIS — Z12.5 SCREENING FOR PROSTATE CANCER: ICD-10-CM

## 2022-04-04 DIAGNOSIS — D69.6 THROMBOCYTOPENIA (H): ICD-10-CM

## 2022-04-04 DIAGNOSIS — E78.5 HYPERLIPIDEMIA LDL GOAL <100: ICD-10-CM

## 2022-04-04 DIAGNOSIS — Z13.29 SCREENING FOR THYROID DISORDER: ICD-10-CM

## 2022-04-04 DIAGNOSIS — I10 ESSENTIAL (PRIMARY) HYPERTENSION: ICD-10-CM

## 2022-04-04 DIAGNOSIS — Z00.00 ROUTINE HISTORY AND PHYSICAL EXAMINATION OF ADULT: Primary | ICD-10-CM

## 2022-04-04 DIAGNOSIS — I44.2 ATRIOVENTRICULAR BLOCK, COMPLETE (H): ICD-10-CM

## 2022-04-04 DIAGNOSIS — Z95.0 PACEMAKER: ICD-10-CM

## 2022-04-04 DIAGNOSIS — Z79.01 LONG TERM CURRENT USE OF ANTICOAGULANT THERAPY: ICD-10-CM

## 2022-04-04 DIAGNOSIS — Z95.2 S/P AORTIC VALVE REPLACEMENT: ICD-10-CM

## 2022-04-04 DIAGNOSIS — R22.0 MOUTH SWELLING: ICD-10-CM

## 2022-04-04 DIAGNOSIS — N52.9 ERECTILE DYSFUNCTION, UNSPECIFIED ERECTILE DYSFUNCTION TYPE: ICD-10-CM

## 2022-04-04 DIAGNOSIS — Z12.11 SCREEN FOR COLON CANCER: ICD-10-CM

## 2022-04-04 DIAGNOSIS — Q87.40 MARFAN'S SYNDROME: ICD-10-CM

## 2022-04-04 LAB
BASOPHILS # BLD AUTO: 0 10E3/UL (ref 0–0.2)
BASOPHILS NFR BLD AUTO: 0 %
EOSINOPHIL # BLD AUTO: 0.1 10E3/UL (ref 0–0.7)
EOSINOPHIL NFR BLD AUTO: 1 %
ERYTHROCYTE [DISTWIDTH] IN BLOOD BY AUTOMATED COUNT: 13.7 % (ref 10–15)
HCT VFR BLD AUTO: 41.4 % (ref 40–53)
HGB BLD-MCNC: 14.3 G/DL (ref 13.3–17.7)
IMM GRANULOCYTES # BLD: 0 10E3/UL
IMM GRANULOCYTES NFR BLD: 0 %
LYMPHOCYTES # BLD AUTO: 1.6 10E3/UL (ref 0.8–5.3)
LYMPHOCYTES NFR BLD AUTO: 37 %
MCH RBC QN AUTO: 30.9 PG (ref 26.5–33)
MCHC RBC AUTO-ENTMCNC: 34.5 G/DL (ref 31.5–36.5)
MCV RBC AUTO: 89 FL (ref 78–100)
MONOCYTES # BLD AUTO: 0.3 10E3/UL (ref 0–1.3)
MONOCYTES NFR BLD AUTO: 6 %
NEUTROPHILS # BLD AUTO: 2.5 10E3/UL (ref 1.6–8.3)
NEUTROPHILS NFR BLD AUTO: 56 %
PLATELET # BLD AUTO: 108 10E3/UL (ref 150–450)
PSA SERPL-MCNC: 1.27 UG/L (ref 0–4.5)
RBC # BLD AUTO: 4.63 10E6/UL (ref 4.4–5.9)
TSH SERPL DL<=0.005 MIU/L-ACNC: 2.42 UIU/ML (ref 0.3–5)
WBC # BLD AUTO: 4.4 10E3/UL (ref 4–11)

## 2022-04-04 PROCEDURE — 90471 IMMUNIZATION ADMIN: CPT | Performed by: INTERNAL MEDICINE

## 2022-04-04 PROCEDURE — 84443 ASSAY THYROID STIM HORMONE: CPT | Performed by: INTERNAL MEDICINE

## 2022-04-04 PROCEDURE — G0103 PSA SCREENING: HCPCS | Performed by: INTERNAL MEDICINE

## 2022-04-04 PROCEDURE — 85025 COMPLETE CBC W/AUTO DIFF WBC: CPT | Performed by: INTERNAL MEDICINE

## 2022-04-04 PROCEDURE — 99214 OFFICE O/P EST MOD 30 MIN: CPT | Mod: 25 | Performed by: INTERNAL MEDICINE

## 2022-04-04 PROCEDURE — 90732 PPSV23 VACC 2 YRS+ SUBQ/IM: CPT | Performed by: INTERNAL MEDICINE

## 2022-04-04 PROCEDURE — 99396 PREV VISIT EST AGE 40-64: CPT | Mod: 25 | Performed by: INTERNAL MEDICINE

## 2022-04-04 PROCEDURE — 36415 COLL VENOUS BLD VENIPUNCTURE: CPT | Performed by: INTERNAL MEDICINE

## 2022-04-04 RX ORDER — ALBUTEROL SULFATE 90 UG/1
2 AEROSOL, METERED RESPIRATORY (INHALATION) EVERY 4 HOURS PRN
Qty: 18 G | Refills: 3 | Status: SHIPPED | OUTPATIENT
Start: 2022-04-04

## 2022-04-04 RX ORDER — BUDESONIDE AND FORMOTEROL FUMARATE DIHYDRATE 80; 4.5 UG/1; UG/1
2 AEROSOL RESPIRATORY (INHALATION) 2 TIMES DAILY
Qty: 10.2 G | Refills: 3 | Status: SHIPPED | OUTPATIENT
Start: 2022-04-04

## 2022-04-04 RX ORDER — SILDENAFIL 50 MG/1
50 TABLET, FILM COATED ORAL DAILY PRN
Qty: 30 TABLET | Refills: 1 | Status: SHIPPED | OUTPATIENT
Start: 2022-04-04 | End: 2024-06-10

## 2022-04-04 ASSESSMENT — ENCOUNTER SYMPTOMS: COUGH: 1

## 2022-04-04 NOTE — PROGRESS NOTES
"  {PROVIDER CHARTING PREFERENCE:359025}    Subjective   Henrik is a 62 year old who presents for the following health issues {ACCOMPANIED BY STATEMENT (Optional):597978}    Cough         Sore throat- headache over the weekend, no tylenol today. Feels like back of the tongue with swelling. Went through whole weekend. No history of allergy type reactions.    Needs refill on albuterol. Having issues in the summer- October to March gets chronic cough. 2 years ago stopped lisinopril and put back on losartan. Using allegra.     Using flonase to help with nasal congestion. Helps with congestion.      Feeling the last year more short of breath=     Did adjustment to pacemaker which helped. Did wear out more. New wire and pacemaker.     Tries to do 5 mile walk.     {SUPERLIST (Optional):382018}  {additonal problems for provider to add (Optional):156409}    Review of Systems   Respiratory: Positive for cough.       {ROS COMP (Optional):186624}      Objective    /80 (BP Location: Right arm, Patient Position: Sitting, Cuff Size: Adult Regular)   Pulse 64   Ht 1.791 m (5' 10.5\")   Wt 79.8 kg (176 lb)   SpO2 98%   BMI 24.90 kg/m    Body mass index is 24.9 kg/m .  Physical Exam   {Exam List (Optional):754752}    {Diagnostic Test Results (Optional):765550}    {AMBULATORY ATTESTATION (Optional):341177}        "

## 2022-04-04 NOTE — PATIENT INSTRUCTIONS
For the breathing- we will start symbicort 2 puffs twice per day to see if this helps with breathing- if not covered, we can consider another agent. Let me know how things are going after 3-4 weeks on this.   - continue to use albuterol as needed  - can consider adding in Singulair to help with allergies as well.     Labs today as we discussed.     Refilled the sildenafil- use [employer] if needed to help with cost    They should call you to set up the colonoscopy for further evaluation as well.     Royer Roblero MD

## 2022-04-05 PROBLEM — I44.2 ATRIOVENTRICULAR BLOCK, COMPLETE (H): Status: ACTIVE | Noted: 2022-04-05

## 2022-04-05 PROBLEM — D69.6 THROMBOCYTOPENIA (H): Status: ACTIVE | Noted: 2022-04-05

## 2022-04-05 PROBLEM — E78.5 HYPERLIPIDEMIA LDL GOAL <100: Status: ACTIVE | Noted: 2022-04-05

## 2022-04-05 PROBLEM — I10 ESSENTIAL (PRIMARY) HYPERTENSION: Status: ACTIVE | Noted: 2020-10-12

## 2022-04-05 PROBLEM — J45.40 MODERATE PERSISTENT ASTHMA, UNSPECIFIED WHETHER COMPLICATED: Status: ACTIVE | Noted: 2022-04-05

## 2022-04-05 PROBLEM — N52.9 ERECTILE DYSFUNCTION, UNSPECIFIED ERECTILE DYSFUNCTION TYPE: Status: ACTIVE | Noted: 2022-04-05

## 2022-04-05 ASSESSMENT — ASTHMA QUESTIONNAIRES
QUESTION_3 LAST FOUR WEEKS HOW OFTEN DID YOUR ASTHMA SYMPTOMS (WHEEZING, COUGHING, SHORTNESS OF BREATH, CHEST TIGHTNESS OR PAIN) WAKE YOU UP AT NIGHT OR EARLIER THAN USUAL IN THE MORNING: ONCE A WEEK
QUESTION_5 LAST FOUR WEEKS HOW WOULD YOU RATE YOUR ASTHMA CONTROL: SOMEWHAT CONTROLLED
QUESTION_1 LAST FOUR WEEKS HOW MUCH OF THE TIME DID YOUR ASTHMA KEEP YOU FROM GETTING AS MUCH DONE AT WORK, SCHOOL OR AT HOME: SOME OF THE TIME
ACT_TOTALSCORE: 15
QUESTION_2 LAST FOUR WEEKS HOW OFTEN HAVE YOU HAD SHORTNESS OF BREATH: THREE TO SIX TIMES A WEEK
QUESTION_4 LAST FOUR WEEKS HOW OFTEN HAVE YOU USED YOUR RESCUE INHALER OR NEBULIZER MEDICATION (SUCH AS ALBUTEROL): TWO OR THREE TIMES PER WEEK
ACT_TOTALSCORE: 15

## 2022-04-05 ASSESSMENT — ENCOUNTER SYMPTOMS: COUGH: 1

## 2022-04-05 NOTE — PROGRESS NOTES
SUBJECTIVE:   CC: Richard Ball is an 62 year old male who presents for preventative health visit.       Cough      Sore throat- headache over the weekend, no tylenol today. Feels like back of the tongue with swelling. Went through whole weekend. No history of allergy type reactions. Did travel to [place] recently.    Needs refill on albuterol. Having issues in the summer- October to March gets chronic cough. 2 years ago stopped lisinopril and put back on losartan. Using allegra. Feels could use help with breathing as is feeling short of breath with exertion - no chest pain, coughing noted.     Using flonase to help with nasal congestion. Helps with congestion.      Feeling the last year more short of breath feeling that asthma has not been doing well- interested in something more to help. Using albuterol occasionally.     Did adjustment to pacemaker which helped. Did wear out more. New wire and pacemaker. Last echo 1/21 with normal EF    Did cologuard last fall. Negative. Getting some blood about once per week in stools and some pruritis following this.     Today's PHQ-2 Score:   PHQ-2 ( 1999 Pf[employer]) 2/23/2022   Q1: Little interest or pleasure in doing things 0   Q2: Feeling down, depressed or hopeless 0   PHQ-2 Score 0   PHQ-2 Total Score (12-17 Years)- Positive if 3 or more points; Administer PHQ-A if positive -   Q1: Little interest or pleasure in doing things -   Q2: Feeling down, depressed or hopeless -   PHQ-2 Score -       Abuse: Current or Past(Physical, Sexual or Emotional)- No  Do you feel safe in your environment? Yes    Have you ever done Advance Care Planning? (For example, a Health Directive, POLST, or a discussion with a medical provider or your loved ones about your wishes):pending    Social History     Tobacco Use     Smoking status: Never Smoker     Smokeless tobacco: Never Used   Substance Use Topics     Alcohol use: Yes     Alcohol/week: 4.0 standard drinks     Types: 4 Standard drinks or  "equivalent per week     Comment: a glass of wine w/ dinner     If you drink alcohol do you typically have >3 drinks per day or >7 drinks per week? Not applicable    Alcohol Use 10/24/2018   Prescreen: >3 drinks/day or >7 drinks/week? No   Prescreen: >3 drinks/day or >7 drinks/week? -       Last PSA:   PSA   Date Value Ref Range Status   10/24/2018 1.53 0 - 4 ug/L Final     Comment:     Assay Method:  Chemiluminescence using Siemens Vista analyzer     Prostate Specific Antigen Screen   Date Value Ref Range Status   04/04/2022 1.27 0.00 - 4.50 ug/L Final       Reviewed orders with patient. Reviewed health maintenance and updated orders accordingly - Yes  Lab work is in process    Reviewed and updated as needed this visit by clinical staff   Tobacco  Allergies  Meds              Reviewed and updated as needed this visit by Provider                     Review of Systems   Respiratory: Positive for cough.      A complete ROS was done and was otherwise negative.    OBJECTIVE:   /80 (BP Location: Right arm, Patient Position: Sitting, Cuff Size: Adult Regular)   Pulse 64   Ht 1.791 m (5' 10.5\")   Wt 79.8 kg (176 lb)   SpO2 98%   BMI 24.90 kg/m      Physical Exam  General: alert, interactive, NAD  HEENT: sclerae clear, MMM with post nasal drip noted, slightly enlarged and tender lymph node along right lower jaw area  Neck: supple, no bruits noted  CV: RRR, noted mechanical click  Resp: clear, no wheezing, easy work of breathing  Abdomen: +bs, non-tender  Ext: warm and well perfused,  no edema  Psych: appropriate affect  Neuro: no focal deficits noted.        ASSESSMENT/PLAN:     Problem List Items Addressed This Visit     Marfan's syndrome     Scoliosis and s/p aortic valve replacement from this.         Pacemaker    S/P aortic valve replacement     On long term anticoagulation, stable         Relevant Orders    CBC with platelets and differential (Completed)    Long-term (current) use of anticoagulants " "[Z79.01]    Thrombocytopenia (H)     Stable in the 100 range for patient         Essential (primary) hypertension     BLOOD PRESSURE stable today, continues to follow with cardiology         Atrioventricular block, complete (H)     S/p pacemaker placement, just had pacemaker lead and batter replaced. Dr. Morales         Moderate persistent asthma, unspecified whether complicated     Seems to have uncontrolled asthma, will start symbicort to see if helps with symptoms. Consider singulair if not having great control. Continue allegra.          Relevant Medications    albuterol (VENTOLIN HFA) 108 (90 Base) MCG/ACT inhaler    budesonide-formoterol (SYMBICORT) 80-4.5 MCG/ACT Inhaler    Erectile dysfunction, unspecified erectile dysfunction type     Tolerates viagra ok works +/-         Relevant Medications    sildenafil (VIAGRA) 50 MG tablet    Hyperlipidemia LDL goal <100      Other Visit Diagnoses     Routine history and physical examination of adult    -  Primary    Screening for prostate cancer        Relevant Orders    PROSTATE SPEC ANTIGEN SCREEN (Completed)    Screen for colon cancer    Will do colonoscopy for screening due to blood in the stools    Relevant Orders    Adult Gastro Ref - Procedure Only    Screening for thyroid disorder        Relevant Orders    TSH with free T4 reflex (Completed)    Mouth swelling    - suspect post nasal drip playing a role, start flonase, if not improving, start augmentin.    Relevant Medications    amoxicillin-clavulanate (AUGMENTIN) 875-125 MG tablet            COUNSELING:   Reviewed preventive health counseling, as reflected in patient instructions    Estimated body mass index is 24.9 kg/m  as calculated from the following:    Height as of this encounter: 1.791 m (5' 10.5\").    Weight as of this encounter: 79.8 kg (176 lb).         He reports that he has never smoked. He has never used smokeless tobacco.      Counseling Resources:  ATP IV Guidelines  Pooled Cohorts Equation " Calculator  FRAX Risk Assessment  ICSI Preventive Guidelines  Dietary Guidelines for Americans, 2010  USDA's MyPlate  ASA Prophylaxis  Lung CA Screening    Royer Roblero MD  [facility]

## 2022-04-06 NOTE — ASSESSMENT & PLAN NOTE
Seems to have uncontrolled asthma, will start symbicort to see if helps with symptoms. Consider singulair if not having great control. Continue allegra.

## 2022-04-28 ENCOUNTER — ANTICOAGULATION THERAPY VISIT (OUTPATIENT)
Dept: ANTICOAGULATION | Facility: CLINIC | Age: 62
End: 2022-04-28

## 2022-04-28 ENCOUNTER — LAB (OUTPATIENT)
Dept: LAB | Facility: CLINIC | Age: 62
End: 2022-04-28
Payer: COMMERCIAL

## 2022-04-28 DIAGNOSIS — Z79.01 LONG TERM CURRENT USE OF ANTICOAGULANTS WITH INR GOAL OF 2.0-3.0: ICD-10-CM

## 2022-04-28 DIAGNOSIS — Z79.01 LONG TERM CURRENT USE OF ANTICOAGULANT THERAPY: ICD-10-CM

## 2022-04-28 DIAGNOSIS — Z95.2 S/P AORTIC VALVE REPLACEMENT: ICD-10-CM

## 2022-04-28 DIAGNOSIS — Z95.2 HEART VALVE REPLACED: Primary | ICD-10-CM

## 2022-04-28 DIAGNOSIS — Z95.2 HEART VALVE REPLACED: ICD-10-CM

## 2022-04-28 LAB — INR BLD: 2.6 (ref 0.9–1.1)

## 2022-04-28 PROCEDURE — 85610 PROTHROMBIN TIME: CPT

## 2022-04-28 PROCEDURE — 36416 COLLJ CAPILLARY BLOOD SPEC: CPT

## 2022-04-28 NOTE — PROGRESS NOTES
ANTICOAGULATION MANAGEMENT     Richard Ball 62 year old male is on warfarin with therapeutic INR result. (Goal INR 2.0-3.0)    Recent labs: (last 7 days)     04/28/22  0740   INR 2.6*       ASSESSMENT       Source(s): Chart Review and Patient/Caregiver Call       Warfarin doses taken: Warfarin taken as instructed    Diet: No new diet changes identified    New illness, injury, or hospitalization: No    Medication/supplement changes: None noted    Signs or symptoms of bleeding or clotting: No    Previous INR: Therapeutic last 2(+) visits    Additional findings: None       PLAN     Recommended plan for no diet, medication or health factor changes affecting INR     Dosing Instructions: continue your current warfarin dose with next INR in 6 weeks       Summary  As of 4/28/2022    Full warfarin instructions:  5 mg every Mon, Wed, Fri; 2.5 mg all other days   Next INR check:  6/10/2022             Telephone call with Henrik who verbalizes understanding and agrees to plan.  He is planning on scheduling a colonoscopy in June after school is out and before July.  He is scheduled to see Dr. Garcia on 6/14.  Told patient to ideally review with Dr. Garcia in person and let us know the date as soon as possible since he will need to hold warfarin and has bridged with lovenox in the past so that would need to be reviewed with Dr. Garcia again.      Lab visit scheduled    Education provided: Goal range and significance of current result and Contact [phone] with any changes, questions or concerns.     Plan made per [facility] anticoagulation protocol    Teresa Espino, RN  Anticoagulation Clinic  4/28/2022    _______________________________________________________________________     Anticoagulation Episode Summary     Current INR goal:  2.0-3.0   TTR:  63.1 % (1 y)   Target end date:  Indefinite   Send INR reminders to:  [facility] HEART INR NURSE    Indications    Heart valve replaced [Z95.2] [Z95.2]  Long term current use of  anticoagulants with INR goal of 2.0-3.0 [Z79.01]  S/P aortic valve replacement [Z95.2]           Comments:  AVR in 2001, Lovenox needed per Dr. Garcia due to possible TIA in 05/18         Anticoagulation Care Providers     Provider Role Specialty Phone number    Sundar Garcia MD Referring Cardiovascular Disease [phone]

## 2022-06-10 ENCOUNTER — LAB (OUTPATIENT)
Dept: LAB | Facility: CLINIC | Age: 62
End: 2022-06-10
Payer: COMMERCIAL

## 2022-06-10 ENCOUNTER — HOSPITAL ENCOUNTER (OUTPATIENT)
Dept: CARDIOLOGY | Facility: CLINIC | Age: 62
Discharge: HOME | End: 2022-06-10
Attending: INTERNAL MEDICINE
Payer: COMMERCIAL

## 2022-06-10 ENCOUNTER — ANTICOAGULATION THERAPY VISIT (OUTPATIENT)
Dept: ANTICOAGULATION | Facility: CLINIC | Age: 62
End: 2022-06-10

## 2022-06-10 DIAGNOSIS — Z95.2 HEART VALVE REPLACED: ICD-10-CM

## 2022-06-10 DIAGNOSIS — Q87.40 MARFAN'S SYNDROME: ICD-10-CM

## 2022-06-10 DIAGNOSIS — E78.5 HYPERLIPIDEMIA LDL GOAL <100: ICD-10-CM

## 2022-06-10 DIAGNOSIS — Z95.2 HEART VALVE REPLACED: Primary | ICD-10-CM

## 2022-06-10 DIAGNOSIS — Z79.01 LONG TERM CURRENT USE OF ANTICOAGULANT THERAPY: ICD-10-CM

## 2022-06-10 DIAGNOSIS — Z95.2 S/P AORTIC VALVE REPLACEMENT: ICD-10-CM

## 2022-06-10 DIAGNOSIS — Z95.0 PACEMAKER: ICD-10-CM

## 2022-06-10 DIAGNOSIS — E78.5 HYPERLIPIDEMIA LDL GOAL <100: Primary | ICD-10-CM

## 2022-06-10 DIAGNOSIS — Z79.01 LONG TERM CURRENT USE OF ANTICOAGULANTS WITH INR GOAL OF 2.0-3.0: ICD-10-CM

## 2022-06-10 LAB
ALBUMIN SERPL-MCNC: 3.5 G/DL (ref 3.4–5)
ALP SERPL-CCNC: 66 U/L (ref 40–150)
ALT SERPL W P-5'-P-CCNC: 37 U/L (ref 0–70)
ANION GAP SERPL CALCULATED.3IONS-SCNC: 5 MMOL/L (ref 3–14)
AST SERPL W P-5'-P-CCNC: 26 U/L (ref 0–45)
BASOPHILS # BLD AUTO: 0 10E3/UL (ref 0–0.2)
BASOPHILS NFR BLD AUTO: 0 %
BILIRUB SERPL-MCNC: 0.5 MG/DL (ref 0.2–1.3)
BUN SERPL-MCNC: 13 MG/DL (ref 7–30)
CALCIUM SERPL-MCNC: 8.4 MG/DL (ref 8.5–10.1)
CHLORIDE BLD-SCNC: 110 MMOL/L (ref 94–109)
CHOLEST SERPL-MCNC: 135 MG/DL
CO2 SERPL-SCNC: 28 MMOL/L (ref 20–32)
CREAT SERPL-MCNC: 0.84 MG/DL (ref 0.66–1.25)
EOSINOPHIL # BLD AUTO: 0 10E3/UL (ref 0–0.7)
EOSINOPHIL NFR BLD AUTO: 1 %
ERYTHROCYTE [DISTWIDTH] IN BLOOD BY AUTOMATED COUNT: 13.8 % (ref 10–15)
FASTING STATUS PATIENT QL REPORTED: YES
GFR SERPL CREATININE-BSD FRML MDRD: >90 ML/MIN/1.73M2
GLUCOSE BLD-MCNC: 92 MG/DL (ref 70–99)
HCT VFR BLD AUTO: 40.2 % (ref 40–53)
HDLC SERPL-MCNC: 42 MG/DL
HGB BLD-MCNC: 14 G/DL (ref 13.3–17.7)
IMM GRANULOCYTES # BLD: 0 10E3/UL
IMM GRANULOCYTES NFR BLD: 1 %
INR BLD: 3.5 (ref 0.9–1.1)
LDLC SERPL CALC-MCNC: 54 MG/DL
LVEF ECHO: NORMAL
LYMPHOCYTES # BLD AUTO: 1.3 10E3/UL (ref 0.8–5.3)
LYMPHOCYTES NFR BLD AUTO: 35 %
MCH RBC QN AUTO: 32 PG (ref 26.5–33)
MCHC RBC AUTO-ENTMCNC: 34.8 G/DL (ref 31.5–36.5)
MCV RBC AUTO: 92 FL (ref 78–100)
MONOCYTES # BLD AUTO: 0.3 10E3/UL (ref 0–1.3)
MONOCYTES NFR BLD AUTO: 7 %
NEUTROPHILS # BLD AUTO: 2.1 10E3/UL (ref 1.6–8.3)
NEUTROPHILS NFR BLD AUTO: 56 %
NONHDLC SERPL-MCNC: 93 MG/DL
NRBC # BLD AUTO: 0 10E3/UL
NRBC BLD AUTO-RTO: 0 /100
PLATELET # BLD AUTO: 108 10E3/UL (ref 150–450)
POTASSIUM BLD-SCNC: 4.2 MMOL/L (ref 3.4–5.3)
PROT SERPL-MCNC: 6.3 G/DL (ref 6.8–8.8)
RBC # BLD AUTO: 4.38 10E6/UL (ref 4.4–5.9)
SODIUM SERPL-SCNC: 143 MMOL/L (ref 133–144)
TRIGL SERPL-MCNC: 193 MG/DL
WBC # BLD AUTO: 3.8 10E3/UL (ref 4–11)

## 2022-06-10 PROCEDURE — 80053 COMPREHEN METABOLIC PANEL: CPT

## 2022-06-10 PROCEDURE — 85004 AUTOMATED DIFF WBC COUNT: CPT

## 2022-06-10 PROCEDURE — 93306 TTE W/DOPPLER COMPLETE: CPT | Mod: 26 | Performed by: INTERNAL MEDICINE

## 2022-06-10 PROCEDURE — 93306 TTE W/DOPPLER COMPLETE: CPT

## 2022-06-10 PROCEDURE — 36415 COLL VENOUS BLD VENIPUNCTURE: CPT

## 2022-06-10 PROCEDURE — 36416 COLLJ CAPILLARY BLOOD SPEC: CPT

## 2022-06-10 PROCEDURE — 85610 PROTHROMBIN TIME: CPT

## 2022-06-10 PROCEDURE — 80061 LIPID PANEL: CPT

## 2022-06-10 NOTE — PROGRESS NOTES
ANTICOAGULATION MANAGEMENT     Richard Ball 62 year old male is on warfarin with supratherapeutic INR result. (Goal INR 2.0-3.0)    Recent labs: (last 7 days)     06/10/22  0730   INR 3.5*       ASSESSMENT       Source(s): Chart Review and Patient/Caregiver Call       Warfarin doses taken: Warfarin taken as instructed    Diet: Decreased greens/vitamin K in diet; plans to resume previous intake    New illness, injury, or hospitalization: No    Medication/supplement changes: None noted    Signs or symptoms of bleeding or clotting: No    Previous INR: Therapeutic last 2(+) visits    Additional findings: no plans for colonoscopy at this time, he said previous symptoms have resolved so he will push out the procedure       PLAN     Recommended plan for temporary change(s) affecting INR     Dosing Instructions: partial hold then continue your current warfarin dose with next INR in 2 weeks       Summary  As of 6/10/2022    Full warfarin instructions:  6/10: 2.5 mg; Otherwise 5 mg every Mon, Wed, Fri; 2.5 mg all other days   Next INR check:  6/24/2022             Telephone call with Henrik who verbalizes understanding and agrees to plan    Lab visit scheduled    Education provided: Goal range and significance of current result and Contact [phone] with any changes, questions or concerns.     Plan made per [facility] anticoagulation protocol    Teresa Espino, RN  Anticoagulation Clinic  6/10/2022    _______________________________________________________________________     Anticoagulation Episode Summary     Current INR goal:  2.0-3.0   TTR:  62.5 % (1 y)   Target end date:  Indefinite   Send INR reminders to:  [facility]    Indications    Heart valve replaced [Z95.2] [Z95.2]  Long term current use of anticoagulants with INR goal of 2.0-3.0 [Z79.01]  S/P aortic valve replacement [Z95.2]           Comments:           Anticoagulation Care Providers     Provider Role Specialty Phone number    Raman Morales MD  Referring Cardiovascular Disease [phone]    March, Sundar Cantu MD Responsible Cardiovascular Disease [phone]

## 2022-06-14 ENCOUNTER — OFFICE VISIT (OUTPATIENT)
Dept: CARDIOLOGY | Facility: CLINIC | Age: 62
End: 2022-06-14
Attending: INTERNAL MEDICINE
Payer: COMMERCIAL

## 2022-06-14 VITALS
DIASTOLIC BLOOD PRESSURE: 85 MMHG | HEART RATE: 68 BPM | OXYGEN SATURATION: 98 % | BODY MASS INDEX: 24.29 KG/M2 | SYSTOLIC BLOOD PRESSURE: 132 MMHG | HEIGHT: 71 IN | WEIGHT: 173.5 LBS

## 2022-06-14 VITALS
SYSTOLIC BLOOD PRESSURE: 132 MMHG | WEIGHT: 173.5 LBS | HEIGHT: 71 IN | OXYGEN SATURATION: 98 % | BODY MASS INDEX: 24.29 KG/M2 | DIASTOLIC BLOOD PRESSURE: 85 MMHG | HEART RATE: 68 BPM

## 2022-06-14 DIAGNOSIS — Z95.2 S/P AORTIC VALVE REPLACEMENT: ICD-10-CM

## 2022-06-14 DIAGNOSIS — I45.9 HEART BLOCK: Primary | ICD-10-CM

## 2022-06-14 DIAGNOSIS — Q87.40 MARFAN'S SYNDROME: ICD-10-CM

## 2022-06-14 DIAGNOSIS — Z95.0 CARDIAC PACEMAKER IN SITU: ICD-10-CM

## 2022-06-14 DIAGNOSIS — Z95.0 PACEMAKER: ICD-10-CM

## 2022-06-14 DIAGNOSIS — Z79.01 LONG TERM CURRENT USE OF ANTICOAGULANT THERAPY: ICD-10-CM

## 2022-06-14 PROCEDURE — G0463 HOSPITAL OUTPT CLINIC VISIT: HCPCS | Mod: 27

## 2022-06-14 PROCEDURE — 99214 OFFICE O/P EST MOD 30 MIN: CPT | Mod: 25 | Performed by: INTERNAL MEDICINE

## 2022-06-14 PROCEDURE — G0463 HOSPITAL OUTPT CLINIC VISIT: HCPCS

## 2022-06-14 PROCEDURE — 93280 PM DEVICE PROGR EVAL DUAL: CPT | Performed by: INTERNAL MEDICINE

## 2022-06-14 PROCEDURE — 99215 OFFICE O/P EST HI 40 MIN: CPT | Mod: 25 | Performed by: INTERNAL MEDICINE

## 2022-06-14 ASSESSMENT — PAIN SCALES - GENERAL
PAINLEVEL: NO PAIN (0)
PAINLEVEL: NO PAIN (0)

## 2022-06-14 NOTE — PROGRESS NOTES
ELECTROPHYSIOLOGY CLINIC VISIT    Assessment/Recommendations   Assessment/Plan:    Mr. Ball is a 62 year old male who has a past medical history significant for Marfan syndrome, s/p mechanical AVR and reimplantation of coronary arteries 2001, TIA, CHB s/p PPM 2001 and gen change in 2011. He is following up today. He reports feeling well. He has some intermittent palpitations. He denies chest discomfort, abdominal fullness/bloating or peripheral edema, shortness of breath, paroxysmal nocturnal dyspnea, orthopnea, lightheadedness, dizziness, pre-syncope, or syncope. His PPM triggered BOB on 11/25/21. Current cardiac medications include: ASA, Atenolol, Lipitor, Lisinopril, Lovastatin, and Warfarin.      Complete Heart Block s/p PPM 2001 with dysfunctional RA lead:   His RA lead has had noise and failed in 2018 and has been programmed VDD since. He has been 100% . He is interested in having RA lead replaced and values maintaining MRI compatibility. His device has now triggered BOB on 11/2021. We discussed lead management in detail including indications, risks, and benefits of each approach.He underwent RA lead extraction and RA lead replacement and generator change on 3/7/2022. He healed well and is doing very well with no complaints. His one episode of sharp chest pain is not recurrent and does not seem cardiac. He will follow-up as needed going forward. He will continue to follow-up with Dr Garcia.         History of Present Illness/Subjective    Mr. Richard Ball is a 62 year old male who comes in today for EP follow-up of PPM cares.    Mr. Ball is a 62 year old male who has a past medical history significant for Marfan syndrome, s/p mechanical AVR and reimplantation of coronary arteries 2001, TIA, CHB s/p PPM 2001 and gen change in 2011.      He was diagnosed with Marfan syndrome at age 24.  He had genetic testing done at the [facility] in [place].  He underwent surgery in 2001 at  "[facility] in [place] by Dr. Arriola.  His ascending aorta was 5.5 cm at the time, but we do not have those operative reports.  His aortic valve was replaced with a mechanical valve, and they reimplanted his coronary arteries. Appears he had post operative CHB and required PPM implant. His RA lead has had noise and failed in 2018 and has been programmed VDD since. He has been 100% . He is interested in having RA lead replaced and values maintaining MRI compatibility. In 2018, he had a CTA and there was concern about significant coronary artery disease. He went on to have a coronary angiogram, and that showed no significant disease with he did have some disease; and his calcium score was high placing him in the 95% so he is having his cholesterol aggressively controlled. He did have a cerebral CTA in 2018 that showed no evidence of cerebral aneurysms.  He did have a history of a TIA in the past but no deficits. His last chest CTA showed his aorta was normal dimensions in 2018. A recent exercise echo showed normal prosthetic AV function, low workload was achieved, at peak exercise reported to have \"dropped\" V at around 130 bpm.   His dad likely also had Marfan syndrome and  of a cerebral aneurysm when he was 2 year old.  His mom  when he was 4 of ovarian cancer, and he was raised by an adoptive family.  He has 2 children, ages 29 and 30.  Neither of them have been screened for Marfan syndrome.      EP Visit 21: He reports feeling well. He endorses having some palpitations up to 1 minutes on a daily basis. He denies any chest pain/pressures, dizziness, lightheadedness, worsening shortness of breath, leg/ankle swelling, PND, orthopnea, or syncopal symptoms. Recent device transmission showed 100% , stable RV parameters, 400 mode switch episodes and 5 AHR, EGMs show noise. Current cardiac medications include: ASA, Atenolol, Lipitor, Lisinopril, Lovastatin, and Warfarin.      EP 2022: He is " "following up today. He reports feeling well. He has some intermittent palpitations. He denies chest discomfort, abdominal fullness/bloating or peripheral edema, shortness of breath, paroxysmal nocturnal dyspnea, orthopnea, lightheadedness, dizziness, pre-syncope, or syncope. His PPM triggered BOB on 11/25/21. Current cardiac medications include: ASA, Atenolol, Lipitor, Lisinopril, Lovastatin, and Warfarin. He underwent RA lead extraction and RA lead replacement and generator change on 3/7/2022.     He presents today for follow up. He had a TTE done this month showed a normal LVEF. He healed well after procedure. No complaints except intermittent skipped beats. Had one episode of sharp pain for seconds April 8th, no recurrences since. Device interrogation showed normal device function and 14 yuears left on battery, lead parameters within normal limits.      I have reviewed and updated the patient's Past Medical History, Social History, Family History and Medication List.     Cardiographics (Personally Reviewed) :   TTE 6/2022:  Interpretation Summary  Global and regional left ventricular function is normal with an EF of 55-60%.  Right ventricular function, chamber size, wall motion, and thickness are normal.  A mechanical aortic valve is present.  The mean gradient across the aortic valve is 5 mmHg.  Trivial valvular and paravalvular AI.  Pulmonary artery systolic pressure is normal.  The inferior vena cava is normal.  No pericardial effusion is present.  No significant changes noted.      1/6/21  EXERCISE ECHOCARDIOGRAM:   Interpretation Summary  The gradient is normal for this prosthetic aortic valve.  Baseline ECG appears A sense, V pace  Abnormal distal septal motion at rest noted-suspect this is due to pacing and  not ischemia  A low workload was achieved.  The patient exhibited no chest pain during exercise.     I suspect his pacer is set to upper rate approximately 130  At peak exercise there are \"dropped\" V " "(paced) at rate around 130  Although this may be due to PAC's /PAT that I'm not seeing I suspect this is  really upper rate pacing and after the atrial rate exceeded 130 he  wenckebached or dropped beats.     The distal septal motion is abnormal rest and exercise but is thickening--  suspect this is due to V pacing/IVCD and not ischemia. (cannot totally exclude  ischemia)     Pt had low work rate, consider changing upper rate pacing to see if exercise  time improves     6/2018 CTA:  IMPRESSION:     1. Total Agatston score 463.56, placing the patient in the 94th  percentile when compared to age and gender matched control group.     2. There are 2 lesions noted on the cardiac CTA angiogram. The ostium  of the right coronary artery has a greater than 70% stenosis without  evidence of calcification. This may represent postsurgical obstruction  of the reimplanted coronary artery versus atherosclerotic coronary  disease. The second lesion is a 50-70% calcified proximal LAD  stenosis. The ostium of the left coronary artery implanting into the  ascending aorta appears normal.     3. Please see the separate report evaluating the patient's thoracic,  abdominal, and pelvic angiogram.     4.   Please review Radiology report for incidental noncardiac findings  that will follow separately.       Physical Examination   There were no vitals taken for this visit.  Wt Readings from Last 3 Encounters:   04/04/22 79.8 kg (176 lb)   03/07/22 77.6 kg (171 lb 1.2 oz)   02/12/21 78.9 kg (174 lb)     /85 (BP Location: Left arm, Patient Position: Chair, Cuff Size: Adult Regular)   Pulse 68   Ht 1.791 m (5' 10.5\")   Wt 78.7 kg (173 lb 8 oz)   SpO2 98%   BMI 24.54 kg/m      CONSITUTIONAL: no acute distress  HEENT: no icterus, no redness or discharge, neck supple  CV: no visible edema of visualized extremities. No JVD.   RESPIRATORY: respirations nonlabored, no cough  NEURO: AA&Ox3, speech fluent/appropriate, motor grossly " nonfocal  PSYCH: cooperative, affect appropriate  DERM: no rashes on visualized face/neck/upper extremities       Medications  Allergies   Current Outpatient Medications   Medication Sig Dispense Refill     acetaminophen (TYLENOL) 500 MG tablet Take 1,000 mg by mouth 2 times daily       albuterol (VENTOLIN HFA) 108 (90 Base) MCG/ACT inhaler Inhale 2 puffs into the lungs every 4 hours as needed for wheezing Reported on 3/24/2017 18 g 3     amoxicillin (AMOXIL) 500 MG capsule TAKE 4 CAPSULES BY MOUTH ONE HOUR PRIOR TO DENTAL APPOINTMENT (Patient not taking: Reported on 4/4/2022)       aspirin 81 MG chewable tablet Take 1 tablet (81 mg) by mouth daily 90 tablet 3     atenolol (TENORMIN) 25 MG tablet Take 1 tablet (25 mg) by mouth daily With one 50 mg tablet by mouth daily for a total of 75 mg daily 90 tablet 3     atenolol (TENORMIN) 50 MG tablet Take 1 tablet (50 mg) by mouth daily And take one 25 mg by mouth daily for a total of 75 mg daily 90 tablet 3     atorvastatin (LIPITOR) 40 MG tablet Take 1 tablet (40 mg) by mouth daily 90 tablet 3     budesonide-formoterol (SYMBICORT) 80-4.5 MCG/ACT Inhaler Inhale 2 puffs into the lungs 2 times daily 10.2 g 3     enoxaparin ANTICOAGULANT (LOVENOX ANTICOAGULANT) 80 MG/0.8ML syringe Inject 80 mg subcutaneous every 12 hours until INR 2.0 or above as directed by INR clinic 8 mL 0     losartan (COZAAR) 25 MG tablet Take 1 tablet (25 mg) by mouth daily 90 tablet 3     sildenafil (VIAGRA) 50 MG tablet Take 1 tablet (50 mg) by mouth daily as needed (erection) 30 tablet 1     warfarin ANTICOAGULANT (COUMADIN ANTICOAGULANT) 5 MG tablet Take 5 mg every Mon, Wed, Fri; 2.5 mg all other days or as directed by INR clinic 70 tablet 1    Allergies   Allergen Reactions     Ambien [Zolpidem] Other (See Comments)     Hallucinations/ thrashing         Lab Results (Personally Reviewed)    Chemistry/lipid CBC Cardiac Enzymes/BNP/TSH/INR   Lab Results   Component Value Date    BUN 13 06/10/2022      06/10/2022    CO2 28 06/10/2022     Creatinine   Date Value Ref Range Status   06/10/2022 0.84 0.66 - 1.25 mg/dL Final   06/25/2021 0.89 0.66 - 1.25 mg/dL Final       Lab Results   Component Value Date    CHOL 135 06/10/2022    HDL 42 06/10/2022    LDL 54 06/10/2022      Lab Results   Component Value Date    WBC 3.8 (L) 06/10/2022    HGB 14.0 06/10/2022    HCT 40.2 06/10/2022    MCV 92 06/10/2022     (L) 06/10/2022    Lab Results   Component Value Date    TSH 2.42 04/04/2022    INR 3.5 (H) 06/10/2022          Raman Morales MD [facility]  Cardiology - Electrophysiology    Total time spent on patient visit, reviewing notes, imaging, labs, orders, and completing necessary documentation: 30 minutes.  >50% of visit spent on counseling patient and/or coordination of care.

## 2022-06-14 NOTE — LETTER
2022      RE: Richard Ball  [address]  [facility] [address]       Dear Colleague,    Thank you for the opportunity to participate in the care of your patient, Richard Ball, at the [facility] [place] at [facility]. Please see a copy of my visit note below.    CARDIOLOGY CONSULTATION:    Mr. Ball is a delightful 62-year-old gentleman with a history of Marfan syndrome.  His dad likely also had Marfan syndrome and  of a cerebral aneurysm when he was 2 year old.  His mom  when he was 4 of ovarian cancer, and he was raised by an adoptive family.  He has 2 children, ages 29 and 30.  Neither of them have been screened for Marfan syndrome.      Mr. Ball was diagnosed at age 24.  He had genetic testing done at the [facility] in [place].  He underwent surgery in  at [facility] in [place] by Dr. Arriola.  His ascending aorta was 5.5 cm at the time, but we do not have those operative reports.  His aortic valve was replaced with a mechanical valve, and they reimplanted his coronary arteries.  In 2018 we did a coronary CTA and there was concern about significant coronary artery disease. He went on to have a coronary angiogram, and that showed no significant disease; although, he did have some disease it was not flow limiting. His calcium score was high placing him in the 95% on CTA. We need to be aggressive with his cholesterol as a result of this and his LDL is in the 50s on Lipitor 40 mg daily.  He had a stress echo in 2021 and that was negative for ischemia.  He is here with his partner and he reports walking daily and no chest pain or discomfort.     His INR has for the most part been stable with his mechanical aortic valve and he takes a baby asprin.  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  He does have a pacemaker that was placed in  due to heart  block, and his upper lead failed and Dr. Morales replaced it this year.      He did have a cerebral CTA in 2018 that showed no evidence of cerebral aneurysms.  He did have a history of a TIA in the past but no deficits.  On his echo his valve is working well.  CTA with no concerning findings.        PAST MEDICAL HISTORY:  Past Medical History:   Diagnosis Date     Heart abnormality     Artifical aortic graft - ascending aorta     Hemothorax      Marfan's syndrome 12/6/2011       CURRENT MEDICATIONS:  Current Outpatient Medications   Medication Sig Dispense Refill     acetaminophen (TYLENOL) 500 MG tablet Take 1,000 mg by mouth 2 times daily       albuterol (VENTOLIN HFA) 108 (90 Base) MCG/ACT inhaler Inhale 2 puffs into the lungs every 4 hours as needed for wheezing Reported on 3/24/2017 18 g 3     amoxicillin (AMOXIL) 500 MG capsule TAKE 4 CAPSULES BY MOUTH ONE HOUR PRIOR TO DENTAL APPOINTMENT       aspirin 81 MG chewable tablet Take 1 tablet (81 mg) by mouth daily 90 tablet 3     atenolol (TENORMIN) 25 MG tablet Take 1 tablet (25 mg) by mouth daily With one 50 mg tablet by mouth daily for a total of 75 mg daily 90 tablet 3     atenolol (TENORMIN) 50 MG tablet Take 1 tablet (50 mg) by mouth daily And take one 25 mg by mouth daily for a total of 75 mg daily 90 tablet 3     atorvastatin (LIPITOR) 40 MG tablet Take 1 tablet (40 mg) by mouth daily 90 tablet 3     budesonide-formoterol (SYMBICORT) 80-4.5 MCG/ACT Inhaler Inhale 2 puffs into the lungs 2 times daily 10.2 g 3     enoxaparin ANTICOAGULANT (LOVENOX ANTICOAGULANT) 80 MG/0.8ML syringe Inject 80 mg subcutaneous every 12 hours until INR 2.0 or above as directed by INR clinic 8 mL 0     losartan (COZAAR) 25 MG tablet Take 1 tablet (25 mg) by mouth daily 90 tablet 3     sildenafil (VIAGRA) 50 MG tablet Take 1 tablet (50 mg) by mouth daily as needed (erection) 30 tablet 1     warfarin ANTICOAGULANT (COUMADIN ANTICOAGULANT) 5 MG tablet Take 5 mg every Mon, Wed, Fri;  2.5 mg all other days or as directed by INR clinic 70 tablet 1       PAST SURGICAL HISTORY:  Past Surgical History:   Procedure Laterality Date     AORTIC VALVE REPLACEMENT  8/2/2001    Ascending aorta graft     EP PACEMAKER N/A 3/7/2022    Procedure: EP Pacemaker;  Surgeon: Raman Morales MD;  Location: [facility] OR     HC REMOVE TONSILS/ADENOIDS,<11 Y/O      T & A <12y.o.     HC VASECTOMY UNILAT/BILAT W POSTOP SEMEN      Vasectomy     IMPLANT PACEMAKER         ALLERGIES  Ambien [zolpidem]    FAMILY HX:  Family History   Problem Relation Age of Onset     Asthma No family hx of      Diabetes No family hx of      Hypertension No family hx of      Breast Cancer No family hx of      Cancer - colorectal No family hx of      Prostate Cancer No family hx of      Lipids No family hx of      Musculoskeletal Disorder No family hx of      Neurologic Disorder No family hx of        SOCIAL HX:  Social History     Socioeconomic History     Marital status:      Spouse name: None     Number of children: 2     Years of education: 16     Highest education level: None   Occupational History     Employer: UNEMPLOYED     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 No     Exercise Yes     Seat Belt Yes     Self-Exams Yes       ROS:  Constitutional: No fever, chills, or sweats. No weight gain/loss.   ENT: No visual disturbance, ear ache, epistaxis, sore throat.   Allergies/Immunologic: Negative.   Respiratory: No cough, hemoptysis.   Cardiovascular: As per HPI.   GI: No nausea, vomiting, hematemesis, melena, or hematochezia.   : No urinary frequency,  "dysuria, or hematuria.   Integument: Negative.   Psychiatric: Negative.   Neuro: Negative.   Endocrinology: Negative.   Musculoskeletal: No myalgia.    VITAL SIGNS:  /85 (BP Location: Left arm, Patient Position: Chair, Cuff Size: Adult Regular)   Pulse 68   Ht 1.791 m (5' 10.5\")   Wt 78.7 kg (173 lb 8 oz)   SpO2 98%   BMI 24.54 kg/m    Body mass index is 24.54 kg/m .  Wt Readings from Last 2 Encounters:   06/14/22 78.7 kg (173 lb 8 oz)   06/14/22 78.7 kg (173 lb 8 oz)       PHYSICAL EXAM  Richard Ball IS A 62 year old male.in no acute distress.    Mechanical S2. No murmurs.   Lungs clear  Abdomen: soft, non-tender  Ext: no clubbing, cyanosis, edema    LABS    Lab Results   Component Value Date    WBC 3.8 06/10/2022    WBC 4.3 06/07/2019     Lab Results   Component Value Date    RBC 4.38 06/10/2022    RBC 4.04 06/07/2019     Lab Results   Component Value Date    HGB 14.0 06/10/2022    HGB 13.1 06/07/2019     Lab Results   Component Value Date    HCT 40.2 06/10/2022    HCT 37.8 06/07/2019     No components found for: MCT  Lab Results   Component Value Date    MCV 92 06/10/2022    MCV 94 06/07/2019     Lab Results   Component Value Date    MCH 32.0 06/10/2022    MCH 32.4 06/07/2019     Lab Results   Component Value Date    MCHC 34.8 06/10/2022    MCHC 34.7 06/07/2019     Lab Results   Component Value Date    RDW 13.8 06/10/2022    RDW 13.5 06/07/2019     Lab Results   Component Value Date     06/10/2022     06/07/2019      Recent Labs   Lab Test 06/10/22  1109 03/07/22  0706    140   POTASSIUM 4.2 4.2   CHLORIDE 110* 112*   CO2 28 24   ANIONGAP 5 4   GLC 92 104*   BUN 13 13   CR 0.84 0.87   CLINT 8.4* 8.8     Recent Labs   Lab Test 06/10/22  1109 06/25/21  0727   CHOL 135 131   HDL 42 42   LDL 54 59   TRIG 193* 149   NHDL 93 89         IMPRESSION, REPORT, PLAN:   1.  Marfan syndrome.   2.  Aortic aneurysm, status post composite graft placement with a mechanical aortic valve at [facility] in [place] in 2001 by Dr. Arriola, functioning well.   3.  TIA 05/01/2018 with resolution of symptoms.  Negative CT of the head with no aneurysms found or bleeding.   4.  Hypertension, well controlled on atenolol and lisinopril.   5.  Hyperlipidemia   6.  Scoliosis.   7.  Need for family members screening for Marfan.   8.  Pacemaker placement in 2001 with failure of the atrial lead with 100% ventricular pacing.   9.  Coronary artery disease, nonobstructive, on cath 06/2018.        DISCUSSION:  It was a pleasure to see Mr. Ball in followup.  Clinically, he is doing well from a cardiovascular standpoint without any concerning cardiac symptoms.  He has not had any chest pain or discomfort.  He is remaining active.    Cholesterol is well controlled. BP controlled. CTA looks good as does echo.      We will plan to see him back in a year with an echo and stress test. He will let us know if there are any issues prior.     It was a pleasure to see him.  Please do not hesitate to contact me with any questions or concerns.       He will continue to see the ophthalmologist     HARRY SAINI MD    40 minutes face-face, documentation and review of records on day of visit

## 2022-06-14 NOTE — LETTER
6/14/2022      RE: Richard Ball  [address]  [facility] [address]       Dear Colleague,    Thank you for the opportunity to participate in the care of your patient, Richard Ball, at the [facility] HEART CLINIC [place] at [facility]. Please see a copy of my visit note below.              ELECTROPHYSIOLOGY CLINIC VISIT    Assessment/Recommendations   Assessment/Plan:    Mr. Ball is a 62 year old male who has a past medical history significant for Marfan syndrome, s/p mechanical AVR and reimplantation of coronary arteries 2001, TIA, CHB s/p PPM 2001 and gen change in 2011. He is following up today. He reports feeling well. He has some intermittent palpitations. He denies chest discomfort, abdominal fullness/bloating or peripheral edema, shortness of breath, paroxysmal nocturnal dyspnea, orthopnea, lightheadedness, dizziness, pre-syncope, or syncope. His PPM triggered BOB on 11/25/21. Current cardiac medications include: ASA, Atenolol, Lipitor, Lisinopril, Lovastatin, and Warfarin.      Complete Heart Block s/p PPM 2001 with dysfunctional RA lead:   His RA lead has had noise and failed in 2018 and has been programmed VDD since. He has been 100% . He is interested in having RA lead replaced and values maintaining MRI compatibility. His device has now triggered BOB on 11/2021. We discussed lead management in detail including indications, risks, and benefits of each approach.He underwent RA lead extraction and RA lead replacement and generator change on 3/7/2022. He healed well and is doing very well with no complaints. His one episode of sharp chest pain is not recurrent and does not seem cardiac. He will follow-up as needed going forward. He will continue to follow-up with Dr Garcia.         History of Present Illness/Subjective    Mr. Richard Ball is a 62 year old male who comes in today for EP follow-up of PPM cares.    Mr. Ball is a 62 year  "old male who has a past medical history significant for Marfan syndrome, s/p mechanical AVR and reimplantation of coronary arteries , TIA, CHB s/p PPM  and gen change in .      He was diagnosed with Marfan syndrome at age 24.  He had genetic testing done at the [facility] in [place].  He underwent surgery in  at [facility] in [place] by Dr. Arriola.  His ascending aorta was 5.5 cm at the time, but we do not have those operative reports.  His aortic valve was replaced with a mechanical valve, and they reimplanted his coronary arteries. Appears he had post operative CHB and required PPM implant. His RA lead has had noise and failed in 2018 and has been programmed VDD since. He has been 100% . He is interested in having RA lead replaced and values maintaining MRI compatibility. In 2018, he had a CTA and there was concern about significant coronary artery disease. He went on to have a coronary angiogram, and that showed no significant disease with he did have some disease; and his calcium score was high placing him in the 95% so he is having his cholesterol aggressively controlled. He did have a cerebral CTA in 2018 that showed no evidence of cerebral aneurysms.  He did have a history of a TIA in the past but no deficits. His last chest CTA showed his aorta was normal dimensions in 2018. A recent exercise echo showed normal prosthetic AV function, low workload was achieved, at peak exercise reported to have \"dropped\" V at around 130 bpm.   His dad likely also had Marfan syndrome and  of a cerebral aneurysm when he was 2 year old.  His mom  when he was 4 of ovarian cancer, and he was raised by an adoptive family.  He has 2 children, ages 29 and 30.  Neither of them have been screened for Marfan syndrome.      EP Visit 21: He reports feeling well. He endorses having some palpitations up to 1 minutes on a daily basis. He denies any chest pain/pressures, dizziness, " lightheadedness, worsening shortness of breath, leg/ankle swelling, PND, orthopnea, or syncopal symptoms. Recent device transmission showed 100% , stable RV parameters, 400 mode switch episodes and 5 AHR, EGMs show noise. Current cardiac medications include: ASA, Atenolol, Lipitor, Lisinopril, Lovastatin, and Warfarin.      EP 2/23/2022: He is following up today. He reports feeling well. He has some intermittent palpitations. He denies chest discomfort, abdominal fullness/bloating or peripheral edema, shortness of breath, paroxysmal nocturnal dyspnea, orthopnea, lightheadedness, dizziness, pre-syncope, or syncope. His PPM triggered BOB on 11/25/21. Current cardiac medications include: ASA, Atenolol, Lipitor, Lisinopril, Lovastatin, and Warfarin. He underwent RA lead extraction and RA lead replacement and generator change on 3/7/2022.     He presents today for follow up. He had a TTE done this month showed a normal LVEF. He healed well after procedure. No complaints except intermittent skipped beats. Had one episode of sharp pain for seconds April 8th, no recurrences since. Device interrogation showed normal device function and 14 yuears left on battery, lead parameters within normal limits.      I have reviewed and updated the patient's Past Medical History, Social History, Family History and Medication List.     Cardiographics (Personally Reviewed) :   TTE 6/2022:  Interpretation Summary  Global and regional left ventricular function is normal with an EF of 55-60%.  Right ventricular function, chamber size, wall motion, and thickness are normal.  A mechanical aortic valve is present.  The mean gradient across the aortic valve is 5 mmHg.  Trivial valvular and paravalvular AI.  Pulmonary artery systolic pressure is normal.  The inferior vena cava is normal.  No pericardial effusion is present.  No significant changes noted.      1/6/21  EXERCISE ECHOCARDIOGRAM:   Interpretation Summary  The gradient is normal for  "this prosthetic aortic valve.  Baseline ECG appears A sense, V pace  Abnormal distal septal motion at rest noted-suspect this is due to pacing and  not ischemia  A low workload was achieved.  The patient exhibited no chest pain during exercise.     I suspect his pacer is set to upper rate approximately 130  At peak exercise there are \"dropped\" V (paced) at rate around 130  Although this may be due to PAC's /PAT that I'm not seeing I suspect this is  really upper rate pacing and after the atrial rate exceeded 130 he  wenckebached or dropped beats.     The distal septal motion is abnormal rest and exercise but is thickening--  suspect this is due to V pacing/IVCD and not ischemia. (cannot totally exclude  ischemia)     Pt had low work rate, consider changing upper rate pacing to see if exercise  time improves     6/2018 CTA:  IMPRESSION:     1. Total Agatston score 463.56, placing the patient in the 94th  percentile when compared to age and gender matched control group.     2. There are 2 lesions noted on the cardiac CTA angiogram. The ostium  of the right coronary artery has a greater than 70% stenosis without  evidence of calcification. This may represent postsurgical obstruction  of the reimplanted coronary artery versus atherosclerotic coronary  disease. The second lesion is a 50-70% calcified proximal LAD  stenosis. The ostium of the left coronary artery implanting into the  ascending aorta appears normal.     3. Please see the separate report evaluating the patient's thoracic,  abdominal, and pelvic angiogram.     4.   Please review Radiology report for incidental noncardiac findings  that will follow separately.       Physical Examination   There were no vitals taken for this visit.  Wt Readings from Last 3 Encounters:   04/04/22 79.8 kg (176 lb)   03/07/22 77.6 kg (171 lb 1.2 oz)   02/12/21 78.9 kg (174 lb)     /85 (BP Location: Left arm, Patient Position: Chair, Cuff Size: Adult Regular)   Pulse 68   " "Ht 1.791 m (5' 10.5\")   Wt 78.7 kg (173 lb 8 oz)   SpO2 98%   BMI 24.54 kg/m      CONSITUTIONAL: no acute distress  HEENT: no icterus, no redness or discharge, neck supple  CV: no visible edema of visualized extremities. No JVD.   RESPIRATORY: respirations nonlabored, no cough  NEURO: AA&Ox3, speech fluent/appropriate, motor grossly nonfocal  PSYCH: cooperative, affect appropriate  DERM: no rashes on visualized face/neck/upper extremities       Medications  Allergies   Current Outpatient Medications   Medication Sig Dispense Refill     acetaminophen (TYLENOL) 500 MG tablet Take 1,000 mg by mouth 2 times daily       albuterol (VENTOLIN HFA) 108 (90 Base) MCG/ACT inhaler Inhale 2 puffs into the lungs every 4 hours as needed for wheezing Reported on 3/24/2017 18 g 3     amoxicillin (AMOXIL) 500 MG capsule TAKE 4 CAPSULES BY MOUTH ONE HOUR PRIOR TO DENTAL APPOINTMENT (Patient not taking: Reported on 4/4/2022)       aspirin 81 MG chewable tablet Take 1 tablet (81 mg) by mouth daily 90 tablet 3     atenolol (TENORMIN) 25 MG tablet Take 1 tablet (25 mg) by mouth daily With one 50 mg tablet by mouth daily for a total of 75 mg daily 90 tablet 3     atenolol (TENORMIN) 50 MG tablet Take 1 tablet (50 mg) by mouth daily And take one 25 mg by mouth daily for a total of 75 mg daily 90 tablet 3     atorvastatin (LIPITOR) 40 MG tablet Take 1 tablet (40 mg) by mouth daily 90 tablet 3     budesonide-formoterol (SYMBICORT) 80-4.5 MCG/ACT Inhaler Inhale 2 puffs into the lungs 2 times daily 10.2 g 3     enoxaparin ANTICOAGULANT (LOVENOX ANTICOAGULANT) 80 MG/0.8ML syringe Inject 80 mg subcutaneous every 12 hours until INR 2.0 or above as directed by INR clinic 8 mL 0     losartan (COZAAR) 25 MG tablet Take 1 tablet (25 mg) by mouth daily 90 tablet 3     sildenafil (VIAGRA) 50 MG tablet Take 1 tablet (50 mg) by mouth daily as needed (erection) 30 tablet 1     warfarin ANTICOAGULANT (COUMADIN ANTICOAGULANT) 5 MG tablet Take 5 mg every " Mon, Wed, Fri; 2.5 mg all other days or as directed by INR clinic 70 tablet 1    Allergies   Allergen Reactions     Ambien [Zolpidem] Other (See Comments)     Hallucinations/ thrashing         Lab Results (Personally Reviewed)    Chemistry/lipid CBC Cardiac Enzymes/BNP/TSH/INR   Lab Results   Component Value Date    BUN 13 06/10/2022     06/10/2022    CO2 28 06/10/2022     Creatinine   Date Value Ref Range Status   06/10/2022 0.84 0.66 - 1.25 mg/dL Final   06/25/2021 0.89 0.66 - 1.25 mg/dL Final       Lab Results   Component Value Date    CHOL 135 06/10/2022    HDL 42 06/10/2022    LDL 54 06/10/2022      Lab Results   Component Value Date    WBC 3.8 (L) 06/10/2022    HGB 14.0 06/10/2022    HCT 40.2 06/10/2022    MCV 92 06/10/2022     (L) 06/10/2022    Lab Results   Component Value Date    TSH 2.42 04/04/2022    INR 3.5 (H) 06/10/2022          Raman Morales MD [facility]  Cardiology - Electrophysiology    Total time spent on patient visit, reviewing notes, imaging, labs, orders, and completing necessary documentation: 30 minutes.  >50% of visit spent on counseling patient and/or coordination of care.          Please do not hesitate to contact me if you have any questions/concerns.     Sincerely,     Raman Morales MD

## 2022-06-14 NOTE — PROGRESS NOTES
CARDIOLOGY CONSULTATION:    Mr. Ball is a delightful 62-year-old gentleman with a history of Marfan syndrome.  His dad likely also had Marfan syndrome and  of a cerebral aneurysm when he was 2 year old.  His mom  when he was 4 of ovarian cancer, and he was raised by an adoptive family.  He has 2 children, ages 29 and 30.  Neither of them have been screened for Marfan syndrome.      Mr. Ball was diagnosed at age 24.  He had genetic testing done at the [facility] in [place].  He underwent surgery in  at [facility] in [place] by Dr. Arriola.  His ascending aorta was 5.5 cm at the time, but we do not have those operative reports.  His aortic valve was replaced with a mechanical valve, and they reimplanted his coronary arteries.  In 2018 we did a coronary CTA and there was concern about significant coronary artery disease. He went on to have a coronary angiogram, and that showed no significant disease; although, he did have some disease it was not flow limiting. His calcium score was high placing him in the 95% on CTA. We need to be aggressive with his cholesterol as a result of this and his LDL is in the 50s on Lipitor 40 mg daily.  He had a stress echo in 2021 and that was negative for ischemia.  He is here with his partner and he reports walking daily and no chest pain or discomfort.     His INR has for the most part been stable with his mechanical aortic valve and he takes a baby asprin.  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  He does have a pacemaker that was placed in  due to heart block, and his upper lead failed and Dr. Morales replaced it this year.      He did have a cerebral CTA in 2018 that showed no evidence of cerebral aneurysms.  He did have a history of a TIA in the past but no deficits.  On his echo his valve is working well.  CTA with no concerning findings.        PAST MEDICAL HISTORY:  Past Medical History:    Diagnosis Date     Heart abnormality     Artifical aortic graft - ascending aorta     Hemothorax      Marfan's syndrome 12/6/2011       CURRENT MEDICATIONS:  Current Outpatient Medications   Medication Sig Dispense Refill     acetaminophen (TYLENOL) 500 MG tablet Take 1,000 mg by mouth 2 times daily       albuterol (VENTOLIN HFA) 108 (90 Base) MCG/ACT inhaler Inhale 2 puffs into the lungs every 4 hours as needed for wheezing Reported on 3/24/2017 18 g 3     amoxicillin (AMOXIL) 500 MG capsule TAKE 4 CAPSULES BY MOUTH ONE HOUR PRIOR TO DENTAL APPOINTMENT       aspirin 81 MG chewable tablet Take 1 tablet (81 mg) by mouth daily 90 tablet 3     atenolol (TENORMIN) 25 MG tablet Take 1 tablet (25 mg) by mouth daily With one 50 mg tablet by mouth daily for a total of 75 mg daily 90 tablet 3     atenolol (TENORMIN) 50 MG tablet Take 1 tablet (50 mg) by mouth daily And take one 25 mg by mouth daily for a total of 75 mg daily 90 tablet 3     atorvastatin (LIPITOR) 40 MG tablet Take 1 tablet (40 mg) by mouth daily 90 tablet 3     budesonide-formoterol (SYMBICORT) 80-4.5 MCG/ACT Inhaler Inhale 2 puffs into the lungs 2 times daily 10.2 g 3     enoxaparin ANTICOAGULANT (LOVENOX ANTICOAGULANT) 80 MG/0.8ML syringe Inject 80 mg subcutaneous every 12 hours until INR 2.0 or above as directed by INR clinic 8 mL 0     losartan (COZAAR) 25 MG tablet Take 1 tablet (25 mg) by mouth daily 90 tablet 3     sildenafil (VIAGRA) 50 MG tablet Take 1 tablet (50 mg) by mouth daily as needed (erection) 30 tablet 1     warfarin ANTICOAGULANT (COUMADIN ANTICOAGULANT) 5 MG tablet Take 5 mg every Mon, Wed, Fri; 2.5 mg all other days or as directed by INR clinic 70 tablet 1       PAST SURGICAL HISTORY:  Past Surgical History:   Procedure Laterality Date     AORTIC VALVE REPLACEMENT  8/2/2001    Ascending aorta graft     EP PACEMAKER N/A 3/7/2022    Procedure: EP Pacemaker;  Surgeon: Raman Morales MD;  Location:  OR      REMOVE TONSILS/ADENOIDS,<12  "Y/O      T & A <13y.o.     HC VASECTOMY UNILAT/BILAT W POSTOP SEMEN      Vasectomy     IMPLANT PACEMAKER         ALLERGIES  Ambien [zolpidem]    FAMILY HX:  Family History   Problem Relation Age of Onset     Asthma No family hx of      Diabetes No family hx of      Hypertension No family hx of      Breast Cancer No family hx of      Cancer - colorectal No family hx of      Prostate Cancer No family hx of      Lipids No family hx of      Musculoskeletal Disorder No family hx of      Neurologic Disorder No family hx of        SOCIAL HX:  Social History     Socioeconomic History     Marital status:      Spouse name: None     Number of children: 2     Years of education: 16     Highest education level: None   Occupational History     Employer: UNEMPLOYED     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 No     Exercise Yes     Seat Belt Yes     Self-Exams Yes       ROS:  Constitutional: No fever, chills, or sweats. No weight gain/loss.   ENT: No visual disturbance, ear ache, epistaxis, sore throat.   Allergies/Immunologic: Negative.   Respiratory: No cough, hemoptysis.   Cardiovascular: As per HPI.   GI: No nausea, vomiting, hematemesis, melena, or hematochezia.   : No urinary frequency, dysuria, or hematuria.   Integument: Negative.   Psychiatric: Negative.   Neuro: Negative.   Endocrinology: Negative.   Musculoskeletal: No myalgia.    VITAL SIGNS:  /85 (BP Location: Left arm, Patient Position: Chair, Cuff Size: Adult Regular)   Pulse 68   Ht 1.791 m (5' 10.5\")   Wt 78.7 kg (173 lb 8 oz)   SpO2 98%   BMI 24.54 kg/m    Body " mass index is 24.54 kg/m .  Wt Readings from Last 2 Encounters:   06/14/22 78.7 kg (173 lb 8 oz)   06/14/22 78.7 kg (173 lb 8 oz)       PHYSICAL EXAM  Richard Ball IS A 62 year old male.in no acute distress.    Mechanical S2. No murmurs.   Lungs clear  Abdomen: soft, non-tender  Ext: no clubbing, cyanosis, edema    LABS    Lab Results   Component Value Date    WBC 3.8 06/10/2022    WBC 4.3 06/07/2019     Lab Results   Component Value Date    RBC 4.38 06/10/2022    RBC 4.04 06/07/2019     Lab Results   Component Value Date    HGB 14.0 06/10/2022    HGB 13.1 06/07/2019     Lab Results   Component Value Date    HCT 40.2 06/10/2022    HCT 37.8 06/07/2019     No components found for: MCT  Lab Results   Component Value Date    MCV 92 06/10/2022    MCV 94 06/07/2019     Lab Results   Component Value Date    MCH 32.0 06/10/2022    MCH 32.4 06/07/2019     Lab Results   Component Value Date    MCHC 34.8 06/10/2022    MCHC 34.7 06/07/2019     Lab Results   Component Value Date    RDW 13.8 06/10/2022    RDW 13.5 06/07/2019     Lab Results   Component Value Date     06/10/2022     06/07/2019      Recent Labs   Lab Test 06/10/22  1109 03/07/22  0706    140   POTASSIUM 4.2 4.2   CHLORIDE 110* 112*   CO2 28 24   ANIONGAP 5 4   GLC 92 104*   BUN 13 13   CR 0.84 0.87   CLINT 8.4* 8.8     Recent Labs   Lab Test 06/10/22  1109 06/25/21  0727   CHOL 135 131   HDL 42 42   LDL 54 59   TRIG 193* 149   NHDL 93 89         IMPRESSION, REPORT, PLAN:   1.  Marfan syndrome.   2.  Aortic aneurysm, status post composite graft placement with a mechanical aortic valve at [facility] in [place] in 2001 by Dr. Arriola, functioning well.   3.  TIA 05/01/2018 with resolution of symptoms.  Negative CT of the head with no aneurysms found or bleeding.   4.  Hypertension, well controlled on atenolol and lisinopril.   5.  Hyperlipidemia   6.  Scoliosis.   7.  Need for family members screening for Marfan.   8.  Pacemaker placement  in 2001 with failure of the atrial lead with 100% ventricular pacing.   9.  Coronary artery disease, nonobstructive, on cath 06/2018.        DISCUSSION:  It was a pleasure to see Mr. Ball in followup.  Clinically, he is doing well from a cardiovascular standpoint without any concerning cardiac symptoms.  He has not had any chest pain or discomfort.  He is remaining active.    Cholesterol is well controlled. BP controlled. CTA looks good as does echo.      We will plan to see him back in a year with an echo and stress test. He will let us know if there are any issues prior.     It was a pleasure to see him.  Please do not hesitate to contact me with any questions or concerns.       He will continue to see the ophthalmologist     HARRY SAINI MD    40 minutes face-face, documentation and review of records on day of visit

## 2022-06-14 NOTE — NURSING NOTE
Chief Complaint   Patient presents with     Follow Up     62 year old male with history of Marfans Syndrome s/p aortic aneurysm repair with graft and mechanical AVR and PPM placement presenting for evaluation. pacemaker/lead extraction 3/7/22       Vitals were taken and medications were reconciled.   Francisco Larson, EMT  8:42 AM

## 2022-06-14 NOTE — PATIENT INSTRUCTIONS
It was a pleasure to see you in clinic today.  Please do not hesitate to call with any questions or concerns.  You are scheduled for a remote transmission on 9/16/22, 12/20/22 and 3/22/23.  We look forward to seeing you in clinic at your next device check in 1 year.    Betzaida Sommer, RN, MS, CCRN  Electrophysiology Nurse Clinician  [facility]    During Business Hours Please Call:  [phone]  After Hours Please Call:  [phone] - select option #4 and ask for job code [phone]

## 2022-06-14 NOTE — PATIENT INSTRUCTIONS
You were seen today in the Adult Congenital and Cardiovascular Genetics Clinic at the [facility].    Cardiology Providers you saw during your visit:  LASHAWN Garcia MD and Raman Morales MD    Diagnosis:  Marfans    Results:  LASHAWN Garcia MD and Raman Morales MD reviewed the results of your echo, lab, and device check testing today in clinic.    Recommendations:    Continue to eat a heart healthy, low salt diet.  Continue to get 20-30 minutes of aerobic activity, 4-5 days per week.  Examples of aerobic activity include walking, running, swimming, cycling, etc.  Continue to observe good oral hygiene, with regular dental visits.  No changes today      SBE prophylaxis:   Yes____  No__x__    Lifelong Bacterial Endocarditis Prophylaxis:  YES____  NO____    If YES is checked, follow the recommendations outlined below:  Take antibiotic(s) prior to recommended dental procedures and procedures on the respiratory tract or with infected skin, muscle or bones. SBE prophylaxis is not needed for routine GI and  procedures (ie. Colonoscopy or vaginal delivery)  Observe good oral hygiene daily, as advised by your dentist. Get regular professional dental care.  Keep cuts clean.  Infections should be treated promptly.  Symptoms of Infective Endocarditis could include: fever lasting more than 4-5 days or a recurrent fever that initially resolves but returns within 1-2 days)      Exercise restrictions:   Yes__X__  No____         If yes, list restrictions:  Must be allowed to rest if fatigued or SOB      Work restrictions:  Yes____  No_X___         If yes, list restrictions:    FASTING CHOLESTEROL was checked in the last 5 years YES_x__  NO___ (2022)  Continue to eat a heart healthy, low salt diet.         ____ Fasting lipid panel order today         ____ No changes in medications          ____ I recommend the following changes in your cholesterol medications.:          ____ Please follow up for cholesterol screening  at your primary care physician      Follow-up:  Follow up with Dr Morales as needed. Follow up with Dr Garcia in 1 year with exercise stress echo    If you have questions or concerns please contact us at:    NAYE DasilvaN, RN    Rachel Martin (Scheduling)  Nurse Care Coordinator     Clinic   Adult Congenital and CV Genetics   Adult Congenital and CV Genetic  [facility]   [facility]  (P) [phone]     (P) [phone]         (F) [phone]        For after hours urgent needs, call [phone] and ask to speak to the Adult Congenital Physician on call.  Mention Job Code 0401.    For emergencies call 911.    [facility]  [facility]   Clinics and Surgery Center  Mail Code [id number]  [address] [place]  [address]

## 2022-06-14 NOTE — NURSING NOTE
Cardiac Testing: Patient given instructions regarding exercise  stress echocardiogram . Discussed purpose, preparation, procedure and when to expect results reported back to the patient. Patient demonstrated understanding of this information and agreed to call with further questions or concerns.    Return Appointment: Patient given instructions regarding scheduling next clinic visit. Patient demonstrated understanding of this information and agreed to call with further questions or concerns.    Patient stated he understood all health information given and agreed to call with further questions or concerns.

## 2022-06-14 NOTE — PATIENT INSTRUCTIONS
You were seen today in the Adult Congenital and Cardiovascular Genetics Clinic at the [facility].    Cardiology Providers you saw during your visit:  LASHAWN Garcia MD and Raman Morales MD    Diagnosis:  Marfans    Results:  LASHAWN Garcia MD and Raman Morales MD reviewed the results of your echo, lab, and device check testing today in clinic.    Recommendations:    Continue to eat a heart healthy, low salt diet.  Continue to get 20-30 minutes of aerobic activity, 4-5 days per week.  Examples of aerobic activity include walking, running, swimming, cycling, etc.  Continue to observe good oral hygiene, with regular dental visits.  No changes today      SBE prophylaxis:   Yes____  No__x__    Lifelong Bacterial Endocarditis Prophylaxis:  YES____  NO____    If YES is checked, follow the recommendations outlined below:  Take antibiotic(s) prior to recommended dental procedures and procedures on the respiratory tract or with infected skin, muscle or bones. SBE prophylaxis is not needed for routine GI and  procedures (ie. Colonoscopy or vaginal delivery)  Observe good oral hygiene daily, as advised by your dentist. Get regular professional dental care.  Keep cuts clean.  Infections should be treated promptly.  Symptoms of Infective Endocarditis could include: fever lasting more than 4-5 days or a recurrent fever that initially resolves but returns within 1-2 days)      Exercise restrictions:   Yes__X__  No____         If yes, list restrictions:  Must be allowed to rest if fatigued or SOB      Work restrictions:  Yes____  No_X___         If yes, list restrictions:    FASTING CHOLESTEROL was checked in the last 5 years YES_x__  NO___ (2022)  Continue to eat a heart healthy, low salt diet.         ____ Fasting lipid panel order today         ____ No changes in medications          ____ I recommend the following changes in your cholesterol medications.:          ____ Please follow up for cholesterol screening  at your primary care physician      Follow-up:  Follow up with Dr Morales as needed. Follow up with Dr Garcia in 1 year with exercise stress echo    If you have questions or concerns please contact us at:    NAYE DasilvaN, RN    Rachel Martin (Scheduling)  Nurse Care Coordinator     Clinic   Adult Congenital and CV Genetics   Adult Congenital and CV Genetic  [facility]   [facility]  (P) [phone]     (P) [phone]         (F) [phone]        For after hours urgent needs, call [phone] and ask to speak to the Adult Congenital Physician on call.  Mention Job Code 0401.    For emergencies call 911.    [facility]  [facility]   Clinics and Surgery Center  Mail Code [id number]  [address] [place]  [address]

## 2022-06-14 NOTE — NURSING NOTE
Chief Complaint   Patient presents with     Follow Up     62 year old male with history of Marfans Syndrome s/p aortic aneurysm repair with graft and mechanical AVR and PPM placement presenting for evaluation. pacemaker/lead extraction 3/7/22     Vitals were taken and medications were reconciled.  Francisco Larson, EMT  8:49 AM

## 2022-06-22 ENCOUNTER — TELEPHONE (OUTPATIENT)
Dept: FAMILY MEDICINE | Facility: CLINIC | Age: 62
End: 2022-06-22

## 2022-06-22 ENCOUNTER — OFFICE VISIT (OUTPATIENT)
Dept: FAMILY MEDICINE | Facility: CLINIC | Age: 62
End: 2022-06-22
Payer: COMMERCIAL

## 2022-06-22 ENCOUNTER — TELEPHONE (OUTPATIENT)
Dept: ANTICOAGULATION | Facility: CLINIC | Age: 62
End: 2022-06-22

## 2022-06-22 VITALS
BODY MASS INDEX: 23.94 KG/M2 | WEIGHT: 171 LBS | SYSTOLIC BLOOD PRESSURE: 126 MMHG | OXYGEN SATURATION: 99 % | HEIGHT: 71 IN | HEART RATE: 84 BPM | DIASTOLIC BLOOD PRESSURE: 62 MMHG

## 2022-06-22 DIAGNOSIS — Z95.2 HEART VALVE REPLACED: ICD-10-CM

## 2022-06-22 DIAGNOSIS — R30.0 DYSURIA: Primary | ICD-10-CM

## 2022-06-22 DIAGNOSIS — Z95.2 S/P AORTIC VALVE REPLACEMENT: ICD-10-CM

## 2022-06-22 DIAGNOSIS — Z95.2 HEART VALVE REPLACED: Primary | ICD-10-CM

## 2022-06-22 DIAGNOSIS — N41.0 ACUTE PROSTATITIS: ICD-10-CM

## 2022-06-22 DIAGNOSIS — Z79.01 LONG TERM CURRENT USE OF ANTICOAGULANT THERAPY: ICD-10-CM

## 2022-06-22 DIAGNOSIS — Z79.01 LONG TERM CURRENT USE OF ANTICOAGULANTS WITH INR GOAL OF 2.0-3.0: ICD-10-CM

## 2022-06-22 LAB
ALBUMIN UR-MCNC: NEGATIVE MG/DL
APPEARANCE UR: CLEAR
BILIRUB UR QL STRIP: NEGATIVE
COLOR UR AUTO: YELLOW
GLUCOSE UR STRIP-MCNC: NEGATIVE MG/DL
HGB UR QL STRIP: NEGATIVE
KETONES UR STRIP-MCNC: NEGATIVE MG/DL
LEUKOCYTE ESTERASE UR QL STRIP: NEGATIVE
NITRATE UR QL: NEGATIVE
PH UR STRIP: 5.5 [PH] (ref 5–8)
SP GR UR STRIP: 1.01 (ref 1–1.03)
UROBILINOGEN UR STRIP-ACNC: 0.2 E.U./DL

## 2022-06-22 PROCEDURE — 81003 URINALYSIS AUTO W/O SCOPE: CPT | Performed by: FAMILY MEDICINE

## 2022-06-22 PROCEDURE — 87086 URINE CULTURE/COLONY COUNT: CPT | Performed by: FAMILY MEDICINE

## 2022-06-22 PROCEDURE — 99214 OFFICE O/P EST MOD 30 MIN: CPT | Performed by: FAMILY MEDICINE

## 2022-06-22 RX ORDER — CIPROFLOXACIN 500 MG/1
500 TABLET, FILM COATED ORAL 2 TIMES DAILY
Qty: 60 TABLET | Refills: 1 | Status: SHIPPED | OUTPATIENT
Start: 2022-06-22 | End: 2022-07-22

## 2022-06-22 ASSESSMENT — ASTHMA QUESTIONNAIRES
QUESTION_4 LAST FOUR WEEKS HOW OFTEN HAVE YOU USED YOUR RESCUE INHALER OR NEBULIZER MEDICATION (SUCH AS ALBUTEROL): NOT AT ALL
QUESTION_3 LAST FOUR WEEKS HOW OFTEN DID YOUR ASTHMA SYMPTOMS (WHEEZING, COUGHING, SHORTNESS OF BREATH, CHEST TIGHTNESS OR PAIN) WAKE YOU UP AT NIGHT OR EARLIER THAN USUAL IN THE MORNING: NOT AT ALL
QUESTION_2 LAST FOUR WEEKS HOW OFTEN HAVE YOU HAD SHORTNESS OF BREATH: NOT AT ALL
ACT_TOTALSCORE: 25
QUESTION_1 LAST FOUR WEEKS HOW MUCH OF THE TIME DID YOUR ASTHMA KEEP YOU FROM GETTING AS MUCH DONE AT WORK, SCHOOL OR AT HOME: NONE OF THE TIME
QUESTION_5 LAST FOUR WEEKS HOW WOULD YOU RATE YOUR ASTHMA CONTROL: COMPLETELY CONTROLLED
ACT_TOTALSCORE: 25

## 2022-06-22 NOTE — TELEPHONE ENCOUNTER
6-22-22  Reason for Call: prescription    Detailed comments: [facility] called re:  ciprofloxacin (CIPRO) 500 MG tablet  Per pharmacist  there may be a interaction with :  warfarin ANTICOAGULANT (COUMADIN ANTICOAGULANT) 5 MG tablet  Pharmacy wants to know if pt is still on Warfarin?  Phone Number Patient can be reached at: [phone]    Best Time: antyhime    Can we leave a detailed message on this number? NO    Call taken on 6/22/2022 at 11:13 AM by Gayla Hernandez

## 2022-06-22 NOTE — TELEPHONE ENCOUNTER
ANTICOAGULATION  MANAGEMENT     Interacting Medication Review    Interacting medication(s): Ciprofloxacin (Cipro) with warfarin.    Duration: 30 days    Indication: Prostatitis    New medication?: Yes, interaction may increase INR and risk of bleeding       PLAN     Continue current warfarin dose. Recommend to check INR on Patient already has a lab scheduled for 6/24/22.     Patient was not contacted    Anticoagulation Calendar updated    Catia Manning RN

## 2022-06-22 NOTE — PROGRESS NOTES
Assessment & Plan     Dysuria    - Urine Culture Aerobic Bacterial  - ciprofloxacin (CIPRO) 500 MG tablet; Take 1 tablet (500 mg) by mouth 2 times daily for 30 days    Acute prostatitis  - ciprofloxacin (CIPRO) 500 MG tablet; Take 1 tablet (500 mg) by mouth 2 times daily for 30 days       Long-term (current) use of anticoagulants [Z79.01]  Discussed the interaction with Cipro and Coumadin and he will notify his anticoagulation nurse and they will watch his INR very carefully.    Heart valve replaced [Z95.2]  See above    S/P aortic valve replacement  The above           No follow-ups on file.     Talked at length about trying to protect his gut.  We talked about standard of care being to treat acute prostatitis for up to 6 weeks.  I will give a 30-day supply with a refill.  If he is feeling completely asymptomatic and starting to have some digestive side effects, then he will stop the antibiotic early.  If the urine culture comes back very surprising we might change the antibiotic but at this point I think that this is the best treatment and he was excited to have this because he has had that in the past and it worked well for him we just have to monitor exceedingly carefully the interaction with his Coumadin.    Lisbet Madrigal MD  [facility]    Marlena Chester is a 62 year old  presenting for the following health issues:  Urinary Problem    He has had some dysuria for several days.  He describes difficulty emptying his bladder and then having some urinary urgency as if he needs to empty his bladder again.  Also has some burning with urination at the tip of his penis.    He is sexually active but monogamous with his  so I do not need to be concerned about chlamydia or gonorrhea at this point.      History of Present Illness       Reason for visit:  Perceived urinary tract infection  Symptom onset:  3-7 days ago  Symptoms include:  Burning flow tingling feeling. Hard to  "pee  Symptom intensity:  Moderate  Symptom progression:  Improving  Had these symptoms before:  Yes  Has tried/received treatment for these symptoms:  Yes  What makes it worse:  No  What makes it better:  Not sure. Drinking cranberry juice    He eats 0-1 servings of fruits and vegetables daily.He exercises with enough effort to increase his heart rate 9 or less minutes per day.  He exercises with enough effort to increase his heart rate 3 or less days per week.   He is taking medications regularly.      Review of Systems   No fevers chills or sweats and he has no blood in his urine      Objective    /62 (BP Location: Right arm, Patient Position: Sitting, Cuff Size: Adult Regular)   Pulse 84   Ht 1.791 m (5' 10.5\")   Wt 77.6 kg (171 lb)   SpO2 99%   BMI 24.19 kg/m    Body mass index is 24.19 kg/m .  Physical Exam     His urinalysis is quite unremarkable so I have asked for a urine culture regardless.    Because of his symptomatology, I also did a prostate exam which showed slightly inflamed and tender prostate               .  ..  "

## 2022-06-22 NOTE — TELEPHONE ENCOUNTER
Please advise concern.  Theres another encounter today from an INR nurse. Patient is still on Warfarin. Ok to continue or would you like to change?

## 2022-06-22 NOTE — PATIENT INSTRUCTIONS
Monitor INR very closely with adding Cipro for the prostate infection. Check Friday and likely next week before you leave as well

## 2022-06-23 LAB — BACTERIA UR CULT: NO GROWTH

## 2022-06-24 ENCOUNTER — ANTICOAGULATION THERAPY VISIT (OUTPATIENT)
Dept: ANTICOAGULATION | Facility: CLINIC | Age: 62
End: 2022-06-24

## 2022-06-24 ENCOUNTER — LAB (OUTPATIENT)
Dept: LAB | Facility: CLINIC | Age: 62
End: 2022-06-24
Payer: COMMERCIAL

## 2022-06-24 DIAGNOSIS — Z95.2 HEART VALVE REPLACED: Primary | ICD-10-CM

## 2022-06-24 DIAGNOSIS — Z79.01 LONG TERM CURRENT USE OF ANTICOAGULANTS WITH INR GOAL OF 2.0-3.0: ICD-10-CM

## 2022-06-24 DIAGNOSIS — Z95.2 S/P AORTIC VALVE REPLACEMENT: ICD-10-CM

## 2022-06-24 LAB — INR BLD: 3.2 (ref 0.9–1.1)

## 2022-06-24 PROCEDURE — 85610 PROTHROMBIN TIME: CPT

## 2022-06-24 PROCEDURE — 36416 COLLJ CAPILLARY BLOOD SPEC: CPT

## 2022-06-24 NOTE — PROGRESS NOTES
ANTICOAGULATION MANAGEMENT     Richard Ball 62 year old male is on warfarin with supratherapeutic INR result. (Goal INR 2.0-3.0)    Recent labs: (last 7 days)     06/24/22  0759   INR 3.2*       ASSESSMENT       Source(s): Chart Review and Patient/Caregiver Call       Warfarin doses taken: Warfarin taken as instructed    Diet: No new diet changes identified    New illness, injury, or hospitalization: Yes: prostatitis    Medication/supplement changes: ciprofloxacin 30 day course (dates: started yesterday) which has potential for interaction in future; increasing subsequent INRs     Signs or symptoms of bleeding or clotting: No    Previous INR: Supratherapeutic    Additional findings: None       PLAN     Recommended plan for ongoing change(s) affecting INR     Dosing Instructions: decrease your warfarin dose (10% change) with next INR in 4-5 days. Unfortunately Henrik will be out of town next week and unable to recheck INR until after the 4th of July holiday. Scheduled ASAP after his return and we reviewed s/sx of concern when he should seek an evaluation even when traveling.     Summary  As of 6/24/2022    Full warfarin instructions:  5 mg every Mon, Wed; 2.5 mg all other days   Next INR check:  7/5/2022             Telephone call with Henrik who verbalizes understanding and agrees to plan    Lab visit scheduled    Education provided: Goal range and significance of current result, Potential interaction between warfarin and ciprofloxacin, Monitoring for bleeding signs and symptoms, Monitoring for clotting signs and symptoms, When to seek medical attention/emergency care and Contact [phone] with any changes, questions or concerns.     Plan made per [facility] anticoagulation protocol    Lissa Montanez, RN  Anticoagulation Clinic  6/24/2022    _______________________________________________________________________     Anticoagulation Episode Summary     Current INR goal:  2.0-3.0   TTR:  59.7 % (1 y)   Target end date:   Indefinite   Send INR reminders to:  [facility]    Indications    Heart valve replaced [Z95.2] [Z95.2]  Long term current use of anticoagulants with INR goal of 2.0-3.0 [Z79.01]  S/P aortic valve replacement [Z95.2]           Comments:           Anticoagulation Care Providers     Provider Role Specialty Phone number    Raman Morales MD Referring Cardiovascular Disease [phone]    March, Sundar Cantu MD Responsible Cardiovascular Disease [phone]

## 2022-07-05 ENCOUNTER — ANTICOAGULATION THERAPY VISIT (OUTPATIENT)
Dept: ANTICOAGULATION | Facility: CLINIC | Age: 62
End: 2022-07-05

## 2022-07-05 ENCOUNTER — LAB (OUTPATIENT)
Dept: LAB | Facility: CLINIC | Age: 62
End: 2022-07-05
Payer: COMMERCIAL

## 2022-07-05 DIAGNOSIS — Z95.2 S/P AORTIC VALVE REPLACEMENT: ICD-10-CM

## 2022-07-05 DIAGNOSIS — Z95.2 HEART VALVE REPLACED: Primary | ICD-10-CM

## 2022-07-05 DIAGNOSIS — Z79.01 LONG TERM CURRENT USE OF ANTICOAGULANTS WITH INR GOAL OF 2.0-3.0: ICD-10-CM

## 2022-07-05 LAB — INR BLD: 2.5 (ref 0.9–1.1)

## 2022-07-05 PROCEDURE — 36416 COLLJ CAPILLARY BLOOD SPEC: CPT

## 2022-07-05 PROCEDURE — 85610 PROTHROMBIN TIME: CPT

## 2022-07-05 NOTE — PROGRESS NOTES
ANTICOAGULATION MANAGEMENT     Richard Ball 62 year old male is on warfarin with therapeutic INR result. (Goal INR 2.0-3.0)    Recent labs: (last 7 days)     07/05/22  0834   INR 2.5*       ASSESSMENT       Source(s): Chart Review and Patient/Caregiver Call       Warfarin doses taken: Warfarin taken as instructed    Diet: No new diet changes identified    New illness, injury, or hospitalization: No    Medication/supplement changes: continues on 30 day course of Cipro    Signs or symptoms of bleeding or clotting: No    Previous INR: Supratherapeutic    Additional findings: None       PLAN     Recommended plan for ongoing change(s) affecting INR     Dosing Instructions: continue your current warfarin dose with next INR in 2 weeks       Summary  As of 7/5/2022    Full warfarin instructions:  5 mg every Mon, Wed; 2.5 mg all other days   Next INR check:  7/21/2022             Telephone call with Don who verbalizes understanding and agrees to plan and who agrees to plan and repeated back plan correctly    Lab visit scheduled    Education provided: Goal range and significance of current result, Potential interaction between warfarin and Cipro, Monitoring for bleeding signs and symptoms and Contact [phone] with any changes, questions or concerns.     Plan made per [facility] anticoagulation protocol    ERIS CID RN  Anticoagulation Clinic  7/5/2022    _______________________________________________________________________     Anticoagulation Episode Summary     Current INR goal:  2.0-3.0   TTR:  59.5 % (1 y)   Target end date:  Indefinite   Send INR reminders to:  [facility]    Indications    Heart valve replaced [Z95.2] [Z95.2]  Long term current use of anticoagulants with INR goal of 2.0-3.0 [Z79.01]  S/P aortic valve replacement [Z95.2]           Comments:           Anticoagulation Care Providers     Provider Role Specialty Phone number    Raman Morales MD Referring Cardiovascular Disease [phone]     Sundar Garcia MD [facility] Cardiovascular Disease [phone]

## 2022-07-22 ENCOUNTER — ANTICOAGULATION THERAPY VISIT (OUTPATIENT)
Dept: ANTICOAGULATION | Facility: CLINIC | Age: 62
End: 2022-07-22

## 2022-07-22 ENCOUNTER — LAB (OUTPATIENT)
Dept: LAB | Facility: CLINIC | Age: 62
End: 2022-07-22
Payer: COMMERCIAL

## 2022-07-22 DIAGNOSIS — Z95.2 S/P AORTIC VALVE REPLACEMENT: ICD-10-CM

## 2022-07-22 DIAGNOSIS — Z79.01 LONG TERM CURRENT USE OF ANTICOAGULANTS WITH INR GOAL OF 2.0-3.0: ICD-10-CM

## 2022-07-22 DIAGNOSIS — Z95.2 HEART VALVE REPLACED: Primary | ICD-10-CM

## 2022-07-22 LAB — INR BLD: 5.4 (ref 0.9–1.1)

## 2022-07-22 PROCEDURE — 85610 PROTHROMBIN TIME: CPT

## 2022-07-22 PROCEDURE — 36416 COLLJ CAPILLARY BLOOD SPEC: CPT

## 2022-07-22 NOTE — PROGRESS NOTES
ANTICOAGULATION MANAGEMENT     Richard Ball 62 year old male is on warfarin with supratherapeutic INR result. (Goal INR 2.0-3.0)    Recent labs: (last 7 days)     07/22/22  0734   INR 5.4*       ASSESSMENT       Source(s): Patient/Caregiver Call       Warfarin doses taken: Warfarin taken as instructed    Diet: No new diet changes identified    New illness, injury, or hospitalization: No    Medication/supplement changes: Finished Cipro 7/24/22 for prostatis     Signs or symptoms of bleeding or clotting: No    Previous INR: Therapeutic last visit; previously outside of goal range    Additional findings: None       PLAN     Recommended plan for temporary change(s) affecting INR     Dosing Instructions: hold 2 doses then continue your current warfarin dose with next INR in 3 days       Summary  As of 7/22/2022    Full warfarin instructions:  7/22: Hold; 7/23: Hold; Otherwise 5 mg every Mon, Wed; 2.5 mg all other days   Next INR check:  7/25/2022             Telephone call with Don who verbalizes understanding and agrees to plan and who agrees to plan and repeated back plan correctly    Lab visit scheduled    Education provided: None required    Plan made per [facility] anticoagulation protocol    Ruthy Werner RN  Anticoagulation Clinic  7/22/2022    _______________________________________________________________________     Anticoagulation Episode Summary     Current INR goal:  2.0-3.0   TTR:  59.7 % (1 y)   Target end date:  Indefinite   Send INR reminders to:  [facility]    Indications    Heart valve replaced [Z95.2] [Z95.2]  Long term current use of anticoagulants with INR goal of 2.0-3.0 [Z79.01]  S/P aortic valve replacement [Z95.2]           Comments:           Anticoagulation Care Providers     Provider Role Specialty Phone number    Raman Morales MD Referring Cardiovascular Disease [phone]    March, Sundar Cantu MD Responsible Cardiovascular Disease [phone]

## 2022-07-25 ENCOUNTER — LAB (OUTPATIENT)
Dept: LAB | Facility: CLINIC | Age: 62
End: 2022-07-25
Payer: COMMERCIAL

## 2022-07-25 ENCOUNTER — ANTICOAGULATION THERAPY VISIT (OUTPATIENT)
Dept: ANTICOAGULATION | Facility: CLINIC | Age: 62
End: 2022-07-25

## 2022-07-25 DIAGNOSIS — Z95.2 HEART VALVE REPLACED: Primary | ICD-10-CM

## 2022-07-25 DIAGNOSIS — Z95.2 S/P AORTIC VALVE REPLACEMENT: ICD-10-CM

## 2022-07-25 DIAGNOSIS — Z79.01 LONG TERM CURRENT USE OF ANTICOAGULANTS WITH INR GOAL OF 2.0-3.0: ICD-10-CM

## 2022-07-25 LAB — INR BLD: 1.8 (ref 0.9–1.1)

## 2022-07-25 PROCEDURE — 36416 COLLJ CAPILLARY BLOOD SPEC: CPT

## 2022-07-25 PROCEDURE — 85610 PROTHROMBIN TIME: CPT

## 2022-07-25 NOTE — PROGRESS NOTES
ANTICOAGULATION MANAGEMENT     Richard Ball 62 year old male is on warfarin with subtherapeutic INR result. (Goal INR 2.0-3.0)    Recent labs: (last 7 days)     07/25/22  0724   INR 1.8*       ASSESSMENT       Source(s): Patient/Caregiver Call       Warfarin doses taken: Warfarin taken differently, but did not change total weekly dose    Diet: No new diet changes identified    New illness, injury, or hospitalization: No    Medication/supplement changes: None noted    Signs or symptoms of bleeding or clotting: No    Previous INR: Supratherapeutic    Additional findings: Prior to Cipro patient was taking 5mg MWF and 2.5mg all other days, but due to the last INR being supra-therapeutic want to continue current maintenance dose and recheck in a week.       PLAN     Recommended plan for no diet, medication or health factor changes affecting INR     Dosing Instructions: continue your current warfarin dose with next INR in 1 week       Summary  As of 7/25/2022    Full warfarin instructions:  5 mg every Mon, Wed; 2.5 mg all other days   Next INR check:  8/1/2022             Telephone call with Don who verbalizes understanding and agrees to plan and who agrees to plan and repeated back plan correctly    Lab visit scheduled    Education provided: None required    Plan made per [facility] anticoagulation protocol    Ruthy Werner RN  Anticoagulation Clinic  7/25/2022    _______________________________________________________________________     Anticoagulation Episode Summary     Current INR goal:  2.0-3.0   TTR:  59.9 % (1 y)   Target end date:  Indefinite   Send INR reminders to:  [facility]    Indications    Heart valve replaced [Z95.2] [Z95.2]  Long term current use of anticoagulants with INR goal of 2.0-3.0 [Z79.01]  S/P aortic valve replacement [Z95.2]           Comments:           Anticoagulation Care Providers     Provider Role Specialty Phone number    Raman Morales MD Referring Cardiovascular Disease  [phone]    March, Sundar Cantu MD Responsible Cardiovascular Disease [phone]

## 2022-08-01 ENCOUNTER — ANTICOAGULATION THERAPY VISIT (OUTPATIENT)
Dept: ANTICOAGULATION | Facility: CLINIC | Age: 62
End: 2022-08-01

## 2022-08-01 ENCOUNTER — LAB (OUTPATIENT)
Dept: LAB | Facility: CLINIC | Age: 62
End: 2022-08-01
Payer: COMMERCIAL

## 2022-08-01 DIAGNOSIS — Z79.01 LONG TERM CURRENT USE OF ANTICOAGULANTS WITH INR GOAL OF 2.0-3.0: ICD-10-CM

## 2022-08-01 DIAGNOSIS — Z95.2 S/P AORTIC VALVE REPLACEMENT: ICD-10-CM

## 2022-08-01 DIAGNOSIS — Z95.2 HEART VALVE REPLACED: Primary | ICD-10-CM

## 2022-08-01 LAB — INR BLD: 1.9 (ref 0.9–1.1)

## 2022-08-01 PROCEDURE — 36415 COLL VENOUS BLD VENIPUNCTURE: CPT

## 2022-08-01 PROCEDURE — 85610 PROTHROMBIN TIME: CPT

## 2022-08-01 NOTE — PROGRESS NOTES
ANTICOAGULATION MANAGEMENT     Richard Ball 62 year old male is on warfarin with subtherapeutic INR result. (Goal INR 2.0-3.0)    Recent labs: (last 7 days)     08/01/22  0719   INR 1.9*       ASSESSMENT       Source(s): Chart Review    Previous INR was Subtherapeutic    Medication, diet, health changes since last INR chart reviewed; none identified           PLAN     Recommended plan for no diet, medication or health factor changes affecting INR     Dosing Instructions: continue current weekly dose but change the to 5mg on M and F and 2.5mg all other days with next INR in 1 week       Summary  As of 8/1/2022    Full warfarin instructions:  5 mg every Mon, Wed; 2.5 mg all other days   Next INR check:               Detailed voice message left for Don with dosing instructions and follow up date.   Sent [employer] message with dosing and follow up instructions    Contact [phone] to schedule and with any changes, questions or concerns.     Education provided: Please call back if any changes to your diet, medications or how you've been taking warfarin and Contact [phone] with any changes, questions or concerns.     Plan made per [facility] anticoagulation protocol    Breanna Bliss RN  Anticoagulation Clinic  8/1/2022    _______________________________________________________________________     Anticoagulation Episode Summary     Current INR goal:  2.0-3.0   TTR:  59.9 % (1 y)   Target end date:  Indefinite   Send INR reminders to:  [facility]    Indications    Heart valve replaced [Z95.2] [Z95.2]  Long term current use of anticoagulants with INR goal of 2.0-3.0 [Z79.01]  S/P aortic valve replacement [Z95.2]           Comments:           Anticoagulation Care Providers     Provider Role Specialty Phone number    Raman Morales MD Referring Cardiovascular Disease [phone]    March, Sundar Cantu MD Responsible Cardiovascular Disease [phone]

## 2022-08-16 ENCOUNTER — LAB (OUTPATIENT)
Dept: LAB | Facility: CLINIC | Age: 62
End: 2022-08-16
Payer: COMMERCIAL

## 2022-08-16 ENCOUNTER — ANTICOAGULATION THERAPY VISIT (OUTPATIENT)
Dept: ANTICOAGULATION | Facility: CLINIC | Age: 62
End: 2022-08-16

## 2022-08-16 DIAGNOSIS — Z95.2 S/P AORTIC VALVE REPLACEMENT: ICD-10-CM

## 2022-08-16 DIAGNOSIS — Z79.01 LONG TERM CURRENT USE OF ANTICOAGULANTS WITH INR GOAL OF 2.0-3.0: ICD-10-CM

## 2022-08-16 DIAGNOSIS — Z95.2 HEART VALVE REPLACED: Primary | ICD-10-CM

## 2022-08-16 LAB — INR BLD: 3.5 (ref 0.9–1.1)

## 2022-08-16 PROCEDURE — 36416 COLLJ CAPILLARY BLOOD SPEC: CPT

## 2022-08-16 PROCEDURE — 85610 PROTHROMBIN TIME: CPT

## 2022-08-16 NOTE — PROGRESS NOTES
ANTICOAGULATION MANAGEMENT     Richard Ball 62 year old male is on warfarin with supratherapeutic INR result. (Goal INR 2.0-3.0)    Recent labs: (last 7 days)     08/16/22  1516   INR 3.5*       ASSESSMENT       Source(s): Patient/Caregiver Call       Warfarin doses taken: More warfarin taken than planned which may be affecting INR    Diet: No new diet changes identified    New illness, injury, or hospitalization: No    Medication/supplement changes: None noted    Signs or symptoms of bleeding or clotting: No    Previous INR: Subtherapeutic    Additional findings: None       PLAN     Recommended plan for temporary change(s) affecting INR     Dosing Instructions: Continue your current warfarin dose with next INR in 2 weeks       Summary  As of 8/16/2022    Full warfarin instructions:  5 mg every Mon, Fri; 2.5 mg all other days   Next INR check:  8/30/2022             Telephone call with Don who verbalizes understanding and agrees to plan and who agrees to plan and repeated back plan correctly    Lab visit scheduled    Education provided: None required    Plan made per [facility] anticoagulation protocol    Ruthy Werner, RN  Anticoagulation Clinic  8/16/2022    _______________________________________________________________________     Anticoagulation Episode Summary     Current INR goal:  2.0-3.0   TTR:  61.3 % (1 y)   Target end date:  Indefinite   Send INR reminders to:  [facility]    Indications    Heart valve replaced [Z95.2] [Z95.2]  Long term current use of anticoagulants with INR goal of 2.0-3.0 [Z79.01]  S/P aortic valve replacement [Z95.2]           Comments:           Anticoagulation Care Providers     Provider Role Specialty Phone number    Raman Morales MD Referring Cardiovascular Disease [phone]    March, Sundar Cantu MD Responsible Cardiovascular Disease [phone]

## 2022-08-26 LAB
MDC_IDC_EPISODE_DTM: NORMAL
MDC_IDC_EPISODE_DURATION: 1 S
MDC_IDC_EPISODE_ID: 1
MDC_IDC_EPISODE_TYPE: NORMAL
MDC_IDC_LEAD_IMPLANT_DT: NORMAL
MDC_IDC_LEAD_IMPLANT_DT: NORMAL
MDC_IDC_LEAD_LOCATION: NORMAL
MDC_IDC_LEAD_LOCATION: NORMAL
MDC_IDC_LEAD_LOCATION_DETAIL_1: NORMAL
MDC_IDC_LEAD_LOCATION_DETAIL_1: NORMAL
MDC_IDC_LEAD_MFG: NORMAL
MDC_IDC_LEAD_MFG: NORMAL
MDC_IDC_LEAD_MODEL: NORMAL
MDC_IDC_LEAD_MODEL: NORMAL
MDC_IDC_LEAD_POLARITY_TYPE: NORMAL
MDC_IDC_LEAD_POLARITY_TYPE: NORMAL
MDC_IDC_LEAD_SERIAL: NORMAL
MDC_IDC_LEAD_SERIAL: NORMAL
MDC_IDC_LEAD_SPECIAL_FUNCTION: NORMAL
MDC_IDC_LEAD_SPECIAL_FUNCTION: NORMAL
MDC_IDC_MSMT_BATTERY_DTM: NORMAL
MDC_IDC_MSMT_BATTERY_REMAINING_LONGEVITY: 123 MO
MDC_IDC_MSMT_BATTERY_RRT_TRIGGER: 2.62
MDC_IDC_MSMT_BATTERY_STATUS: NORMAL
MDC_IDC_MSMT_BATTERY_VOLTAGE: 3.15 V
MDC_IDC_MSMT_LEADCHNL_RA_IMPEDANCE_VALUE: 456 OHM
MDC_IDC_MSMT_LEADCHNL_RA_IMPEDANCE_VALUE: 608 OHM
MDC_IDC_MSMT_LEADCHNL_RA_PACING_THRESHOLD_AMPLITUDE: 0.5 V
MDC_IDC_MSMT_LEADCHNL_RA_PACING_THRESHOLD_PULSEWIDTH: 0.4 MS
MDC_IDC_MSMT_LEADCHNL_RA_SENSING_INTR_AMPL: 4.25 MV
MDC_IDC_MSMT_LEADCHNL_RA_SENSING_INTR_AMPL: 5 MV
MDC_IDC_MSMT_LEADCHNL_RV_IMPEDANCE_VALUE: 380 OHM
MDC_IDC_MSMT_LEADCHNL_RV_IMPEDANCE_VALUE: 456 OHM
MDC_IDC_MSMT_LEADCHNL_RV_PACING_THRESHOLD_AMPLITUDE: 0.5 V
MDC_IDC_MSMT_LEADCHNL_RV_PACING_THRESHOLD_PULSEWIDTH: 0.4 MS
MDC_IDC_PG_IMPLANT_DTM: NORMAL
MDC_IDC_PG_MFG: NORMAL
MDC_IDC_PG_MODEL: NORMAL
MDC_IDC_PG_SERIAL: NORMAL
MDC_IDC_PG_TYPE: NORMAL
MDC_IDC_SESS_CLINIC_NAME: NORMAL
MDC_IDC_SESS_DTM: NORMAL
MDC_IDC_SESS_TYPE: NORMAL
MDC_IDC_SET_BRADY_AT_MODE_SWITCH_RATE: 171 {BEATS}/MIN
MDC_IDC_SET_BRADY_HYSTRATE: NORMAL
MDC_IDC_SET_BRADY_LOWRATE: 60 {BEATS}/MIN
MDC_IDC_SET_BRADY_MAX_SENSOR_RATE: 130 {BEATS}/MIN
MDC_IDC_SET_BRADY_MAX_TRACKING_RATE: 130 {BEATS}/MIN
MDC_IDC_SET_BRADY_MODE: NORMAL
MDC_IDC_SET_BRADY_PAV_DELAY_LOW: 180 MS
MDC_IDC_SET_BRADY_SAV_DELAY_LOW: 150 MS
MDC_IDC_SET_LEADCHNL_RA_PACING_AMPLITUDE: 2.25 V
MDC_IDC_SET_LEADCHNL_RA_PACING_ANODE_ELECTRODE_1: NORMAL
MDC_IDC_SET_LEADCHNL_RA_PACING_ANODE_LOCATION_1: NORMAL
MDC_IDC_SET_LEADCHNL_RA_PACING_CAPTURE_MODE: NORMAL
MDC_IDC_SET_LEADCHNL_RA_PACING_CATHODE_ELECTRODE_1: NORMAL
MDC_IDC_SET_LEADCHNL_RA_PACING_CATHODE_LOCATION_1: NORMAL
MDC_IDC_SET_LEADCHNL_RA_PACING_POLARITY: NORMAL
MDC_IDC_SET_LEADCHNL_RA_PACING_PULSEWIDTH: 0.4 MS
MDC_IDC_SET_LEADCHNL_RA_SENSING_ANODE_ELECTRODE_1: NORMAL
MDC_IDC_SET_LEADCHNL_RA_SENSING_ANODE_LOCATION_1: NORMAL
MDC_IDC_SET_LEADCHNL_RA_SENSING_CATHODE_ELECTRODE_1: NORMAL
MDC_IDC_SET_LEADCHNL_RA_SENSING_CATHODE_LOCATION_1: NORMAL
MDC_IDC_SET_LEADCHNL_RA_SENSING_POLARITY: NORMAL
MDC_IDC_SET_LEADCHNL_RA_SENSING_SENSITIVITY: 0.3 MV
MDC_IDC_SET_LEADCHNL_RV_PACING_AMPLITUDE: 2.25 V
MDC_IDC_SET_LEADCHNL_RV_PACING_ANODE_ELECTRODE_1: NORMAL
MDC_IDC_SET_LEADCHNL_RV_PACING_ANODE_LOCATION_1: NORMAL
MDC_IDC_SET_LEADCHNL_RV_PACING_CAPTURE_MODE: NORMAL
MDC_IDC_SET_LEADCHNL_RV_PACING_CATHODE_ELECTRODE_1: NORMAL
MDC_IDC_SET_LEADCHNL_RV_PACING_CATHODE_LOCATION_1: NORMAL
MDC_IDC_SET_LEADCHNL_RV_PACING_POLARITY: NORMAL
MDC_IDC_SET_LEADCHNL_RV_PACING_PULSEWIDTH: 0.4 MS
MDC_IDC_SET_LEADCHNL_RV_SENSING_ANODE_ELECTRODE_1: NORMAL
MDC_IDC_SET_LEADCHNL_RV_SENSING_ANODE_LOCATION_1: NORMAL
MDC_IDC_SET_LEADCHNL_RV_SENSING_CATHODE_ELECTRODE_1: NORMAL
MDC_IDC_SET_LEADCHNL_RV_SENSING_CATHODE_LOCATION_1: NORMAL
MDC_IDC_SET_LEADCHNL_RV_SENSING_POLARITY: NORMAL
MDC_IDC_SET_LEADCHNL_RV_SENSING_SENSITIVITY: 0.9 MV
MDC_IDC_SET_ZONE_DETECTION_INTERVAL: 350 MS
MDC_IDC_SET_ZONE_DETECTION_INTERVAL: 400 MS
MDC_IDC_SET_ZONE_TYPE: NORMAL
MDC_IDC_STAT_AT_BURDEN_PERCENT: 0 %
MDC_IDC_STAT_AT_DTM_END: NORMAL
MDC_IDC_STAT_AT_DTM_START: NORMAL
MDC_IDC_STAT_BRADY_AP_VP_PERCENT: 62.74 %
MDC_IDC_STAT_BRADY_AP_VS_PERCENT: 0.01 %
MDC_IDC_STAT_BRADY_AS_VP_PERCENT: 37.09 %
MDC_IDC_STAT_BRADY_AS_VS_PERCENT: 0.16 %
MDC_IDC_STAT_BRADY_DTM_END: NORMAL
MDC_IDC_STAT_BRADY_DTM_START: NORMAL
MDC_IDC_STAT_BRADY_RA_PERCENT_PACED: 62.84 %
MDC_IDC_STAT_BRADY_RV_PERCENT_PACED: 99.84 %
MDC_IDC_STAT_EPISODE_RECENT_COUNT: 0
MDC_IDC_STAT_EPISODE_RECENT_COUNT: 1
MDC_IDC_STAT_EPISODE_RECENT_COUNT_DTM_END: NORMAL
MDC_IDC_STAT_EPISODE_RECENT_COUNT_DTM_START: NORMAL
MDC_IDC_STAT_EPISODE_TOTAL_COUNT: 0
MDC_IDC_STAT_EPISODE_TOTAL_COUNT: 0
MDC_IDC_STAT_EPISODE_TOTAL_COUNT: 1
MDC_IDC_STAT_EPISODE_TOTAL_COUNT_DTM_END: NORMAL
MDC_IDC_STAT_EPISODE_TOTAL_COUNT_DTM_START: NORMAL
MDC_IDC_STAT_EPISODE_TYPE: NORMAL

## 2022-08-30 ENCOUNTER — LAB (OUTPATIENT)
Dept: LAB | Facility: CLINIC | Age: 62
End: 2022-08-30
Payer: COMMERCIAL

## 2022-08-30 ENCOUNTER — ANTICOAGULATION THERAPY VISIT (OUTPATIENT)
Dept: ANTICOAGULATION | Facility: CLINIC | Age: 62
End: 2022-08-30

## 2022-08-30 DIAGNOSIS — Z79.01 LONG TERM CURRENT USE OF ANTICOAGULANTS WITH INR GOAL OF 2.0-3.0: ICD-10-CM

## 2022-08-30 DIAGNOSIS — Z95.2 S/P AORTIC VALVE REPLACEMENT: ICD-10-CM

## 2022-08-30 DIAGNOSIS — Z95.2 HEART VALVE REPLACED: Primary | ICD-10-CM

## 2022-08-30 LAB — INR BLD: 2.8 (ref 0.9–1.1)

## 2022-08-30 PROCEDURE — 85610 PROTHROMBIN TIME: CPT

## 2022-08-30 PROCEDURE — 36416 COLLJ CAPILLARY BLOOD SPEC: CPT

## 2022-08-30 NOTE — PROGRESS NOTES
ANTICOAGULATION MANAGEMENT     Richard Ball 62 year old male is on warfarin with therapeutic INR result. (Goal INR 2.0-3.0)    Recent labs: (last 7 days)     08/30/22  0741   INR 2.8*       ASSESSMENT       Source(s): Chart Review and Patient/Caregiver Call       Warfarin doses taken: Warfarin taken as instructed    Diet: No new diet changes identified    New illness, injury, or hospitalization: No    Medication/supplement changes: None noted    Signs or symptoms of bleeding or clotting: No    Previous INR: Supratherapeutic    Additional findings: None       PLAN     Recommended plan for no diet, medication or health factor changes affecting INR     Dosing Instructions: Continue your current warfarin dose with next INR in 3 weeks       Summary  As of 8/30/2022    Full warfarin instructions:  5 mg every Mon, Fri; 2.5 mg all other days   Next INR check:  9/20/2022             Telephone call with Henrik who verbalizes understanding and agrees to plan and who agrees to plan and repeated back plan correctly    Lab visit scheduled    Education provided: Goal range and significance of current result and Contact [phone] with any changes, questions or concerns.     Plan made per [facility] anticoagulation protocol    ERIS CID RN  Anticoagulation Clinic  8/30/2022    _______________________________________________________________________     Anticoagulation Episode Summary     Current INR goal:  2.0-3.0   TTR:  58.6 % (1 y)   Target end date:  Indefinite   Send INR reminders to:  [facility]    Indications    Heart valve replaced [Z95.2] [Z95.2]  Long term current use of anticoagulants with INR goal of 2.0-3.0 [Z79.01]  S/P aortic valve replacement [Z95.2]           Comments:           Anticoagulation Care Providers     Provider Role Specialty Phone number    Raman Morales MD Referring Cardiovascular Disease [phone]    March, Sundar Cantu MD Responsible Cardiovascular Disease [phone]

## 2022-09-03 ENCOUNTER — HEALTH MAINTENANCE LETTER (OUTPATIENT)
Age: 62
End: 2022-09-03

## 2022-09-15 ENCOUNTER — ANCILLARY PROCEDURE (OUTPATIENT)
Dept: CARDIOLOGY | Facility: CLINIC | Age: 62
End: 2022-09-15
Attending: INTERNAL MEDICINE
Payer: COMMERCIAL

## 2022-09-15 ENCOUNTER — TELEPHONE (OUTPATIENT)
Dept: GASTROENTEROLOGY | Facility: CLINIC | Age: 62
End: 2022-09-15

## 2022-09-15 DIAGNOSIS — Z95.0 CARDIAC PACEMAKER IN SITU: ICD-10-CM

## 2022-09-15 PROCEDURE — 93296 REM INTERROG EVL PM/IDS: CPT

## 2022-09-15 PROCEDURE — 93294 REM INTERROG EVL PM/LDLS PM: CPT | Performed by: INTERNAL MEDICINE

## 2022-09-15 NOTE — CONFIDENTIAL NOTE
Screening Questions    BlueKIND OF PREP RedLOCATION [review exclusion criteria] GreenSEDATION TYPE      1. Are you active on mychart? YES    2. What insurance is in the chart? preferredone     3.   Ordering/Referring Provider: PUSHPA BASS    4. BMI   (If greater than 40 review exclusion criteria [PAC APPT IF [MAC] @ U)  24.4  [If yes, BMI OVER 40-EXTENDED PREP]      **(Sedation review/consideration needed)**  Do you have a legal guardian or Medical Power of    and/or are you able to give consent for your medical care?     No     5. Have you had a positive covid test in the last 90 days?   No  -     6.  Are you currently on dialysis?    [ If yes, G-PREP & HOSPITAL setting ONLY]     7.  Do you have chronic kidney disease?  no [ If yes, G-PREP ]    8.   Do you have a diagnosis of diabetes?   No    [ If yes, G-PREP ]    9.  On a regular basis do you go 3-5 days between bowel movements?   No    [ If yes, EXTENDED PREP]    10.  Are you taking any prescription pain medications on a routine schedule?    No -  [ If yes, EXTENDED PREP] [If yes, MAC]      11.   Do you have any chemical dependencies such as alcohol, street drugs, or methadone?    No  [If yes, MAC]    12.   Do you have any history of post-traumatic stress syndrome, severe anxiety or history of psychosis?    No   [If yes, MAC]    13.  [FEMALES] Are you currently pregnant?     If yes, how many weeks?       Respiratory/Heart Screening:  [If yes to any of the following HOSPITAL setting only]     14. Do you have Pulmonary Hypertension [Lungs]?   No        15. Do you have UNCONTROLLED asthma?   No      16.  Do you use daily home oxygen?  No       17. Do you have mod to severe Obstructive Sleep Apnea?         (OKAY @  UPU  SH  PH  RI  MG - if pt is not on OXYGEN)  No       18.   Have you had a heart or lung transplant?   No       19.   Have you had a stroke or Transient ischemic attack (TIA - aka  mini stroke ) within 6 months?  (If yes, please  review exclusion criteria)  No      20.   In the past 6 months, have you had any heart related issues including cardiomyopathy or heart attack?   No            If yes, did it require cardiac stenting or other implantable device?         21.   Do you have any implantable devices in your body (pacemaker, defib, LVAD)? (If yes, please review exclusion criteria)  YES   22.  Do you take the medication Phentermine?  NO        23. Do you take nitroglycerin?   No            If yes, how often?   (if yes, HOSPITAL setting ONLY)    24.  Are you currently taking any blood thinners?    [If yes, INFORM patient to follw  w/ ORDERING PROVIDER FOR BRIDGING INSTRUCTIONS]     YES--    25.   Do you transfer independently?                (If NO, please HOSPITAL setting ONLY)  Yes     26.   Preferred LOCAL Pharmacy for Pre Prescription:         [facility] [address] [place] [address]      Additional comments: unable to schedule at  due to ICD-- transferred to [facility].

## 2022-09-16 LAB
MDC_IDC_LEAD_IMPLANT_DT: NORMAL
MDC_IDC_LEAD_IMPLANT_DT: NORMAL
MDC_IDC_LEAD_LOCATION: NORMAL
MDC_IDC_LEAD_LOCATION: NORMAL
MDC_IDC_LEAD_LOCATION_DETAIL_1: NORMAL
MDC_IDC_LEAD_LOCATION_DETAIL_1: NORMAL
MDC_IDC_LEAD_MFG: NORMAL
MDC_IDC_LEAD_MFG: NORMAL
MDC_IDC_LEAD_MODEL: NORMAL
MDC_IDC_LEAD_MODEL: NORMAL
MDC_IDC_LEAD_POLARITY_TYPE: NORMAL
MDC_IDC_LEAD_POLARITY_TYPE: NORMAL
MDC_IDC_LEAD_SERIAL: NORMAL
MDC_IDC_LEAD_SERIAL: NORMAL
MDC_IDC_LEAD_SPECIAL_FUNCTION: NORMAL
MDC_IDC_LEAD_SPECIAL_FUNCTION: NORMAL
MDC_IDC_MSMT_BATTERY_DTM: NORMAL
MDC_IDC_MSMT_BATTERY_REMAINING_LONGEVITY: 139 MO
MDC_IDC_MSMT_BATTERY_RRT_TRIGGER: 2.62
MDC_IDC_MSMT_BATTERY_STATUS: NORMAL
MDC_IDC_MSMT_BATTERY_VOLTAGE: 3.11 V
MDC_IDC_MSMT_LEADCHNL_RA_IMPEDANCE_VALUE: 418 OHM
MDC_IDC_MSMT_LEADCHNL_RA_IMPEDANCE_VALUE: 589 OHM
MDC_IDC_MSMT_LEADCHNL_RA_PACING_THRESHOLD_AMPLITUDE: 0.5 V
MDC_IDC_MSMT_LEADCHNL_RA_PACING_THRESHOLD_PULSEWIDTH: 0.4 MS
MDC_IDC_MSMT_LEADCHNL_RA_SENSING_INTR_AMPL: 4.38 MV
MDC_IDC_MSMT_LEADCHNL_RV_IMPEDANCE_VALUE: 342 OHM
MDC_IDC_MSMT_LEADCHNL_RV_IMPEDANCE_VALUE: 418 OHM
MDC_IDC_MSMT_LEADCHNL_RV_PACING_THRESHOLD_AMPLITUDE: 0.62 V
MDC_IDC_MSMT_LEADCHNL_RV_PACING_THRESHOLD_PULSEWIDTH: 0.4 MS
MDC_IDC_MSMT_LEADCHNL_RV_SENSING_INTR_AMPL: 8.12 MV
MDC_IDC_PG_IMPLANT_DTM: NORMAL
MDC_IDC_PG_MFG: NORMAL
MDC_IDC_PG_MODEL: NORMAL
MDC_IDC_PG_SERIAL: NORMAL
MDC_IDC_PG_TYPE: NORMAL
MDC_IDC_SESS_CLINIC_NAME: NORMAL
MDC_IDC_SESS_DTM: NORMAL
MDC_IDC_SESS_TYPE: NORMAL
MDC_IDC_SET_BRADY_AT_MODE_SWITCH_RATE: 171 {BEATS}/MIN
MDC_IDC_SET_BRADY_HYSTRATE: NORMAL
MDC_IDC_SET_BRADY_LOWRATE: 60 {BEATS}/MIN
MDC_IDC_SET_BRADY_MAX_SENSOR_RATE: 130 {BEATS}/MIN
MDC_IDC_SET_BRADY_MAX_TRACKING_RATE: 130 {BEATS}/MIN
MDC_IDC_SET_BRADY_MODE: NORMAL
MDC_IDC_SET_BRADY_PAV_DELAY_LOW: 180 MS
MDC_IDC_SET_BRADY_SAV_DELAY_LOW: 150 MS
MDC_IDC_SET_LEADCHNL_RA_PACING_AMPLITUDE: 1.5 V
MDC_IDC_SET_LEADCHNL_RA_PACING_ANODE_ELECTRODE_1: NORMAL
MDC_IDC_SET_LEADCHNL_RA_PACING_ANODE_LOCATION_1: NORMAL
MDC_IDC_SET_LEADCHNL_RA_PACING_CAPTURE_MODE: NORMAL
MDC_IDC_SET_LEADCHNL_RA_PACING_CATHODE_ELECTRODE_1: NORMAL
MDC_IDC_SET_LEADCHNL_RA_PACING_CATHODE_LOCATION_1: NORMAL
MDC_IDC_SET_LEADCHNL_RA_PACING_POLARITY: NORMAL
MDC_IDC_SET_LEADCHNL_RA_PACING_PULSEWIDTH: 0.4 MS
MDC_IDC_SET_LEADCHNL_RA_SENSING_ANODE_ELECTRODE_1: NORMAL
MDC_IDC_SET_LEADCHNL_RA_SENSING_ANODE_LOCATION_1: NORMAL
MDC_IDC_SET_LEADCHNL_RA_SENSING_CATHODE_ELECTRODE_1: NORMAL
MDC_IDC_SET_LEADCHNL_RA_SENSING_CATHODE_LOCATION_1: NORMAL
MDC_IDC_SET_LEADCHNL_RA_SENSING_POLARITY: NORMAL
MDC_IDC_SET_LEADCHNL_RA_SENSING_SENSITIVITY: 0.3 MV
MDC_IDC_SET_LEADCHNL_RV_PACING_AMPLITUDE: 2 V
MDC_IDC_SET_LEADCHNL_RV_PACING_ANODE_ELECTRODE_1: NORMAL
MDC_IDC_SET_LEADCHNL_RV_PACING_ANODE_LOCATION_1: NORMAL
MDC_IDC_SET_LEADCHNL_RV_PACING_CAPTURE_MODE: NORMAL
MDC_IDC_SET_LEADCHNL_RV_PACING_CATHODE_ELECTRODE_1: NORMAL
MDC_IDC_SET_LEADCHNL_RV_PACING_CATHODE_LOCATION_1: NORMAL
MDC_IDC_SET_LEADCHNL_RV_PACING_POLARITY: NORMAL
MDC_IDC_SET_LEADCHNL_RV_PACING_PULSEWIDTH: 0.4 MS
MDC_IDC_SET_LEADCHNL_RV_SENSING_ANODE_ELECTRODE_1: NORMAL
MDC_IDC_SET_LEADCHNL_RV_SENSING_ANODE_LOCATION_1: NORMAL
MDC_IDC_SET_LEADCHNL_RV_SENSING_CATHODE_ELECTRODE_1: NORMAL
MDC_IDC_SET_LEADCHNL_RV_SENSING_CATHODE_LOCATION_1: NORMAL
MDC_IDC_SET_LEADCHNL_RV_SENSING_POLARITY: NORMAL
MDC_IDC_SET_LEADCHNL_RV_SENSING_SENSITIVITY: 0.9 MV
MDC_IDC_SET_ZONE_DETECTION_INTERVAL: 350 MS
MDC_IDC_SET_ZONE_DETECTION_INTERVAL: 400 MS
MDC_IDC_SET_ZONE_TYPE: NORMAL
MDC_IDC_STAT_AT_BURDEN_PERCENT: 0 %
MDC_IDC_STAT_AT_DTM_END: NORMAL
MDC_IDC_STAT_AT_DTM_START: NORMAL
MDC_IDC_STAT_BRADY_AP_VP_PERCENT: 67.68 %
MDC_IDC_STAT_BRADY_AP_VS_PERCENT: 0.03 %
MDC_IDC_STAT_BRADY_AS_VP_PERCENT: 31.84 %
MDC_IDC_STAT_BRADY_AS_VS_PERCENT: 0.46 %
MDC_IDC_STAT_BRADY_DTM_END: NORMAL
MDC_IDC_STAT_BRADY_DTM_START: NORMAL
MDC_IDC_STAT_BRADY_RA_PERCENT_PACED: 68.04 %
MDC_IDC_STAT_BRADY_RV_PERCENT_PACED: 99.52 %
MDC_IDC_STAT_EPISODE_RECENT_COUNT: 0
MDC_IDC_STAT_EPISODE_RECENT_COUNT_DTM_END: NORMAL
MDC_IDC_STAT_EPISODE_RECENT_COUNT_DTM_START: NORMAL
MDC_IDC_STAT_EPISODE_TOTAL_COUNT: 0
MDC_IDC_STAT_EPISODE_TOTAL_COUNT: 1
MDC_IDC_STAT_EPISODE_TOTAL_COUNT_DTM_END: NORMAL
MDC_IDC_STAT_EPISODE_TOTAL_COUNT_DTM_START: NORMAL
MDC_IDC_STAT_EPISODE_TYPE: NORMAL

## 2022-09-20 ENCOUNTER — LAB (OUTPATIENT)
Dept: LAB | Facility: CLINIC | Age: 62
End: 2022-09-20
Payer: COMMERCIAL

## 2022-09-20 ENCOUNTER — ANTICOAGULATION THERAPY VISIT (OUTPATIENT)
Dept: ANTICOAGULATION | Facility: CLINIC | Age: 62
End: 2022-09-20

## 2022-09-20 DIAGNOSIS — Z95.2 S/P AORTIC VALVE REPLACEMENT: ICD-10-CM

## 2022-09-20 DIAGNOSIS — Z79.01 LONG TERM CURRENT USE OF ANTICOAGULANTS WITH INR GOAL OF 2.0-3.0: ICD-10-CM

## 2022-09-20 DIAGNOSIS — Z95.2 HEART VALVE REPLACED: Primary | ICD-10-CM

## 2022-09-20 LAB — INR BLD: 2.7 (ref 0.9–1.1)

## 2022-09-20 PROCEDURE — 36416 COLLJ CAPILLARY BLOOD SPEC: CPT

## 2022-09-20 PROCEDURE — 85610 PROTHROMBIN TIME: CPT

## 2022-09-20 NOTE — PROGRESS NOTES
ANTICOAGULATION MANAGEMENT     Richard Ball 62 year old male is on warfarin with therapeutic INR result. (Goal INR 2.0-3.0)    Recent labs: (last 7 days)     09/20/22  0743   INR 2.7*       ASSESSMENT       Source(s): Chart Review    Previous INR was Therapeutic last visit; previously outside of goal range    Medication, diet, health changes since last INR chart reviewed; none identified           PLAN     Recommended plan for no diet, medication or health factor changes affecting INR     Dosing Instructions: Continue your current warfarin dose with next INR in 3 weeks       Summary  As of 9/20/2022    Full warfarin instructions:  5 mg every Mon, Fri; 2.5 mg all other days   Next INR check:  10/11/2022             Detailed voice message left for Don with dosing instructions and follow up date.     Contact [phone] to schedule and with any changes, questions or concerns.     Education provided: Please call back if any changes to your diet, medications or how you've been taking warfarin    Plan made per [facility] anticoagulation protocol    Kody Lorenz, RN  Anticoagulation Clinic  9/20/2022    _______________________________________________________________________     Anticoagulation Episode Summary     Current INR goal:  2.0-3.0   TTR:  58.6 % (1 y)   Target end date:  Indefinite   Send INR reminders to:  [facility]    Indications    Heart valve replaced [Z95.2] [Z95.2]  Long term current use of anticoagulants with INR goal of 2.0-3.0 [Z79.01]  S/P aortic valve replacement [Z95.2]           Comments:           Anticoagulation Care Providers     Provider Role Specialty Phone number    Raman Morales MD Referring Cardiovascular Disease [phone]    March, Sundar Cantu MD Responsible Cardiovascular Disease [phone]

## 2022-09-22 DIAGNOSIS — E78.5 HYPERLIPIDEMIA LDL GOAL <70: ICD-10-CM

## 2022-09-22 RX ORDER — ATORVASTATIN CALCIUM 40 MG/1
40 TABLET, FILM COATED ORAL DAILY
Qty: 90 TABLET | Refills: 3 | Status: SHIPPED | OUTPATIENT
Start: 2022-09-22 | End: 2023-11-17

## 2022-09-22 NOTE — PROGRESS NOTES
[facility] Refill Guideline reviewed.  Medication meets criteria for refill.  AKIL Lebron RN

## 2022-10-18 ENCOUNTER — LAB (OUTPATIENT)
Dept: LAB | Facility: CLINIC | Age: 62
End: 2022-10-18
Payer: COMMERCIAL

## 2022-10-18 ENCOUNTER — ANTICOAGULATION THERAPY VISIT (OUTPATIENT)
Dept: ANTICOAGULATION | Facility: CLINIC | Age: 62
End: 2022-10-18

## 2022-10-18 DIAGNOSIS — Z95.2 HEART VALVE REPLACED: Primary | ICD-10-CM

## 2022-10-18 DIAGNOSIS — Z79.01 LONG TERM CURRENT USE OF ANTICOAGULANTS WITH INR GOAL OF 2.0-3.0: ICD-10-CM

## 2022-10-18 DIAGNOSIS — Z95.2 S/P AORTIC VALVE REPLACEMENT: ICD-10-CM

## 2022-10-18 LAB — INR BLD: 2.4 (ref 0.9–1.1)

## 2022-10-18 PROCEDURE — 36416 COLLJ CAPILLARY BLOOD SPEC: CPT

## 2022-10-18 PROCEDURE — 85610 PROTHROMBIN TIME: CPT

## 2022-10-18 NOTE — PROGRESS NOTES
ANTICOAGULATION MANAGEMENT     Richard Ball 62 year old male is on warfarin with therapeutic INR result. (Goal INR 2.0-3.0)    Recent labs: (last 7 days)     10/18/22  1501   INR 2.4*       ASSESSMENT       Source(s): Chart Review and Patient/Caregiver Call       Warfarin doses taken: Warfarin taken as instructed    Diet: No new diet changes identified    New illness, injury, or hospitalization: No    Medication/supplement changes: None noted    Signs or symptoms of bleeding or clotting: No    Previous INR: Therapeutic last 2(+) visits    Additional findings: wants to push out his checks to 4 weeks       PLAN     Recommended plan for no diet, medication or health factor changes affecting INR     Dosing Instructions: Continue your current warfarin dose with next INR in 4 weeks       Summary  As of 10/18/2022    Full warfarin instructions:  5 mg every Mon, Fri; 2.5 mg all other days   Next INR check:  11/15/2022             Telephone call with Henrik who verbalizes understanding and agrees to plan    Lab visit scheduled X 2    Education provided: Goal range and significance of current result    Plan made per [facility] anticoagulation protocol    Breanna Bliss RN  Anticoagulation Clinic  10/18/2022    _______________________________________________________________________     Anticoagulation Episode Summary     Current INR goal:  2.0-3.0   TTR:  63.4 % (1 y)   Target end date:  Indefinite   Send INR reminders to:  [facility]    Indications    Heart valve replaced [Z95.2] [Z95.2]  Long term current use of anticoagulants with INR goal of 2.0-3.0 [Z79.01]  S/P aortic valve replacement [Z95.2]           Comments:           Anticoagulation Care Providers     Provider Role Specialty Phone number    Raman Morales MD Referring Cardiovascular Disease [phone]    March, Sundar Cantu MD Responsible Cardiovascular Disease [phone]

## 2022-11-15 ENCOUNTER — ANTICOAGULATION THERAPY VISIT (OUTPATIENT)
Dept: ANTICOAGULATION | Facility: CLINIC | Age: 62
End: 2022-11-15

## 2022-11-15 ENCOUNTER — LAB (OUTPATIENT)
Dept: LAB | Facility: CLINIC | Age: 62
End: 2022-11-15
Payer: COMMERCIAL

## 2022-11-15 DIAGNOSIS — Z79.01 LONG TERM CURRENT USE OF ANTICOAGULANTS WITH INR GOAL OF 2.0-3.0: ICD-10-CM

## 2022-11-15 DIAGNOSIS — Z95.2 S/P AORTIC VALVE REPLACEMENT: ICD-10-CM

## 2022-11-15 DIAGNOSIS — Z95.2 HEART VALVE REPLACED: Primary | ICD-10-CM

## 2022-11-15 LAB — INR BLD: 2.5 (ref 0.9–1.1)

## 2022-11-15 PROCEDURE — 85610 PROTHROMBIN TIME: CPT

## 2022-11-15 PROCEDURE — 36416 COLLJ CAPILLARY BLOOD SPEC: CPT

## 2022-11-15 NOTE — PROGRESS NOTES
ANTICOAGULATION MANAGEMENT     Richard Ball 62 year old male is on warfarin with therapeutic INR result. (Goal INR 2.0-3.0)    Recent labs: (last 7 days)     11/15/22  0730   INR 2.5*       ASSESSMENT       Source(s): Chart Review    Previous INR was Therapeutic last 2(+) visits    Medication, diet, health changes since last INR chart reviewed; none identified           PLAN     Recommended plan for no diet, medication or health factor changes affecting INR     Dosing Instructions: Continue your current warfarin dose with next INR in 6 weeks       Summary  As of 11/15/2022    Full warfarin instructions:  5 mg every Mon, Fri; 2.5 mg all other days; Starting 11/15/2022   Next INR check:  12/12/2022             Detailed voice message left for Don with dosing instructions and follow up date.     Lab visit scheduled    Education provided:     Please call back if any changes to your diet, medications or how you've been taking warfarin    Plan made per [facility] anticoagulation protocol    Kody Lorenz, RN  Anticoagulation Clinic  11/15/2022    _______________________________________________________________________     Anticoagulation Episode Summary     Current INR goal:  2.0-3.0   TTR:  67.7 % (1 y)   Target end date:  Indefinite   Send INR reminders to:  [facility]    Indications    Heart valve replaced [Z95.2] [Z95.2]  Long term current use of anticoagulants with INR goal of 2.0-3.0 [Z79.01]  S/P aortic valve replacement [Z95.2]           Comments:           Anticoagulation Care Providers     Provider Role Specialty Phone number    Raman Morales MD Referring Cardiovascular Disease [phone]    March, Sundar Cantu MD Responsible Cardiovascular Disease [phone]

## 2022-12-06 ENCOUNTER — TELEPHONE (OUTPATIENT)
Dept: CARDIOLOGY | Facility: CLINIC | Age: 62
End: 2022-12-06

## 2022-12-06 DIAGNOSIS — Z95.2 S/P AORTIC VALVE REPLACEMENT: ICD-10-CM

## 2022-12-06 DIAGNOSIS — Q87.40 MARFAN'S SYNDROME: ICD-10-CM

## 2022-12-06 RX ORDER — WARFARIN SODIUM 5 MG/1
TABLET ORAL
Qty: 90 TABLET | Refills: 1 | Status: SHIPPED | OUTPATIENT
Start: 2022-12-06 | End: 2023-08-08

## 2022-12-06 NOTE — TELEPHONE ENCOUNTER
ANTICOAGULATION MANAGEMENT:  Medication Refill    Anticoagulation Summary  As of 11/15/2022    Warfarin maintenance plan:  5 mg (5 mg x 1) every Mon, Fri; 2.5 mg (5 mg x 0.5) all other days; Starting 11/15/2022   Next INR check:  12/12/2022   Target end date:  Indefinite    Indications    Heart valve replaced [Z95.2] [Z95.2]  Long term current use of anticoagulants with INR goal of 2.0-3.0 [Z79.01]  S/P aortic valve replacement [Z95.2]             Anticoagulation Care Providers     Provider Role Specialty Phone number    Raman Morales MD Referring Cardiovascular Disease [phone]    March, Sundar Cantu MD Responsible Cardiovascular Disease [phone]          Visit with referring provider/group within last year: Yes    [facility] referral signed within last year: Yes    Richard meets all criteria for refill (current [facility] referral, office visit with referring provider/group in last year, lab monitoring up to date or not exceeding 2 weeks overdue). Rx instructions and quantity match patient's current dosing plan. Warfarin 90 day supply with 1 refill granted per [facility] protocol     Ruthy Werner RN  Anticoagulation Clinic

## 2022-12-07 DIAGNOSIS — Q87.40 MARFAN'S SYNDROME: Primary | ICD-10-CM

## 2022-12-09 RX ORDER — ATENOLOL 50 MG/1
50 TABLET ORAL DAILY
Qty: 90 TABLET | Refills: 3 | Status: SHIPPED | OUTPATIENT
Start: 2022-12-09 | End: 2023-12-02

## 2022-12-12 ENCOUNTER — ANTICOAGULATION THERAPY VISIT (OUTPATIENT)
Dept: ANTICOAGULATION | Facility: CLINIC | Age: 62
End: 2022-12-12

## 2022-12-12 ENCOUNTER — LAB (OUTPATIENT)
Dept: LAB | Facility: CLINIC | Age: 62
End: 2022-12-12
Payer: COMMERCIAL

## 2022-12-12 DIAGNOSIS — Z95.2 S/P AORTIC VALVE REPLACEMENT: ICD-10-CM

## 2022-12-12 DIAGNOSIS — Z95.2 HEART VALVE REPLACED: Primary | ICD-10-CM

## 2022-12-12 DIAGNOSIS — Z79.01 LONG TERM CURRENT USE OF ANTICOAGULANTS WITH INR GOAL OF 2.0-3.0: ICD-10-CM

## 2022-12-12 LAB — INR BLD: 2.1 (ref 0.9–1.1)

## 2022-12-12 PROCEDURE — 85610 PROTHROMBIN TIME: CPT

## 2022-12-12 PROCEDURE — 36416 COLLJ CAPILLARY BLOOD SPEC: CPT

## 2022-12-12 NOTE — PROGRESS NOTES
ANTICOAGULATION MANAGEMENT     Richard Ball 62 year old male is on warfarin with therapeutic INR result. (Goal INR 2.0-3.0)    Recent labs: (last 7 days)     12/12/22  0731   INR 2.1*       ASSESSMENT       Source(s): Chart Review and Patient/Caregiver Call       Warfarin doses taken: Reviewed in chart    Diet: No new diet changes identified    New illness, injury, or hospitalization: No    Medication/supplement changes: None noted    Signs or symptoms of bleeding or clotting: No    Previous INR: Therapeutic last 2(+) visits    Additional findings: Patient is leaving for the next 3 weeks.       PLAN     Recommended plan for no diet, medication or health factor changes affecting INR     Dosing Instructions: Continue your current warfarin dose with next INR in 4 weeks       Summary  As of 12/12/2022    Full warfarin instructions:  5 mg every Mon, Fri; 2.5 mg all other days; Starting 12/12/2022   Next INR check:  1/9/2023             Detailed voice message left for Don with dosing instructions and follow up date.     Contact [phone] to schedule and with any changes, questions or concerns.     Education provided:     Please call back if any changes to your diet, medications or how you've been taking warfarin    Contact [phone] with any changes, questions or concerns.     Plan made per [facility] anticoagulation protocol    Catia Manning RN  Anticoagulation Clinic  12/12/2022    _______________________________________________________________________     Anticoagulation Episode Summary     Current INR goal:  2.0-3.0   TTR:  67.7 % (1 y)   Target end date:  Indefinite   Send INR reminders to:  [facility]    Indications    Heart valve replaced [Z95.2] [Z95.2]  Long term current use of anticoagulants with INR goal of 2.0-3.0 [Z79.01]  S/P aortic valve replacement [Z95.2]           Comments:           Anticoagulation Care Providers     Provider Role Specialty Phone number    Raman Morales MD Referring  Cardiovascular Disease [phone]    March, Sundar Cantu MD Responsible Cardiovascular Disease [phone]

## 2022-12-21 ENCOUNTER — ANCILLARY PROCEDURE (OUTPATIENT)
Dept: CARDIOLOGY | Facility: CLINIC | Age: 62
End: 2022-12-21
Attending: INTERNAL MEDICINE
Payer: COMMERCIAL

## 2022-12-21 PROCEDURE — 93296 REM INTERROG EVL PM/IDS: CPT

## 2022-12-21 PROCEDURE — 93294 REM INTERROG EVL PM/LDLS PM: CPT | Performed by: INTERNAL MEDICINE

## 2022-12-22 DIAGNOSIS — Z95.2 S/P AORTIC VALVE REPLACEMENT: ICD-10-CM

## 2022-12-22 DIAGNOSIS — Z95.2 S/P AVR (AORTIC VALVE REPLACEMENT): ICD-10-CM

## 2022-12-22 DIAGNOSIS — Z95.2 HEART VALVE REPLACED: ICD-10-CM

## 2022-12-22 DIAGNOSIS — Q87.40 MARFAN'S SYNDROME: ICD-10-CM

## 2022-12-22 RX ORDER — LOSARTAN POTASSIUM 25 MG/1
25 TABLET ORAL DAILY
Qty: 90 TABLET | Refills: 1 | Status: SHIPPED | OUTPATIENT
Start: 2022-12-22 | End: 2023-11-03

## 2023-01-12 ENCOUNTER — LAB (OUTPATIENT)
Dept: LAB | Facility: CLINIC | Age: 63
End: 2023-01-12
Payer: COMMERCIAL

## 2023-01-12 ENCOUNTER — ANTICOAGULATION THERAPY VISIT (OUTPATIENT)
Dept: ANTICOAGULATION | Facility: CLINIC | Age: 63
End: 2023-01-12

## 2023-01-12 DIAGNOSIS — Z79.01 LONG TERM CURRENT USE OF ANTICOAGULANTS WITH INR GOAL OF 2.0-3.0: ICD-10-CM

## 2023-01-12 DIAGNOSIS — Z95.2 HEART VALVE REPLACED: Primary | ICD-10-CM

## 2023-01-12 DIAGNOSIS — Z95.2 S/P AORTIC VALVE REPLACEMENT: ICD-10-CM

## 2023-01-12 LAB — INR BLD: 2.2 (ref 0.9–1.1)

## 2023-01-12 PROCEDURE — 85610 PROTHROMBIN TIME: CPT

## 2023-01-12 PROCEDURE — 36416 COLLJ CAPILLARY BLOOD SPEC: CPT

## 2023-01-12 NOTE — PROGRESS NOTES
ANTICOAGULATION MANAGEMENT     Richard Ball 62 year old male is on warfarin with therapeutic INR result. (Goal INR 2.0-3.0)    Recent labs: (last 7 days)     01/12/23  0819   INR 2.2*       ASSESSMENT       Source(s): Chart Review and Patient/Caregiver Call       Warfarin doses taken: Warfarin taken as instructed    Diet: No new diet changes identified    New illness, injury, or hospitalization: No    Medication/supplement changes: None noted    Signs or symptoms of bleeding or clotting: No    Previous INR: Therapeutic last 2(+) visits    Additional findings: None       PLAN     Recommended plan for no diet, medication or health factor changes affecting INR     Dosing Instructions: Continue your current warfarin dose with next INR in 6 weeks       Summary  As of 1/12/2023    Full warfarin instructions:  5 mg every Mon, Fri; 2.5 mg all other days   Next INR check:  2/23/2023             Telephone call with Henrik who verbalizes understanding and agrees to plan    Lab visit scheduled    Education provided:     Goal range and lab monitoring: goal range and significance of current result and Importance of therapeutic range    Plan made per [facility] anticoagulation protocol    Wilfredo Collier RN  Anticoagulation Clinic  1/12/2023    _______________________________________________________________________     Anticoagulation Episode Summary     Current INR goal:  2.0-3.0   TTR:  72.2 % (1 y)   Target end date:  Indefinite   Send INR reminders to:  [facility]    Indications    Heart valve replaced [Z95.2] [Z95.2]  Long term current use of anticoagulants with INR goal of 2.0-3.0 [Z79.01]  S/P aortic valve replacement [Z95.2]           Comments:           Anticoagulation Care Providers     Provider Role Specialty Phone number    Raman Morales MD Referring Cardiovascular Disease [phone]    March, Sundar Cantu MD Responsible Cardiovascular Disease [phone]

## 2023-02-20 ENCOUNTER — ANTICOAGULATION THERAPY VISIT (OUTPATIENT)
Dept: ANTICOAGULATION | Facility: CLINIC | Age: 63
End: 2023-02-20

## 2023-02-20 ENCOUNTER — LAB (OUTPATIENT)
Dept: LAB | Facility: CLINIC | Age: 63
End: 2023-02-20
Payer: COMMERCIAL

## 2023-02-20 DIAGNOSIS — Z95.2 HEART VALVE REPLACED: Primary | ICD-10-CM

## 2023-02-20 DIAGNOSIS — Z79.01 LONG TERM CURRENT USE OF ANTICOAGULANTS WITH INR GOAL OF 2.0-3.0: ICD-10-CM

## 2023-02-20 DIAGNOSIS — Z95.2 S/P AORTIC VALVE REPLACEMENT: ICD-10-CM

## 2023-02-20 LAB — INR BLD: 1.7 (ref 0.9–1.1)

## 2023-02-20 PROCEDURE — 36416 COLLJ CAPILLARY BLOOD SPEC: CPT

## 2023-02-20 PROCEDURE — 85610 PROTHROMBIN TIME: CPT

## 2023-02-20 NOTE — PROGRESS NOTES
ANTICOAGULATION MANAGEMENT     Richard Ball 63 year old male is on warfarin with subtherapeutic INR result. (Goal INR 2.0-3.0)    Recent labs: (last 7 days)     02/20/23  1450   INR 1.7*       ASSESSMENT       Source(s): Chart Review and Patient/Caregiver Call       Warfarin doses taken: Warfarin taken as instructed    Diet: No new diet changes identified    New illness, injury, or hospitalization: No    Medication/supplement changes: None noted    Signs or symptoms of bleeding or clotting: No    Previous INR: Therapeutic last 2(+) visits    Additional findings: INR has been on the low side of goal range. Will increase dose and watch trend.        PLAN     Recommended plan for no diet, medication or health factor changes affecting INR     Dosing Instructions: Increase your warfarin dose (10% change) with next INR in 2 weeks       Summary  As of 2/20/2023    Full warfarin instructions:  5 mg every Mon, Wed, Fri; 2.5 mg all other days   Next INR check:  3/6/2023             Telephone call with Don who verbalizes understanding and agrees to plan and who agrees to plan and repeated back plan correctly    Lab visit scheduled    Education provided:     Taking warfarin: Importance of taking warfarin as instructed    Goal range and lab monitoring: goal range and significance of current result and Importance of therapeutic range    Plan made per [facility] anticoagulation protocol    Catia Manning RN  Anticoagulation Clinic  2/20/2023    _______________________________________________________________________     Anticoagulation Episode Summary     Current INR goal:  2.0-3.0   TTR:  68.6 % (1 y)   Target end date:  Indefinite   Send INR reminders to:  [facility]    Indications    Heart valve replaced [Z95.2] [Z95.2]  Long term current use of anticoagulants with INR goal of 2.0-3.0 [Z79.01]  S/P aortic valve replacement [Z95.2]           Comments:           Anticoagulation Care Providers     Provider Role Specialty  Phone number    Raman Morales MD Referring Cardiovascular Disease [phone]    March, Sundar Cantu MD Responsible Cardiovascular Disease [phone]

## 2023-03-06 ENCOUNTER — LAB (OUTPATIENT)
Dept: LAB | Facility: CLINIC | Age: 63
End: 2023-03-06
Payer: COMMERCIAL

## 2023-03-06 ENCOUNTER — TELEPHONE (OUTPATIENT)
Dept: FAMILY MEDICINE | Facility: CLINIC | Age: 63
End: 2023-03-06

## 2023-03-06 ENCOUNTER — TELEPHONE (OUTPATIENT)
Dept: ANTICOAGULATION | Facility: CLINIC | Age: 63
End: 2023-03-06

## 2023-03-06 ENCOUNTER — ANTICOAGULATION THERAPY VISIT (OUTPATIENT)
Dept: ANTICOAGULATION | Facility: CLINIC | Age: 63
End: 2023-03-06

## 2023-03-06 DIAGNOSIS — Z79.01 LONG TERM CURRENT USE OF ANTICOAGULANTS WITH INR GOAL OF 2.0-3.0: ICD-10-CM

## 2023-03-06 DIAGNOSIS — Z95.2 S/P AORTIC VALVE REPLACEMENT: ICD-10-CM

## 2023-03-06 DIAGNOSIS — Z95.2 HEART VALVE REPLACED: Primary | ICD-10-CM

## 2023-03-06 LAB — INR BLD: 3.4 (ref 0.9–1.1)

## 2023-03-06 PROCEDURE — 36415 COLL VENOUS BLD VENIPUNCTURE: CPT

## 2023-03-06 PROCEDURE — 85610 PROTHROMBIN TIME: CPT

## 2023-03-06 NOTE — PROGRESS NOTES
ANTICOAGULATION MANAGEMENT     Richard Ball 63 year old male is on warfarin with supratherapeutic INR result. (Goal INR 2.0-3.0)    Recent labs: (last 7 days)     03/06/23  1438   INR 3.4*       ASSESSMENT       Source(s): Patient/Caregiver Call       Warfarin doses taken: Warfarin taken as instructed    Diet: decrease appetite due to recent Covid +    New illness, injury, or hospitalization: Yes: tested + for COVID 2/28. Reports today is the first day with no fevers.    Medication/supplement changes: Taking OTC Tylenol and cough syrup     Signs or symptoms of bleeding or clotting: No    Previous INR: Subtherapeutic    Additional findings: None         PLAN     Recommended plan for temporary change(s) affecting INR     Dosing Instructions: partial hold then continue your current warfarin dose with next INR in 2 weeks       Summary  As of 3/6/2023    Full warfarin instructions:  3/6: 2.5 mg; Otherwise 5 mg every Mon, Wed, Fri; 2.5 mg all other days   Next INR check:  3/20/2023             Telephone call with Don who verbalizes understanding and agrees to plan and who agrees to plan and repeated back plan correctly    Lab visit scheduled    Education provided:     None required    Plan made per [facility] anticoagulation protocol    Ruthy Werner, RN  Anticoagulation Clinic  3/6/2023    _______________________________________________________________________     Anticoagulation Episode Summary     Current INR goal:  2.0-3.0   TTR:  67.4 % (1 y)   Target end date:  Indefinite   Send INR reminders to:  [facility]    Indications    Heart valve replaced [Z95.2] [Z95.2]  Long term current use of anticoagulants with INR goal of 2.0-3.0 [Z79.01]  S/P aortic valve replacement [Z95.2]           Comments:           Anticoagulation Care Providers     Provider Role Specialty Phone number    Raman Morales MD Referring Cardiovascular Disease [phone]    March, Sundar Cantu MD Responsible Cardiovascular Disease  [phone]

## 2023-03-06 NOTE — TELEPHONE ENCOUNTER
Patient reports he tested Covid + last Tuesday and today is his first day that he has not had a fever. Patient reports appetite decreased and he has taken OTC Tylenol and cough syrup. Patient is wondering if he should get an INR this week. Writer advised on this since we increase maintenance dose last INR and there has been changes. Writer let patient know that he will not get turned away from the lab and that the lab techs will follow procedure with Covid +. Writer helped patient make an appointment for 3/6 at [facility]  At 2:45 PM. Patient communicated understanding.

## 2023-03-06 NOTE — TELEPHONE ENCOUNTER
Pt calling to speak with INR nurse about his INR results. Transferred to INR.    Esperanza Weiss RN

## 2023-03-07 ENCOUNTER — MYC MEDICAL ADVICE (OUTPATIENT)
Dept: CARDIOLOGY | Facility: CLINIC | Age: 63
End: 2023-03-07

## 2023-03-07 DIAGNOSIS — Q87.40 MARFAN'S SYNDROME: ICD-10-CM

## 2023-03-07 RX ORDER — ATENOLOL 25 MG/1
25 TABLET ORAL DAILY
Qty: 90 TABLET | Refills: 3 | Status: SHIPPED | OUTPATIENT
Start: 2023-03-07 | End: 2023-10-05

## 2023-03-14 ENCOUNTER — TELEPHONE (OUTPATIENT)
Dept: CARDIOLOGY | Facility: CLINIC | Age: 63
End: 2023-03-14

## 2023-03-14 DIAGNOSIS — Z95.2 S/P AORTIC VALVE REPLACEMENT: Primary | ICD-10-CM

## 2023-03-14 DIAGNOSIS — Z95.0 CARDIAC PACEMAKER IN SITU: ICD-10-CM

## 2023-03-14 NOTE — TELEPHONE ENCOUNTER
1st attempt to reach out to pt to schedule follow up with March with Device check and stress echo prior. No answer, LVM

## 2023-03-20 ENCOUNTER — LAB (OUTPATIENT)
Dept: LAB | Facility: CLINIC | Age: 63
End: 2023-03-20
Payer: COMMERCIAL

## 2023-03-20 ENCOUNTER — ANTICOAGULATION THERAPY VISIT (OUTPATIENT)
Dept: ANTICOAGULATION | Facility: CLINIC | Age: 63
End: 2023-03-20

## 2023-03-20 DIAGNOSIS — Z95.2 HEART VALVE REPLACED: Primary | ICD-10-CM

## 2023-03-20 DIAGNOSIS — Z95.2 S/P AORTIC VALVE REPLACEMENT: ICD-10-CM

## 2023-03-20 DIAGNOSIS — Z79.01 LONG TERM CURRENT USE OF ANTICOAGULANTS WITH INR GOAL OF 2.0-3.0: ICD-10-CM

## 2023-03-20 DIAGNOSIS — N41.0 ACUTE PROSTATITIS: ICD-10-CM

## 2023-03-20 DIAGNOSIS — Z12.5 SCREENING FOR PROSTATE CANCER: ICD-10-CM

## 2023-03-20 LAB — INR BLD: 2.5 (ref 0.9–1.1)

## 2023-03-20 PROCEDURE — G0103 PSA SCREENING: HCPCS

## 2023-03-20 PROCEDURE — 36415 COLL VENOUS BLD VENIPUNCTURE: CPT

## 2023-03-20 PROCEDURE — 85610 PROTHROMBIN TIME: CPT

## 2023-03-20 NOTE — PROGRESS NOTES
Residual cough    ANTICOAGULATION MANAGEMENT     Richard Ball 63 year old male is on warfarin with therapeutic INR result. (Goal INR 2.0-3.0)    Recent labs: (last 7 days)     03/20/23  1544   INR 2.5*       ASSESSMENT     Warfarin Lab Questionnaire    Dose in Tablet or Mg 3/20/2023   TAB or MG? milligram (mg)     No flowsheet data found.  Warfarin Lab Questionnaire 3/20/2023   Missed doses? No   Medication changes? No   Abnormal bleeding? No   Shortness of breath? No   Injuries or illness since last INR? No   Changes in diet or alcohol? No   Upcoming surgery, procedure? No   Best number to call with results? [phone]       Additional findings: reports that he is feeling better than last encounter, residual cough remains. Reports taking less Warfarin than recommended         PLAN     Recommended plan for temporary change(s) affecting INR     Dosing Instructions: Continue your current warfarin dose with next INR in 3 weeks       Summary  As of 3/20/2023    Full warfarin instructions:  5 mg every Mon, Fri; 2.5 mg all other days   Next INR check:  4/10/2023             Telephone call with Henrik who verbalizes understanding and agrees to plan    Lab visit scheduled    Education provided:     Contact [phone] with any changes, questions or concerns.     Plan made per [facility] anticoagulation protocol    ERIS CID RN  Anticoagulation Clinic  3/20/2023    _______________________________________________________________________     Anticoagulation Episode Summary     Current INR goal:  2.0-3.0   TTR:  68.4 % (1 y)   Target end date:  Indefinite   Send INR reminders to:  [facility]    Indications    Heart valve replaced [Z95.2] [Z95.2]  Long term current use of anticoagulants with INR goal of 2.0-3.0 [Z79.01]  S/P aortic valve replacement [Z95.2]           Comments:           Anticoagulation Care Providers     Provider Role Specialty Phone number    Raman Morales MD Referring Cardiovascular Disease [phone]     Sundar Garcia MD [facility] Cardiovascular Disease [phone]

## 2023-03-21 LAB — PSA SERPL DL<=0.01 NG/ML-MCNC: 1.56 NG/ML (ref 0–4.5)

## 2023-03-23 ENCOUNTER — ANCILLARY PROCEDURE (OUTPATIENT)
Dept: CARDIOLOGY | Facility: CLINIC | Age: 63
End: 2023-03-23
Attending: INTERNAL MEDICINE
Payer: COMMERCIAL

## 2023-03-23 DIAGNOSIS — Z95.0 CARDIAC PACEMAKER IN SITU: ICD-10-CM

## 2023-03-23 PROCEDURE — 93296 REM INTERROG EVL PM/IDS: CPT

## 2023-03-23 PROCEDURE — 93294 REM INTERROG EVL PM/LDLS PM: CPT | Performed by: INTERNAL MEDICINE

## 2023-03-29 LAB
MDC_IDC_EPISODE_DTM: NORMAL
MDC_IDC_EPISODE_DURATION: 1 S
MDC_IDC_EPISODE_ID: 2
MDC_IDC_EPISODE_TYPE: NORMAL
MDC_IDC_LEAD_IMPLANT_DT: NORMAL
MDC_IDC_LEAD_IMPLANT_DT: NORMAL
MDC_IDC_LEAD_LOCATION: NORMAL
MDC_IDC_LEAD_LOCATION: NORMAL
MDC_IDC_LEAD_LOCATION_DETAIL_1: NORMAL
MDC_IDC_LEAD_LOCATION_DETAIL_1: NORMAL
MDC_IDC_LEAD_MFG: NORMAL
MDC_IDC_LEAD_MFG: NORMAL
MDC_IDC_LEAD_MODEL: NORMAL
MDC_IDC_LEAD_MODEL: NORMAL
MDC_IDC_LEAD_POLARITY_TYPE: NORMAL
MDC_IDC_LEAD_POLARITY_TYPE: NORMAL
MDC_IDC_LEAD_SERIAL: NORMAL
MDC_IDC_LEAD_SERIAL: NORMAL
MDC_IDC_LEAD_SPECIAL_FUNCTION: NORMAL
MDC_IDC_LEAD_SPECIAL_FUNCTION: NORMAL
MDC_IDC_MSMT_BATTERY_DTM: NORMAL
MDC_IDC_MSMT_BATTERY_REMAINING_LONGEVITY: 132 MO
MDC_IDC_MSMT_BATTERY_RRT_TRIGGER: 2.62
MDC_IDC_MSMT_BATTERY_STATUS: NORMAL
MDC_IDC_MSMT_BATTERY_VOLTAGE: 3.02 V
MDC_IDC_MSMT_LEADCHNL_RA_IMPEDANCE_VALUE: 399 OHM
MDC_IDC_MSMT_LEADCHNL_RA_IMPEDANCE_VALUE: 551 OHM
MDC_IDC_MSMT_LEADCHNL_RA_PACING_THRESHOLD_AMPLITUDE: 0.5 V
MDC_IDC_MSMT_LEADCHNL_RA_PACING_THRESHOLD_PULSEWIDTH: 0.4 MS
MDC_IDC_MSMT_LEADCHNL_RA_SENSING_INTR_AMPL: 4.4 MV
MDC_IDC_MSMT_LEADCHNL_RV_IMPEDANCE_VALUE: 342 OHM
MDC_IDC_MSMT_LEADCHNL_RV_IMPEDANCE_VALUE: 418 OHM
MDC_IDC_MSMT_LEADCHNL_RV_PACING_THRESHOLD_AMPLITUDE: 0.62 V
MDC_IDC_MSMT_LEADCHNL_RV_PACING_THRESHOLD_PULSEWIDTH: 0.4 MS
MDC_IDC_MSMT_LEADCHNL_RV_SENSING_INTR_AMPL: 8.1 MV
MDC_IDC_PG_IMPLANT_DTM: NORMAL
MDC_IDC_PG_MFG: NORMAL
MDC_IDC_PG_MODEL: NORMAL
MDC_IDC_PG_SERIAL: NORMAL
MDC_IDC_PG_TYPE: NORMAL
MDC_IDC_SESS_CLINIC_NAME: NORMAL
MDC_IDC_SESS_DTM: NORMAL
MDC_IDC_SESS_TYPE: NORMAL
MDC_IDC_SET_BRADY_AT_MODE_SWITCH_RATE: 171 {BEATS}/MIN
MDC_IDC_SET_BRADY_HYSTRATE: NORMAL
MDC_IDC_SET_BRADY_LOWRATE: 60 {BEATS}/MIN
MDC_IDC_SET_BRADY_MAX_SENSOR_RATE: 130 {BEATS}/MIN
MDC_IDC_SET_BRADY_MAX_TRACKING_RATE: 130 {BEATS}/MIN
MDC_IDC_SET_BRADY_MODE: NORMAL
MDC_IDC_SET_BRADY_PAV_DELAY_LOW: 180 MS
MDC_IDC_SET_BRADY_SAV_DELAY_LOW: 150 MS
MDC_IDC_SET_LEADCHNL_RA_PACING_AMPLITUDE: 1.5 V
MDC_IDC_SET_LEADCHNL_RA_PACING_ANODE_ELECTRODE_1: NORMAL
MDC_IDC_SET_LEADCHNL_RA_PACING_ANODE_LOCATION_1: NORMAL
MDC_IDC_SET_LEADCHNL_RA_PACING_CAPTURE_MODE: NORMAL
MDC_IDC_SET_LEADCHNL_RA_PACING_CATHODE_ELECTRODE_1: NORMAL
MDC_IDC_SET_LEADCHNL_RA_PACING_CATHODE_LOCATION_1: NORMAL
MDC_IDC_SET_LEADCHNL_RA_PACING_POLARITY: NORMAL
MDC_IDC_SET_LEADCHNL_RA_PACING_PULSEWIDTH: 0.4 MS
MDC_IDC_SET_LEADCHNL_RA_SENSING_ANODE_ELECTRODE_1: NORMAL
MDC_IDC_SET_LEADCHNL_RA_SENSING_ANODE_LOCATION_1: NORMAL
MDC_IDC_SET_LEADCHNL_RA_SENSING_CATHODE_ELECTRODE_1: NORMAL
MDC_IDC_SET_LEADCHNL_RA_SENSING_CATHODE_LOCATION_1: NORMAL
MDC_IDC_SET_LEADCHNL_RA_SENSING_POLARITY: NORMAL
MDC_IDC_SET_LEADCHNL_RA_SENSING_SENSITIVITY: 0.3 MV
MDC_IDC_SET_LEADCHNL_RV_PACING_AMPLITUDE: 2 V
MDC_IDC_SET_LEADCHNL_RV_PACING_ANODE_ELECTRODE_1: NORMAL
MDC_IDC_SET_LEADCHNL_RV_PACING_ANODE_LOCATION_1: NORMAL
MDC_IDC_SET_LEADCHNL_RV_PACING_CAPTURE_MODE: NORMAL
MDC_IDC_SET_LEADCHNL_RV_PACING_CATHODE_ELECTRODE_1: NORMAL
MDC_IDC_SET_LEADCHNL_RV_PACING_CATHODE_LOCATION_1: NORMAL
MDC_IDC_SET_LEADCHNL_RV_PACING_POLARITY: NORMAL
MDC_IDC_SET_LEADCHNL_RV_PACING_PULSEWIDTH: 0.4 MS
MDC_IDC_SET_LEADCHNL_RV_SENSING_ANODE_ELECTRODE_1: NORMAL
MDC_IDC_SET_LEADCHNL_RV_SENSING_ANODE_LOCATION_1: NORMAL
MDC_IDC_SET_LEADCHNL_RV_SENSING_CATHODE_ELECTRODE_1: NORMAL
MDC_IDC_SET_LEADCHNL_RV_SENSING_CATHODE_LOCATION_1: NORMAL
MDC_IDC_SET_LEADCHNL_RV_SENSING_POLARITY: NORMAL
MDC_IDC_SET_LEADCHNL_RV_SENSING_SENSITIVITY: 0.9 MV
MDC_IDC_SET_ZONE_DETECTION_INTERVAL: 350 MS
MDC_IDC_SET_ZONE_DETECTION_INTERVAL: 400 MS
MDC_IDC_SET_ZONE_TYPE: NORMAL
MDC_IDC_STAT_AT_BURDEN_PERCENT: 0 %
MDC_IDC_STAT_AT_DTM_END: NORMAL
MDC_IDC_STAT_AT_DTM_START: NORMAL
MDC_IDC_STAT_BRADY_AP_VP_PERCENT: 58.01 %
MDC_IDC_STAT_BRADY_AP_VS_PERCENT: 0 %
MDC_IDC_STAT_BRADY_AS_VP_PERCENT: 41.97 %
MDC_IDC_STAT_BRADY_AS_VS_PERCENT: 0.02 %
MDC_IDC_STAT_BRADY_DTM_END: NORMAL
MDC_IDC_STAT_BRADY_DTM_START: NORMAL
MDC_IDC_STAT_BRADY_RA_PERCENT_PACED: 57.99 %
MDC_IDC_STAT_BRADY_RV_PERCENT_PACED: 99.98 %
MDC_IDC_STAT_EPISODE_RECENT_COUNT: 0
MDC_IDC_STAT_EPISODE_RECENT_COUNT: 1
MDC_IDC_STAT_EPISODE_RECENT_COUNT_DTM_END: NORMAL
MDC_IDC_STAT_EPISODE_RECENT_COUNT_DTM_START: NORMAL
MDC_IDC_STAT_EPISODE_TOTAL_COUNT: 0
MDC_IDC_STAT_EPISODE_TOTAL_COUNT: 2
MDC_IDC_STAT_EPISODE_TOTAL_COUNT_DTM_END: NORMAL
MDC_IDC_STAT_EPISODE_TOTAL_COUNT_DTM_START: NORMAL
MDC_IDC_STAT_EPISODE_TYPE: NORMAL

## 2023-04-10 ENCOUNTER — ANTICOAGULATION THERAPY VISIT (OUTPATIENT)
Dept: ANTICOAGULATION | Facility: CLINIC | Age: 63
End: 2023-04-10

## 2023-04-10 ENCOUNTER — LAB (OUTPATIENT)
Dept: LAB | Facility: CLINIC | Age: 63
End: 2023-04-10
Payer: COMMERCIAL

## 2023-04-10 DIAGNOSIS — Z79.01 LONG TERM CURRENT USE OF ANTICOAGULANTS WITH INR GOAL OF 2.0-3.0: ICD-10-CM

## 2023-04-10 DIAGNOSIS — Z95.2 HEART VALVE REPLACED: Primary | ICD-10-CM

## 2023-04-10 DIAGNOSIS — Z95.2 S/P AORTIC VALVE REPLACEMENT: ICD-10-CM

## 2023-04-10 LAB — INR BLD: 1.7 (ref 0.9–1.1)

## 2023-04-10 PROCEDURE — 36415 COLL VENOUS BLD VENIPUNCTURE: CPT

## 2023-04-10 PROCEDURE — 85610 PROTHROMBIN TIME: CPT

## 2023-04-10 NOTE — PROGRESS NOTES
ANTICOAGULATION MANAGEMENT     Richard Ball 63 year old male is on warfarin with subtherapeutic INR result. (Goal INR 2.0-3.0)    Recent labs: (last 7 days)     04/10/23  1553   INR 1.7*       ASSESSMENT       Source(s): Chart Review and Patient/Caregiver Call       Warfarin doses taken: Missed dose(s) may be affecting INR    Diet: Increased greens/vitamin K in diet; plans to resume previous intake    New illness, injury, or hospitalization: No    Medication/supplement changes: None noted    Signs or symptoms of bleeding or clotting: No    Previous INR: Therapeutic last visit; previously outside of goal range    Additional findings: None         PLAN     Recommended plan for temporary change(s) affecting INR     Dosing Instructions: booster dose then continue your current warfarin dose with next INR in 2 weeks       Summary  As of 4/10/2023    Full warfarin instructions:  4/10: 7.5 mg; Otherwise 5 mg every Mon, Fri; 2.5 mg all other days   Next INR check:  4/24/2023             Telephone call with Don who verbalizes understanding and agrees to plan and who agrees to plan and repeated back plan correctly    Lab visit scheduled    Education provided:     Taking warfarin: Importance of taking warfarin as instructed    Goal range and lab monitoring: goal range and significance of current result and Importance of therapeutic range    Dietary considerations: importance of consistent vitamin K intake    Plan made per [facility] anticoagulation protocol    Maria Del Carmen Lorenz RN  Anticoagulation Clinic  4/10/2023    _______________________________________________________________________     Anticoagulation Episode Summary     Current INR goal:  2.0-3.0   TTR:  66.3 % (1 y)   Target end date:  Indefinite   Send INR reminders to:  [facility]    Indications    Heart valve replaced [Z95.2] [Z95.2]  Long term current use of anticoagulants with INR goal of 2.0-3.0 [Z79.01]  S/P aortic valve replacement [Z95.2]            Comments:           Anticoagulation Care Providers     Provider Role Specialty Phone number    Raman Morales MD Referring Cardiovascular Disease [phone]    March, Sundar Cantu MD Responsible Cardiovascular Disease [phone]

## 2023-04-24 ENCOUNTER — ANTICOAGULATION THERAPY VISIT (OUTPATIENT)
Dept: ANTICOAGULATION | Facility: CLINIC | Age: 63
End: 2023-04-24

## 2023-04-24 ENCOUNTER — LAB (OUTPATIENT)
Dept: LAB | Facility: CLINIC | Age: 63
End: 2023-04-24
Payer: COMMERCIAL

## 2023-04-24 DIAGNOSIS — Z95.2 HEART VALVE REPLACED: Primary | ICD-10-CM

## 2023-04-24 DIAGNOSIS — Z79.01 LONG TERM CURRENT USE OF ANTICOAGULANTS WITH INR GOAL OF 2.0-3.0: ICD-10-CM

## 2023-04-24 DIAGNOSIS — Z95.2 S/P AORTIC VALVE REPLACEMENT: ICD-10-CM

## 2023-04-24 LAB — INR BLD: 1.4 (ref 0.9–1.1)

## 2023-04-24 PROCEDURE — 36416 COLLJ CAPILLARY BLOOD SPEC: CPT

## 2023-04-24 PROCEDURE — 85610 PROTHROMBIN TIME: CPT

## 2023-04-24 NOTE — PROGRESS NOTES
ANTICOAGULATION MANAGEMENT     Richard Ball 63 year old male is on warfarin with subtherapeutic INR result. (Goal INR 2.0-3.0)    Recent labs: (last 7 days)     04/24/23  1541   INR 1.4*       ASSESSMENT       Source(s): Patient/Caregiver Call       Warfarin doses taken: Warfarin taken as instructed    Diet: No new diet changes identified    Medication/supplement changes: None noted    New illness, injury, or hospitalization: No    Signs or symptoms of bleeding or clotting: No    Previous result: Subtherapeutic    Additional findings: Patient denies any missed doses and denies eating any greens or supplemental drinks          PLAN     Recommended plan for no diet, medication or health factor changes affecting INR     Dosing Instructions: booster dose then Increase your warfarin dose (11.1% change) with next INR in 8 days       Summary  As of 4/24/2023    Full warfarin instructions:  4/24: 7.5 mg; Otherwise 5 mg every Mon, Wed, Fri; 2.5 mg all other days   Next INR check:  5/2/2023             Telephone call with Don who verbalizes understanding and agrees to plan and who agrees to plan and repeated back plan correctly    Lab visit scheduled    Education provided:     None required    Plan made per [facility] anticoagulation protocol    Ruthy Werner RN  Anticoagulation Clinic  4/24/2023    _______________________________________________________________________     Anticoagulation Episode Summary     Current INR goal:  2.0-3.0   TTR:  62.4 % (1 y)   Target end date:  Indefinite   Send INR reminders to:  [facility]    Indications    Heart valve replaced [Z95.2] [Z95.2]  Long term current use of anticoagulants with INR goal of 2.0-3.0 [Z79.01]  S/P aortic valve replacement [Z95.2]           Comments:           Anticoagulation Care Providers     Provider Role Specialty Phone number    Raman Morales MD Referring Cardiovascular Disease [phone]    March, Sundar Cantu MD Responsible Cardiovascular Disease  [phone]

## 2023-05-01 ENCOUNTER — LAB (OUTPATIENT)
Dept: LAB | Facility: CLINIC | Age: 63
End: 2023-05-01
Payer: COMMERCIAL

## 2023-05-01 ENCOUNTER — ANTICOAGULATION THERAPY VISIT (OUTPATIENT)
Dept: ANTICOAGULATION | Facility: CLINIC | Age: 63
End: 2023-05-01

## 2023-05-01 DIAGNOSIS — Z79.01 LONG TERM CURRENT USE OF ANTICOAGULANTS WITH INR GOAL OF 2.0-3.0: ICD-10-CM

## 2023-05-01 DIAGNOSIS — Z95.2 HEART VALVE REPLACED: Primary | ICD-10-CM

## 2023-05-01 DIAGNOSIS — Z95.2 S/P AORTIC VALVE REPLACEMENT: ICD-10-CM

## 2023-05-01 LAB — INR BLD: 2.3 (ref 0.9–1.1)

## 2023-05-01 PROCEDURE — 85610 PROTHROMBIN TIME: CPT

## 2023-05-01 PROCEDURE — 36416 COLLJ CAPILLARY BLOOD SPEC: CPT

## 2023-05-01 NOTE — PROGRESS NOTES
ANTICOAGULATION MANAGEMENT     Richard Ball 63 year old male is on warfarin with therapeutic INR result. (Goal INR 2.0-3.0)    Recent labs: (last 7 days)     05/01/23  1540   INR 2.3*       ASSESSMENT       Source(s): Chart Review       Warfarin doses taken: Reviewed in chart    Diet: No new diet changes identified    Medication/supplement changes: None noted    New illness, injury, or hospitalization: No    Signs or symptoms of bleeding or clotting: No    Previous result: Subtherapeutic    Additional findings: None         PLAN     Recommended plan for no diet, medication or health factor changes affecting INR     Dosing Instructions: Continue your current warfarin dose with next INR in 1 week       Summary  As of 5/1/2023    Full warfarin instructions:  5 mg every Mon, Wed, Fri; 2.5 mg all other days   Next INR check:  5/8/2023             Detailed voice message left for Don with dosing instructions and follow up date.   Sent [employer] message with dosing and follow up instructions    Contact [phone] to schedule and with any changes, questions or concerns.     Education provided:     Please call back if any changes to your diet, medications or how you've been taking warfarin    Contact [phone] with any changes, questions or concerns.     Plan made per [facility] anticoagulation protocol    Dayanara Davidson RN  Anticoagulation Clinic  5/1/2023    _______________________________________________________________________     Anticoagulation Episode Summary     Current INR goal:  2.0-3.0   TTR:  61.2 % (1 y)   Target end date:  Indefinite   Send INR reminders to:  [facility]    Indications    Heart valve replaced [Z95.2] [Z95.2]  Long term current use of anticoagulants with INR goal of 2.0-3.0 [Z79.01]  S/P aortic valve replacement [Z95.2]           Comments:           Anticoagulation Care Providers     Provider Role Specialty Phone number    Raman Morales MD Referring Cardiovascular Disease [phone]     Sundar Garcia MD [facility] Cardiovascular Disease [phone]

## 2023-05-03 ENCOUNTER — TRANSFERRED RECORDS (OUTPATIENT)
Dept: HEALTH INFORMATION MANAGEMENT | Facility: CLINIC | Age: 63
End: 2023-05-03

## 2023-05-09 ENCOUNTER — ANTICOAGULATION THERAPY VISIT (OUTPATIENT)
Dept: ANTICOAGULATION | Facility: CLINIC | Age: 63
End: 2023-05-09

## 2023-05-09 ENCOUNTER — DOCUMENTATION ONLY (OUTPATIENT)
Dept: ANTICOAGULATION | Facility: CLINIC | Age: 63
End: 2023-05-09

## 2023-05-09 ENCOUNTER — LAB (OUTPATIENT)
Dept: LAB | Facility: CLINIC | Age: 63
End: 2023-05-09
Payer: COMMERCIAL

## 2023-05-09 DIAGNOSIS — Z95.2 S/P AORTIC VALVE REPLACEMENT: Primary | ICD-10-CM

## 2023-05-09 DIAGNOSIS — Z79.01 LONG TERM CURRENT USE OF ANTICOAGULANTS WITH INR GOAL OF 2.0-3.0: ICD-10-CM

## 2023-05-09 DIAGNOSIS — Z95.2 HEART VALVE REPLACED: Primary | ICD-10-CM

## 2023-05-09 DIAGNOSIS — Z95.2 S/P AORTIC VALVE REPLACEMENT: ICD-10-CM

## 2023-05-09 LAB — INR BLD: 2.9 (ref 0.9–1.1)

## 2023-05-09 PROCEDURE — 85610 PROTHROMBIN TIME: CPT

## 2023-05-09 PROCEDURE — 36416 COLLJ CAPILLARY BLOOD SPEC: CPT

## 2023-05-09 NOTE — PROGRESS NOTES
ANTICOAGULATION MANAGEMENT     Richard Ball 63 year old male is on warfarin with therapeutic INR result. (Goal INR 2.0-3.0)    Recent labs: (last 7 days)     05/09/23  1542   INR 2.9*       ASSESSMENT       Source(s): Patient/Caregiver Call       Warfarin doses taken: Warfarin taken as instructed    Diet: No new diet changes identified    Medication/supplement changes: None noted    New illness, injury, or hospitalization: No    Signs or symptoms of bleeding or clotting: No    Previous result: Therapeutic last visit; previously outside of goal range    Additional findings: INR is trending up         PLAN     Recommended plan for no diet, medication or health factor changes affecting INR     Dosing Instructions: decrease your warfarin dose (10% change) with next INR in 3 weeks       Summary  As of 5/9/2023    Full warfarin instructions:  5 mg every Mon, Fri; 2.5 mg all other days   Next INR check:  5/30/2023             Telephone call with Don who verbalizes understanding and agrees to plan and who agrees to plan and repeated back plan correctly    Lab visit scheduled    Education provided:     None required    Plan made per [facility] anticoagulation protocol    Ruthy Werner RN  Anticoagulation Clinic  5/9/2023    _______________________________________________________________________     Anticoagulation Episode Summary     Current INR goal:  2.0-3.0   TTR:  61.2 % (1 y)   Target end date:  Indefinite   Send INR reminders to:  [facility]    Indications    Heart valve replaced [Z95.2] [Z95.2]  Long term current use of anticoagulants with INR goal of 2.0-3.0 [Z79.01]  S/P aortic valve replacement [Z95.2]           Comments:           Anticoagulation Care Providers     Provider Role Specialty Phone number    Raman Morales MD Referring Cardiovascular Disease [phone]    March, Sundar Cantu MD Responsible Cardiovascular Disease [phone]

## 2023-05-09 NOTE — PROGRESS NOTES
ANTICOAGULATION CLINIC REFERRAL RENEWAL REQUEST       An annual renewal order is required for all patients referred to [facility] Anticoagulation [facility].?  Please review and sign the pended referral order for Richard Ball.       ANTICOAGULATION SUMMARY      Warfarin indication(s)   Mechanical AVR    Mechanical heart valve present?  Mechanical  AVR-Bileaflet       Current goal range   INR: 2.0-3.0     Goal appropriate for indication? Goal INR 2-3, standard for indication(s) above     Time in Therapeutic Range (TTR)  (Goal > 60%) 61.2%       Office visit with referring provider's group within last year yes on 6/14/22       Ruthy Werner RN  [facility]

## 2023-05-30 ENCOUNTER — LAB (OUTPATIENT)
Dept: LAB | Facility: CLINIC | Age: 63
End: 2023-05-30
Payer: COMMERCIAL

## 2023-05-30 ENCOUNTER — ANTICOAGULATION THERAPY VISIT (OUTPATIENT)
Dept: ANTICOAGULATION | Facility: CLINIC | Age: 63
End: 2023-05-30

## 2023-05-30 DIAGNOSIS — Z95.2 S/P AORTIC VALVE REPLACEMENT: ICD-10-CM

## 2023-05-30 DIAGNOSIS — Z79.01 LONG TERM CURRENT USE OF ANTICOAGULANTS WITH INR GOAL OF 2.0-3.0: ICD-10-CM

## 2023-05-30 DIAGNOSIS — Z95.2 HEART VALVE REPLACED: Primary | ICD-10-CM

## 2023-05-30 LAB — INR BLD: 2.8 (ref 0.9–1.1)

## 2023-05-30 PROCEDURE — 85610 PROTHROMBIN TIME: CPT

## 2023-05-30 PROCEDURE — 36415 COLL VENOUS BLD VENIPUNCTURE: CPT

## 2023-05-30 NOTE — PROGRESS NOTES
ANTICOAGULATION MANAGEMENT     Richard Ball 63 year old male is on warfarin with therapeutic INR result. (Goal INR 2.0-3.0)    Recent labs: (last 7 days)     05/30/23  1539   INR 2.8*       ASSESSMENT       Source(s): Chart Review    Previous INR was Therapeutic last 2(+) visits    Medication, diet, health changes since last INR chart reviewed; none identified             PLAN     Recommended plan for no diet, medication or health factor changes affecting INR     Dosing Instructions: Continue your current warfarin dose with next INR in 4 weeks       Summary  As of 5/30/2023    Full warfarin instructions:  5 mg every Mon, Fri; 2.5 mg all other days   Next INR check:  6/27/2023             Detailed voice message left for Don with dosing instructions and follow up date.   Sent P[employer] message with dosing and follow up instructions    Contact [phone] to schedule and with any changes, questions or concerns.     Education provided:     Please call back if any changes to your diet, medications or how you've been taking warfarin    Contact [phone] with any changes, questions or concerns.     Plan made per [facility] anticoagulation protocol    Lissa Montanez RN  Anticoagulation Clinic  5/30/2023    _______________________________________________________________________     Anticoagulation Episode Summary     Current INR goal:  2.0-3.0   TTR:  64.6 % (1 y)   Target end date:  Indefinite   Send INR reminders to:  [facility]    Indications    Heart valve replaced [Z95.2] [Z95.2]  Long term current use of anticoagulants with INR goal of 2.0-3.0 [Z79.01]  S/P aortic valve replacement [Z95.2]           Comments:           Anticoagulation Care Providers     Provider Role Specialty Phone number    March, Sundar Cantu MD Referring Cardiovascular Disease [phone]    Raman Morales MD Referring Cardiovascular Disease [phone]

## 2023-06-03 ENCOUNTER — HEALTH MAINTENANCE LETTER (OUTPATIENT)
Age: 63
End: 2023-06-03

## 2023-06-26 ENCOUNTER — ANTICOAGULATION THERAPY VISIT (OUTPATIENT)
Dept: ANTICOAGULATION | Facility: CLINIC | Age: 63
End: 2023-06-26

## 2023-06-26 ENCOUNTER — LAB (OUTPATIENT)
Dept: LAB | Facility: CLINIC | Age: 63
End: 2023-06-26
Payer: COMMERCIAL

## 2023-06-26 DIAGNOSIS — Z79.01 LONG TERM CURRENT USE OF ANTICOAGULANTS WITH INR GOAL OF 2.0-3.0: ICD-10-CM

## 2023-06-26 DIAGNOSIS — Z95.2 S/P AORTIC VALVE REPLACEMENT: ICD-10-CM

## 2023-06-26 DIAGNOSIS — Z95.2 HEART VALVE REPLACED: Primary | ICD-10-CM

## 2023-06-26 LAB — INR BLD: 2.2 (ref 0.9–1.1)

## 2023-06-26 PROCEDURE — 36416 COLLJ CAPILLARY BLOOD SPEC: CPT

## 2023-06-26 PROCEDURE — 85610 PROTHROMBIN TIME: CPT

## 2023-06-26 NOTE — PROGRESS NOTES
ANTICOAGULATION MANAGEMENT     Richard Ball 63 year old male is on warfarin with therapeutic INR result. (Goal INR 2.0-3.0)    Recent labs: (last 7 days)     06/26/23  1345   INR 2.2*       ASSESSMENT       Source(s): Chart Review and Patient/Caregiver Call       Warfarin doses taken: Warfarin taken as instructed    Diet: No new diet changes identified    Medication/supplement changes: None noted    New illness, injury, or hospitalization: No    Signs or symptoms of bleeding or clotting: No    Previous result: Therapeutic last 2(+) visits    Additional findings: None         PLAN     Recommended plan for no diet, medication or health factor changes affecting INR     Dosing Instructions: Continue your current warfarin dose with next INR in 6 weeks   (pt will be out of town until 8/5)    Summary  As of 6/26/2023    Full warfarin instructions:  5 mg every Mon, Fri; 2.5 mg all other days   Next INR check:  8/7/2023             Telephone call with Don who verbalizes understanding and agrees to plan    Lab visit scheduled    Education provided:     Please call back if any changes to your diet, medications or how you've been taking warfarin    Plan made per [facility] anticoagulation protocol    Miranda Harper, RN  Anticoagulation Clinic  6/26/2023    _______________________________________________________________________     Anticoagulation Episode Summary     Current INR goal:  2.0-3.0   TTR:  72.0 % (1 y)   Target end date:  Indefinite   Send INR reminders to:  [facility]    Indications    Heart valve replaced [Z95.2] [Z95.2]  Long term current use of anticoagulants with INR goal of 2.0-3.0 [Z79.01]  S/P aortic valve replacement [Z95.2]           Comments:           Anticoagulation Care Providers     Provider Role Specialty Phone number    March, Sundar Cantu MD Referring Cardiovascular Disease [phone]    Raman Morales MD Referring Cardiovascular Disease [phone]

## 2023-06-27 ENCOUNTER — ANCILLARY PROCEDURE (OUTPATIENT)
Dept: CARDIOLOGY | Facility: CLINIC | Age: 63
End: 2023-06-27
Attending: INTERNAL MEDICINE
Payer: COMMERCIAL

## 2023-06-27 ENCOUNTER — HOSPITAL ENCOUNTER (OUTPATIENT)
Dept: CARDIOLOGY | Facility: CLINIC | Age: 63
Discharge: HOME | End: 2023-06-27
Attending: INTERNAL MEDICINE
Payer: COMMERCIAL

## 2023-06-27 VITALS
HEART RATE: 62 BPM | WEIGHT: 168 LBS | DIASTOLIC BLOOD PRESSURE: 79 MMHG | BODY MASS INDEX: 23.76 KG/M2 | OXYGEN SATURATION: 96 % | SYSTOLIC BLOOD PRESSURE: 114 MMHG

## 2023-06-27 DIAGNOSIS — Z95.0 PACEMAKER: ICD-10-CM

## 2023-06-27 DIAGNOSIS — Z79.01 LONG TERM CURRENT USE OF ANTICOAGULANT THERAPY: ICD-10-CM

## 2023-06-27 DIAGNOSIS — Z95.0 CARDIAC PACEMAKER IN SITU: ICD-10-CM

## 2023-06-27 DIAGNOSIS — Z95.2 S/P AORTIC VALVE REPLACEMENT: ICD-10-CM

## 2023-06-27 DIAGNOSIS — Z95.2 S/P AVR (AORTIC VALVE REPLACEMENT): Primary | ICD-10-CM

## 2023-06-27 DIAGNOSIS — Q87.40 MARFAN'S SYNDROME: ICD-10-CM

## 2023-06-27 PROCEDURE — 93325 DOPPLER ECHO COLOR FLOW MAPG: CPT | Mod: TC

## 2023-06-27 PROCEDURE — 93018 CV STRESS TEST I&R ONLY: CPT | Performed by: INTERNAL MEDICINE

## 2023-06-27 PROCEDURE — 93325 DOPPLER ECHO COLOR FLOW MAPG: CPT | Mod: 26 | Performed by: INTERNAL MEDICINE

## 2023-06-27 PROCEDURE — 93350 STRESS TTE ONLY: CPT | Mod: 26 | Performed by: INTERNAL MEDICINE

## 2023-06-27 PROCEDURE — 255N000002 HC RX 255 OP 636: Performed by: INTERNAL MEDICINE

## 2023-06-27 PROCEDURE — G0463 HOSPITAL OUTPT CLINIC VISIT: HCPCS | Mod: 25 | Performed by: INTERNAL MEDICINE

## 2023-06-27 PROCEDURE — 99215 OFFICE O/P EST HI 40 MIN: CPT | Mod: 25 | Performed by: INTERNAL MEDICINE

## 2023-06-27 PROCEDURE — 93016 CV STRESS TEST SUPVJ ONLY: CPT | Performed by: INTERNAL MEDICINE

## 2023-06-27 PROCEDURE — 93017 CV STRESS TEST TRACING ONLY: CPT

## 2023-06-27 PROCEDURE — 93280 PM DEVICE PROGR EVAL DUAL: CPT | Mod: XE | Performed by: INTERNAL MEDICINE

## 2023-06-27 PROCEDURE — 93321 DOPPLER ECHO F-UP/LMTD STD: CPT | Mod: 26 | Performed by: INTERNAL MEDICINE

## 2023-06-27 RX ADMIN — PERFLUTREN 5 ML: 6.52 INJECTION, SUSPENSION INTRAVENOUS at 10:18

## 2023-06-27 ASSESSMENT — PAIN SCALES - GENERAL: PAINLEVEL: NO PAIN (0)

## 2023-06-27 NOTE — PATIENT INSTRUCTIONS
You were seen today in the Adult Congenital and Cardiovascular Genetics Clinic at the [facility].    Cardiology Providers you saw during your visit:  LASHAWN Garcia MD    Diagnosis:  Marfans syndrome    Results:  LASHAWN Garcia MD reviewed the results of your stress echo and device check testing today in clinic.    Recommendations for you:    No changes today      General Cardiac Recommendations:  Continue to eat a heart healthy, low salt diet.  Continue to get 20-30 minutes of aerobic activity, 4-5 days per week.  Examples of aerobic activity include walking, running, swimming, cycling, etc.  Continue to observe good oral hygiene, with regular dental visits.      SBE prophylaxis:   Yes____  No__x__      Exercise restrictions:   Yes__X__  No____         If yes, list restrictions:  Must be allowed to rest if fatigued or SOB      FASTING CHOLESTEROL was checked in the last 5 years YES__x_  NO___ (2022)      Follow-up:  Follow up with Dr Garcia in 1 year with an echo    If you have questions or concerns please contact us at:    Ketty Brown RN, BSN   Rachel Martin (Scheduling)  Nurse Care Coordinator     Clinic   Adult Congenital and CV Genetics   Adult Congenital and CV Genetic  [facility]   [facility]  (P) [phone]     (P) [phone]            (F) [phone]        For after hours urgent needs, call [phone] and ask to speak to the Adult Congenital Physician on call.  Mention Job Code 0401.    For emergencies call 437.    [facility]  [facility]   Clinics and [facility] Center  Mail Code [id number]  [address] [place]  [address]

## 2023-06-27 NOTE — PROGRESS NOTES
CARDIOLOGY CONSULTATION:    Mr. Blal is a delightful 63-year-old gentleman with a history of Marfan syndrome.  His dad likely also had Marfan syndrome and  of a cerebral aneurysm when he was 2 year old.  His mom  when he was 4 of ovarian cancer, and he was raised by an adoptive family.  He has 2 children in their 30s and neither of them have been screened for Marfan syndrome.      Mr. Ball was diagnosed at age 24.  He had genetic testing done at the [facility] in [place].  He underwent surgery in  at [facility] in [place] by Dr. Arriola.  His ascending aorta was 5.5 cm at the time, but we do not have those operative reports.  His aortic valve was replaced with a mechanical valve, and they reimplanted his coronary arteries.  In 2018 we did a coronary CTA and there was concern about significant coronary artery disease. He went on to have a coronary angiogram, and that showed no significant disease; although, he did have some disease it was not flow limiting. His calcium score was high placing him in the 95% on CTA. We need to be aggressive with his cholesterol as a result of this and his LDL is in the 50s on Lipitor 40 mg daily.  He had a stress echo in 2021 and that was negative for ischemia and he had another today that was also negative.  He is here with his partner and he reports walking - limited recently because he because he broke his knee cap - and no chest pain or discomfort.     His INR has for the most part been stable with his mechanical aortic valve and he takes a baby asprin.  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  He does have a pacemaker that was placed in  due to heart block, and his upper lead failed and Dr. Morales replaced it this year.  10 + years of life left.  He is looking forward to a trip on a train to [place] and [place] coming up. Plans to retire in [place].      He did have a cerebral CTA in  2018 that showed no evidence of cerebral aneurysms.  He did have a history of a TIA in the past but no deficits.  On his echo his valve is working well.  CTA with no concerning findings.    PAST MEDICAL HISTORY:  Past Medical History:   Diagnosis Date     Heart abnormality     Artifical aortic graft - ascending aorta     Hemothorax      Marfan's syndrome 12/6/2011       CURRENT MEDICATIONS:  Current Outpatient Medications   Medication Sig Dispense Refill     albuterol (VENTOLIN HFA) 108 (90 Base) MCG/ACT inhaler Inhale 2 puffs into the lungs every 4 hours as needed for wheezing Reported on 3/24/2017 18 g 3     aspirin 81 MG chewable tablet Take 1 tablet (81 mg) by mouth daily 90 tablet 3     atenolol (TENORMIN) 25 MG tablet Take 1 tablet (25 mg) by mouth daily With one 50 mg tablet by mouth daily for a total of 75 mg daily 90 tablet 3     atenolol (TENORMIN) 50 MG tablet Take 1 tablet (50 mg) by mouth daily And take one 25 mg by mouth daily for a total of 75 mg daily 90 tablet 3     atorvastatin (LIPITOR) 40 MG tablet Take 1 tablet (40 mg) by mouth daily 90 tablet 3     budesonide-formoterol (SYMBICORT) 80-4.5 MCG/ACT Inhaler Inhale 2 puffs into the lungs 2 times daily 10.2 g 3     losartan (COZAAR) 25 MG tablet Take 1 tablet (25 mg) by mouth daily 90 tablet 1     sildenafil (VIAGRA) 50 MG tablet Take 1 tablet (50 mg) by mouth daily as needed (erection) 30 tablet 1     warfarin ANTICOAGULANT (COUMADIN ANTICOAGULANT) 5 MG tablet Take 5 mg every Mon, Fri; 2.5 mg all other days or as directed by INR clinic 90 tablet 1     acetaminophen (TYLENOL) 500 MG tablet Take 1,000 mg by mouth 2 times daily       amoxicillin (AMOXIL) 500 MG capsule TAKE 4 CAPSULES BY MOUTH ONE HOUR PRIOR TO DENTAL APPOINTMENT (Patient not taking: Reported on 6/27/2023)         PAST SURGICAL HISTORY:  Past Surgical History:   Procedure Laterality Date     AORTIC VALVE REPLACEMENT  8/2/2001    Ascending aorta graft     EP PACEMAKER N/A 3/7/2022     Procedure: EP Pacemaker;  Surgeon: Raman Morales MD;  Location: [facility] OR     HC REMOVE TONSILS/ADENOIDS,<13 Y/O      T & A <12y.o.     HC VASECTOMY UNILAT/BILAT W POSTOP SEMEN      Vasectomy     IMPLANT PACEMAKER         ALLERGIES  Ambien [zolpidem]    FAMILY HX:  Family History   Problem Relation Age of Onset     Asthma No family hx of      Diabetes No family hx of      Hypertension No family hx of      Breast Cancer No family hx of      Cancer - colorectal No family hx of      Prostate Cancer No family hx of      Lipids No family hx of      Musculoskeletal Disorder No family hx of      Neurologic Disorder No family hx of        SOCIAL HX:  Social History     Socioeconomic History     Marital status:      Spouse name: None     Number of children: 2     Years of education: 16     Highest education level: None   Occupational History     Employer: UNEMPLOYED     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 No     Exercise Yes     Seat Belt Yes     Self-Exams Yes       ROS:  Constitutional: No fever, chills, or sweats. No weight gain/loss.   ENT: No visual disturbance, ear ache, epistaxis, sore throat.   Allergies/Immunologic: Negative.   Respiratory: No cough, hemoptysis.   Cardiovascular: As per HPI.   GI: No nausea, vomiting, hematemesis, melena, or hematochezia.   : No urinary frequency, dysuria, or hematuria.   Integument: Negative.   Psychiatric: Negative.   Neuro: Negative.   Endocrinology: Negative.   Musculoskeletal: No myalgia.    VITAL SIGNS:  /79 (BP Location: Right arm, Patient Position: Chair, Cuff Size: Adult  Regular)   Pulse 62   Wt 76.2 kg (168 lb)   SpO2 96%   BMI 23.76 kg/m    Body mass index is 23.76 kg/m .  Wt Readings from Last 2 Encounters:   06/27/23 76.2 kg (168 lb)   06/22/22 77.6 kg (171 lb)       PHYSICAL EXAM  Richard Ball IS A 63 year old male.in no acute distress.  HEENT: Unremarkable.  Neck: JVP normal.  Lungs: CTA.  Cor: RRR. Normal S1 and S2 mechanical.  Abd: Soft..  Extremities: No C/C/E. Neuro: Grossly intact.    LABS    Lab Results   Component Value Date    WBC 3.8 06/10/2022    WBC 4.3 06/07/2019     Lab Results   Component Value Date    RBC 4.38 06/10/2022    RBC 4.04 06/07/2019     Lab Results   Component Value Date    HGB 14.0 06/10/2022    HGB 13.1 06/07/2019     Lab Results   Component Value Date    HCT 40.2 06/10/2022    HCT 37.8 06/07/2019     No components found for: MCT  Lab Results   Component Value Date    MCV 92 06/10/2022    MCV 94 06/07/2019     Lab Results   Component Value Date    MCH 32.0 06/10/2022    MCH 32.4 06/07/2019     Lab Results   Component Value Date    MCHC 34.8 06/10/2022    MCHC 34.7 06/07/2019     Lab Results   Component Value Date    RDW 13.8 06/10/2022    RDW 13.5 06/07/2019     Lab Results   Component Value Date     06/10/2022     06/07/2019      Recent Labs   Lab Test 06/10/22  1109 03/07/22  0706    140   POTASSIUM 4.2 4.2   CHLORIDE 110* 112*   CO2 28 24   ANIONGAP 5 4   GLC 92 104*   BUN 13 13   CR 0.84 0.87   CLINT 8.4* 8.8     Recent Labs   Lab Test 06/10/22  1109 06/25/21  0727   CHOL 135 131   HDL 42 42   LDL 54 59   TRIG 193* 149   NHDL 93 89      IMPRESSION, REPORT, PLAN:   1.  Marfan syndrome.   2.  Aortic aneurysm, status post composite graft placement with a mechanical aortic valve at [facility] in [place] in 2001 by Dr. Arriola, functioning well.   3.  TIA 05/01/2018 with resolution of symptoms.  Negative CT of the head with no aneurysms found or bleeding.   4.  Hypertension, well controlled on atenolol and lisinopril.    5.  Hyperlipidemia   6.  Scoliosis.   7.  Need for family members screening for Marfan.   8.  Pacemaker placement in 2001 with failure of the atrial lead with 100% ventricular pacing.   9.  Coronary artery disease, nonobstructive, on cath 06/2018.        DISCUSSION:  It was a pleasure to see Mr. Ball in followup.  Clinically, he is doing well from a cardiovascular standpoint without any concerning cardiac symptoms.  He has not had any chest pain or discomfort.  He is remaining active.    Cholesterol is well controlled. BP controlled. CTA looks good as does echo. Stress test negative.      We will plan to see him back in a year with an echo.  OK to have colonoscopy - just needs to have a bridging with lovenox given history of TIA (1mg / KG BID once INR <2 and stop 12 hours before procedure and restart after and continue till INR >2)     It was a pleasure to see him.  Please do not hesitate to contact me with any questions or concerns.       He will continue to see the ophthalmologist     HARRY SAINI MD    42 minutes face-face, documentation and review of records on day of visit

## 2023-06-27 NOTE — NURSING NOTE
Chief Complaint   Patient presents with     Follow Up     63 year old male with history of Marfans Syndrome s/p aortic aneurysm repair with graft and mechanical AVR and PPM placement presenting for evaluation. pacemaker/lead extraction 3/7/22     Vitals were taken and medications reconciled.    Oscar Taveras, MICAHEL  12:08 PM

## 2023-06-27 NOTE — PATIENT INSTRUCTIONS
It was a pleasure to see you in clinic today, please do not hesitate to call us with any questions or concerns.  Your next automatic remote Pacemaker check is scheduled for 9/28/2023, we will plan to see you back in clinic in 1 year.    Laura Cedillo RN    Electrophysiology Nurse Clinician  [facility]    During Business Hours Please Call:  [phone]  After Hours Please Call:  [phone] - select option #4 and ask for job code [phone]

## 2023-06-27 NOTE — NURSING NOTE
Cardiac Testing: Patient given instructions regarding  echocardiogram . Discussed purpose, preparation, procedure and when to expect results reported back to the patient. Patient demonstrated understanding of this information and agreed to call with further questions or concerns.    Med Reconcile: Reviewed and verified all current medications with the patient. The updated medication list was printed and given to the patient.    Return Appointment: Patient given instructions regarding scheduling next clinic visit. Patient demonstrated understanding of this information and agreed to call with further questions or concerns.    Patient stated he understood all health information given and agreed to call with further questions or concerns.

## 2023-06-27 NOTE — LETTER
2023      RE: Richard Ball  [address]  [place] [facility] [address]       Dear Colleague,    Thank you for the opportunity to participate in the care of your patient, Richard Ball, at the [facility] [place] [facility]. Please see a copy of my visit note below.    CARDIOLOGY CONSULTATION:    Mr. Ball is a delightful 63-year-old gentleman with a history of Marfan syndrome.  His dad likely also had Marfan syndrome and  of a cerebral aneurysm when he was 2 year old.  His mom  when he was 4 of ovarian cancer, and he was raised by an adoptive family.  He has 2 children in their 30s and neither of them have been screened for Marfan syndrome.      Mr. Ball was diagnosed at age 24.  He had genetic testing done at the [facility] in [place].  He underwent surgery in  at [facility] in [place] by Dr. Arriola.  His ascending aorta was 5.5 cm at the time, but we do not have those operative reports.  His aortic valve was replaced with a mechanical valve, and they reimplanted his coronary arteries.  In 2018 we did a coronary CTA and there was concern about significant coronary artery disease. He went on to have a coronary angiogram, and that showed no significant disease; although, he did have some disease it was not flow limiting. His calcium score was high placing him in the 95% on CTA. We need to be aggressive with his cholesterol as a result of this and his LDL is in the 50s on Lipitor 40 mg daily.  He had a stress echo in 2021 and that was negative for ischemia and he had another today that was also negative.  He is here with his partner and he reports walking - limited recently because he because he broke his knee cap - and no chest pain or discomfort.     His INR has for the most part been stable with his mechanical aortic valve and he takes a baby asprin.  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  He does have a pacemaker that was placed in 2001 due to heart block, and his upper lead failed and Dr. Morales replaced it this year.  10 + years of life left.  He is looking forward to a trip on a train to [place] and [place] coming up. Plans to retire in [place].      He did have a cerebral CTA in 2018 that showed no evidence of cerebral aneurysms.  He did have a history of a TIA in the past but no deficits.  On his echo his valve is working well.  CTA with no concerning findings.    PAST MEDICAL HISTORY:  Past Medical History:   Diagnosis Date    Heart abnormality     Artifical aortic graft - ascending aorta    Hemothorax     Marfan's syndrome 12/6/2011       CURRENT MEDICATIONS:  Current Outpatient Medications   Medication Sig Dispense Refill    albuterol (VENTOLIN HFA) 108 (90 Base) MCG/ACT inhaler Inhale 2 puffs into the lungs every 4 hours as needed for wheezing Reported on 3/24/2017 18 g 3    aspirin 81 MG chewable tablet Take 1 tablet (81 mg) by mouth daily 90 tablet 3    atenolol (TENORMIN) 25 MG tablet Take 1 tablet (25 mg) by mouth daily With one 50 mg tablet by mouth daily for a total of 75 mg daily 90 tablet 3    atenolol (TENORMIN) 50 MG tablet Take 1 tablet (50 mg) by mouth daily And take one 25 mg by mouth daily for a total of 75 mg daily 90 tablet 3    atorvastatin (LIPITOR) 40 MG tablet Take 1 tablet (40 mg) by mouth daily 90 tablet 3    budesonide-formoterol (SYMBICORT) 80-4.5 MCG/ACT Inhaler Inhale 2 puffs into the lungs 2 times daily 10.2 g 3    losartan (COZAAR) 25 MG tablet Take 1 tablet (25 mg) by mouth daily 90 tablet 1    sildenafil (VIAGRA) 50 MG tablet Take 1 tablet (50 mg) by mouth daily as needed (erection) 30 tablet 1    warfarin ANTICOAGULANT (COUMADIN ANTICOAGULANT) 5 MG tablet Take 5 mg every Mon, Fri; 2.5 mg all other days or as directed by INR clinic 90 tablet 1    acetaminophen (TYLENOL) 500 MG tablet Take 1,000 mg by mouth 2 times  daily      amoxicillin (AMOXIL) 500 MG capsule TAKE 4 CAPSULES BY MOUTH ONE HOUR PRIOR TO DENTAL APPOINTMENT (Patient not taking: Reported on 6/27/2023)         PAST SURGICAL HISTORY:  Past Surgical History:   Procedure Laterality Date    AORTIC VALVE REPLACEMENT  8/2/2001    Ascending aorta graft    EP PACEMAKER N/A 3/7/2022    Procedure: EP Pacemaker;  Surgeon: Raman Morales MD;  Location: [facility] OR    HC REMOVE TONSILS/ADENOIDS,<13 Y/O      T & A <13y.o.    HC VASECTOMY UNILAT/BILAT W POSTOP SEMEN      Vasectomy    IMPLANT PACEMAKER         ALLERGIES  Ambien [zolpidem]    FAMILY HX:  Family History   Problem Relation Age of Onset    Asthma No family hx of     Diabetes No family hx of     Hypertension No family hx of     Breast Cancer No family hx of     Cancer - colorectal No family hx of     Prostate Cancer No family hx of     Lipids No family hx of     Musculoskeletal Disorder No family hx of     Neurologic Disorder No family hx of        SOCIAL HX:  Social History     Socioeconomic History    Marital status:      Spouse name: None    Number of children: 2    Years of education: 16    Highest education level: None   Occupational History     Employer: UNEMPLOYED     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 No    Exercise Yes    Seat Belt Yes    Self-Exams Yes       ROS:  Constitutional: No fever, chills, or sweats. No weight gain/loss.   ENT: No visual disturbance, ear ache, epistaxis, sore throat.   Allergies/Immunologic: Negative.   Respiratory: No cough, hemoptysis.   Cardiovascular: As per HPI.   GI: No nausea, vomiting,  hematemesis, melena, or hematochezia.   : No urinary frequency, dysuria, or hematuria.   Integument: Negative.   Psychiatric: Negative.   Neuro: Negative.   Endocrinology: Negative.   Musculoskeletal: No myalgia.    VITAL SIGNS:  /79 (BP Location: Right arm, Patient Position: Chair, Cuff Size: Adult Regular)   Pulse 62   Wt 76.2 kg (168 lb)   SpO2 96%   BMI 23.76 kg/m    Body mass index is 23.76 kg/m .  Wt Readings from Last 2 Encounters:   06/27/23 76.2 kg (168 lb)   06/22/22 77.6 kg (171 lb)       PHYSICAL EXAM  Richard Ball IS A 63 year old male.in no acute distress.  HEENT: Unremarkable.  Neck: JVP normal.  Lungs: CTA.  Cor: RRR. Normal S1 and S2 mechanical.  Abd: Soft..  Extremities: No C/C/E. Neuro: Grossly intact.    LABS    Lab Results   Component Value Date    WBC 3.8 06/10/2022    WBC 4.3 06/07/2019     Lab Results   Component Value Date    RBC 4.38 06/10/2022    RBC 4.04 06/07/2019     Lab Results   Component Value Date    HGB 14.0 06/10/2022    HGB 13.1 06/07/2019     Lab Results   Component Value Date    HCT 40.2 06/10/2022    HCT 37.8 06/07/2019     No components found for: MCT  Lab Results   Component Value Date    MCV 92 06/10/2022    MCV 94 06/07/2019     Lab Results   Component Value Date    MCH 32.0 06/10/2022    MCH 32.4 06/07/2019     Lab Results   Component Value Date    MCHC 34.8 06/10/2022    MCHC 34.7 06/07/2019     Lab Results   Component Value Date    RDW 13.8 06/10/2022    RDW 13.5 06/07/2019     Lab Results   Component Value Date     06/10/2022     06/07/2019      Recent Labs   Lab Test 06/10/22  1109 03/07/22  0706    140   POTASSIUM 4.2 4.2   CHLORIDE 110* 112*   CO2 28 24   ANIONGAP 5 4   GLC 92 104*   BUN 13 13   CR 0.84 0.87   CLINT 8.4* 8.8     Recent Labs   Lab Test 06/10/22  1109 06/25/21  0727   CHOL 135 131   HDL 42 42   LDL 54 59   TRIG 193* 149   NHDL 93 89      IMPRESSION, REPORT, PLAN:   1.  Marfan syndrome.   2.  Aortic aneurysm, status  post composite graft placement with a mechanical aortic valve at [facility] in [place] in 2001 by Dr. Arriola, functioning well.   3.  TIA 05/01/2018 with resolution of symptoms.  Negative CT of the head with no aneurysms found or bleeding.   4.  Hypertension, well controlled on atenolol and lisinopril.   5.  Hyperlipidemia   6.  Scoliosis.   7.  Need for family members screening for Marfan.   8.  Pacemaker placement in 2001 with failure of the atrial lead with 100% ventricular pacing.   9.  Coronary artery disease, nonobstructive, on cath 06/2018.        DISCUSSION:  It was a pleasure to see Mr. Ball in followup.  Clinically, he is doing well from a cardiovascular standpoint without any concerning cardiac symptoms.  He has not had any chest pain or discomfort.  He is remaining active.    Cholesterol is well controlled. BP controlled. CTA looks good as does echo. Stress test negative.      We will plan to see him back in a year with an echo.  OK to have colonoscopy - just needs to have a bridging with lovenox given history of TIA (1mg / KG BID once INR <2 and stop 12 hours before procedure and restart after and continue till INR >2)     It was a pleasure to see him.  Please do not hesitate to contact me with any questions or concerns.       He will continue to see the ophthalmologist     HARRY SAIIN MD    42 minutes face-face, documentation and review of records on day of visit

## 2023-08-07 ENCOUNTER — LAB (OUTPATIENT)
Dept: LAB | Facility: CLINIC | Age: 63
End: 2023-08-07
Payer: COMMERCIAL

## 2023-08-07 ENCOUNTER — ANTICOAGULATION THERAPY VISIT (OUTPATIENT)
Dept: ANTICOAGULATION | Facility: CLINIC | Age: 63
End: 2023-08-07

## 2023-08-07 DIAGNOSIS — Z79.01 LONG TERM CURRENT USE OF ANTICOAGULANTS WITH INR GOAL OF 2.0-3.0: ICD-10-CM

## 2023-08-07 DIAGNOSIS — Z95.2 S/P AORTIC VALVE REPLACEMENT: ICD-10-CM

## 2023-08-07 DIAGNOSIS — Z95.2 HEART VALVE REPLACED: Primary | ICD-10-CM

## 2023-08-07 LAB — INR BLD: 2.6 (ref 0.9–1.1)

## 2023-08-07 PROCEDURE — 36416 COLLJ CAPILLARY BLOOD SPEC: CPT

## 2023-08-07 PROCEDURE — 85610 PROTHROMBIN TIME: CPT

## 2023-08-07 NOTE — PROGRESS NOTES
ANTICOAGULATION MANAGEMENT     Richard Ball 63 year old male is on warfarin with therapeutic INR result. (Goal INR 2.0-3.0)    Recent labs: (last 7 days)     08/07/23  0914   INR 2.6*       ASSESSMENT     Source(s): Chart Review and Patient/Caregiver Call     Warfarin doses taken: Warfarin taken as instructed  Diet: No new diet changes identified  Medication/supplement changes: None noted  New illness, injury, or hospitalization: No  Signs or symptoms of bleeding or clotting: No  Previous result: Therapeutic last 2(+) visits  Additional findings:  just got back from a vacation     Per 6/27/23 Cardiology OV: OK to have colonoscopy - just needs to have a bridging with lovenox given history of TIA (1mg / KG BID once INR <2 and stop 12 hours before procedure and restart after and continue till INR >2)      PLAN     Recommended plan for no diet, medication or health factor changes affecting INR     Dosing Instructions: Continue your current warfarin dose with next INR in 6 weeks       Summary  As of 8/7/2023      Full warfarin instructions:  5 mg every Mon, Fri; 2.5 mg all other days   Next INR check:  9/20/2023               Telephone call with Henrik who agrees to plan and repeated back plan correctly    Lab visit scheduled    Education provided:   Goal range and lab monitoring: goal range and significance of current result  Contact [phone] with any changes, questions or concerns.     Plan made per [facility] anticoagulation protocol    ERIS CID RN  Anticoagulation Clinic  8/7/2023    _______________________________________________________________________     Anticoagulation Episode Summary       Current INR goal:  2.0-3.0   TTR:  79.0 % (1 y)   Target end date:  Indefinite   Send INR reminders to:  [facility]    Indications    Heart valve replaced [Z95.2] [Z95.2]  Long term current use of anticoagulants with INR goal of 2.0-3.0 [Z79.01]  S/P aortic valve replacement [Z95.2]             Comments:                Anticoagulation Care Providers       Provider Role Specialty Phone number    March, Sundar Cantu MD Referring Cardiovascular Disease [phone]    Raman Morales MD Referring Cardiovascular Disease [phone]

## 2023-08-08 DIAGNOSIS — Z95.2 S/P AORTIC VALVE REPLACEMENT: ICD-10-CM

## 2023-08-08 DIAGNOSIS — Q87.40 MARFAN'S SYNDROME: ICD-10-CM

## 2023-08-08 RX ORDER — WARFARIN SODIUM 5 MG/1
TABLET ORAL
Qty: 90 TABLET | Refills: 1 | Status: SHIPPED | OUTPATIENT
Start: 2023-08-08

## 2023-08-08 NOTE — TELEPHONE ENCOUNTER
ANTICOAGULATION MANAGEMENT:  Medication Refill    Anticoagulation Summary  As of 8/7/2023      Warfarin maintenance plan:  5 mg (5 mg x 1) every Mon, Fri; 2.5 mg (5 mg x 0.5) all other days   Next INR check:  9/20/2023   Target end date:  Indefinite    Indications    Heart valve replaced [Z95.2] [Z95.2]  Long term current use of anticoagulants with INR goal of 2.0-3.0 [Z79.01]  S/P aortic valve replacement [Z95.2]                 Anticoagulation Care Providers       Provider Role Specialty Phone number    March, Sundar Cantu MD Referring Cardiovascular Disease [phone]    Raman Morales MD Referring Cardiovascular Disease [phone]            Refill Criteria    Visit with referring provider/group: Meets criteria: office visit within referring provider group in the last 1 year on 6/27/23    [facility] referral signed last signed: 05/10/2023; within last year: Yes    Lab monitoring not exceeding 2 weeks overdue: No    Richard meets all criteria for refill. Rx instructions and quantity supplied updated to match patient's current dosing plan. Warfarin 90 day supply with 1 refill granted per [facility] protocol     ERIS CID RN  Anticoagulation Clinic

## 2023-09-20 ENCOUNTER — ANTICOAGULATION THERAPY VISIT (OUTPATIENT)
Dept: ANTICOAGULATION | Facility: CLINIC | Age: 63
End: 2023-09-20

## 2023-09-20 ENCOUNTER — LAB (OUTPATIENT)
Dept: LAB | Facility: CLINIC | Age: 63
End: 2023-09-20
Payer: COMMERCIAL

## 2023-09-20 DIAGNOSIS — Z95.2 S/P AORTIC VALVE REPLACEMENT: ICD-10-CM

## 2023-09-20 DIAGNOSIS — Z95.2 HEART VALVE REPLACED: Primary | ICD-10-CM

## 2023-09-20 DIAGNOSIS — Z79.01 LONG TERM CURRENT USE OF ANTICOAGULANTS WITH INR GOAL OF 2.0-3.0: ICD-10-CM

## 2023-09-20 LAB — INR BLD: 2.9 (ref 0.9–1.1)

## 2023-09-20 PROCEDURE — 85610 PROTHROMBIN TIME: CPT

## 2023-09-20 PROCEDURE — 36416 COLLJ CAPILLARY BLOOD SPEC: CPT

## 2023-09-20 NOTE — PROGRESS NOTES
ANTICOAGULATION MANAGEMENT     Richard Ball 63 year old male is on warfarin with therapeutic INR result. (Goal INR 2.0-3.0)    Recent labs: (last 7 days)     09/20/23  0726   INR 2.9*       ASSESSMENT     Source(s): Chart Review and Patient/Caregiver Call     Warfarin doses taken: Warfarin taken as instructed  Diet: No new diet changes identified  Medication/supplement changes: None noted  New illness, injury, or hospitalization: No  Signs or symptoms of bleeding or clotting: No  Previous result: Therapeutic last 2(+) visits  Additional findings: None       PLAN     Recommended plan for no diet, medication or health factor changes affecting INR     Dosing Instructions: Continue your current warfarin dose with next INR in 6 weeks       Summary  As of 9/20/2023      Full warfarin instructions:  5 mg every Mon, Fri; 2.5 mg all other days   Next INR check:  10/30/2023               Telephone call with Henrik who agrees to plan and repeated back plan correctly    Lab visit scheduled    Education provided:   Goal range and lab monitoring: goal range and significance of current result and Importance of following up at instructed interval  Symptom monitoring: monitoring for bleeding signs and symptoms  Contact [phone] with any changes, questions or concerns.     Plan made per [facility] anticoagulation protocol    ERIS CID RN  Anticoagulation Clinic  9/20/2023    _______________________________________________________________________     Anticoagulation Episode Summary       Current INR goal:  2.0-3.0   TTR:  83.0 % (1 y)   Target end date:  Indefinite   Send INR reminders to:  [facility]    Indications    Heart valve replaced [Z95.2] [Z95.2]  Long term current use of anticoagulants with INR goal of 2.0-3.0 [Z79.01]  S/P aortic valve replacement [Z95.2]             Comments:               Anticoagulation Care Providers       Provider Role Specialty Phone number    March, Sundar Cantu MD Referring  Cardiovascular Disease [phone]    Raman Morales MD Referring Cardiovascular Disease [phone]

## 2023-09-28 ENCOUNTER — ANCILLARY PROCEDURE (OUTPATIENT)
Dept: CARDIOLOGY | Facility: CLINIC | Age: 63
End: 2023-09-28
Attending: INTERNAL MEDICINE
Payer: COMMERCIAL

## 2023-09-28 DIAGNOSIS — Z95.0 CARDIAC PACEMAKER IN SITU: ICD-10-CM

## 2023-09-28 PROCEDURE — 93294 REM INTERROG EVL PM/LDLS PM: CPT | Performed by: INTERNAL MEDICINE

## 2023-09-28 PROCEDURE — 93296 REM INTERROG EVL PM/IDS: CPT

## 2023-10-04 DIAGNOSIS — Z79.01 LONG TERM CURRENT USE OF ANTICOAGULANTS WITH INR GOAL OF 2.0-3.0: ICD-10-CM

## 2023-10-04 DIAGNOSIS — Z95.2 S/P AORTIC VALVE REPLACEMENT: ICD-10-CM

## 2023-10-04 DIAGNOSIS — Z95.2 HEART VALVE REPLACED: Primary | ICD-10-CM

## 2023-10-04 RX ORDER — WARFARIN SODIUM 5 MG/1
TABLET ORAL
Qty: 90 TABLET | Refills: 1 | Status: SHIPPED | OUTPATIENT
Start: 2023-10-04

## 2023-10-04 NOTE — TELEPHONE ENCOUNTER
ANTICOAGULATION MANAGEMENT:  Medication Refill    Anticoagulation Summary  As of 9/20/2023      Warfarin maintenance plan:  5 mg (5 mg x 1) every Mon, Fri; 2.5 mg (5 mg x 0.5) all other days   Next INR check:  10/30/2023   Target end date:  Indefinite    Indications    Heart valve replaced [Z95.2] [Z95.2]  Long term current use of anticoagulants with INR goal of 2.0-3.0 [Z79.01]  S/P aortic valve replacement [Z95.2]                 Anticoagulation Care Providers       Provider Role Specialty Phone number    March, Sundar Cantu MD Referring Cardiovascular Disease [phone]    Raman Morales MD Referring Cardiovascular Disease [phone]            Refill Criteria    Visit with referring provider/group: Meets criteria: office visit within referring provider group in the last 1 year on 6/27/23    [facility] referral signed last signed: 05/10/2023; within last year: Yes    Lab monitoring not exceeding 2 weeks overdue: Yes    Richard meets all criteria for refill. Rx instructions and quantity supplied updated to match patient's current dosing plan. Warfarin 90 day supply with 1 refill granted per [facility] protocol     Kody Lorenz, RN  Anticoagulation Clinic

## 2023-10-05 DIAGNOSIS — Q87.40 MARFAN'S SYNDROME: ICD-10-CM

## 2023-10-05 RX ORDER — ATENOLOL 25 MG/1
25 TABLET ORAL DAILY
Qty: 90 TABLET | Refills: 3 | Status: SHIPPED | OUTPATIENT
Start: 2023-10-05 | End: 2024-07-10

## 2023-10-05 NOTE — PROGRESS NOTES
REFILL REQUEST    [facility] Cardiology Refill Guideline reviewed.  Medication meets criteria for refill.    Shannon Alvarado RN 10/05/23 3:09 PM

## 2023-10-30 ENCOUNTER — ANTICOAGULATION THERAPY VISIT (OUTPATIENT)
Dept: ANTICOAGULATION | Facility: CLINIC | Age: 63
End: 2023-10-30

## 2023-10-30 ENCOUNTER — LAB (OUTPATIENT)
Dept: LAB | Facility: CLINIC | Age: 63
End: 2023-10-30
Payer: COMMERCIAL

## 2023-10-30 DIAGNOSIS — Z95.2 S/P AORTIC VALVE REPLACEMENT: ICD-10-CM

## 2023-10-30 DIAGNOSIS — Z79.01 LONG TERM CURRENT USE OF ANTICOAGULANTS WITH INR GOAL OF 2.0-3.0: ICD-10-CM

## 2023-10-30 DIAGNOSIS — Z95.2 HEART VALVE REPLACED: Primary | ICD-10-CM

## 2023-10-30 LAB — INR BLD: 3.1 (ref 0.9–1.1)

## 2023-10-30 PROCEDURE — 36416 COLLJ CAPILLARY BLOOD SPEC: CPT

## 2023-10-30 PROCEDURE — 85610 PROTHROMBIN TIME: CPT

## 2023-10-30 NOTE — PROGRESS NOTES
ANTICOAGULATION MANAGEMENT     Richard Ball 63 year old male is on warfarin with supratherapeutic INR result. (Goal INR 2.0-3.0)    Recent labs: (last 7 days)     10/30/23  0740   INR 3.1*       ASSESSMENT     Source(s): Chart Review and Patient/Caregiver Call     Warfarin doses taken: Warfarin taken as instructed  Diet:  Pt states that he has been traveling lately and has had changes in diet as a result, plans to return back to regular intake.   Medication/supplement changes: None noted  New illness, injury, or hospitalization: No  Signs or symptoms of bleeding or clotting: No  Previous result: Therapeutic last 2(+) visits  Additional findings: None       PLAN     Recommended plan for temporary change(s) affecting INR     Dosing Instructions: Continue your current warfarin dose with next INR in 2 weeks       Summary  As of 10/30/2023      Full warfarin instructions:  5 mg every Mon, Fri; 2.5 mg all other days   Next INR check:  11/13/2023               Telephone call with Don who verbalizes understanding and agrees to plan    Patient offered & declined to schedule next visit    Education provided:   Goal range and lab monitoring: goal range and significance of current result and Importance of therapeutic range    Plan made per [facility] anticoagulation protocol    Wilfredo Collier RN  Anticoagulation Clinic  10/30/2023    _______________________________________________________________________     Anticoagulation Episode Summary       Current INR goal:  2.0-3.0   TTR:  77.5% (1 y)   Target end date:  Indefinite   Send INR reminders to:  [facility]    Indications    Heart valve replaced [Z95.2] [Z95.2]  Long term current use of anticoagulants with INR goal of 2.0-3.0 [Z79.01]  S/P aortic valve replacement [Z95.2]             Comments:               Anticoagulation Care Providers       Provider Role Specialty Phone number    March, Sundar Cantu MD Referring Cardiovascular Disease [phone]    Raman Morales MD  Referring Cardiovascular Disease [phone]

## 2023-11-03 DIAGNOSIS — Z95.2 S/P AORTIC VALVE REPLACEMENT: ICD-10-CM

## 2023-11-03 DIAGNOSIS — Z95.2 S/P AVR (AORTIC VALVE REPLACEMENT): ICD-10-CM

## 2023-11-03 DIAGNOSIS — Q87.40 MARFAN'S SYNDROME: ICD-10-CM

## 2023-11-03 DIAGNOSIS — Z95.2 HEART VALVE REPLACED: ICD-10-CM

## 2023-11-06 LAB
MDC_IDC_LEAD_IMPLANT_DT: NORMAL
MDC_IDC_LEAD_IMPLANT_DT: NORMAL
MDC_IDC_LEAD_LOCATION: NORMAL
MDC_IDC_LEAD_LOCATION: NORMAL
MDC_IDC_LEAD_LOCATION_DETAIL_1: NORMAL
MDC_IDC_LEAD_LOCATION_DETAIL_1: NORMAL
MDC_IDC_LEAD_MFG: NORMAL
MDC_IDC_LEAD_MFG: NORMAL
MDC_IDC_LEAD_MODEL: NORMAL
MDC_IDC_LEAD_MODEL: NORMAL
MDC_IDC_LEAD_POLARITY_TYPE: NORMAL
MDC_IDC_LEAD_POLARITY_TYPE: NORMAL
MDC_IDC_LEAD_SERIAL: NORMAL
MDC_IDC_LEAD_SERIAL: NORMAL
MDC_IDC_LEAD_SPECIAL_FUNCTION: NORMAL
MDC_IDC_LEAD_SPECIAL_FUNCTION: NORMAL
MDC_IDC_MSMT_BATTERY_DTM: NORMAL
MDC_IDC_MSMT_BATTERY_REMAINING_LONGEVITY: 132 MO
MDC_IDC_MSMT_BATTERY_RRT_TRIGGER: 2.62
MDC_IDC_MSMT_BATTERY_STATUS: NORMAL
MDC_IDC_MSMT_BATTERY_VOLTAGE: 3.01 V
MDC_IDC_MSMT_LEADCHNL_RA_IMPEDANCE_VALUE: 456 OHM
MDC_IDC_MSMT_LEADCHNL_RA_IMPEDANCE_VALUE: 646 OHM
MDC_IDC_MSMT_LEADCHNL_RA_PACING_THRESHOLD_AMPLITUDE: 0.5 V
MDC_IDC_MSMT_LEADCHNL_RA_PACING_THRESHOLD_PULSEWIDTH: 0.4 MS
MDC_IDC_MSMT_LEADCHNL_RA_SENSING_INTR_AMPL: 4.25 MV
MDC_IDC_MSMT_LEADCHNL_RV_IMPEDANCE_VALUE: 380 OHM
MDC_IDC_MSMT_LEADCHNL_RV_IMPEDANCE_VALUE: 456 OHM
MDC_IDC_MSMT_LEADCHNL_RV_PACING_THRESHOLD_AMPLITUDE: 0.5 V
MDC_IDC_MSMT_LEADCHNL_RV_PACING_THRESHOLD_PULSEWIDTH: 0.4 MS
MDC_IDC_PG_IMPLANT_DTM: NORMAL
MDC_IDC_PG_MFG: NORMAL
MDC_IDC_PG_MODEL: NORMAL
MDC_IDC_PG_SERIAL: NORMAL
MDC_IDC_PG_TYPE: NORMAL
MDC_IDC_SESS_CLINIC_NAME: NORMAL
MDC_IDC_SESS_DTM: NORMAL
MDC_IDC_SESS_TYPE: NORMAL
MDC_IDC_SET_BRADY_AT_MODE_SWITCH_RATE: 171 {BEATS}/MIN
MDC_IDC_SET_BRADY_HYSTRATE: NORMAL
MDC_IDC_SET_BRADY_LOWRATE: 60 {BEATS}/MIN
MDC_IDC_SET_BRADY_MAX_SENSOR_RATE: 130 {BEATS}/MIN
MDC_IDC_SET_BRADY_MAX_TRACKING_RATE: 130 {BEATS}/MIN
MDC_IDC_SET_BRADY_MODE: NORMAL
MDC_IDC_SET_BRADY_PAV_DELAY_LOW: 180 MS
MDC_IDC_SET_BRADY_SAV_DELAY_LOW: 150 MS
MDC_IDC_SET_LEADCHNL_RA_PACING_AMPLITUDE: 1.5 V
MDC_IDC_SET_LEADCHNL_RA_PACING_ANODE_ELECTRODE_1: NORMAL
MDC_IDC_SET_LEADCHNL_RA_PACING_ANODE_LOCATION_1: NORMAL
MDC_IDC_SET_LEADCHNL_RA_PACING_CAPTURE_MODE: NORMAL
MDC_IDC_SET_LEADCHNL_RA_PACING_CATHODE_ELECTRODE_1: NORMAL
MDC_IDC_SET_LEADCHNL_RA_PACING_CATHODE_LOCATION_1: NORMAL
MDC_IDC_SET_LEADCHNL_RA_PACING_POLARITY: NORMAL
MDC_IDC_SET_LEADCHNL_RA_PACING_PULSEWIDTH: 0.4 MS
MDC_IDC_SET_LEADCHNL_RA_SENSING_ANODE_ELECTRODE_1: NORMAL
MDC_IDC_SET_LEADCHNL_RA_SENSING_ANODE_LOCATION_1: NORMAL
MDC_IDC_SET_LEADCHNL_RA_SENSING_CATHODE_ELECTRODE_1: NORMAL
MDC_IDC_SET_LEADCHNL_RA_SENSING_CATHODE_LOCATION_1: NORMAL
MDC_IDC_SET_LEADCHNL_RA_SENSING_POLARITY: NORMAL
MDC_IDC_SET_LEADCHNL_RA_SENSING_SENSITIVITY: 0.3 MV
MDC_IDC_SET_LEADCHNL_RV_PACING_AMPLITUDE: 2 V
MDC_IDC_SET_LEADCHNL_RV_PACING_ANODE_ELECTRODE_1: NORMAL
MDC_IDC_SET_LEADCHNL_RV_PACING_ANODE_LOCATION_1: NORMAL
MDC_IDC_SET_LEADCHNL_RV_PACING_CAPTURE_MODE: NORMAL
MDC_IDC_SET_LEADCHNL_RV_PACING_CATHODE_ELECTRODE_1: NORMAL
MDC_IDC_SET_LEADCHNL_RV_PACING_CATHODE_LOCATION_1: NORMAL
MDC_IDC_SET_LEADCHNL_RV_PACING_POLARITY: NORMAL
MDC_IDC_SET_LEADCHNL_RV_PACING_PULSEWIDTH: 0.4 MS
MDC_IDC_SET_LEADCHNL_RV_SENSING_ANODE_ELECTRODE_1: NORMAL
MDC_IDC_SET_LEADCHNL_RV_SENSING_ANODE_LOCATION_1: NORMAL
MDC_IDC_SET_LEADCHNL_RV_SENSING_CATHODE_ELECTRODE_1: NORMAL
MDC_IDC_SET_LEADCHNL_RV_SENSING_CATHODE_LOCATION_1: NORMAL
MDC_IDC_SET_LEADCHNL_RV_SENSING_POLARITY: NORMAL
MDC_IDC_SET_LEADCHNL_RV_SENSING_SENSITIVITY: 0.9 MV
MDC_IDC_SET_ZONE_DETECTION_INTERVAL: 350 MS
MDC_IDC_SET_ZONE_DETECTION_INTERVAL: 400 MS
MDC_IDC_SET_ZONE_TYPE: NORMAL
MDC_IDC_STAT_AT_BURDEN_PERCENT: 0 %
MDC_IDC_STAT_AT_DTM_END: NORMAL
MDC_IDC_STAT_AT_DTM_START: NORMAL
MDC_IDC_STAT_BRADY_AP_VP_PERCENT: 63.98 %
MDC_IDC_STAT_BRADY_AP_VS_PERCENT: 0.02 %
MDC_IDC_STAT_BRADY_AS_VP_PERCENT: 35.76 %
MDC_IDC_STAT_BRADY_AS_VS_PERCENT: 0.23 %
MDC_IDC_STAT_BRADY_DTM_END: NORMAL
MDC_IDC_STAT_BRADY_DTM_START: NORMAL
MDC_IDC_STAT_BRADY_RA_PERCENT_PACED: 64.15 %
MDC_IDC_STAT_BRADY_RV_PERCENT_PACED: 99.75 %
MDC_IDC_STAT_EPISODE_RECENT_COUNT: 0
MDC_IDC_STAT_EPISODE_RECENT_COUNT: 0
MDC_IDC_STAT_EPISODE_RECENT_COUNT: 1
MDC_IDC_STAT_EPISODE_RECENT_COUNT_DTM_END: NORMAL
MDC_IDC_STAT_EPISODE_RECENT_COUNT_DTM_START: NORMAL
MDC_IDC_STAT_EPISODE_TOTAL_COUNT: 0
MDC_IDC_STAT_EPISODE_TOTAL_COUNT: 0
MDC_IDC_STAT_EPISODE_TOTAL_COUNT: 2
MDC_IDC_STAT_EPISODE_TOTAL_COUNT_DTM_END: NORMAL
MDC_IDC_STAT_EPISODE_TOTAL_COUNT_DTM_START: NORMAL
MDC_IDC_STAT_EPISODE_TYPE: NORMAL

## 2023-11-06 RX ORDER — LOSARTAN POTASSIUM 25 MG/1
25 TABLET ORAL DAILY
Qty: 90 TABLET | Refills: 0 | Status: SHIPPED | OUTPATIENT
Start: 2023-11-06 | End: 2024-02-11

## 2023-11-06 NOTE — TELEPHONE ENCOUNTER
losartan (COZAAR) 25 MG tablet 90 tablet 1 12/22/2022       Last Office Visit: 6/27/23  Future Office visit:   none    Angiotensin-II Receptors Onhbin9011/03/2023 10:15 AM   Protocol Details Normal serum creatinine on file in past 12 months    Normal serum potassium on file in past 12 months     Component      Latest Ref Rng 6/10/2022  11:09 AM   Sodium      133 - 144 mmol/L 143    Potassium      3.4 - 5.3 mmol/L 4.2    Chloride      94 - 109 mmol/L 110 (H)    Carbon Dioxide      20 - 32 mmol/L 28    Anion Gap      3 - 14 mmol/L 5    Urea Nitrogen      7 - 30 mg/dL 13    Creatinine      0.66 - 1.25 mg/dL 0.84    Calcium      8.5 - 10.1 mg/dL 8.4 (L)    Glucose      70 - 99 mg/dL 92                                   90 day janki refill sent to the pharmacy. Overdue labs per the refill protocol   Seema Zacarias RN

## 2023-11-17 DIAGNOSIS — E78.5 HYPERLIPIDEMIA LDL GOAL <70: ICD-10-CM

## 2023-11-27 RX ORDER — ATORVASTATIN CALCIUM 40 MG/1
40 TABLET, FILM COATED ORAL DAILY
Qty: 90 TABLET | Refills: 3 | Status: SHIPPED | OUTPATIENT
Start: 2023-11-27

## 2023-11-29 ENCOUNTER — ANTICOAGULATION THERAPY VISIT (OUTPATIENT)
Dept: ANTICOAGULATION | Facility: CLINIC | Age: 63
End: 2023-11-29

## 2023-11-29 ENCOUNTER — LAB (OUTPATIENT)
Dept: LAB | Facility: CLINIC | Age: 63
End: 2023-11-29
Payer: COMMERCIAL

## 2023-11-29 ENCOUNTER — MYC MEDICAL ADVICE (OUTPATIENT)
Dept: ANTICOAGULATION | Facility: CLINIC | Age: 63
End: 2023-11-29
Payer: COMMERCIAL

## 2023-11-29 DIAGNOSIS — Z95.2 S/P AORTIC VALVE REPLACEMENT: ICD-10-CM

## 2023-11-29 DIAGNOSIS — Z79.01 LONG TERM CURRENT USE OF ANTICOAGULANTS WITH INR GOAL OF 2.0-3.0: ICD-10-CM

## 2023-11-29 DIAGNOSIS — Z95.2 HEART VALVE REPLACED: Primary | ICD-10-CM

## 2023-11-29 LAB — INR BLD: 2.6 (ref 0.9–1.1)

## 2023-11-29 PROCEDURE — 36415 COLL VENOUS BLD VENIPUNCTURE: CPT

## 2023-11-29 PROCEDURE — 85610 PROTHROMBIN TIME: CPT

## 2023-11-29 NOTE — PROGRESS NOTES
ANTICOAGULATION MANAGEMENT     Richard Ball 63 year old male is on warfarin with therapeutic INR result. (Goal INR 2.0-3.0)    Recent labs: (last 7 days)     11/29/23  1503   INR 2.6*       ASSESSMENT     Source(s): Chart Review  Previous INR was Supratherapeutic  Medication, diet, health changes since last INR chart reviewed; none identified         PLAN     Recommended plan for no diet, medication or health factor changes affecting INR     Dosing Instructions: Continue your current warfarin dose with next INR in 4-5 weeks       Summary  As of 11/29/2023      Full warfarin instructions:  5 mg every Mon, Fri; 2.5 mg all other days   Next INR check:  1/3/2024               Detailed voice message left for Don with dosing instructions and follow up date.     Contact [phone]  to schedule and with any changes, questions or concerns.     Education provided:   Please call back if any changes to your diet, medications or how you've been taking warfarin    Plan made per [facility] anticoagulation protocol    Wilfredo Collier RN  Anticoagulation Clinic  11/29/2023    _______________________________________________________________________     Anticoagulation Episode Summary       Current INR goal:  2.0-3.0   TTR:  75.9% (1 y)   Target end date:  Indefinite   Send INR reminders to:  [facility]    Indications    Heart valve replaced [Z95.2] [Z95.2]  Long term current use of anticoagulants with INR goal of 2.0-3.0 [Z79.01]  S/P aortic valve replacement [Z95.2]             Comments:               Anticoagulation Care Providers       Provider Role Specialty Phone number    March, Sundar Cantu MD Referring Cardiovascular Disease [phone]    Raman Morales MD Referring Cardiovascular Disease [phone]

## 2023-12-02 DIAGNOSIS — Q87.40 MARFAN'S SYNDROME: ICD-10-CM

## 2023-12-04 RX ORDER — ATENOLOL 50 MG/1
50 TABLET ORAL DAILY
Qty: 90 TABLET | Refills: 2 | Status: SHIPPED | OUTPATIENT
Start: 2023-12-04 | End: 2024-07-03

## 2023-12-04 NOTE — TELEPHONE ENCOUNTER
atenolol (TENORMIN) 50 MG tablet   90 tablet 3 12/9/2022 6/27/2023  [facility]    March, Sundar Cantu MD  Cardiovascular Disease  Filed to coincide w 25 mg

## 2023-12-26 ENCOUNTER — MYC MEDICAL ADVICE (OUTPATIENT)
Dept: CARDIOLOGY | Facility: CLINIC | Age: 63
End: 2023-12-26
Payer: COMMERCIAL

## 2023-12-28 ENCOUNTER — ANCILLARY PROCEDURE (OUTPATIENT)
Dept: CARDIOLOGY | Facility: CLINIC | Age: 63
End: 2023-12-28
Attending: INTERNAL MEDICINE
Payer: COMMERCIAL

## 2023-12-28 DIAGNOSIS — Z95.0 CARDIAC PACEMAKER IN SITU: ICD-10-CM

## 2023-12-28 PROCEDURE — 93294 REM INTERROG EVL PM/LDLS PM: CPT | Performed by: INTERNAL MEDICINE

## 2023-12-28 PROCEDURE — 93296 REM INTERROG EVL PM/IDS: CPT

## 2024-01-03 ENCOUNTER — ANTICOAGULATION THERAPY VISIT (OUTPATIENT)
Dept: ANTICOAGULATION | Facility: CLINIC | Age: 64
End: 2024-01-03

## 2024-01-03 ENCOUNTER — LAB (OUTPATIENT)
Dept: LAB | Facility: CLINIC | Age: 64
End: 2024-01-03
Payer: COMMERCIAL

## 2024-01-03 DIAGNOSIS — Z79.01 LONG TERM CURRENT USE OF ANTICOAGULANTS WITH INR GOAL OF 2.0-3.0: ICD-10-CM

## 2024-01-03 DIAGNOSIS — Z95.2 S/P AORTIC VALVE REPLACEMENT: ICD-10-CM

## 2024-01-03 DIAGNOSIS — Z95.2 HEART VALVE REPLACED: Primary | ICD-10-CM

## 2024-01-03 LAB — INR BLD: 1.5 (ref 0.9–1.1)

## 2024-01-03 PROCEDURE — 36416 COLLJ CAPILLARY BLOOD SPEC: CPT

## 2024-01-03 PROCEDURE — 85610 PROTHROMBIN TIME: CPT

## 2024-01-03 NOTE — PROGRESS NOTES
ANTICOAGULATION MANAGEMENT     Richard Ball 63 year old male is on warfarin with subtherapeutic INR result. (Goal INR 2.0-3.0)    Recent labs: (last 7 days)     01/03/24  1151   INR 1.5*       ASSESSMENT     Source(s): Chart Review and Patient/Caregiver Call     Warfarin doses taken: Missed dose(s) may be affecting INR. Henrik believes he missed a dose 12/29 due to mishap with his pills and traveling.   Diet: No new diet changes identified  Medication/supplement changes: None noted  New illness, injury, or hospitalization: No  Signs or symptoms of bleeding or clotting: No  Previous result: Therapeutic last 2(+) visits  Additional findings: None       PLAN     Recommended plan for temporary change(s) affecting INR     Dosing Instructions: booster dose then continue your current warfarin dose with next INR in 1 week       Summary  As of 1/3/2024      Full warfarin instructions:  1/3: 5 mg; Otherwise 5 mg every Mon, Fri; 2.5 mg all other days   Next INR check:  1/10/2024               Telephone call with Henrik who verbalizes understanding and agrees to plan    Lab visit scheduled    Education provided:   Please call back if any changes to your diet, medications or how you've been taking warfarin  Taking warfarin: Importance of taking warfarin as instructed  Goal range and lab monitoring: goal range and significance of current result    Plan made per [facility] anticoagulation protocol    Romelia Encarnacion RN  Anticoagulation Clinic  1/3/2024    _______________________________________________________________________     Anticoagulation Episode Summary       Current INR goal:  2.0-3.0   TTR:  71.5% (1 y)   Target end date:  Indefinite   Send INR reminders to:  [facility]    Indications    Heart valve replaced [Z95.2] [Z95.2]  Long term current use of anticoagulants with INR goal of 2.0-3.0 [Z79.01]  S/P aortic valve replacement [Z95.2]             Comments:               Anticoagulation Care Providers       Provider Role  Specialty Phone number    March, Gwynane MD Alondra Referring Cardiovascular Disease [phone]    Raman Morales MD Referring Cardiovascular Disease [phone]

## 2024-01-10 ENCOUNTER — ANTICOAGULATION THERAPY VISIT (OUTPATIENT)
Dept: ANTICOAGULATION | Facility: CLINIC | Age: 64
End: 2024-01-10

## 2024-01-10 ENCOUNTER — LAB (OUTPATIENT)
Dept: LAB | Facility: CLINIC | Age: 64
End: 2024-01-10
Payer: COMMERCIAL

## 2024-01-10 DIAGNOSIS — Z79.01 LONG TERM CURRENT USE OF ANTICOAGULANTS WITH INR GOAL OF 2.0-3.0: ICD-10-CM

## 2024-01-10 DIAGNOSIS — Z95.2 S/P AORTIC VALVE REPLACEMENT: ICD-10-CM

## 2024-01-10 DIAGNOSIS — Z95.2 HEART VALVE REPLACED: Primary | ICD-10-CM

## 2024-01-10 LAB — INR BLD: 2.5 (ref 0.9–1.1)

## 2024-01-10 PROCEDURE — 36416 COLLJ CAPILLARY BLOOD SPEC: CPT

## 2024-01-10 PROCEDURE — 85610 PROTHROMBIN TIME: CPT

## 2024-01-10 NOTE — PROGRESS NOTES
ANTICOAGULATION MANAGEMENT     Richard Ball 63 year old male is on warfarin with therapeutic INR result. (Goal INR 2.0-3.0)    Recent labs: (last 7 days)     01/10/24  1344   INR 2.5*       ASSESSMENT     Source(s): Chart Review and Patient/Caregiver Call     Warfarin doses taken: Booster dose(s) recently taken which may be affecting INR  Diet: No new diet changes identified  Medication/supplement changes: None noted  New illness, injury, or hospitalization: No  Signs or symptoms of bleeding or clotting: No  Previous result: Subtherapeutic  Additional findings: None       PLAN     Recommended plan for temporary change(s) affecting INR     Dosing Instructions: Continue your current warfarin dose with next INR in 4 weeks       Summary  As of 1/10/2024      Full warfarin instructions:  5 mg every Mon, Fri; 2.5 mg all other days   Next INR check:  2/7/2024               Telephone call with Henrik who verbalizes understanding and agrees to plan    Lab visit scheduled    Education provided:   Please call back if any changes to your diet, medications or how you've been taking warfarin  Symptom monitoring: monitoring for bleeding signs and symptoms and monitoring for clotting signs and symptoms    Plan made per [facility] anticoagulation protocol    Carolina Rosa RN  Anticoagulation Clinic  1/10/2024    _______________________________________________________________________     Anticoagulation Episode Summary       Current INR goal:  2.0-3.0   TTR:  70.6% (1 y)   Target end date:  Indefinite   Send INR reminders to:  [facility]    Indications    Heart valve replaced [Z95.2] [Z95.2]  Long term current use of anticoagulants with INR goal of 2.0-3.0 [Z79.01]  S/P aortic valve replacement [Z95.2]             Comments:               Anticoagulation Care Providers       Provider Role Specialty Phone number    March, Sundar Cantu MD Referring Cardiovascular Disease [phone]    Raman Morales MD Referring  Cardiovascular Disease [phone]

## 2024-01-22 LAB
MDC_IDC_LEAD_CONNECTION_STATUS: NORMAL
MDC_IDC_LEAD_CONNECTION_STATUS: NORMAL
MDC_IDC_LEAD_IMPLANT_DT: NORMAL
MDC_IDC_LEAD_IMPLANT_DT: NORMAL
MDC_IDC_LEAD_LOCATION: NORMAL
MDC_IDC_LEAD_LOCATION: NORMAL
MDC_IDC_LEAD_LOCATION_DETAIL_1: NORMAL
MDC_IDC_LEAD_LOCATION_DETAIL_1: NORMAL
MDC_IDC_LEAD_MFG: NORMAL
MDC_IDC_LEAD_MFG: NORMAL
MDC_IDC_LEAD_MODEL: NORMAL
MDC_IDC_LEAD_MODEL: NORMAL
MDC_IDC_LEAD_POLARITY_TYPE: NORMAL
MDC_IDC_LEAD_POLARITY_TYPE: NORMAL
MDC_IDC_LEAD_SERIAL: NORMAL
MDC_IDC_LEAD_SERIAL: NORMAL
MDC_IDC_LEAD_SPECIAL_FUNCTION: NORMAL
MDC_IDC_LEAD_SPECIAL_FUNCTION: NORMAL
MDC_IDC_MSMT_BATTERY_DTM: NORMAL
MDC_IDC_MSMT_BATTERY_REMAINING_LONGEVITY: 123 MO
MDC_IDC_MSMT_BATTERY_RRT_TRIGGER: 2.62
MDC_IDC_MSMT_BATTERY_STATUS: NORMAL
MDC_IDC_MSMT_BATTERY_VOLTAGE: 3 V
MDC_IDC_MSMT_LEADCHNL_RA_IMPEDANCE_VALUE: 418 OHM
MDC_IDC_MSMT_LEADCHNL_RA_IMPEDANCE_VALUE: 570 OHM
MDC_IDC_MSMT_LEADCHNL_RA_PACING_THRESHOLD_AMPLITUDE: 0.5 V
MDC_IDC_MSMT_LEADCHNL_RA_PACING_THRESHOLD_PULSEWIDTH: 0.4 MS
MDC_IDC_MSMT_LEADCHNL_RA_SENSING_INTR_AMPL: 4.38 MV
MDC_IDC_MSMT_LEADCHNL_RV_IMPEDANCE_VALUE: 342 OHM
MDC_IDC_MSMT_LEADCHNL_RV_IMPEDANCE_VALUE: 418 OHM
MDC_IDC_MSMT_LEADCHNL_RV_PACING_THRESHOLD_AMPLITUDE: 0.5 V
MDC_IDC_MSMT_LEADCHNL_RV_PACING_THRESHOLD_PULSEWIDTH: 0.4 MS
MDC_IDC_PG_IMPLANT_DTM: NORMAL
MDC_IDC_PG_MFG: NORMAL
MDC_IDC_PG_MODEL: NORMAL
MDC_IDC_PG_SERIAL: NORMAL
MDC_IDC_PG_TYPE: NORMAL
MDC_IDC_SESS_CLINIC_NAME: NORMAL
MDC_IDC_SESS_DTM: NORMAL
MDC_IDC_SESS_TYPE: NORMAL
MDC_IDC_SET_BRADY_AT_MODE_SWITCH_RATE: 171 {BEATS}/MIN
MDC_IDC_SET_BRADY_HYSTRATE: NORMAL
MDC_IDC_SET_BRADY_LOWRATE: 60 {BEATS}/MIN
MDC_IDC_SET_BRADY_MAX_SENSOR_RATE: 130 {BEATS}/MIN
MDC_IDC_SET_BRADY_MAX_TRACKING_RATE: 130 {BEATS}/MIN
MDC_IDC_SET_BRADY_MODE: NORMAL
MDC_IDC_SET_BRADY_PAV_DELAY_LOW: 180 MS
MDC_IDC_SET_BRADY_SAV_DELAY_LOW: 150 MS
MDC_IDC_SET_LEADCHNL_RA_PACING_AMPLITUDE: 1.5 V
MDC_IDC_SET_LEADCHNL_RA_PACING_ANODE_ELECTRODE_1: NORMAL
MDC_IDC_SET_LEADCHNL_RA_PACING_ANODE_LOCATION_1: NORMAL
MDC_IDC_SET_LEADCHNL_RA_PACING_CAPTURE_MODE: NORMAL
MDC_IDC_SET_LEADCHNL_RA_PACING_CATHODE_ELECTRODE_1: NORMAL
MDC_IDC_SET_LEADCHNL_RA_PACING_CATHODE_LOCATION_1: NORMAL
MDC_IDC_SET_LEADCHNL_RA_PACING_POLARITY: NORMAL
MDC_IDC_SET_LEADCHNL_RA_PACING_PULSEWIDTH: 0.4 MS
MDC_IDC_SET_LEADCHNL_RA_SENSING_ANODE_ELECTRODE_1: NORMAL
MDC_IDC_SET_LEADCHNL_RA_SENSING_ANODE_LOCATION_1: NORMAL
MDC_IDC_SET_LEADCHNL_RA_SENSING_CATHODE_ELECTRODE_1: NORMAL
MDC_IDC_SET_LEADCHNL_RA_SENSING_CATHODE_LOCATION_1: NORMAL
MDC_IDC_SET_LEADCHNL_RA_SENSING_POLARITY: NORMAL
MDC_IDC_SET_LEADCHNL_RA_SENSING_SENSITIVITY: 0.3 MV
MDC_IDC_SET_LEADCHNL_RV_PACING_AMPLITUDE: 2 V
MDC_IDC_SET_LEADCHNL_RV_PACING_ANODE_ELECTRODE_1: NORMAL
MDC_IDC_SET_LEADCHNL_RV_PACING_ANODE_LOCATION_1: NORMAL
MDC_IDC_SET_LEADCHNL_RV_PACING_CAPTURE_MODE: NORMAL
MDC_IDC_SET_LEADCHNL_RV_PACING_CATHODE_ELECTRODE_1: NORMAL
MDC_IDC_SET_LEADCHNL_RV_PACING_CATHODE_LOCATION_1: NORMAL
MDC_IDC_SET_LEADCHNL_RV_PACING_POLARITY: NORMAL
MDC_IDC_SET_LEADCHNL_RV_PACING_PULSEWIDTH: 0.4 MS
MDC_IDC_SET_LEADCHNL_RV_SENSING_ANODE_ELECTRODE_1: NORMAL
MDC_IDC_SET_LEADCHNL_RV_SENSING_ANODE_LOCATION_1: NORMAL
MDC_IDC_SET_LEADCHNL_RV_SENSING_CATHODE_ELECTRODE_1: NORMAL
MDC_IDC_SET_LEADCHNL_RV_SENSING_CATHODE_LOCATION_1: NORMAL
MDC_IDC_SET_LEADCHNL_RV_SENSING_POLARITY: NORMAL
MDC_IDC_SET_LEADCHNL_RV_SENSING_SENSITIVITY: 0.9 MV
MDC_IDC_SET_ZONE_DETECTION_INTERVAL: 350 MS
MDC_IDC_SET_ZONE_DETECTION_INTERVAL: 400 MS
MDC_IDC_SET_ZONE_STATUS: NORMAL
MDC_IDC_SET_ZONE_STATUS: NORMAL
MDC_IDC_SET_ZONE_TYPE: NORMAL
MDC_IDC_SET_ZONE_VENDOR_TYPE: NORMAL
MDC_IDC_STAT_AT_BURDEN_PERCENT: 0.1 %
MDC_IDC_STAT_AT_DTM_END: NORMAL
MDC_IDC_STAT_AT_DTM_START: NORMAL
MDC_IDC_STAT_BRADY_AP_VP_PERCENT: 68.27 %
MDC_IDC_STAT_BRADY_AP_VS_PERCENT: 0 %
MDC_IDC_STAT_BRADY_AS_VP_PERCENT: 31.72 %
MDC_IDC_STAT_BRADY_AS_VS_PERCENT: 0.01 %
MDC_IDC_STAT_BRADY_DTM_END: NORMAL
MDC_IDC_STAT_BRADY_DTM_START: NORMAL
MDC_IDC_STAT_BRADY_RA_PERCENT_PACED: 68.25 %
MDC_IDC_STAT_BRADY_RV_PERCENT_PACED: 99.99 %
MDC_IDC_STAT_EPISODE_RECENT_COUNT: 0
MDC_IDC_STAT_EPISODE_RECENT_COUNT_DTM_END: NORMAL
MDC_IDC_STAT_EPISODE_RECENT_COUNT_DTM_START: NORMAL
MDC_IDC_STAT_EPISODE_TOTAL_COUNT: 0
MDC_IDC_STAT_EPISODE_TOTAL_COUNT: 2
MDC_IDC_STAT_EPISODE_TOTAL_COUNT_DTM_END: NORMAL
MDC_IDC_STAT_EPISODE_TOTAL_COUNT_DTM_START: NORMAL
MDC_IDC_STAT_EPISODE_TYPE: NORMAL
MDC_IDC_STAT_TACHYTHERAPY_RECENT_DTM_END: NORMAL
MDC_IDC_STAT_TACHYTHERAPY_RECENT_DTM_START: NORMAL
MDC_IDC_STAT_TACHYTHERAPY_TOTAL_DTM_END: NORMAL
MDC_IDC_STAT_TACHYTHERAPY_TOTAL_DTM_START: NORMAL

## 2024-02-02 LAB
MDC_IDC_LEAD_IMPLANT_DT: NORMAL
MDC_IDC_LEAD_IMPLANT_DT: NORMAL
MDC_IDC_LEAD_LOCATION: NORMAL
MDC_IDC_LEAD_LOCATION: NORMAL
MDC_IDC_LEAD_LOCATION_DETAIL_1: NORMAL
MDC_IDC_LEAD_LOCATION_DETAIL_1: NORMAL
MDC_IDC_LEAD_MFG: NORMAL
MDC_IDC_LEAD_MFG: NORMAL
MDC_IDC_LEAD_MODEL: NORMAL
MDC_IDC_LEAD_MODEL: NORMAL
MDC_IDC_LEAD_POLARITY_TYPE: NORMAL
MDC_IDC_LEAD_POLARITY_TYPE: NORMAL
MDC_IDC_LEAD_SERIAL: NORMAL
MDC_IDC_LEAD_SERIAL: NORMAL
MDC_IDC_LEAD_SPECIAL_FUNCTION: NORMAL
MDC_IDC_LEAD_SPECIAL_FUNCTION: NORMAL
MDC_IDC_MSMT_BATTERY_DTM: NORMAL
MDC_IDC_MSMT_BATTERY_REMAINING_LONGEVITY: 125 MO
MDC_IDC_MSMT_BATTERY_RRT_TRIGGER: 2.62
MDC_IDC_MSMT_BATTERY_STATUS: NORMAL
MDC_IDC_MSMT_BATTERY_VOLTAGE: 3.01 V
MDC_IDC_MSMT_LEADCHNL_RA_IMPEDANCE_VALUE: 399 OHM
MDC_IDC_MSMT_LEADCHNL_RA_IMPEDANCE_VALUE: 532 OHM
MDC_IDC_MSMT_LEADCHNL_RA_PACING_THRESHOLD_AMPLITUDE: 0.5 V
MDC_IDC_MSMT_LEADCHNL_RA_PACING_THRESHOLD_PULSEWIDTH: 0.4 MS
MDC_IDC_MSMT_LEADCHNL_RA_SENSING_INTR_AMPL: 4.25 MV
MDC_IDC_MSMT_LEADCHNL_RA_SENSING_INTR_AMPL: 4.25 MV
MDC_IDC_MSMT_LEADCHNL_RV_IMPEDANCE_VALUE: 342 OHM
MDC_IDC_MSMT_LEADCHNL_RV_IMPEDANCE_VALUE: 418 OHM
MDC_IDC_MSMT_LEADCHNL_RV_PACING_THRESHOLD_AMPLITUDE: 0.75 V
MDC_IDC_MSMT_LEADCHNL_RV_PACING_THRESHOLD_PULSEWIDTH: 0.4 MS
MDC_IDC_MSMT_LEADCHNL_RV_SENSING_INTR_AMPL: 8.12 MV
MDC_IDC_PG_IMPLANT_DTM: NORMAL
MDC_IDC_PG_MFG: NORMAL
MDC_IDC_PG_MODEL: NORMAL
MDC_IDC_PG_SERIAL: NORMAL
MDC_IDC_PG_TYPE: NORMAL
MDC_IDC_SESS_CLINIC_NAME: NORMAL
MDC_IDC_SESS_DTM: NORMAL
MDC_IDC_SESS_TYPE: NORMAL
MDC_IDC_SET_BRADY_AT_MODE_SWITCH_RATE: 171 {BEATS}/MIN
MDC_IDC_SET_BRADY_HYSTRATE: NORMAL
MDC_IDC_SET_BRADY_LOWRATE: 60 {BEATS}/MIN
MDC_IDC_SET_BRADY_MAX_SENSOR_RATE: 130 {BEATS}/MIN
MDC_IDC_SET_BRADY_MAX_TRACKING_RATE: 130 {BEATS}/MIN
MDC_IDC_SET_BRADY_MODE: NORMAL
MDC_IDC_SET_BRADY_PAV_DELAY_LOW: 180 MS
MDC_IDC_SET_BRADY_SAV_DELAY_LOW: 150 MS
MDC_IDC_SET_LEADCHNL_RA_PACING_AMPLITUDE: 1.5 V
MDC_IDC_SET_LEADCHNL_RA_PACING_ANODE_ELECTRODE_1: NORMAL
MDC_IDC_SET_LEADCHNL_RA_PACING_ANODE_LOCATION_1: NORMAL
MDC_IDC_SET_LEADCHNL_RA_PACING_CAPTURE_MODE: NORMAL
MDC_IDC_SET_LEADCHNL_RA_PACING_CATHODE_ELECTRODE_1: NORMAL
MDC_IDC_SET_LEADCHNL_RA_PACING_CATHODE_LOCATION_1: NORMAL
MDC_IDC_SET_LEADCHNL_RA_PACING_POLARITY: NORMAL
MDC_IDC_SET_LEADCHNL_RA_PACING_PULSEWIDTH: 0.4 MS
MDC_IDC_SET_LEADCHNL_RA_SENSING_ANODE_ELECTRODE_1: NORMAL
MDC_IDC_SET_LEADCHNL_RA_SENSING_ANODE_LOCATION_1: NORMAL
MDC_IDC_SET_LEADCHNL_RA_SENSING_CATHODE_ELECTRODE_1: NORMAL
MDC_IDC_SET_LEADCHNL_RA_SENSING_CATHODE_LOCATION_1: NORMAL
MDC_IDC_SET_LEADCHNL_RA_SENSING_POLARITY: NORMAL
MDC_IDC_SET_LEADCHNL_RA_SENSING_SENSITIVITY: 0.3 MV
MDC_IDC_SET_LEADCHNL_RV_PACING_AMPLITUDE: 2 V
MDC_IDC_SET_LEADCHNL_RV_PACING_ANODE_ELECTRODE_1: NORMAL
MDC_IDC_SET_LEADCHNL_RV_PACING_ANODE_LOCATION_1: NORMAL
MDC_IDC_SET_LEADCHNL_RV_PACING_CAPTURE_MODE: NORMAL
MDC_IDC_SET_LEADCHNL_RV_PACING_CATHODE_ELECTRODE_1: NORMAL
MDC_IDC_SET_LEADCHNL_RV_PACING_CATHODE_LOCATION_1: NORMAL
MDC_IDC_SET_LEADCHNL_RV_PACING_POLARITY: NORMAL
MDC_IDC_SET_LEADCHNL_RV_PACING_PULSEWIDTH: 0.4 MS
MDC_IDC_SET_LEADCHNL_RV_SENSING_ANODE_ELECTRODE_1: NORMAL
MDC_IDC_SET_LEADCHNL_RV_SENSING_ANODE_LOCATION_1: NORMAL
MDC_IDC_SET_LEADCHNL_RV_SENSING_CATHODE_ELECTRODE_1: NORMAL
MDC_IDC_SET_LEADCHNL_RV_SENSING_CATHODE_LOCATION_1: NORMAL
MDC_IDC_SET_LEADCHNL_RV_SENSING_POLARITY: NORMAL
MDC_IDC_SET_LEADCHNL_RV_SENSING_SENSITIVITY: 0.9 MV
MDC_IDC_SET_ZONE_DETECTION_INTERVAL: 350 MS
MDC_IDC_SET_ZONE_DETECTION_INTERVAL: 400 MS
MDC_IDC_SET_ZONE_TYPE: NORMAL
MDC_IDC_STAT_AT_BURDEN_PERCENT: 0 %
MDC_IDC_STAT_AT_DTM_END: NORMAL
MDC_IDC_STAT_AT_DTM_START: NORMAL
MDC_IDC_STAT_BRADY_AP_VP_PERCENT: 74.1 %
MDC_IDC_STAT_BRADY_AP_VS_PERCENT: 0 %
MDC_IDC_STAT_BRADY_AS_VP_PERCENT: 25.89 %
MDC_IDC_STAT_BRADY_AS_VS_PERCENT: 0.01 %
MDC_IDC_STAT_BRADY_DTM_END: NORMAL
MDC_IDC_STAT_BRADY_DTM_START: NORMAL
MDC_IDC_STAT_BRADY_RA_PERCENT_PACED: 74.08 %
MDC_IDC_STAT_BRADY_RV_PERCENT_PACED: 99.99 %
MDC_IDC_STAT_EPISODE_RECENT_COUNT: 0
MDC_IDC_STAT_EPISODE_RECENT_COUNT_DTM_END: NORMAL
MDC_IDC_STAT_EPISODE_RECENT_COUNT_DTM_START: NORMAL
MDC_IDC_STAT_EPISODE_TOTAL_COUNT: 0
MDC_IDC_STAT_EPISODE_TOTAL_COUNT: 2
MDC_IDC_STAT_EPISODE_TOTAL_COUNT_DTM_END: NORMAL
MDC_IDC_STAT_EPISODE_TOTAL_COUNT_DTM_START: NORMAL
MDC_IDC_STAT_EPISODE_TYPE: NORMAL

## 2024-02-07 ENCOUNTER — ANTICOAGULATION THERAPY VISIT (OUTPATIENT)
Dept: ANTICOAGULATION | Facility: CLINIC | Age: 64
End: 2024-02-07

## 2024-02-07 ENCOUNTER — LAB (OUTPATIENT)
Dept: LAB | Facility: CLINIC | Age: 64
End: 2024-02-07
Payer: COMMERCIAL

## 2024-02-07 DIAGNOSIS — Z95.2 S/P AORTIC VALVE REPLACEMENT: ICD-10-CM

## 2024-02-07 DIAGNOSIS — Z95.2 HEART VALVE REPLACED: Primary | ICD-10-CM

## 2024-02-07 DIAGNOSIS — Z79.01 LONG TERM CURRENT USE OF ANTICOAGULANTS WITH INR GOAL OF 2.0-3.0: ICD-10-CM

## 2024-02-07 LAB — INR BLD: 2.9 (ref 0.9–1.1)

## 2024-02-07 PROCEDURE — 36416 COLLJ CAPILLARY BLOOD SPEC: CPT

## 2024-02-07 PROCEDURE — 85610 PROTHROMBIN TIME: CPT

## 2024-02-07 NOTE — PROGRESS NOTES
ANTICOAGULATION MANAGEMENT     Richard Ball 63 year old male is on warfarin with therapeutic INR result. (Goal INR 2.0-3.0)    Recent labs: (last 7 days)     02/07/24  0819   INR 2.9*       ASSESSMENT     Source(s): Chart Review and Patient/Caregiver Call     Warfarin doses taken: Warfarin taken as instructed  Diet: No new diet changes identified  Medication/supplement changes: None noted  New illness, injury, or hospitalization: No  Signs or symptoms of bleeding or clotting: No  Previous result: Therapeutic last visit; previously outside of goal range  Additional findings: None       PLAN     Recommended plan for no diet, medication or health factor changes affecting INR     Dosing Instructions: Continue your current warfarin dose with next INR in 5 weeks       Summary  As of 2/7/2024      Full warfarin instructions:  5 mg every Mon, Fri; 2.5 mg all other days   Next INR check:  3/13/2024               Telephone call with Henrik who verbalizes understanding and agrees to plan    Lab visit scheduled    Education provided:   Goal range and lab monitoring: goal range and significance of current result  Contact [phone] with any changes, questions or concerns.     Plan made per [facility] anticoagulation protocol    Nina Fuller RN  Anticoagulation Clinic  2/7/2024    _______________________________________________________________________     Anticoagulation Episode Summary       Current INR goal:  2.0-3.0   TTR:  73.3% (1 y)   Target end date:  Indefinite   Send INR reminders to:  [facility]    Indications    Heart valve replaced [Z95.2] [Z95.2]  Long term current use of anticoagulants with INR goal of 2.0-3.0 [Z79.01]  S/P aortic valve replacement [Z95.2]             Comments:               Anticoagulation Care Providers       Provider Role Specialty Phone number    March, Sundar Cantu MD Referring Cardiovascular Disease [phone]    Raman Morales MD Referring Cardiovascular Disease [phone]

## 2024-02-09 DIAGNOSIS — Q87.40 MARFAN'S SYNDROME: ICD-10-CM

## 2024-02-09 DIAGNOSIS — Z95.2 S/P AVR (AORTIC VALVE REPLACEMENT): ICD-10-CM

## 2024-02-09 DIAGNOSIS — Z95.2 S/P AORTIC VALVE REPLACEMENT: ICD-10-CM

## 2024-02-09 DIAGNOSIS — Z95.2 HEART VALVE REPLACED: ICD-10-CM

## 2024-02-15 RX ORDER — LOSARTAN POTASSIUM 25 MG/1
25 TABLET ORAL DAILY
Qty: 90 TABLET | Refills: 3 | Status: SHIPPED | OUTPATIENT
Start: 2024-02-15

## 2024-02-16 ENCOUNTER — ANTICOAGULATION THERAPY VISIT (OUTPATIENT)
Dept: ANTICOAGULATION | Facility: CLINIC | Age: 64
End: 2024-02-16
Payer: COMMERCIAL

## 2024-02-16 ENCOUNTER — NURSE TRIAGE (OUTPATIENT)
Dept: NURSING | Facility: CLINIC | Age: 64
End: 2024-02-16
Payer: COMMERCIAL

## 2024-02-16 DIAGNOSIS — Z95.2 S/P AORTIC VALVE REPLACEMENT: ICD-10-CM

## 2024-02-16 DIAGNOSIS — Z79.01 LONG TERM CURRENT USE OF ANTICOAGULANTS WITH INR GOAL OF 2.0-3.0: ICD-10-CM

## 2024-02-16 DIAGNOSIS — Z95.2 HEART VALVE REPLACED: Primary | ICD-10-CM

## 2024-02-16 LAB — INR (EXTERNAL): 1.6 (ref 0.9–1.1)

## 2024-02-16 NOTE — TELEPHONE ENCOUNTER
Pt calling. He states since Christmas he has been experiencing stomach issues. Pt thought at first was reflux and has been taking Antacids w/o resolution. Pt states that he feels this is getting worse. He describes the pain as a constant dull ache and states his abd is harder than normal and he is bloated. Pt states he has extreme fatigue as well and is needing naps, which is unusual for him. He is also running a lower temp than normal, around 96.     Advised ED or UR. Pt is agreeable to this.    Alexa Brown, RN, BSN  [facility] Nurse Advisor 11:56 AM 2/16/2024    Reason for Disposition   Pain is mainly in upper abdomen (if needed ask: 'is it mainly above the belly button?')   Pain lasting > 10 minutes and over 50 years old    Additional Information   Negative: Passed out (i.e., fainted, collapsed and was not responding)   Negative: Shock suspected (e.g., cold/pale/clammy skin, too weak to stand, low BP, rapid pulse)   Negative: Sounds like a life-threatening emergency to the triager   Negative: Followed an abdomen (stomach) injury   Negative: Chest pain   Negative: SEVERE difficulty breathing (e.g., struggling for each breath, speaks in single words)   Negative: Shock suspected (e.g., cold/pale/clammy skin, too weak to stand, low BP, rapid pulse)   Negative: Difficult to awaken or acting confused (e.g., disoriented, slurred speech)   Negative: Passed out (i.e., lost consciousness, collapsed and was not responding)   Negative: Visible sweat on face or sweat is dripping down   Negative: Sounds like a life-threatening emergency to the triager   Negative: Followed an abdomen (stomach) injury   Negative: Chest pain   Negative: Abdominal pain and pregnant < 20 weeks   Negative: Abdominal pain and pregnant 20 or more weeks   Negative: Abdomen bloating or swelling are main symptoms   Negative: SEVERE abdominal pain (e.g., excruciating)    Protocols used: Abdominal Pain - Male-A-[place], Abdominal Pain - Upper-A-[place]

## 2024-02-17 NOTE — PROGRESS NOTES
ANTICOAGULATION MANAGEMENT     Richard Ball 63 year old male is on warfarin with subtherapeutic INR result. (Goal INR 2.0-3.0)    Recent labs: (last 7 days)     02/16/24  1556   INR 1.6*       ASSESSMENT     Source(s): Chart Review  Previous INR was Therapeutic last 2(+) visits  Medication, diet, health changes since last INR chart reviewed;   Patient seen in Urgent care today 2/16/24  Subtherapeutic INR with patient history of TIA, writer recommended booster doses x2  LVM for patient to call back       PLAN     Unable to reach Henrik today.    Left message to take 7.5mg on Fri, 5mg on Sat, 2.5mg on Sun this weekend. Request call back for assessment.    Follow up required to confirm warfarin dose taken and assess for changes and confirm warfarin dose taken    Kody Lorenz, RN  Anticoagulation Clinic  2/16/2024

## 2024-02-20 ENCOUNTER — ANTICOAGULATION THERAPY VISIT (OUTPATIENT)
Dept: ANTICOAGULATION | Facility: CLINIC | Age: 64
End: 2024-02-20

## 2024-02-20 ENCOUNTER — LAB (OUTPATIENT)
Dept: LAB | Facility: CLINIC | Age: 64
End: 2024-02-20
Payer: COMMERCIAL

## 2024-02-20 DIAGNOSIS — Z79.01 LONG TERM CURRENT USE OF ANTICOAGULANTS WITH INR GOAL OF 2.0-3.0: ICD-10-CM

## 2024-02-20 DIAGNOSIS — Z95.2 S/P AORTIC VALVE REPLACEMENT: ICD-10-CM

## 2024-02-20 DIAGNOSIS — Z95.2 HEART VALVE REPLACED: Primary | ICD-10-CM

## 2024-02-20 LAB — INR BLD: 3.7 (ref 0.9–1.1)

## 2024-02-20 PROCEDURE — 85610 PROTHROMBIN TIME: CPT

## 2024-02-20 PROCEDURE — 36416 COLLJ CAPILLARY BLOOD SPEC: CPT

## 2024-02-20 NOTE — PROGRESS NOTES
ANTICOAGULATION MANAGEMENT     Richard Ball 64 year old male is on warfarin with supratherapeutic INR result. (Goal INR 2.0-3.0)    Recent labs: (last 7 days)     02/20/24  1544   INR 3.7*       ASSESSMENT     Source(s): Chart Review and Patient/Caregiver Call     Warfarin doses taken: Booster dose(s) recently taken which may be affecting INR  Diet:  Pt has been having some abdominal pain and reported eating less over the past week.   Medication/supplement changes: None noted  New illness, injury, or hospitalization: Yes: [facility] on 2/16/24 for abdominal pain, pt also reported chills, low temp- workup unremarkable.   Signs or symptoms of bleeding or clotting: No  Previous result: Subtherapeutic  Additional findings:  Rapid rise, [facility] consulted.        PLAN     Recommended plan for temporary change(s) affecting INR     Dosing Instructions: Continue your current warfarin dose with next INR in 6-7 days   - pt starts that he is leaving on Sunday for a business trip and will have INR checked on 2/29/24 when he returns.     Summary  As of 2/20/2024      Full warfarin instructions:  5 mg every Mon, Fri; 2.5 mg all other days   Next INR check:  2/29/2024               Telephone call with Henrik who verbalizes understanding and agrees to plan    Lab visit scheduled    Education provided:   Goal range and lab monitoring: goal range and significance of current result and Importance of therapeutic range    Plan made with [facility] Pharmacist Tamra Collier, RN  Anticoagulation Clinic  2/20/2024    _______________________________________________________________________     Anticoagulation Episode Summary       Current INR goal:  2.0-3.0   TTR:  75.6% (1 y)   Target end date:  Indefinite   Send INR reminders to:  [facility]    Indications    Heart valve replaced [Z95.2] [Z95.2]  Long term current use of anticoagulants with INR goal of 2.0-3.0 [Z79.01]  S/P aortic valve replacement [Z95.2]              Comments:               Anticoagulation Care Providers       Provider Role Specialty Phone number    March, Sundar Cantu MD Referring Cardiovascular Disease [phone]    Raman Morales MD Referring Cardiovascular Disease [phone]

## 2024-02-29 ENCOUNTER — ANTICOAGULATION THERAPY VISIT (OUTPATIENT)
Dept: ANTICOAGULATION | Facility: CLINIC | Age: 64
End: 2024-02-29

## 2024-02-29 ENCOUNTER — LAB (OUTPATIENT)
Dept: LAB | Facility: CLINIC | Age: 64
End: 2024-02-29
Payer: COMMERCIAL

## 2024-02-29 DIAGNOSIS — Z95.2 S/P AORTIC VALVE REPLACEMENT: ICD-10-CM

## 2024-02-29 DIAGNOSIS — Z95.2 HEART VALVE REPLACED: Primary | ICD-10-CM

## 2024-02-29 DIAGNOSIS — Z79.01 LONG TERM CURRENT USE OF ANTICOAGULANTS WITH INR GOAL OF 2.0-3.0: ICD-10-CM

## 2024-02-29 LAB — INR BLD: 2 (ref 0.9–1.1)

## 2024-02-29 PROCEDURE — 36416 COLLJ CAPILLARY BLOOD SPEC: CPT

## 2024-02-29 PROCEDURE — 85610 PROTHROMBIN TIME: CPT

## 2024-02-29 NOTE — PROGRESS NOTES
ANTICOAGULATION MANAGEMENT     Richard Ball 64 year old male is on warfarin with therapeutic INR result. (Goal INR 2.0-3.0)    Recent labs: (last 7 days)     02/29/24  1546   INR 2.0*       ASSESSMENT     Source(s): Chart Review  Previous INR was Supratherapeutic  Medication, diet, health changes since last INR chart reviewed; none identified         PLAN     Recommended plan for no diet, medication or health factor changes affecting INR     Dosing Instructions: Continue your current warfarin dose with next INR in 2 weeks       Summary  As of 2/29/2024      Full warfarin instructions:  5 mg every Mon, Fri; 2.5 mg all other days   Next INR check:  3/13/2024               Detailed voicemail left for Don with dosing    Pt already has followup INR appointment with dosing instructions and INR follow up date.      Education provided:   Please call back if any changes to your diet, medications or how you've been taking warfarin    Plan made per [facility] anticoagulation protocol    Wilfredo Collier RN  Anticoagulation Clinic  2/29/2024    _______________________________________________________________________     Anticoagulation Episode Summary       Current INR goal:  2.0-3.0   TTR:  75.3% (1 y)   Target end date:  Indefinite   Send INR reminders to:  [facility]    Indications    Heart valve replaced [Z95.2] [Z95.2]  Long term current use of anticoagulants with INR goal of 2.0-3.0 [Z79.01]  S/P aortic valve replacement [Z95.2]             Comments:               Anticoagulation Care Providers       Provider Role Specialty Phone number    March, Sundar Cantu MD Referring Cardiovascular Disease [phone]    Raman Morales MD Referring Cardiovascular Disease [phone]

## 2024-03-04 ENCOUNTER — TELEPHONE (OUTPATIENT)
Dept: ANTICOAGULATION | Facility: CLINIC | Age: 64
End: 2024-03-04
Payer: COMMERCIAL

## 2024-03-04 NOTE — TELEPHONE ENCOUNTER
[facility] received a comment alert 3/3/24 regarding encounter date 2/29/24 that he did not receive a follow up call after his INR. Per chart review of [facility] encounter 2/29/24, staff had a telephone call with Don regarding his INR result of 2.0-he verbalized an understanding of result and agreed to ongoing anticoagulation plan with follow up in 2 weeks.     Writer attempted to reach Don, left VM to call [facility] back when available. Writer will attempt to reach patient again this afternoon.

## 2024-03-04 NOTE — TELEPHONE ENCOUNTER
Spoke with Henrik. He denies changes to medications/health/diet but does state he forgot to take his Warfarin Sunday 2/25. Will update [facility] calendar. He is currently traveling near [place], [place] but states he will be back home tonight. He also states he is leaving 3/20/24 for travel and will be gone for 2 weeks but agrees to keep INR recheck date of 3/13/24 that is already scheduled. Henrik also agrees to dosing plan from 2/29/24 [facility] encounter.    Nina Fuller RN  Anticoagulation Clinic

## 2024-03-13 ENCOUNTER — LAB (OUTPATIENT)
Dept: LAB | Facility: CLINIC | Age: 64
End: 2024-03-13
Payer: COMMERCIAL

## 2024-03-13 ENCOUNTER — ANTICOAGULATION THERAPY VISIT (OUTPATIENT)
Dept: ANTICOAGULATION | Facility: CLINIC | Age: 64
End: 2024-03-13

## 2024-03-13 DIAGNOSIS — Z79.01 LONG TERM CURRENT USE OF ANTICOAGULANTS WITH INR GOAL OF 2.0-3.0: ICD-10-CM

## 2024-03-13 DIAGNOSIS — Z95.2 HEART VALVE REPLACED: Primary | ICD-10-CM

## 2024-03-13 DIAGNOSIS — Z95.2 S/P AORTIC VALVE REPLACEMENT: ICD-10-CM

## 2024-03-13 LAB — INR BLD: 1.7 (ref 0.9–1.1)

## 2024-03-13 PROCEDURE — 36416 COLLJ CAPILLARY BLOOD SPEC: CPT

## 2024-03-13 PROCEDURE — 85610 PROTHROMBIN TIME: CPT

## 2024-03-13 NOTE — PROGRESS NOTES
ANTICOAGULATION MANAGEMENT     Richard Ball 64 year old male is on warfarin with subtherapeutic INR result. (Goal INR 2.0-3.0)    Recent labs: (last 7 days)     03/13/24  0810   INR 1.7*       ASSESSMENT     Source(s): Chart Review and Patient/Caregiver Call     Warfarin doses taken: Warfarin taken as instructed  Diet: No new diet changes identified  Medication/supplement changes: None noted  New illness, injury, or hospitalization: No  Signs or symptoms of bleeding or clotting: No  Previous result: Therapeutic last visit; previously outside of goal range  Additional findings:  Patient is leaving for a cruise in 1 week.  Plan for increased ETOH use while on the 2 week cruise.  Writer considered increasing dose but with the above circumstances writer decides to give booster dose today.  Patient cannot check an INR for 3 weeks.        PLAN     Recommended plan for no diet, medication or health factor changes affecting INR     Dosing Instructions: booster dose then continue your current warfarin dose with next INR in 3 weeks       Summary  As of 3/13/2024      Full warfarin instructions:  3/13: 5 mg; Otherwise 5 mg every Mon, Fri; 2.5 mg all other days   Next INR check:  4/4/2024               Telephone call with Don who verbalizes understanding and agrees to plan and who agrees to plan and repeated back plan correctly    Lab visit scheduled    Education provided:   Taking warfarin: Importance of taking warfarin as instructed  Goal range and lab monitoring: goal range and significance of current result and Importance of therapeutic range    Plan made per [facility] anticoagulation protocol    Catia Manning, RN  Anticoagulation Clinic  3/13/2024    _______________________________________________________________________     Anticoagulation Episode Summary       Current INR goal:  2.0-3.0   TTR:  74.5% (1 y)   Target end date:  Indefinite   Send INR reminders to:  [facility]    Indications    Heart valve replaced  [Z95.2] [Z95.2]  Long term current use of anticoagulants with INR goal of 2.0-3.0 [Z79.01]  S/P aortic valve replacement [Z95.2]             Comments:               Anticoagulation Care Providers       Provider Role Specialty Phone number    March, Sundar Cantu MD Referring Cardiovascular Disease [phone]    Raman Mroales MD Referring Cardiovascular Disease [phone]

## 2024-03-28 ENCOUNTER — ANCILLARY PROCEDURE (OUTPATIENT)
Dept: CARDIOLOGY | Facility: CLINIC | Age: 64
End: 2024-03-28
Attending: INTERNAL MEDICINE
Payer: COMMERCIAL

## 2024-03-28 DIAGNOSIS — Z95.0 CARDIAC PACEMAKER IN SITU: ICD-10-CM

## 2024-03-28 PROCEDURE — 93296 REM INTERROG EVL PM/IDS: CPT

## 2024-03-28 PROCEDURE — 93294 REM INTERROG EVL PM/LDLS PM: CPT | Performed by: INTERNAL MEDICINE

## 2024-04-04 ENCOUNTER — LAB (OUTPATIENT)
Dept: LAB | Facility: CLINIC | Age: 64
End: 2024-04-04
Payer: COMMERCIAL

## 2024-04-04 ENCOUNTER — ANTICOAGULATION THERAPY VISIT (OUTPATIENT)
Dept: ANTICOAGULATION | Facility: CLINIC | Age: 64
End: 2024-04-04

## 2024-04-04 DIAGNOSIS — Z79.01 LONG TERM CURRENT USE OF ANTICOAGULANTS WITH INR GOAL OF 2.0-3.0: ICD-10-CM

## 2024-04-04 DIAGNOSIS — Z95.2 S/P AORTIC VALVE REPLACEMENT: ICD-10-CM

## 2024-04-04 DIAGNOSIS — Z95.2 HEART VALVE REPLACED: Primary | ICD-10-CM

## 2024-04-04 LAB — INR BLD: 3.3 (ref 0.9–1.1)

## 2024-04-04 PROCEDURE — 36416 COLLJ CAPILLARY BLOOD SPEC: CPT

## 2024-04-04 PROCEDURE — 85610 PROTHROMBIN TIME: CPT

## 2024-04-04 NOTE — PROGRESS NOTES
ANTICOAGULATION MANAGEMENT     Richard Ball 64 year old male is on warfarin with supratherapeutic INR result. (Goal INR 2.0-3.0)    Recent labs: (last 7 days)     04/04/24  0731   INR 3.3*       ASSESSMENT     Source(s): Chart Review and Patient/Caregiver Call     Warfarin doses taken: Warfarin taken as instructed  Diet:  Just returned from a cruise, was eating differently and had increased alcohol  Medication/supplement changes: None noted  New illness, injury, or hospitalization: No  Signs or symptoms of bleeding or clotting: No  Previous result: Subtherapeutic  Additional findings: None       PLAN     Recommended plan for temporary change(s) affecting INR     Dosing Instructions: Continue your current warfarin dose with next INR in 2 weeks       Summary  As of 4/4/2024      Full warfarin instructions:  5 mg every Mon, Fri; 2.5 mg all other days   Next INR check:  4/18/2024               Telephone call with Don who verbalizes understanding and agrees to plan    Lab visit scheduled    Education provided:   Symptom monitoring: monitoring for bleeding signs and symptoms  Contact [phone] with any changes, questions or concerns.     Plan made per [facility] anticoagulation protocol    Lissa Montanez RN  Anticoagulation Clinic  4/4/2024    _______________________________________________________________________     Anticoagulation Episode Summary       Current INR goal:  2.0-3.0   TTR:  72.8% (1 y)   Target end date:  Indefinite   Send INR reminders to:  [facility]    Indications    Heart valve replaced [Z95.2] [Z95.2]  Long term current use of anticoagulants with INR goal of 2.0-3.0 [Z79.01]  S/P aortic valve replacement [Z95.2]             Comments:               Anticoagulation Care Providers       Provider Role Specialty Phone number    March, Sundar Cantu MD Referring Cardiovascular Disease [phone]    Raman Morales MD Referring Cardiovascular Disease [phone]

## 2024-04-19 ENCOUNTER — LAB (OUTPATIENT)
Dept: LAB | Facility: CLINIC | Age: 64
End: 2024-04-19
Payer: COMMERCIAL

## 2024-04-19 ENCOUNTER — ANTICOAGULATION THERAPY VISIT (OUTPATIENT)
Dept: ANTICOAGULATION | Facility: CLINIC | Age: 64
End: 2024-04-19

## 2024-04-19 DIAGNOSIS — Z95.2 S/P AORTIC VALVE REPLACEMENT: ICD-10-CM

## 2024-04-19 DIAGNOSIS — Z79.01 LONG TERM CURRENT USE OF ANTICOAGULANTS WITH INR GOAL OF 2.0-3.0: ICD-10-CM

## 2024-04-19 DIAGNOSIS — Z95.2 HEART VALVE REPLACED: Primary | ICD-10-CM

## 2024-04-19 LAB — INR BLD: 2.4 (ref 0.9–1.1)

## 2024-04-19 PROCEDURE — 36416 COLLJ CAPILLARY BLOOD SPEC: CPT

## 2024-04-19 PROCEDURE — 85610 PROTHROMBIN TIME: CPT

## 2024-04-19 NOTE — PROGRESS NOTES
"ANTICOAGULATION MANAGEMENT     Richard Ball 64 year old male is on warfarin with therapeutic INR result. (Goal INR 2.0-3.0)    Recent labs: (last 7 days)     04/19/24  0727   INR 2.4*       ASSESSMENT     Source(s): Chart Review and Patient/Caregiver Call     Warfarin doses taken: Warfarin taken as instructed  Diet: No new diet changes identified  Medication/supplement changes: None noted  New illness, injury, or hospitalization: No  Signs or symptoms of bleeding or clotting: No  Previous result: Supratherapeutic  Additional findings:  Henrik would like to recheck in 4 weeks now that he is \"stable\"       PLAN     Recommended plan for no diet, medication or health factor changes affecting INR     Dosing Instructions: Continue your current warfarin dose with next INR in 4 weeks       Summary  As of 4/19/2024      Full warfarin instructions:  5 mg every Mon, Fri; 2.5 mg all other days   Next INR check:  5/17/2024               Telephone call with Henrik who agrees to plan and repeated back plan correctly    Lab visit scheduled    Education provided:   Goal range and lab monitoring: goal range and significance of current result and Importance of following up at instructed interval  Contact [phone] with any changes, questions or concerns.     Plan made per [facility] anticoagulation protocol    ERIS ICD RN  Anticoagulation Clinic  4/19/2024    _______________________________________________________________________     Anticoagulation Episode Summary       Current INR goal:  2.0-3.0   TTR:  75.5% (1 y)   Target end date:  Indefinite   Send INR reminders to:  [facility]    Indications    Heart valve replaced [Z95.2] [Z95.2]  Long term current use of anticoagulants with INR goal of 2.0-3.0 [Z79.01]  S/P aortic valve replacement [Z95.2]             Comments:               Anticoagulation Care Providers       Provider Role Specialty Phone number    March, Sundar Cantu MD Referring Cardiovascular Disease " [phone]    Raman Morales MD Referring Cardiovascular Disease [phone]

## 2024-04-22 LAB
MDC_IDC_LEAD_CONNECTION_STATUS: NORMAL
MDC_IDC_LEAD_CONNECTION_STATUS: NORMAL
MDC_IDC_LEAD_IMPLANT_DT: NORMAL
MDC_IDC_LEAD_IMPLANT_DT: NORMAL
MDC_IDC_LEAD_LOCATION: NORMAL
MDC_IDC_LEAD_LOCATION: NORMAL
MDC_IDC_LEAD_LOCATION_DETAIL_1: NORMAL
MDC_IDC_LEAD_LOCATION_DETAIL_1: NORMAL
MDC_IDC_LEAD_MFG: NORMAL
MDC_IDC_LEAD_MFG: NORMAL
MDC_IDC_LEAD_MODEL: NORMAL
MDC_IDC_LEAD_MODEL: NORMAL
MDC_IDC_LEAD_POLARITY_TYPE: NORMAL
MDC_IDC_LEAD_POLARITY_TYPE: NORMAL
MDC_IDC_LEAD_SERIAL: NORMAL
MDC_IDC_LEAD_SERIAL: NORMAL
MDC_IDC_LEAD_SPECIAL_FUNCTION: NORMAL
MDC_IDC_LEAD_SPECIAL_FUNCTION: NORMAL
MDC_IDC_MSMT_BATTERY_DTM: NORMAL
MDC_IDC_MSMT_BATTERY_REMAINING_LONGEVITY: 117 MO
MDC_IDC_MSMT_BATTERY_RRT_TRIGGER: 2.62
MDC_IDC_MSMT_BATTERY_STATUS: NORMAL
MDC_IDC_MSMT_BATTERY_VOLTAGE: 3 V
MDC_IDC_MSMT_LEADCHNL_RA_IMPEDANCE_VALUE: 380 OHM
MDC_IDC_MSMT_LEADCHNL_RA_IMPEDANCE_VALUE: 475 OHM
MDC_IDC_MSMT_LEADCHNL_RA_PACING_THRESHOLD_AMPLITUDE: 0.5 V
MDC_IDC_MSMT_LEADCHNL_RA_PACING_THRESHOLD_PULSEWIDTH: 0.4 MS
MDC_IDC_MSMT_LEADCHNL_RA_SENSING_INTR_AMPL: 4.12 MV
MDC_IDC_MSMT_LEADCHNL_RV_IMPEDANCE_VALUE: 323 OHM
MDC_IDC_MSMT_LEADCHNL_RV_IMPEDANCE_VALUE: 399 OHM
MDC_IDC_MSMT_LEADCHNL_RV_PACING_THRESHOLD_AMPLITUDE: 0.62 V
MDC_IDC_MSMT_LEADCHNL_RV_PACING_THRESHOLD_PULSEWIDTH: 0.4 MS
MDC_IDC_MSMT_LEADCHNL_RV_SENSING_INTR_AMPL: 8.12 MV
MDC_IDC_PG_IMPLANT_DTM: NORMAL
MDC_IDC_PG_MFG: NORMAL
MDC_IDC_PG_MODEL: NORMAL
MDC_IDC_PG_SERIAL: NORMAL
MDC_IDC_PG_TYPE: NORMAL
MDC_IDC_SESS_CLINIC_NAME: NORMAL
MDC_IDC_SESS_DTM: NORMAL
MDC_IDC_SESS_TYPE: NORMAL
MDC_IDC_SET_BRADY_AT_MODE_SWITCH_RATE: 171 {BEATS}/MIN
MDC_IDC_SET_BRADY_HYSTRATE: NORMAL
MDC_IDC_SET_BRADY_LOWRATE: 60 {BEATS}/MIN
MDC_IDC_SET_BRADY_MAX_SENSOR_RATE: 130 {BEATS}/MIN
MDC_IDC_SET_BRADY_MAX_TRACKING_RATE: 130 {BEATS}/MIN
MDC_IDC_SET_BRADY_MODE: NORMAL
MDC_IDC_SET_BRADY_PAV_DELAY_LOW: 180 MS
MDC_IDC_SET_BRADY_SAV_DELAY_LOW: 150 MS
MDC_IDC_SET_LEADCHNL_RA_PACING_AMPLITUDE: 1.5 V
MDC_IDC_SET_LEADCHNL_RA_PACING_ANODE_ELECTRODE_1: NORMAL
MDC_IDC_SET_LEADCHNL_RA_PACING_ANODE_LOCATION_1: NORMAL
MDC_IDC_SET_LEADCHNL_RA_PACING_CAPTURE_MODE: NORMAL
MDC_IDC_SET_LEADCHNL_RA_PACING_CATHODE_ELECTRODE_1: NORMAL
MDC_IDC_SET_LEADCHNL_RA_PACING_CATHODE_LOCATION_1: NORMAL
MDC_IDC_SET_LEADCHNL_RA_PACING_POLARITY: NORMAL
MDC_IDC_SET_LEADCHNL_RA_PACING_PULSEWIDTH: 0.4 MS
MDC_IDC_SET_LEADCHNL_RA_SENSING_ANODE_ELECTRODE_1: NORMAL
MDC_IDC_SET_LEADCHNL_RA_SENSING_ANODE_LOCATION_1: NORMAL
MDC_IDC_SET_LEADCHNL_RA_SENSING_CATHODE_ELECTRODE_1: NORMAL
MDC_IDC_SET_LEADCHNL_RA_SENSING_CATHODE_LOCATION_1: NORMAL
MDC_IDC_SET_LEADCHNL_RA_SENSING_POLARITY: NORMAL
MDC_IDC_SET_LEADCHNL_RA_SENSING_SENSITIVITY: 0.3 MV
MDC_IDC_SET_LEADCHNL_RV_PACING_AMPLITUDE: 2 V
MDC_IDC_SET_LEADCHNL_RV_PACING_ANODE_ELECTRODE_1: NORMAL
MDC_IDC_SET_LEADCHNL_RV_PACING_ANODE_LOCATION_1: NORMAL
MDC_IDC_SET_LEADCHNL_RV_PACING_CAPTURE_MODE: NORMAL
MDC_IDC_SET_LEADCHNL_RV_PACING_CATHODE_ELECTRODE_1: NORMAL
MDC_IDC_SET_LEADCHNL_RV_PACING_CATHODE_LOCATION_1: NORMAL
MDC_IDC_SET_LEADCHNL_RV_PACING_POLARITY: NORMAL
MDC_IDC_SET_LEADCHNL_RV_PACING_PULSEWIDTH: 0.4 MS
MDC_IDC_SET_LEADCHNL_RV_SENSING_ANODE_ELECTRODE_1: NORMAL
MDC_IDC_SET_LEADCHNL_RV_SENSING_ANODE_LOCATION_1: NORMAL
MDC_IDC_SET_LEADCHNL_RV_SENSING_CATHODE_ELECTRODE_1: NORMAL
MDC_IDC_SET_LEADCHNL_RV_SENSING_CATHODE_LOCATION_1: NORMAL
MDC_IDC_SET_LEADCHNL_RV_SENSING_POLARITY: NORMAL
MDC_IDC_SET_LEADCHNL_RV_SENSING_SENSITIVITY: 0.9 MV
MDC_IDC_SET_ZONE_DETECTION_INTERVAL: 350 MS
MDC_IDC_SET_ZONE_DETECTION_INTERVAL: 400 MS
MDC_IDC_SET_ZONE_STATUS: NORMAL
MDC_IDC_SET_ZONE_STATUS: NORMAL
MDC_IDC_SET_ZONE_TYPE: NORMAL
MDC_IDC_SET_ZONE_VENDOR_TYPE: NORMAL
MDC_IDC_STAT_AT_BURDEN_PERCENT: 0 %
MDC_IDC_STAT_AT_DTM_END: NORMAL
MDC_IDC_STAT_AT_DTM_START: NORMAL
MDC_IDC_STAT_BRADY_AP_VP_PERCENT: 73.88 %
MDC_IDC_STAT_BRADY_AP_VS_PERCENT: 0 %
MDC_IDC_STAT_BRADY_AS_VP_PERCENT: 26.11 %
MDC_IDC_STAT_BRADY_AS_VS_PERCENT: 0.01 %
MDC_IDC_STAT_BRADY_DTM_END: NORMAL
MDC_IDC_STAT_BRADY_DTM_START: NORMAL
MDC_IDC_STAT_BRADY_RA_PERCENT_PACED: 73.86 %
MDC_IDC_STAT_BRADY_RV_PERCENT_PACED: 99.99 %
MDC_IDC_STAT_EPISODE_RECENT_COUNT: 0
MDC_IDC_STAT_EPISODE_RECENT_COUNT_DTM_END: NORMAL
MDC_IDC_STAT_EPISODE_RECENT_COUNT_DTM_START: NORMAL
MDC_IDC_STAT_EPISODE_TOTAL_COUNT: 0
MDC_IDC_STAT_EPISODE_TOTAL_COUNT: 2
MDC_IDC_STAT_EPISODE_TOTAL_COUNT_DTM_END: NORMAL
MDC_IDC_STAT_EPISODE_TOTAL_COUNT_DTM_START: NORMAL
MDC_IDC_STAT_EPISODE_TYPE: NORMAL
MDC_IDC_STAT_TACHYTHERAPY_RECENT_DTM_END: NORMAL
MDC_IDC_STAT_TACHYTHERAPY_RECENT_DTM_START: NORMAL
MDC_IDC_STAT_TACHYTHERAPY_TOTAL_DTM_END: NORMAL
MDC_IDC_STAT_TACHYTHERAPY_TOTAL_DTM_START: NORMAL

## 2024-05-17 ENCOUNTER — ANTICOAGULATION THERAPY VISIT (OUTPATIENT)
Dept: ANTICOAGULATION | Facility: CLINIC | Age: 64
End: 2024-05-17

## 2024-05-17 ENCOUNTER — DOCUMENTATION ONLY (OUTPATIENT)
Dept: ANTICOAGULATION | Facility: CLINIC | Age: 64
End: 2024-05-17

## 2024-05-17 ENCOUNTER — LAB (OUTPATIENT)
Dept: LAB | Facility: CLINIC | Age: 64
End: 2024-05-17
Payer: COMMERCIAL

## 2024-05-17 DIAGNOSIS — Z95.2 HEART VALVE REPLACED: Primary | ICD-10-CM

## 2024-05-17 DIAGNOSIS — Z95.2 S/P AORTIC VALVE REPLACEMENT: ICD-10-CM

## 2024-05-17 DIAGNOSIS — Z79.01 LONG TERM CURRENT USE OF ANTICOAGULANTS WITH INR GOAL OF 2.0-3.0: ICD-10-CM

## 2024-05-17 DIAGNOSIS — Q87.40 MARFAN'S SYNDROME: ICD-10-CM

## 2024-05-17 LAB — INR BLD: 2.4 (ref 0.9–1.1)

## 2024-05-17 PROCEDURE — 36416 COLLJ CAPILLARY BLOOD SPEC: CPT

## 2024-05-17 PROCEDURE — 85610 PROTHROMBIN TIME: CPT

## 2024-05-17 NOTE — PROGRESS NOTES
ANTICOAGULATION MANAGEMENT     Richard Ball 64 year old male is on warfarin with therapeutic INR result. (Goal INR 2.0-3.0)    Recent labs: (last 7 days)     05/17/24  0815   INR 2.4*       ASSESSMENT     Source(s): Chart Review and Patient/Caregiver Call     Warfarin doses taken: Warfarin taken as instructed  Diet: No new diet changes identified  Medication/supplement changes: None noted  New illness, injury, or hospitalization: No  Signs or symptoms of bleeding or clotting: No  Previous result: Therapeutic last visit; previously outside of goal range  Additional findings: None       PLAN     Recommended plan for no diet, medication or health factor changes affecting INR     Dosing Instructions: Continue your current warfarin dose with next INR in 4 weeks       Summary  As of 5/17/2024      Full warfarin instructions:  5 mg every Mon, Fri; 2.5 mg all other days   Next INR check:  6/14/2024               Telephone call with Don who verbalizes understanding and agrees to plan and who agrees to plan and repeated back plan correctly    Patient offered & declined to schedule next visit    Education provided:   Please call back if any changes to your diet, medications or how you've been taking warfarin    Plan made per [facility] anticoagulation protocol    Romelia Encarnacion RN  Anticoagulation Clinic  5/17/2024    _______________________________________________________________________     Anticoagulation Episode Summary       Current INR goal:  2.0-3.0   TTR:  78.1% (1 y)   Target end date:  Indefinite   Send INR reminders to:  [facility]    Indications    Heart valve replaced [Z95.2] [Z95.2]  Long term current use of anticoagulants with INR goal of 2.0-3.0 [Z79.01]  S/P aortic valve replacement [Z95.2]             Comments:               Anticoagulation Care Providers       Provider Role Specialty Phone number    March, Sundar Cantu MD Referring Cardiovascular Disease [phone]    Raman Morales MD Referring  Cardiovascular Disease [phone]

## 2024-05-17 NOTE — PROGRESS NOTES
ANTICOAGULATION CLINIC REFERRAL RENEWAL REQUEST       An annual renewal order is required for all patients referred to [facility].?  Please review and sign the pended referral order for Richard Ball.       ANTICOAGULATION SUMMARY      Warfarin indication(s)   Mechanical AVR    Mechanical heart valve present?  Mechanical  AVR-Bileaflet       Current goal range   INR: 2.0-3.0     Goal appropriate for indication? Goal INR 2-3, standard for indication(s) above     Time in Therapeutic Range (TTR)  (Goal > 60%) 75.5%       Office visit with referring provider's group within last year yes on 6/27/23       Romelia Encarnacion RN  [facility]

## 2024-06-10 DIAGNOSIS — N52.9 ERECTILE DYSFUNCTION, UNSPECIFIED ERECTILE DYSFUNCTION TYPE: ICD-10-CM

## 2024-06-10 RX ORDER — SILDENAFIL 50 MG/1
50 TABLET, FILM COATED ORAL DAILY PRN
Qty: 30 TABLET | Refills: 1 | Status: SHIPPED | OUTPATIENT
Start: 2024-06-10

## 2024-06-10 NOTE — TELEPHONE ENCOUNTER
Pending Prescriptions:                       Disp   Refills    sildenafil (VIAGRA) 50 MG tablet          30 tab*1            Sig: Take 1 tablet (50 mg) by mouth daily as needed           (erection)    Pharmacy:    [id number] - [place], [place] - [address]

## 2024-06-17 ENCOUNTER — TELEPHONE (OUTPATIENT)
Dept: CARDIOLOGY | Facility: CLINIC | Age: 64
End: 2024-06-17
Payer: COMMERCIAL

## 2024-06-18 ENCOUNTER — ANTICOAGULATION THERAPY VISIT (OUTPATIENT)
Dept: ANTICOAGULATION | Facility: CLINIC | Age: 64
End: 2024-06-18

## 2024-06-18 ENCOUNTER — LAB (OUTPATIENT)
Dept: LAB | Facility: CLINIC | Age: 64
End: 2024-06-18
Payer: COMMERCIAL

## 2024-06-18 DIAGNOSIS — Z95.2 HEART VALVE REPLACED: Primary | ICD-10-CM

## 2024-06-18 DIAGNOSIS — Z95.2 HEART VALVE REPLACED: ICD-10-CM

## 2024-06-18 DIAGNOSIS — Q87.40 MARFAN'S SYNDROME: ICD-10-CM

## 2024-06-18 DIAGNOSIS — Z95.2 S/P AORTIC VALVE REPLACEMENT: ICD-10-CM

## 2024-06-18 DIAGNOSIS — Z79.01 LONG TERM CURRENT USE OF ANTICOAGULANTS WITH INR GOAL OF 2.0-3.0: ICD-10-CM

## 2024-06-18 LAB — INR BLD: 2.5 (ref 0.9–1.1)

## 2024-06-18 PROCEDURE — 85610 PROTHROMBIN TIME: CPT

## 2024-06-18 PROCEDURE — 36416 COLLJ CAPILLARY BLOOD SPEC: CPT

## 2024-06-18 NOTE — PROGRESS NOTES
ANTICOAGULATION MANAGEMENT     Richard Ball 64 year old male is on warfarin with therapeutic INR result. (Goal INR 2.0-3.0)    Recent labs: (last 7 days)     06/18/24  0738   INR 2.5*       ASSESSMENT     Source(s): Chart Review and Patient/Caregiver Call     Warfarin doses taken: Warfarin taken as instructed  Diet: No new diet changes identified  Medication/supplement changes: None noted  New illness, injury, or hospitalization: No  Signs or symptoms of bleeding or clotting: No  Previous result: Therapeutic last 2(+) visits  Additional findings: None       PLAN     Recommended plan for no diet, medication or health factor changes affecting INR     Dosing Instructions: Continue your current warfarin dose with next INR in 4-5 weeks       Summary  As of 6/18/2024      Full warfarin instructions:  5 mg every Mon, Fri; 2.5 mg all other days   Next INR check:  7/18/2024               Telephone call with Henrik who verbalizes understanding and agrees to plan    Lab visit scheduled    Education provided:   Goal range and lab monitoring: goal range and significance of current result and Importance of therapeutic range    Plan made per [facility] anticoagulation protocol    Wilfredo Collier RN  Anticoagulation Clinic  6/18/2024    _______________________________________________________________________     Anticoagulation Episode Summary       Current INR goal:  2.0-3.0   TTR:  78.1% (1 y)   Target end date:  Indefinite   Send INR reminders to:  [facility]    Indications    Heart valve replaced [Z95.2] [Z95.2]  Long term current use of anticoagulants with INR goal of 2.0-3.0 [Z79.01]  S/P aortic valve replacement [Z95.2]  Marfan's syndrome [Q87.40]             Comments:               Anticoagulation Care Providers       Provider Role Specialty Phone number    March, Sundar Cantu MD Referring Cardiovascular Disease [phone]    Raman Morales MD Referring Cardiovascular Disease [phone]

## 2024-06-27 ENCOUNTER — ANCILLARY PROCEDURE (OUTPATIENT)
Dept: CARDIOLOGY | Facility: CLINIC | Age: 64
End: 2024-06-27
Attending: INTERNAL MEDICINE
Payer: COMMERCIAL

## 2024-06-27 DIAGNOSIS — Z95.0 CARDIAC PACEMAKER IN SITU: ICD-10-CM

## 2024-06-27 PROCEDURE — 93296 REM INTERROG EVL PM/IDS: CPT

## 2024-06-27 PROCEDURE — 93294 REM INTERROG EVL PM/LDLS PM: CPT | Performed by: INTERNAL MEDICINE

## 2024-07-03 DIAGNOSIS — Q87.40 MARFAN'S SYNDROME: ICD-10-CM

## 2024-07-03 RX ORDER — ATENOLOL 50 MG/1
50 TABLET ORAL DAILY
Qty: 90 TABLET | Refills: 1 | Status: SHIPPED | OUTPATIENT
Start: 2024-07-03

## 2024-07-05 DIAGNOSIS — Z79.01 LONG TERM CURRENT USE OF ANTICOAGULANT THERAPY: ICD-10-CM

## 2024-07-05 DIAGNOSIS — Z95.2 S/P AORTIC VALVE REPLACEMENT: Primary | ICD-10-CM

## 2024-07-05 RX ORDER — WARFARIN SODIUM 5 MG/1
TABLET ORAL
Qty: 90 TABLET | Refills: 1 | Status: SHIPPED | OUTPATIENT
Start: 2024-07-05

## 2024-07-05 NOTE — TELEPHONE ENCOUNTER
ANTICOAGULATION MANAGEMENT:  Medication Refill    Anticoagulation Summary  As of 6/18/2024      Warfarin maintenance plan:  5 mg (5 mg x 1) every Mon, Fri; 2.5 mg (5 mg x 0.5) all other days   Next INR check:  7/18/2024   Target end date:  Indefinite    Indications    Heart valve replaced [Z95.2] [Z95.2]  Long term current use of anticoagulants with INR goal of 2.0-3.0 [Z79.01]  S/P aortic valve replacement [Z95.2]  Marfan's syndrome [Q87.40]                 Anticoagulation Care Providers       Provider Role Specialty Phone number    March, Sundar Cantu MD Referring Cardiovascular Disease [phone]    Raman Morales MD Referring Cardiovascular Disease [phone]            Refill Criteria    Visit with referring provider/group: Meets criteria: office visit within referring provider group in the last 1 year on 6/27/23.  Patient is scheduled to see Dr. Garcia on 10/1/24    [facility] referral last signed: 05/22/2024; within last year: Yes    Lab monitoring not exceeding 2 weeks overdue: Yes    Richard meets all criteria for refill. Rx instructions and quantity supplied updated to match patient's current dosing plan. Warfarin 90 day supply with 1 refill granted per [facility] protocol     Catia Manning RN  Anticoagulation Clinic

## 2024-07-06 ENCOUNTER — HEALTH MAINTENANCE LETTER (OUTPATIENT)
Age: 64
End: 2024-07-06

## 2024-07-10 DIAGNOSIS — Q87.40 MARFAN'S SYNDROME: ICD-10-CM

## 2024-07-10 RX ORDER — ATENOLOL 25 MG/1
25 TABLET ORAL DAILY
Qty: 90 TABLET | Refills: 0 | Status: SHIPPED | OUTPATIENT
Start: 2024-07-10

## 2024-07-16 ENCOUNTER — IMMUNIZATION (OUTPATIENT)
Dept: FAMILY MEDICINE | Facility: CLINIC | Age: 64
End: 2024-07-16
Payer: COMMERCIAL

## 2024-07-16 DIAGNOSIS — Z23 HIGH PRIORITY FOR 2019-NCOV VACCINE: Primary | ICD-10-CM

## 2024-07-16 PROCEDURE — 91320 SARSCV2 VAC 30MCG TRS-SUC IM: CPT

## 2024-07-16 PROCEDURE — 99207 PR NO CHARGE NURSE ONLY: CPT

## 2024-07-16 PROCEDURE — 90480 ADMN SARSCOV2 VAC 1/ONLY CMP: CPT

## 2024-07-18 ENCOUNTER — LAB (OUTPATIENT)
Dept: LAB | Facility: CLINIC | Age: 64
End: 2024-07-18
Payer: COMMERCIAL

## 2024-07-18 ENCOUNTER — ANTICOAGULATION THERAPY VISIT (OUTPATIENT)
Dept: ANTICOAGULATION | Facility: CLINIC | Age: 64
End: 2024-07-18

## 2024-07-18 DIAGNOSIS — Z95.2 HEART VALVE REPLACED: ICD-10-CM

## 2024-07-18 DIAGNOSIS — Q87.40 MARFAN'S SYNDROME: ICD-10-CM

## 2024-07-18 DIAGNOSIS — Z95.2 S/P AORTIC VALVE REPLACEMENT: ICD-10-CM

## 2024-07-18 DIAGNOSIS — Z79.01 LONG TERM CURRENT USE OF ANTICOAGULANTS WITH INR GOAL OF 2.0-3.0: ICD-10-CM

## 2024-07-18 DIAGNOSIS — Z95.2 HEART VALVE REPLACED: Primary | ICD-10-CM

## 2024-07-18 LAB
INR BLD: 3.3 (ref 0.9–1.1)
MDC_IDC_LEAD_CONNECTION_STATUS: NORMAL
MDC_IDC_LEAD_CONNECTION_STATUS: NORMAL
MDC_IDC_LEAD_IMPLANT_DT: NORMAL
MDC_IDC_LEAD_IMPLANT_DT: NORMAL
MDC_IDC_LEAD_LOCATION: NORMAL
MDC_IDC_LEAD_LOCATION: NORMAL
MDC_IDC_LEAD_LOCATION_DETAIL_1: NORMAL
MDC_IDC_LEAD_LOCATION_DETAIL_1: NORMAL
MDC_IDC_LEAD_MFG: NORMAL
MDC_IDC_LEAD_MFG: NORMAL
MDC_IDC_LEAD_MODEL: NORMAL
MDC_IDC_LEAD_MODEL: NORMAL
MDC_IDC_LEAD_POLARITY_TYPE: NORMAL
MDC_IDC_LEAD_POLARITY_TYPE: NORMAL
MDC_IDC_LEAD_SERIAL: NORMAL
MDC_IDC_LEAD_SERIAL: NORMAL
MDC_IDC_LEAD_SPECIAL_FUNCTION: NORMAL
MDC_IDC_LEAD_SPECIAL_FUNCTION: NORMAL
MDC_IDC_MSMT_BATTERY_DTM: NORMAL
MDC_IDC_MSMT_BATTERY_REMAINING_LONGEVITY: 116 MO
MDC_IDC_MSMT_BATTERY_RRT_TRIGGER: 2.62
MDC_IDC_MSMT_BATTERY_STATUS: NORMAL
MDC_IDC_MSMT_BATTERY_VOLTAGE: 3 V
MDC_IDC_MSMT_LEADCHNL_RA_IMPEDANCE_VALUE: 399 OHM
MDC_IDC_MSMT_LEADCHNL_RA_IMPEDANCE_VALUE: 513 OHM
MDC_IDC_MSMT_LEADCHNL_RA_PACING_THRESHOLD_AMPLITUDE: 0.5 V
MDC_IDC_MSMT_LEADCHNL_RA_PACING_THRESHOLD_PULSEWIDTH: 0.4 MS
MDC_IDC_MSMT_LEADCHNL_RA_SENSING_INTR_AMPL: 4.5 MV
MDC_IDC_MSMT_LEADCHNL_RA_SENSING_INTR_AMPL: 4.5 MV
MDC_IDC_MSMT_LEADCHNL_RV_IMPEDANCE_VALUE: 342 OHM
MDC_IDC_MSMT_LEADCHNL_RV_IMPEDANCE_VALUE: 418 OHM
MDC_IDC_MSMT_LEADCHNL_RV_PACING_THRESHOLD_AMPLITUDE: 0.62 V
MDC_IDC_MSMT_LEADCHNL_RV_PACING_THRESHOLD_PULSEWIDTH: 0.4 MS
MDC_IDC_MSMT_LEADCHNL_RV_SENSING_INTR_AMPL: 8.12 MV
MDC_IDC_PG_IMPLANT_DTM: NORMAL
MDC_IDC_PG_MFG: NORMAL
MDC_IDC_PG_MODEL: NORMAL
MDC_IDC_PG_SERIAL: NORMAL
MDC_IDC_PG_TYPE: NORMAL
MDC_IDC_SESS_CLINIC_NAME: NORMAL
MDC_IDC_SESS_DTM: NORMAL
MDC_IDC_SESS_TYPE: NORMAL
MDC_IDC_SET_BRADY_AT_MODE_SWITCH_RATE: 171 {BEATS}/MIN
MDC_IDC_SET_BRADY_HYSTRATE: NORMAL
MDC_IDC_SET_BRADY_LOWRATE: 60 {BEATS}/MIN
MDC_IDC_SET_BRADY_MAX_SENSOR_RATE: 130 {BEATS}/MIN
MDC_IDC_SET_BRADY_MAX_TRACKING_RATE: 130 {BEATS}/MIN
MDC_IDC_SET_BRADY_MODE: NORMAL
MDC_IDC_SET_BRADY_PAV_DELAY_LOW: 180 MS
MDC_IDC_SET_BRADY_SAV_DELAY_LOW: 150 MS
MDC_IDC_SET_LEADCHNL_RA_PACING_AMPLITUDE: 1.5 V
MDC_IDC_SET_LEADCHNL_RA_PACING_ANODE_ELECTRODE_1: NORMAL
MDC_IDC_SET_LEADCHNL_RA_PACING_ANODE_LOCATION_1: NORMAL
MDC_IDC_SET_LEADCHNL_RA_PACING_CAPTURE_MODE: NORMAL
MDC_IDC_SET_LEADCHNL_RA_PACING_CATHODE_ELECTRODE_1: NORMAL
MDC_IDC_SET_LEADCHNL_RA_PACING_CATHODE_LOCATION_1: NORMAL
MDC_IDC_SET_LEADCHNL_RA_PACING_POLARITY: NORMAL
MDC_IDC_SET_LEADCHNL_RA_PACING_PULSEWIDTH: 0.4 MS
MDC_IDC_SET_LEADCHNL_RA_SENSING_ANODE_ELECTRODE_1: NORMAL
MDC_IDC_SET_LEADCHNL_RA_SENSING_ANODE_LOCATION_1: NORMAL
MDC_IDC_SET_LEADCHNL_RA_SENSING_CATHODE_ELECTRODE_1: NORMAL
MDC_IDC_SET_LEADCHNL_RA_SENSING_CATHODE_LOCATION_1: NORMAL
MDC_IDC_SET_LEADCHNL_RA_SENSING_POLARITY: NORMAL
MDC_IDC_SET_LEADCHNL_RA_SENSING_SENSITIVITY: 0.3 MV
MDC_IDC_SET_LEADCHNL_RV_PACING_AMPLITUDE: 2 V
MDC_IDC_SET_LEADCHNL_RV_PACING_ANODE_ELECTRODE_1: NORMAL
MDC_IDC_SET_LEADCHNL_RV_PACING_ANODE_LOCATION_1: NORMAL
MDC_IDC_SET_LEADCHNL_RV_PACING_CAPTURE_MODE: NORMAL
MDC_IDC_SET_LEADCHNL_RV_PACING_CATHODE_ELECTRODE_1: NORMAL
MDC_IDC_SET_LEADCHNL_RV_PACING_CATHODE_LOCATION_1: NORMAL
MDC_IDC_SET_LEADCHNL_RV_PACING_POLARITY: NORMAL
MDC_IDC_SET_LEADCHNL_RV_PACING_PULSEWIDTH: 0.4 MS
MDC_IDC_SET_LEADCHNL_RV_SENSING_ANODE_ELECTRODE_1: NORMAL
MDC_IDC_SET_LEADCHNL_RV_SENSING_ANODE_LOCATION_1: NORMAL
MDC_IDC_SET_LEADCHNL_RV_SENSING_CATHODE_ELECTRODE_1: NORMAL
MDC_IDC_SET_LEADCHNL_RV_SENSING_CATHODE_LOCATION_1: NORMAL
MDC_IDC_SET_LEADCHNL_RV_SENSING_POLARITY: NORMAL
MDC_IDC_SET_LEADCHNL_RV_SENSING_SENSITIVITY: 0.9 MV
MDC_IDC_SET_ZONE_DETECTION_INTERVAL: 350 MS
MDC_IDC_SET_ZONE_DETECTION_INTERVAL: 400 MS
MDC_IDC_SET_ZONE_STATUS: NORMAL
MDC_IDC_SET_ZONE_STATUS: NORMAL
MDC_IDC_SET_ZONE_TYPE: NORMAL
MDC_IDC_SET_ZONE_VENDOR_TYPE: NORMAL
MDC_IDC_STAT_AT_BURDEN_PERCENT: 0 %
MDC_IDC_STAT_AT_DTM_END: NORMAL
MDC_IDC_STAT_AT_DTM_START: NORMAL
MDC_IDC_STAT_BRADY_AP_VP_PERCENT: 89.94 %
MDC_IDC_STAT_BRADY_AP_VS_PERCENT: 0 %
MDC_IDC_STAT_BRADY_AS_VP_PERCENT: 10.05 %
MDC_IDC_STAT_BRADY_AS_VS_PERCENT: 0.01 %
MDC_IDC_STAT_BRADY_DTM_END: NORMAL
MDC_IDC_STAT_BRADY_DTM_START: NORMAL
MDC_IDC_STAT_BRADY_RA_PERCENT_PACED: 89.93 %
MDC_IDC_STAT_BRADY_RV_PERCENT_PACED: 99.99 %
MDC_IDC_STAT_EPISODE_RECENT_COUNT: 0
MDC_IDC_STAT_EPISODE_RECENT_COUNT_DTM_END: NORMAL
MDC_IDC_STAT_EPISODE_RECENT_COUNT_DTM_START: NORMAL
MDC_IDC_STAT_EPISODE_TOTAL_COUNT: 0
MDC_IDC_STAT_EPISODE_TOTAL_COUNT: 3
MDC_IDC_STAT_EPISODE_TOTAL_COUNT_DTM_END: NORMAL
MDC_IDC_STAT_EPISODE_TOTAL_COUNT_DTM_START: NORMAL
MDC_IDC_STAT_EPISODE_TYPE: NORMAL
MDC_IDC_STAT_TACHYTHERAPY_RECENT_DTM_END: NORMAL
MDC_IDC_STAT_TACHYTHERAPY_RECENT_DTM_START: NORMAL
MDC_IDC_STAT_TACHYTHERAPY_TOTAL_DTM_END: NORMAL
MDC_IDC_STAT_TACHYTHERAPY_TOTAL_DTM_START: NORMAL

## 2024-07-18 PROCEDURE — 36416 COLLJ CAPILLARY BLOOD SPEC: CPT

## 2024-07-18 PROCEDURE — 85610 PROTHROMBIN TIME: CPT

## 2024-07-18 NOTE — PROGRESS NOTES
ANTICOAGULATION MANAGEMENT     Richard Ball 64 year old male is on warfarin with supratherapeutic INR result. (Goal INR 2.0-3.0)    Recent labs: (last 7 days)     07/18/24  0737   INR 3.3*       ASSESSMENT     Source(s): Chart Review and Patient/Caregiver Call     Warfarin doses taken:  Patient did miss 2 doses but then made up for them the following day.   Diet: Decreased greens/vitamin K in diet; plans to resume previous intake  Medication/supplement changes: None noted  New illness, injury, or hospitalization: No  Signs or symptoms of bleeding or clotting: No  Previous result: Therapeutic last 2(+) visits  Additional findings:  Patient was traveling the last couple of weeks.  Patient is also leaving for another trip out of the country next week for 2 weeks.         PLAN     Recommended plan for temporary change(s) affecting INR     Dosing Instructions: Continue your current warfarin dose with next INR in 3 weeks       Summary  As of 7/18/2024      Full warfarin instructions:  5 mg every Mon, Fri; 2.5 mg all other days   Next INR check:  8/9/2024               Telephone call with Don who verbalizes understanding and agrees to plan and who agrees to plan and repeated back plan correctly    Lab visit scheduled    Education provided: Taking warfarin: Importance of taking warfarin as instructed  Goal range and lab monitoring: goal range and significance of current result and Importance of therapeutic range    Plan made per [facility] anticoagulation protocol    Catia Manning RN  Anticoagulation Clinic  7/18/2024    _______________________________________________________________________     Anticoagulation Episode Summary       Current INR goal:  2.0-3.0   TTR:  75.0% (1 y)   Target end date:  Indefinite   Send INR reminders to:  [facility]    Indications    Heart valve replaced [Z95.2] [Z95.2]  Long term current use of anticoagulants with INR goal of 2.0-3.0 [Z79.01]  S/P aortic valve replacement  [Z95.2]  Marfan's syndrome [Q87.40]             Comments:               Anticoagulation Care Providers       Provider Role Specialty Phone number    March, Sundar Cantu MD Referring Cardiovascular Disease [phone]    Raman Morales MD Referring Cardiovascular Disease [phone]

## 2024-08-13 ENCOUNTER — ANTICOAGULATION THERAPY VISIT (OUTPATIENT)
Dept: ANTICOAGULATION | Facility: CLINIC | Age: 64
End: 2024-08-13

## 2024-08-13 ENCOUNTER — LAB (OUTPATIENT)
Dept: LAB | Facility: CLINIC | Age: 64
End: 2024-08-13
Payer: COMMERCIAL

## 2024-08-13 DIAGNOSIS — Q87.40 MARFAN'S SYNDROME: ICD-10-CM

## 2024-08-13 DIAGNOSIS — Z95.2 HEART VALVE REPLACED: Primary | ICD-10-CM

## 2024-08-13 DIAGNOSIS — Z95.2 S/P AORTIC VALVE REPLACEMENT: ICD-10-CM

## 2024-08-13 DIAGNOSIS — Z95.2 HEART VALVE REPLACED: ICD-10-CM

## 2024-08-13 DIAGNOSIS — Z79.01 LONG TERM CURRENT USE OF ANTICOAGULANTS WITH INR GOAL OF 2.0-3.0: ICD-10-CM

## 2024-08-13 LAB — INR BLD: 3.9 (ref 0.9–1.1)

## 2024-08-13 PROCEDURE — 85610 PROTHROMBIN TIME: CPT

## 2024-08-13 PROCEDURE — 36416 COLLJ CAPILLARY BLOOD SPEC: CPT

## 2024-08-13 NOTE — PROGRESS NOTES
ANTICOAGULATION MANAGEMENT     Richard Ball 64 year old male is on warfarin with supratherapeutic INR result. (Goal INR 2.0-3.0)    Recent labs: (last 7 days)     08/13/24  0944   INR 3.9*       ASSESSMENT     Source(s): Chart Review and Patient/Caregiver Call     Warfarin doses taken: Warfarin taken as instructed  Diet: No new diet changes identified  Medication/supplement changes: taking cold/sinus OTC and Tylenol for symptom management.  New illness, injury, or hospitalization: developed a massive head cold before traveling back from [place]-COVID negative-hopes he is improving but still has a headache and drainage, cough  Signs or symptoms of bleeding or clotting: No  Previous result: Supratherapeutic  Additional findings:     Just flew back from [place], massive head cold before coming back.     Decrease maintenance dose for now with ongoing symptoms, OTC medication. If Henrik improves-he will go back to his previous maintenance dose, 5 mg every Mon, Fri; 2.5 mg all other days      PLAN     Recommended plan for temporary change(s) and ongoing change(s) affecting INR     Dosing Instructions: hold dose then decrease your warfarin dose (11.1% change) with next INR in 2 weeks       Summary  As of 8/13/2024      Full warfarin instructions:  8/13: Hold; Otherwise 5 mg every Mon; 2.5 mg all other days   Next INR check:  8/29/2024               Telephone call with Henrik who agrees to plan and repeated back plan correctly    Lab visit scheduled    Education provided: Goal range and lab monitoring: goal range and significance of current result and Importance of following up at instructed interval  Interaction IS anticipated between warfarin and OTC medications for sinus, Tylenol  Symptom monitoring: monitoring for bleeding signs and symptoms and when to seek medical attention/emergency care  Contact [phone] with any changes, questions or concerns.     Plan made per [facility] anticoagulation protocol    ERIS CID,  RN  Anticoagulation Clinic  8/13/2024    _______________________________________________________________________     Anticoagulation Episode Summary       Current INR goal:  2.0-3.0   TTR:  67.9% (1 y)   Target end date:  Indefinite   Send INR reminders to:  [facility]    Indications    Heart valve replaced [Z95.2] [Z95.2]  Long term current use of anticoagulants with INR goal of 2.0-3.0 [Z79.01]  S/P aortic valve replacement [Z95.2]  Marfan's syndrome [Q87.40]             Comments:               Anticoagulation Care Providers       Provider Role Specialty Phone number    March, Sundar Cantu MD Referring Cardiovascular Disease [phone]    Raman Morales MD Referring Cardiovascular Disease [phone]

## 2024-08-29 ENCOUNTER — LAB (OUTPATIENT)
Dept: LAB | Facility: CLINIC | Age: 64
End: 2024-08-29
Payer: COMMERCIAL

## 2024-08-29 ENCOUNTER — ANTICOAGULATION THERAPY VISIT (OUTPATIENT)
Dept: ANTICOAGULATION | Facility: CLINIC | Age: 64
End: 2024-08-29

## 2024-08-29 DIAGNOSIS — Z95.2 S/P AORTIC VALVE REPLACEMENT: ICD-10-CM

## 2024-08-29 DIAGNOSIS — Q87.40 MARFAN'S SYNDROME: ICD-10-CM

## 2024-08-29 DIAGNOSIS — Z79.01 LONG TERM CURRENT USE OF ANTICOAGULANTS WITH INR GOAL OF 2.0-3.0: ICD-10-CM

## 2024-08-29 DIAGNOSIS — Z95.2 HEART VALVE REPLACED: ICD-10-CM

## 2024-08-29 DIAGNOSIS — Z95.2 HEART VALVE REPLACED: Primary | ICD-10-CM

## 2024-08-29 LAB — INR BLD: 2.3 (ref 0.9–1.1)

## 2024-08-29 PROCEDURE — 36416 COLLJ CAPILLARY BLOOD SPEC: CPT

## 2024-08-29 PROCEDURE — 85610 PROTHROMBIN TIME: CPT

## 2024-08-29 NOTE — PROGRESS NOTES
ANTICOAGULATION MANAGEMENT     Richard Ball 64 year old male is on warfarin with therapeutic INR result. (Goal INR 2.0-3.0)    Recent labs: (last 7 days)     08/29/24  0730   INR 2.3*       ASSESSMENT     Source(s): Chart Review and Patient/Caregiver Call     Warfarin doses taken: Warfarin taken as instructed  Diet: No new diet changes identified  Medication/supplement changes: None noted  New illness, injury, or hospitalization: No  Signs or symptoms of bleeding or clotting: No  Previous result: Supratherapeutic  Additional findings: None       PLAN     Recommended plan for no diet, medication or health factor changes affecting INR     Dosing Instructions: Continue your current warfarin dose with next INR in 3 weeks       Summary  As of 8/29/2024      Full warfarin instructions:  5 mg every Mon; 2.5 mg all other days   Next INR check:  9/19/2024               Telephone call with Henrik who verbalizes understanding and agrees to plan    Lab visit scheduled    Education provided: Please call back if any changes to your diet, medications or how you've been taking warfarin    Plan made per [facility] anticoagulation protocol    Miranda Harper RN  Anticoagulation Clinic  8/29/2024    _______________________________________________________________________     Anticoagulation Episode Summary       Current INR goal:  2.0-3.0   TTR:  65.4% (1 y)   Target end date:  Indefinite   Send INR reminders to:  [facility]    Indications    Heart valve replaced [Z95.2] [Z95.2]  Long term current use of anticoagulants with INR goal of 2.0-3.0 [Z79.01]  S/P aortic valve replacement [Z95.2]  Marfan's syndrome [Q87.40]             Comments:               Anticoagulation Care Providers       Provider Role Specialty Phone number    March, Sundar Cantu MD Referring Cardiovascular Disease [phone]    Raman Morales MD Referring Cardiovascular Disease [phone]

## 2024-09-16 ENCOUNTER — MYC MEDICAL ADVICE (OUTPATIENT)
Dept: CARDIOLOGY | Facility: CLINIC | Age: 64
End: 2024-09-16
Payer: COMMERCIAL

## 2024-09-19 ENCOUNTER — ANTICOAGULATION THERAPY VISIT (OUTPATIENT)
Dept: ANTICOAGULATION | Facility: CLINIC | Age: 64
End: 2024-09-19

## 2024-09-19 ENCOUNTER — LAB (OUTPATIENT)
Dept: LAB | Facility: CLINIC | Age: 64
End: 2024-09-19
Payer: COMMERCIAL

## 2024-09-19 DIAGNOSIS — Q87.40 MARFAN'S SYNDROME: ICD-10-CM

## 2024-09-19 DIAGNOSIS — Z95.2 S/P AORTIC VALVE REPLACEMENT: ICD-10-CM

## 2024-09-19 DIAGNOSIS — Z79.01 LONG TERM CURRENT USE OF ANTICOAGULANTS WITH INR GOAL OF 2.0-3.0: ICD-10-CM

## 2024-09-19 DIAGNOSIS — Z95.2 HEART VALVE REPLACED: Primary | ICD-10-CM

## 2024-09-19 DIAGNOSIS — Z95.2 HEART VALVE REPLACED: ICD-10-CM

## 2024-09-19 LAB — INR BLD: 2.8 (ref 0.9–1.1)

## 2024-09-19 PROCEDURE — 85610 PROTHROMBIN TIME: CPT

## 2024-09-19 PROCEDURE — 36416 COLLJ CAPILLARY BLOOD SPEC: CPT

## 2024-09-19 NOTE — PROGRESS NOTES
ANTICOAGULATION MANAGEMENT     Richard Ball 64 year old male is on warfarin with therapeutic INR result. (Goal INR 2.0-3.0)    Recent labs: (last 7 days)     09/19/24  0735   INR 2.8*       ASSESSMENT     Source(s): Chart Review     Warfarin doses taken: Reviewed in chart  Diet: No new diet changes identified  Medication/supplement changes: None noted  New illness, injury, or hospitalization: No  Signs or symptoms of bleeding or clotting: No  Previous result: Therapeutic last visit; previously outside of goal range  Additional findings: None       PLAN     Recommended plan for no diet, medication or health factor changes affecting INR     Dosing Instructions: Continue your current warfarin dose with next INR in 4 weeks       Summary  As of 9/19/2024      Full warfarin instructions:  5 mg every Mon; 2.5 mg all other days   Next INR check:  10/17/2024               Detailed voice message left for Don with dosing instructions and follow up date.   Sent [employer] message with dosing and follow up instructions    Contact [phone] to schedule and with any changes, questions or concerns.     Education provided: Please call back if any changes to your diet, medications or how you've been taking warfarin  Contact [phone] with any changes, questions or concerns.     Plan made per [facility] anticoagulation protocol    Dayanara Davidson RN  9/19/2024  Anticoagulation Clinic  [employer] [place] for routing messages: p [facility] patient phone line: [phone]        _______________________________________________________________________     Anticoagulation Episode Summary       Current INR goal:  2.0-3.0   TTR:  65.4% (1 y)   Target end date:  Indefinite   Send INR reminders to:  [facility]    Indications    Heart valve replaced [Z95.2] [Z95.2]  Long term current use of anticoagulants with INR goal of 2.0-3.0 [Z79.01]  S/P aortic valve replacement [Z95.2]  Marfan's syndrome [Q87.40]             Comments:                Anticoagulation Care Providers       Provider Role Specialty Phone number    March, Sundar Cantu MD Referring Cardiovascular Disease [phone]    Raman Morales MD Referring Cardiovascular Disease [phone]

## 2024-10-01 ENCOUNTER — ANCILLARY PROCEDURE (OUTPATIENT)
Dept: CARDIOLOGY | Facility: CLINIC | Age: 64
End: 2024-10-01
Attending: INTERNAL MEDICINE
Payer: COMMERCIAL

## 2024-10-01 VITALS
DIASTOLIC BLOOD PRESSURE: 77 MMHG | BODY MASS INDEX: 25.04 KG/M2 | WEIGHT: 177 LBS | SYSTOLIC BLOOD PRESSURE: 123 MMHG | OXYGEN SATURATION: 96 % | HEART RATE: 82 BPM

## 2024-10-01 DIAGNOSIS — F41.9 ANXIETY: ICD-10-CM

## 2024-10-01 DIAGNOSIS — Z95.2 S/P AORTIC VALVE REPLACEMENT: ICD-10-CM

## 2024-10-01 DIAGNOSIS — I25.119 CORONARY ARTERY DISEASE INVOLVING NATIVE CORONARY ARTERY OF NATIVE HEART WITH ANGINA PECTORIS (H): Primary | ICD-10-CM

## 2024-10-01 DIAGNOSIS — Z95.0 PACEMAKER: ICD-10-CM

## 2024-10-01 DIAGNOSIS — Q87.40 MARFAN'S SYNDROME: ICD-10-CM

## 2024-10-01 DIAGNOSIS — Z79.01 LONG TERM CURRENT USE OF ANTICOAGULANT THERAPY: ICD-10-CM

## 2024-10-01 DIAGNOSIS — Z95.2 S/P AVR (AORTIC VALVE REPLACEMENT): ICD-10-CM

## 2024-10-01 LAB — LVEF ECHO: NORMAL

## 2024-10-01 PROCEDURE — 93306 TTE W/DOPPLER COMPLETE: CPT | Performed by: STUDENT IN AN ORGANIZED HEALTH CARE EDUCATION/TRAINING PROGRAM

## 2024-10-01 PROCEDURE — 99215 OFFICE O/P EST HI 40 MIN: CPT | Mod: 25 | Performed by: INTERNAL MEDICINE

## 2024-10-01 PROCEDURE — 99213 OFFICE O/P EST LOW 20 MIN: CPT | Performed by: INTERNAL MEDICINE

## 2024-10-01 ASSESSMENT — PAIN SCALES - GENERAL: PAINLEVEL: NO PAIN (0)

## 2024-10-01 NOTE — PATIENT INSTRUCTIONS
"Thank you for visiting the Adult Congenital and Cardiovascular Genetics Clinic at the [facility].    Cardiology Providers you saw during your visit:  LASHAWN Garcia MD    Diagnosis:  Renata    Results:  LASHAWN Garcia MD reviewed the results of your echo testing today in clinic.    If you have questions or concerns, please call us at [phone] or contact us through [employer].  ______________________________________________________________________________    Recommendations from your Cardiology Provider TODAY:    Have your lipids drawn with your next INR check, please be fasting  Complete a stress echo, we will call you to schedule  Referral for therapy placed      ____________________________________________________________________________________________________    Follow-up Plan:  Follow up with Dr Garcia in March 2026 with an echo prior    ____________________________________________________________________________________________________    If you have questions or concerns, please call us at [phone] or contact us through [employer]. Our fax number is [phone]    Ketty Brown RN RN, BSN   Breanna Fong (Scheduling)  Nurse Care Coordinator     Clinic   Adult Congenital and CV Genetics              Adult Congenital and CV Genetics  [facility]              [facility]        For after hours urgent needs, call [phone] and ask to speak to the \"On-Call Cardiologist.\"    For emergencies call 911.      ____________________________________________________________________________________________________    Additional Important Information for Your Heart Health      General Cardiac Recommendations:  Continue to eat a heart healthy, low salt diet.  Continue to get 20-30 minutes of aerobic activity, 4-5 days per week.  Examples of aerobic activity include walking, running, swimming, cycling, etc.  Continue to observe good " oral hygiene, with regular dental visits.        SBE prophylaxis (antibiotics needed before dental appointments):   Yes____  No__X__    SBE prophylaxis applies to patients with certain heart conditions who are recommended to take antibiotics before dental appointments and other specific procedures. These antibiotics are to help prevent an infection of the heart (endocarditis) that certain patients are at higher risk of developing. The guidelines used come from the American Heart Association and are periodically updated.      FASTING CHOLESTEROL was checked in the last 5 years YES__X__  NO____ (2022)  If no, please follow up with your primary care physician. You should have a cholesterol screening every 5 years at minimum, and every year if taking a medication for your cholesterol levels.

## 2024-10-01 NOTE — LETTER
10/1/2024      RE: Richard Ball  [address]  [place] [address]       Dear Colleague,    Thank you for the opportunity to participate in the care of your patient, Richard Ball, at the [facility] [place] at [facility]. Please see a copy of my visit note below.    CARDIOLOGY CONSULTATION:    Mr. Ball is a delightful 64-year-old gentleman with a history of Marfan syndrome.  His dad likely also had Marfan syndrome and  of a cerebral aneurysm when he was 2 year old.  His mom  when he was 4 of ovarian cancer, and he was raised by an adoptive family.  He has 2 children in their 30s and neither of them have been screened for Marfan syndrome. He has 2 gradnchildren     Mr. Ball was diagnosed at age 24.  He had genetic testing done at the [facility] in [place].  He underwent surgery in  at [facility] in [place] by Dr. Arriola.  His ascending aorta was 5.5 cm at the time, but we do not have those operative reports.  His aortic valve was replaced with a mechanical valve, and they reimplanted his coronary arteries.  In 2018 we did a coronary CTA and there was concern about significant coronary artery disease. He went on to have a coronary angiogram, and that showed no significant disease; he did have some disease, but it was not flow limiting with IFR evaluation of the RCA. His calcium score was high placing him in the 95% on CTA. We need to be aggressive with his cholesterol as a result of this and his LDL is in the 50s on Lipitor 40 mg daily.  He had a stress echo in 2021 and that was negative for ischemia, repeat 2023 also negative.  He has a strong family history of early CAD and his sister just had 4 stents placed 2024 in [place].    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 He plans to retire when he is 66 and go on medicare.  He  walks for exercise.  His nephew from his brother  at 48 of an MI and his brother also had an MI at a young age.      His INR has for the most part been stable with his mechanical aortic valve and he takes a baby asprin.  He does have a pacemaker that was placed in  due to heart block, and his upper lead failed and Dr. Morales replaced it in   10 + years of life left.  He went on a cruise on the [place] to [place] this year and looks forward to a Spring Break Cruise to [place].  Plans to retire in [place].      He did have a cerebral CTA in 2018 that showed no evidence of cerebral aneurysms.  He did have a history of a TIA in the past but no deficits.  On his echo his valve is working well.  CTA with no concerning findings.  He suffers from sone depression/anxiety and does not see a therapist    MEDICAL HISTORY:  Past Medical History:   Diagnosis Date     Heart abnormality     Artifical aortic graft - ascending aorta     Hemothorax      Marfan's syndrome 2011       CURRENT MEDICATIONS:  Current Outpatient Medications   Medication Sig Dispense Refill     acetaminophen (TYLENOL) 500 MG tablet Take 1,000 mg by mouth 2 times daily       albuterol (VENTOLIN HFA) 108 (90 Base) MCG/ACT inhaler Inhale 2 puffs into the lungs every 4 hours as needed for wheezing Reported on 3/24/2017 18 g 3     aspirin 81 MG chewable tablet Take 1 tablet (81 mg) by mouth daily 90 tablet 3     atenolol (TENORMIN) 25 MG tablet Take 1 tablet (25 mg) by mouth daily With one 50 mg tablet by mouth daily for a total of 75 mg daily 90 tablet 0     atenolol (TENORMIN) 50 MG tablet Take 1 tablet (50 mg) by mouth daily And take one 25 mg by mouth daily for a total of 75 mg daily 90 tablet 1     atorvastatin (LIPITOR) 40 MG tablet Take 1 tablet (40 mg) by mouth daily 90 tablet 3     budesonide-formoterol (SYMBICORT) 80-4.5 MCG/ACT Inhaler Inhale 2 puffs into the lungs 2 times daily 10.2 g 3     losartan (COZAAR) 25 MG tablet Take 1 tablet  (25 mg) by mouth daily 90 tablet 3     sildenafil (VIAGRA) 50 MG tablet Take 1 tablet (50 mg) by mouth daily as needed (erection) 30 tablet 1     warfarin ANTICOAGULANT (COUMADIN ANTICOAGULANT) 5 MG tablet Take 0.5 to 1 tablet by mouth every day OR as directed by Anticoagulation Clinic 90 tablet 1     warfarin ANTICOAGULANT (COUMADIN) 5 MG tablet Take 0.5-1 tablet daily or as directed 90 tablet 1     warfarin ANTICOAGULANT (COUMADIN) 5 MG tablet Take 1/2 tablet (2.5mg) to 1 whole tablet (5mg) by mouth daily or as directed by the Coumadin clinic 90 tablet 1     amoxicillin (AMOXIL) 500 MG capsule TAKE 4 CAPSULES BY MOUTH ONE HOUR PRIOR TO DENTAL APPOINTMENT (Patient not taking: Reported on 6/27/2023)         PAST SURGICAL HISTORY:  Past Surgical History:   Procedure Laterality Date     AORTIC VALVE REPLACEMENT  8/2/2001    Ascending aorta graft     EP PACEMAKER N/A 3/7/2022    Procedure: EP Pacemaker;  Surgeon: Raman Morales MD;  Location: [facility] OR     HC REMOVE TONSILS/ADENOIDS,<13 Y/O      T & A <12y.o.     HC VASECTOMY UNILAT/BILAT W POSTOP SEMEN      Vasectomy     IMPLANT PACEMAKER         ALLERGIES  Ambien [zolpidem]    FAMILY HX:  Family History   Problem Relation Age of Onset     Asthma No family hx of      Diabetes No family hx of      Hypertension No family hx of      Breast Cancer No family hx of      Cancer - colorectal No family hx of      Prostate Cancer No family hx of      Lipids No family hx of      Musculoskeletal Disorder No family hx of      Neurologic Disorder No family hx of        SOCIAL HX:  Social History     Socioeconomic History     Marital status:      Spouse name: None     Number of children: 2     Years of education: 16     Highest education level: None   Occupational History     Employer: UNEMPLOYED     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 No     Exercise Yes     Seat Belt Yes     Self-Exams Yes     Social Determinants of Health      Received from [facility], [facility]    Financial Resource Strain    Received from [facility], [facility]    Social Connections       ROS:  Constitutional: No fever, chills, or sweats. No weight gain/loss.   ENT: No visual disturbance, ear ache, epistaxis, sore throat.   Allergies/Immunologic: Negative.   Respiratory: No cough, hemoptysis.   Cardiovascular: As per HPI.   GI: No nausea, vomiting, hematemesis, melena, or hematochezia.   : No urinary frequency, dysuria, or hematuria.   Integument: Negative.   Psychiatric: Negative.   Neuro: Negative.   Endocrinology: Negative.   Musculoskeletal: No myalgia.    VITAL SIGNS:  /77 (BP Location: Right arm, Patient Position: Chair, Cuff Size: Adult Regular)   Pulse 82   Wt 80.3 kg (177 lb)   SpO2 96%   BMI 25.04 kg/m    Body mass index is 25.04 kg/m .  Wt Readings from Last 2 Encounters:   10/01/24 80.3 kg (177 lb)   06/27/23 76.2 kg (168 lb)       PHYSICAL EXAM  Richard Ball IS A 64 year old male.in no acute distress.  HEENT: Unremarkable.  Neck: JVP normal.    Lungs: CTA.  Cor: RRR. Normal S1 and S2 mechanical.  No murmur  Abd: Soft, nontender, nondistended.  NABS.  No pulsatile mass.  Extremities: No C/C/E.  .  Neuro: Grossly intact.    LABS    Lab Results   Component Value Date    WBC 3.8 06/10/2022    WBC 4.3 06/07/2019     Lab Results   Component Value Date    RBC 4.38 06/10/2022    RBC 4.04 06/07/2019     Lab Results   Component Value Date    HGB 14.0 06/10/2022    HGB 13.1  "06/07/2019     Lab Results   Component Value Date    HCT 40.2 06/10/2022    HCT 37.8 06/07/2019     No components found for: \"MCT\"  Lab Results   Component Value Date    MCV 92 06/10/2022    MCV 94 06/07/2019     Lab Results   Component Value Date    MCH 32.0 06/10/2022    MCH 32.4 06/07/2019     Lab Results   Component Value Date    MCHC 34.8 06/10/2022    MCHC 34.7 06/07/2019     Lab Results   Component Value Date    RDW 13.8 06/10/2022    RDW 13.5 06/07/2019     Lab Results   Component Value Date     06/10/2022     06/07/2019      Recent Labs   Lab Test 06/10/22  1109 03/07/22  0706    140   POTASSIUM 4.2 4.2   CHLORIDE 110* 112*   CO2 28 24   ANIONGAP 5 4   GLC 92 104*   BUN 13 13   CR 0.84 0.87   CLINT 8.4* 8.8     Recent Labs   Lab Test 06/10/22  1109 06/25/21  0727   CHOL 135 131   HDL 42 42   LDL 54 59   TRIG 193* 149   NHDL 93 89        IMPRESSION, REPORT, PLAN:   1.  Marfan syndrome.   2.  Aortic aneurysm, status post composite graft placement with a mechanical aortic valve at [facility] in [place] in 2001 by Dr. Arriola, functioning well.   3.  TIA 05/01/2018 with resolution of symptoms.  Negative CT of the head with no aneurysms found or bleeding.   4.  Hypertension, well controlled on atenolol and lisinopril.   5.  Hyperlipidemia   6.  Scoliosis.   7.  Need for family members screening for Marfan.   8.  Pacemaker placement in 2001 with failure of the atrial lead with 100% ventricular pacing.   9.  Coronary artery disease, nonobstructive, on cath 06/2018.        DISCUSSION:  It was a pleasure to see Mr. Ball in followup.  Clinically, he is doing well from a cardiovascular standpoint without any concerning cardiac symptoms other than occasional chest pain.  Discussed exercise stress echo. We will recheck his cholesterol and check VLDL and lipo a.   He is remaining active.    BP controlled. CTA looked good  in 2022 as does his echo 2024.      We will plan to see him back 3/2026 " after he turns 66 with an echo.      It was a pleasure to see him.  Please do not hesitate to contact me with any questions or concerns.       He will continue to see the ophthalmologist     HARRY GARCIA MD    45 minutes face-face, documentation and review of records on day of visit      Please do not hesitate to contact me if you have any questions/concerns.     Sincerely,     Harry Garcia MD

## 2024-10-01 NOTE — NURSING NOTE
Cardiac Testing: Patient given instructions regarding  echocardiogram  and stress echocardiogram . Discussed purpose, preparation, procedure and when to expect results reported back to the patient. Patient demonstrated understanding of this information and agreed to call with further questions or concerns.    Return Appointment: Patient given instructions regarding scheduling next clinic visit. Patient demonstrated understanding of this information and agreed to call with further questions or concerns.    Patient stated he understood all health information given and agreed to call with further questions or concerns.

## 2024-10-01 NOTE — PROGRESS NOTES
CARDIOLOGY CONSULTATION:    Mr. Ball is a delightful 64-year-old gentleman with a history of Marfan syndrome.  His dad likely also had Marfan syndrome and  of a cerebral aneurysm when he was 2 year old.  His mom  when he was 4 of ovarian cancer, and he was raised by an adoptive family.  He has 2 children in their 30s and neither of them have been screened for Marfan syndrome. He has 2 gradnchildren     Mr. Ball was diagnosed at age 24.  He had genetic testing done at the [facility] in [place].  He underwent surgery in  at [facility] in [place] by Dr. Arriola.  His ascending aorta was 5.5 cm at the time, but we do not have those operative reports.  His aortic valve was replaced with a mechanical valve, and they reimplanted his coronary arteries.  In  we did a coronary CTA and there was concern about significant coronary artery disease. He went on to have a coronary angiogram, and that showed no significant disease; he did have some disease, but it was not flow limiting with IFR evaluation of the RCA. His calcium score was high placing him in the 95% on CTA. We need to be aggressive with his cholesterol as a result of this and his LDL is in the 50s on Lipitor 40 mg daily.  He had a stress echo in 2021 and that was negative for ischemia, repeat 2023 also negative.  He has a strong family history of early CAD and his sister just had 4 stents placed 2024 in [place].    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 He plans to retire when he is 66 and go on medicare.  He walks for exercise.  His nephew from his brother  at 48 of an MI and his brother also had an MI at a young age.      His INR has for the most part been stable with his mechanical aortic valve and he takes a baby asprin.  He does have a pacemaker that was placed in  due to heart block, and his upper lead failed and Dr. Morales replaced it in  2023  10 + years of life left.  He went on a cruise on the [facility] to [place] this year and looks forward to a Spring Break Cruise to [place].  Plans to retire in [place].      He did have a cerebral CTA in 2018 that showed no evidence of cerebral aneurysms.  He did have a history of a TIA in the past but no deficits.  On his echo his valve is working well.  CTA with no concerning findings.  He suffers from sone depression/anxiety and does not see a therapist    MEDICAL HISTORY:  Past Medical History:   Diagnosis Date    Heart abnormality     Artifical aortic graft - ascending aorta    Hemothorax     Marfan's syndrome 12/6/2011       CURRENT MEDICATIONS:  Current Outpatient Medications   Medication Sig Dispense Refill    acetaminophen (TYLENOL) 500 MG tablet Take 1,000 mg by mouth 2 times daily      albuterol (VENTOLIN HFA) 108 (90 Base) MCG/ACT inhaler Inhale 2 puffs into the lungs every 4 hours as needed for wheezing Reported on 3/24/2017 18 g 3    aspirin 81 MG chewable tablet Take 1 tablet (81 mg) by mouth daily 90 tablet 3    atenolol (TENORMIN) 25 MG tablet Take 1 tablet (25 mg) by mouth daily With one 50 mg tablet by mouth daily for a total of 75 mg daily 90 tablet 0    atenolol (TENORMIN) 50 MG tablet Take 1 tablet (50 mg) by mouth daily And take one 25 mg by mouth daily for a total of 75 mg daily 90 tablet 1    atorvastatin (LIPITOR) 40 MG tablet Take 1 tablet (40 mg) by mouth daily 90 tablet 3    budesonide-formoterol (SYMBICORT) 80-4.5 MCG/ACT Inhaler Inhale 2 puffs into the lungs 2 times daily 10.2 g 3    losartan (COZAAR) 25 MG tablet Take 1 tablet (25 mg) by mouth daily 90 tablet 3    sildenafil (VIAGRA) 50 MG tablet Take 1 tablet (50 mg) by mouth daily as needed (erection) 30 tablet 1    warfarin ANTICOAGULANT (COUMADIN ANTICOAGULANT) 5 MG tablet Take 0.5 to 1 tablet by mouth every day OR as directed by Anticoagulation Clinic 90 tablet 1    warfarin ANTICOAGULANT (COUMADIN) 5 MG tablet Take 0.5-1  tablet daily or as directed 90 tablet 1    warfarin ANTICOAGULANT (COUMADIN) 5 MG tablet Take 1/2 tablet (2.5mg) to 1 whole tablet (5mg) by mouth daily or as directed by the Coumadin clinic 90 tablet 1    amoxicillin (AMOXIL) 500 MG capsule TAKE 4 CAPSULES BY MOUTH ONE HOUR PRIOR TO DENTAL APPOINTMENT (Patient not taking: Reported on 6/27/2023)         PAST SURGICAL HISTORY:  Past Surgical History:   Procedure Laterality Date    AORTIC VALVE REPLACEMENT  8/2/2001    Ascending aorta graft    EP PACEMAKER N/A 3/7/2022    Procedure: EP Pacemaker;  Surgeon: Raman Morales MD;  Location: [facility] OR    HC REMOVE TONSILS/ADENOIDS,<13 Y/O      T & A <13y.o.    HC VASECTOMY UNILAT/BILAT W POSTOP SEMEN      Vasectomy    IMPLANT PACEMAKER         ALLERGIES  Ambien [zolpidem]    FAMILY HX:  Family History   Problem Relation Age of Onset    Asthma No family hx of     Diabetes No family hx of     Hypertension No family hx of     Breast Cancer No family hx of     Cancer - colorectal No family hx of     Prostate Cancer No family hx of     Lipids No family hx of     Musculoskeletal Disorder No family hx of     Neurologic Disorder No family hx of        SOCIAL HX:  Social History     Socioeconomic History    Marital status:      Spouse name: None    Number of children: 2    Years of education: 16    Highest education level: None   Occupational History     Employer: UNEMPLOYED     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 No    Exercise Yes    Seat Belt Yes    Self-Exams Yes     Social Determinants of Health      Received from [facility] &  "[facility], [facility] & [facility]    Financial Resource Strain    Received from [facility], [facility]    Social Connections       ROS:  Constitutional: No fever, chills, or sweats. No weight gain/loss.   ENT: No visual disturbance, ear ache, epistaxis, sore throat.   Allergies/Immunologic: Negative.   Respiratory: No cough, hemoptysis.   Cardiovascular: As per HPI.   GI: No nausea, vomiting, hematemesis, melena, or hematochezia.   : No urinary frequency, dysuria, or hematuria.   Integument: Negative.   Psychiatric: Negative.   Neuro: Negative.   Endocrinology: Negative.   Musculoskeletal: No myalgia.    VITAL SIGNS:  /77 (BP Location: Right arm, Patient Position: Chair, Cuff Size: Adult Regular)   Pulse 82   Wt 80.3 kg (177 lb)   SpO2 96%   BMI 25.04 kg/m    Body mass index is 25.04 kg/m .  Wt Readings from Last 2 Encounters:   10/01/24 80.3 kg (177 lb)   06/27/23 76.2 kg (168 lb)       PHYSICAL EXAM  Richard Ball IS A 64 year old male.in no acute distress.  HEENT: Unremarkable.  Neck: JVP normal.    Lungs: CTA.  Cor: RRR. Normal S1 and S2 mechanical.  No murmur  Abd: Soft, nontender, nondistended.  NABS.  No pulsatile mass.  Extremities: No C/C/E.  .  Neuro: Grossly intact.    LABS    Lab Results   Component Value Date    WBC 3.8 06/10/2022    WBC 4.3 06/07/2019     Lab Results   Component Value Date    RBC 4.38 06/10/2022    RBC 4.04 06/07/2019     Lab Results   Component Value Date    HGB 14.0 06/10/2022    HGB 13.1 06/07/2019     Lab Results   Component Value Date    HCT 40.2 06/10/2022    HCT 37.8 06/07/2019     No components found for: \"MCT\"  Lab Results   Component Value Date    MCV 92 06/10/2022    MCV 94 06/07/2019     Lab Results   Component Value Date    MCH 32.0 06/10/2022    MCH 32.4 06/07/2019     Lab Results   Component Value Date    MCHC 34.8 06/10/2022    MCHC 34.7 06/07/2019     Lab " Results   Component Value Date    RDW 13.8 06/10/2022    RDW 13.5 06/07/2019     Lab Results   Component Value Date     06/10/2022     06/07/2019      Recent Labs   Lab Test 06/10/22  1109 03/07/22  0706    140   POTASSIUM 4.2 4.2   CHLORIDE 110* 112*   CO2 28 24   ANIONGAP 5 4   GLC 92 104*   BUN 13 13   CR 0.84 0.87   CLINT 8.4* 8.8     Recent Labs   Lab Test 06/10/22  1109 06/25/21  0727   CHOL 135 131   HDL 42 42   LDL 54 59   TRIG 193* 149   NHDL 93 89        IMPRESSION, REPORT, PLAN:   1.  Marfan syndrome.   2.  Aortic aneurysm, status post composite graft placement with a mechanical aortic valve at [facility] in [place] in 2001 by Dr. Arriola, functioning well.   3.  TIA 05/01/2018 with resolution of symptoms.  Negative CT of the head with no aneurysms found or bleeding.   4.  Hypertension, well controlled on atenolol and lisinopril.   5.  Hyperlipidemia   6.  Scoliosis.   7.  Need for family members screening for Marfan.   8.  Pacemaker placement in 2001 with failure of the atrial lead with 100% ventricular pacing.   9.  Coronary artery disease, nonobstructive, on cath 06/2018.        DISCUSSION:  It was a pleasure to see Mr. Ball in followup.  Clinically, he is doing well from a cardiovascular standpoint without any concerning cardiac symptoms other than occasional chest pain.  Discussed exercise stress echo. We will recheck his cholesterol and check VLDL and lipo a.   He is remaining active.    BP controlled. CTA looked good  in 2022 as does his echo 2024.      We will plan to see him back 3/2026 after he turns 66 with an echo.      It was a pleasure to see him.  Please do not hesitate to contact me with any questions or concerns.       He will continue to see the ophthalmologist     HARRY SAINI MD    45 minutes face-face, documentation and review of records on day of visit

## 2024-10-01 NOTE — NURSING NOTE
Chief Complaint   Patient presents with    Follow Up     [facility] 10/1/2024: [age] year old male with history of Marfans Syndrome s/p aortic aneurysm repair with graft and mechanical AVR and PPM placement presenting for evaluation. pacemaker/lead extraction 3/7/22       Vitals were taken and medications reconciled.    Yevgeniy Perea, MICHAEL  9:38 AM

## 2024-10-02 ENCOUNTER — TELEPHONE (OUTPATIENT)
Dept: CARDIOLOGY | Facility: CLINIC | Age: 64
End: 2024-10-02
Payer: COMMERCIAL

## 2024-10-02 NOTE — TELEPHONE ENCOUNTER
2023     Sofia Coelho DO  [address] [facility]  [address]  [place] [address]    Patient: Irais Parker   YOB: 2003   Date of Visit: 2023       Dear Sofia Coelho DO:    Thank you for referring Irais Parker to me for evaluation. Below are the relevant portions of my assessment and plan of care.    Encounter Diagnosis and Orders:  Diagnoses and all orders for this visit:    1. Abnormal genetic test in pregnancy (Primary)    2. 34 weeks gestation of pregnancy    3. IUGR (intrauterine growth retardation) affecting mother, third trimester, not applicable or unspecified fetus  Assessment & Plan:  There is considerable variability in the definition of what constitutes a growth-restricted fetus.  Definitions have included estimated fetal weight below the 3rd percentile, below the 5th percentile or below the 10th percentile for gestational age.  Subnormal fetal growth may be a consequence of error in dating criteria or result from chronic uteroplacental insufficiency, exposure to drugs or environmental agents, congenital infections or intrinsic genetic limitations of growth potential.  Early or symmetric IUGR can suggest chromosomal abnormalities such as trisomies, triploidies or maternal uniparental disomy.  Asymmetrical IUGR may result from nutritional compromise with sparing of head growth and be associated with tobacco abuse, chronic placental abnormalities such as abruption or as a harbinger of preeclampsia.      Current recommendations by Copel and Bahmatthewr for the management of fetal growth restriction, (Obstet Gynecol May 2014) suggest for patients at term (37 0/7 weeks' gestation or more) that delivery be affected if the estimated fetal weight is less than the 5th percentile, or oligohydramnios is present with an estimated fetal weight of less than the 10th percentile, or with worsening  testing results.  Otherwise, delivery can be delayed as long as   Attempted to reach pt to schedule appt, no answer, LVM    testing is reassuring with plans for elective delivery at 39 weeks' gestation.  Induction of labor can be attempted depending on the indication for delivery.  If the diagnosis of IUGR is  (before 37 weeks' gestation), it is not possible to make absolute recommendations on timing unless other  testing is reassuring based on the data from the Growth Restriction Intervention Trial (GRIT), a multinational prospective randomized study (Lancet 2004).  It is suggested that fetal growth be monitored every two weeks with consideration for delivery if no growth occurs over the course of 14 days.  The patient should receive twice weekly nonstress testing with amniotic fluid volume assessment.  Further, the literature would support weekly Doppler ultrasonography of the umbilical artery in the setting of intrauterine growth retardation with hospitalization for evidence of absent or reverse end-diastolic velocities.  Delivery should also be considered for worsening  testing (biophysical profile score less than 6/8 or nonreassuring fetal heart rate tracing).  When  delivery before 32 weeks' gestation is imminent, consider intravenous magnesium sulfate administration for fetal neuroprotection.      Ultrasound today in the fetus overall growth is approximately 10th percentile but the abdominal circumference is at the 1st percentile.  Amniotic fluid volume is normal but umbilical artery Dopplers are mildly elevated.    We have recommended the patient go to bed rest.  We recommended increase nutrition and have recommended the patient's stop vaping.  Patient was counseled extensively regarding fetal movement and will contact her OB immediately if she notices decreased fetal movement.    We recommend twice-weekly  testing.  Patient reports that she is already receiving testing on  and .  Owing to the abnormal Dopplers we will perform BPP's on .  She can have   testing on Fridays in [place].  If no other complications arise we would recommend delivery at 37 weeks gestation.          If you have questions, please do not hesitate to call me. I look forward to following Irais along with you.         Sincerely,        Douglas A. Milligan, MD        CC: No Recipients

## 2024-10-15 ENCOUNTER — LAB (OUTPATIENT)
Dept: LAB | Facility: CLINIC | Age: 64
End: 2024-10-15
Payer: COMMERCIAL

## 2024-10-15 ENCOUNTER — ANTICOAGULATION THERAPY VISIT (OUTPATIENT)
Dept: ANTICOAGULATION | Facility: CLINIC | Age: 64
End: 2024-10-15

## 2024-10-15 DIAGNOSIS — Z95.2 S/P AORTIC VALVE REPLACEMENT: ICD-10-CM

## 2024-10-15 DIAGNOSIS — Q87.40 MARFAN'S SYNDROME: ICD-10-CM

## 2024-10-15 DIAGNOSIS — I25.119 CORONARY ARTERY DISEASE INVOLVING NATIVE CORONARY ARTERY OF NATIVE HEART WITH ANGINA PECTORIS (H): ICD-10-CM

## 2024-10-15 DIAGNOSIS — Z95.2 HEART VALVE REPLACED: Primary | ICD-10-CM

## 2024-10-15 DIAGNOSIS — Z79.01 LONG TERM CURRENT USE OF ANTICOAGULANTS WITH INR GOAL OF 2.0-3.0: ICD-10-CM

## 2024-10-15 DIAGNOSIS — Z95.2 HEART VALVE REPLACED: ICD-10-CM

## 2024-10-15 LAB
APO A-I SERPL-MCNC: 79 MG/DL
CHOLEST SERPL-MCNC: 133 MG/DL
FASTING STATUS PATIENT QL REPORTED: YES
HDLC SERPL-MCNC: 38 MG/DL
INR BLD: 2.5 (ref 0.9–1.1)
LDLC SERPL CALC-MCNC: 61 MG/DL
LDLC SERPL DIRECT ASSAY-MCNC: 65 MG/DL
NONHDLC SERPL-MCNC: 95 MG/DL
TRIGL SERPL-MCNC: 169 MG/DL

## 2024-10-15 PROCEDURE — 80061 LIPID PANEL: CPT

## 2024-10-15 PROCEDURE — 36416 COLLJ CAPILLARY BLOOD SPEC: CPT

## 2024-10-15 PROCEDURE — 85610 PROTHROMBIN TIME: CPT

## 2024-10-15 PROCEDURE — 83721 ASSAY OF BLOOD LIPOPROTEIN: CPT | Mod: 59

## 2024-10-15 PROCEDURE — 83695 ASSAY OF LIPOPROTEIN(A): CPT

## 2024-10-15 PROCEDURE — 36415 COLL VENOUS BLD VENIPUNCTURE: CPT

## 2024-10-15 NOTE — PROGRESS NOTES
ANTICOAGULATION MANAGEMENT     Richard Ball 64 year old male is on warfarin with therapeutic INR result. (Goal INR 2.0-3.0)    Recent labs: (last 7 days)     10/15/24  0756   INR 2.5*       ASSESSMENT     Source(s): Chart Review and Patient/Caregiver Call     Warfarin doses taken: Warfarin taken as instructed  Diet: No new diet changes identified  Medication/supplement changes: None noted  New illness, injury, or hospitalization: No  Signs or symptoms of bleeding or clotting: No  Previous result: Therapeutic last 2(+) visits  Additional findings: None       PLAN     Recommended plan for no diet, medication or health factor changes affecting INR     Dosing Instructions: Continue your current warfarin dose with next INR in 5 weeks       Summary  As of 10/15/2024      Full warfarin instructions:  5 mg every Mon; 2.5 mg all other days   Next INR check:  11/19/2024               Telephone call with Don who verbalizes understanding and agrees to plan and who agrees to plan and repeated back plan correctly    Lab visit scheduled    Education provided: Please call back if any changes to your diet, medications or how you've been taking warfarin    Plan made per [facility] anticoagulation protocol    Romelia Encarnacion RN  10/15/2024  Anticoagulation Clinic  [employer] for routing messages: p [facility]  [facility] patient phone line: [phone]        _______________________________________________________________________     Anticoagulation Episode Summary       Current INR goal:  2.0-3.0   TTR:  67.0% (1 y)   Target end date:  Indefinite   Send INR reminders to:  [facility]    Indications    Heart valve replaced [Z95.2] [Z95.2]  Long term current use of anticoagulants with INR goal of 2.0-3.0 [Z79.01]  S/P aortic valve replacement [Z95.2]  Marfan's syndrome [Q87.40]             Comments:               Anticoagulation Care Providers       Provider Role Specialty Phone number    March, Sundar Cantu MD Referring  Cardiovascular Disease [phone]    Raman Morales MD Referring Cardiovascular Disease [phone]

## 2024-10-23 ENCOUNTER — HOSPITAL ENCOUNTER (OUTPATIENT)
Dept: CARDIOLOGY | Facility: CLINIC | Age: 64
Discharge: HOME | End: 2024-10-23
Attending: INTERNAL MEDICINE | Admitting: INTERNAL MEDICINE
Payer: COMMERCIAL

## 2024-10-23 PROCEDURE — 255N000002 HC RX 255 OP 636: Performed by: INTERNAL MEDICINE

## 2024-10-23 PROCEDURE — 93018 CV STRESS TEST I&R ONLY: CPT | Performed by: INTERNAL MEDICINE

## 2024-10-23 PROCEDURE — 93321 DOPPLER ECHO F-UP/LMTD STD: CPT | Mod: 26 | Performed by: INTERNAL MEDICINE

## 2024-10-23 PROCEDURE — 93350 STRESS TTE ONLY: CPT | Mod: 26 | Performed by: INTERNAL MEDICINE

## 2024-10-23 PROCEDURE — 93325 DOPPLER ECHO COLOR FLOW MAPG: CPT | Mod: 26 | Performed by: INTERNAL MEDICINE

## 2024-10-23 PROCEDURE — 93016 CV STRESS TEST SUPVJ ONLY: CPT | Performed by: INTERNAL MEDICINE

## 2024-10-23 RX ADMIN — PERFLUTREN 2 ML: 6.52 INJECTION, SUSPENSION INTRAVENOUS at 14:27

## 2024-10-25 DIAGNOSIS — Q87.40 MARFAN'S SYNDROME: ICD-10-CM

## 2024-10-31 RX ORDER — ATENOLOL 25 MG/1
25 TABLET ORAL DAILY
Qty: 90 TABLET | Refills: 3 | Status: SHIPPED | OUTPATIENT
Start: 2024-10-31

## 2024-11-20 ENCOUNTER — ANTICOAGULATION THERAPY VISIT (OUTPATIENT)
Dept: ANTICOAGULATION | Facility: CLINIC | Age: 64
End: 2024-11-20

## 2024-11-20 ENCOUNTER — LAB (OUTPATIENT)
Dept: LAB | Facility: CLINIC | Age: 64
End: 2024-11-20
Payer: COMMERCIAL

## 2024-11-20 DIAGNOSIS — Z95.2 HEART VALVE REPLACED: ICD-10-CM

## 2024-11-20 DIAGNOSIS — Q87.40 MARFAN'S SYNDROME: ICD-10-CM

## 2024-11-20 DIAGNOSIS — Z95.2 S/P AORTIC VALVE REPLACEMENT: ICD-10-CM

## 2024-11-20 DIAGNOSIS — Z79.01 LONG TERM CURRENT USE OF ANTICOAGULANTS WITH INR GOAL OF 2.0-3.0: ICD-10-CM

## 2024-11-20 DIAGNOSIS — Z95.2 HEART VALVE REPLACED: Primary | ICD-10-CM

## 2024-11-20 LAB — INR BLD: 2.2 (ref 0.9–1.1)

## 2024-11-20 PROCEDURE — 36416 COLLJ CAPILLARY BLOOD SPEC: CPT

## 2024-11-20 PROCEDURE — 85610 PROTHROMBIN TIME: CPT

## 2024-11-20 NOTE — PROGRESS NOTES
ANTICOAGULATION MANAGEMENT     Richard Ball 64 year old male is on warfarin with therapeutic INR result. (Goal INR 2.0-3.0)    Recent labs: (last 7 days)     11/20/24  0806   INR 2.2*       ASSESSMENT     Source(s): Chart Review and Patient/Caregiver Call     Warfarin doses taken: Warfarin taken as instructed  Diet: No new diet changes identified  Medication/supplement changes: None noted  New illness, injury, or hospitalization: No  Signs or symptoms of bleeding or clotting: No  Previous result: Therapeutic last 2(+) visits  Additional findings: None       PLAN     Recommended plan for no diet, medication or health factor changes affecting INR     Dosing Instructions: Continue your current warfarin dose with next INR in 5 weeks   (scheduled out 4 weeks due to holiday and Don's travel plans)    Summary  As of 11/20/2024      Full warfarin instructions:  5 mg every Mon; 2.5 mg all other days   Next INR check:  12/20/2024               Telephone call with Don who verbalizes understanding and agrees to plan and who agrees to plan and repeated back plan correctly    Lab visit scheduled    Education provided: Contact [phone] with any changes, questions or concerns.     Plan made per [facility] anticoagulation protocol    Dayanara Davidson RN  11/20/2024  Anticoagulation Clinic  [employer] for routing messages: p [facility] patient phone line: [phone]        _______________________________________________________________________     Anticoagulation Episode Summary       Current INR goal:  2.0-3.0   TTR:  72.6% (1 y)   Target end date:  Indefinite   Send INR reminders to:  [facility]    Indications    Heart valve replaced [Z95.2] [Z95.2]  Long term current use of anticoagulants with INR goal of 2.0-3.0 [Z79.01]  S/P aortic valve replacement [Z95.2]  Marfan's syndrome [Q87.40]             Comments:  --             Anticoagulation Care Providers       Provider Role Specialty Phone number    March,  Sundar Cantu MD Referring Cardiovascular Disease [phone]    Raman Morales MD Referring Cardiovascular Disease [phone]

## 2024-12-23 DIAGNOSIS — E78.5 HYPERLIPIDEMIA LDL GOAL <70: ICD-10-CM

## 2024-12-23 RX ORDER — ATORVASTATIN CALCIUM 40 MG/1
40 TABLET, FILM COATED ORAL DAILY
Qty: 90 TABLET | Refills: 3 | Status: SHIPPED | OUTPATIENT
Start: 2024-12-23

## 2024-12-31 ENCOUNTER — ANCILLARY PROCEDURE (OUTPATIENT)
Dept: CARDIOLOGY | Facility: CLINIC | Age: 64
End: 2024-12-31
Attending: INTERNAL MEDICINE
Payer: COMMERCIAL

## 2024-12-31 PROCEDURE — 93296 REM INTERROG EVL PM/IDS: CPT

## 2025-01-27 ENCOUNTER — MYC REFILL (OUTPATIENT)
Dept: CARDIOLOGY | Facility: CLINIC | Age: 65
End: 2025-01-27
Payer: COMMERCIAL

## 2025-01-27 ENCOUNTER — MYC REFILL (OUTPATIENT)
Dept: FAMILY MEDICINE | Facility: CLINIC | Age: 65
End: 2025-01-27
Payer: COMMERCIAL

## 2025-01-27 ENCOUNTER — MYC MEDICAL ADVICE (OUTPATIENT)
Dept: CARDIOLOGY | Facility: CLINIC | Age: 65
End: 2025-01-27
Payer: COMMERCIAL

## 2025-01-27 DIAGNOSIS — Z95.2 S/P AVR (AORTIC VALVE REPLACEMENT): ICD-10-CM

## 2025-01-27 DIAGNOSIS — Q87.40 MARFAN'S SYNDROME: ICD-10-CM

## 2025-01-27 DIAGNOSIS — Z95.2 HEART VALVE REPLACED: ICD-10-CM

## 2025-01-27 DIAGNOSIS — E78.5 HYPERLIPIDEMIA LDL GOAL <70: ICD-10-CM

## 2025-01-27 DIAGNOSIS — J45.40 MODERATE PERSISTENT ASTHMA, UNSPECIFIED WHETHER COMPLICATED: ICD-10-CM

## 2025-01-27 DIAGNOSIS — Z79.01 LONG TERM CURRENT USE OF ANTICOAGULANT THERAPY: ICD-10-CM

## 2025-01-27 DIAGNOSIS — Z95.2 S/P AORTIC VALVE REPLACEMENT: Primary | ICD-10-CM

## 2025-01-27 DIAGNOSIS — Z95.2 S/P AORTIC VALVE REPLACEMENT: ICD-10-CM

## 2025-01-27 RX ORDER — AMOXICILLIN 500 MG/1
2000 CAPSULE ORAL
Qty: 12 CAPSULE | Refills: 1 | Status: SHIPPED | OUTPATIENT
Start: 2025-01-27

## 2025-01-27 RX ORDER — LOSARTAN POTASSIUM 25 MG/1
25 TABLET ORAL DAILY
Qty: 90 TABLET | Refills: 3 | Status: SHIPPED | OUTPATIENT
Start: 2025-01-27

## 2025-01-27 RX ORDER — BUDESONIDE AND FORMOTEROL FUMARATE DIHYDRATE 80; 4.5 UG/1; UG/1
2 AEROSOL RESPIRATORY (INHALATION) 2 TIMES DAILY
Qty: 10.2 G | Refills: 3 | Status: SHIPPED | OUTPATIENT
Start: 2025-01-27

## 2025-01-27 RX ORDER — ATORVASTATIN CALCIUM 40 MG/1
40 TABLET, FILM COATED ORAL DAILY
Qty: 90 TABLET | Refills: 3 | Status: SHIPPED | OUTPATIENT
Start: 2025-01-27

## 2025-01-27 RX ORDER — ATENOLOL 50 MG/1
50 TABLET ORAL DAILY
Qty: 90 TABLET | Refills: 3 | Status: SHIPPED | OUTPATIENT
Start: 2025-01-27

## 2025-01-27 RX ORDER — WARFARIN SODIUM 5 MG/1
TABLET ORAL
Qty: 90 TABLET | Refills: 2 | Status: SHIPPED | OUTPATIENT
Start: 2025-01-27

## 2025-01-27 RX ORDER — ALBUTEROL SULFATE 90 UG/1
2 INHALANT RESPIRATORY (INHALATION) EVERY 4 HOURS PRN
Qty: 18 G | Refills: 3 | Status: SHIPPED | OUTPATIENT
Start: 2025-01-27

## 2025-01-28 RX ORDER — LOSARTAN POTASSIUM 25 MG/1
25 TABLET ORAL DAILY
Qty: 90 TABLET | Refills: 3 | OUTPATIENT
Start: 2025-01-28

## 2025-01-28 RX ORDER — WARFARIN SODIUM 5 MG/1
TABLET ORAL
Qty: 90 TABLET | Refills: 1 | OUTPATIENT
Start: 2025-01-28

## 2025-01-28 RX ORDER — ATENOLOL 50 MG/1
50 TABLET ORAL DAILY
Qty: 90 TABLET | Refills: 1 | OUTPATIENT
Start: 2025-01-28

## 2025-01-28 RX ORDER — ATENOLOL 25 MG/1
25 TABLET ORAL DAILY
Qty: 90 TABLET | Refills: 3 | Status: SHIPPED | OUTPATIENT
Start: 2025-01-28

## 2025-01-28 RX ORDER — ATORVASTATIN CALCIUM 40 MG/1
40 TABLET, FILM COATED ORAL DAILY
Qty: 90 TABLET | Refills: 3 | OUTPATIENT
Start: 2025-01-28

## 2025-02-18 ENCOUNTER — LAB (OUTPATIENT)
Dept: LAB | Facility: CLINIC | Age: 65
End: 2025-02-18
Payer: COMMERCIAL

## 2025-02-18 ENCOUNTER — ANTICOAGULATION THERAPY VISIT (OUTPATIENT)
Dept: ANTICOAGULATION | Facility: CLINIC | Age: 65
End: 2025-02-18

## 2025-02-18 DIAGNOSIS — Z95.2 S/P AORTIC VALVE REPLACEMENT: ICD-10-CM

## 2025-02-18 DIAGNOSIS — Z79.01 LONG TERM CURRENT USE OF ANTICOAGULANTS WITH INR GOAL OF 2.0-3.0: ICD-10-CM

## 2025-02-18 DIAGNOSIS — Z95.2 HEART VALVE REPLACED: ICD-10-CM

## 2025-02-18 DIAGNOSIS — Q87.40 MARFAN'S SYNDROME: ICD-10-CM

## 2025-02-18 DIAGNOSIS — Z95.2 HEART VALVE REPLACED: Primary | ICD-10-CM

## 2025-02-18 LAB — INR BLD: 3.3 (ref 0.9–1.1)

## 2025-02-18 PROCEDURE — 36416 COLLJ CAPILLARY BLOOD SPEC: CPT

## 2025-02-18 PROCEDURE — 85610 PROTHROMBIN TIME: CPT

## 2025-02-18 NOTE — PROGRESS NOTES
ANTICOAGULATION MANAGEMENT     Richard Ball 65 year old male is on warfarin with therapeutic INR result. (Goal INR 2.0-3.0)    Recent labs: (last 7 days)     02/18/25  1556   INR 3.3*       ASSESSMENT     Source(s): Chart Review and Patient/Caregiver Call     Warfarin doses taken: Booster dose(s) recently taken which may be affecting INR and Missed dose(s) may be affecting INR Missed dose and then self boosted   Diet: No new diet changes identified  Medication/supplement changes: None noted  New illness, injury, or hospitalization: No  Signs or symptoms of bleeding or clotting: No  Previous result: Therapeutic last visit; previously outside of goal range  Additional findings: None       PLAN     Recommended plan for no diet, medication or health factor changes affecting INR     Dosing Instructions: Continue your current warfarin dose with next INR in 2 weeks       Summary  As of 2/18/2025      Full warfarin instructions:  5 mg every Mon, Fri; 2.5 mg all other days   Next INR check:  3/5/2025               Telephone call with Don who verbalizes understanding and agrees to plan and who agrees to plan and repeated back plan correctly    Lab visit scheduled    Education provided: Please call back if any changes to your diet, medications or how you've been taking warfarin    Plan made per [facility] anticoagulation protocol    Romelia Encarnacion RN  2/18/2025  Anticoagulation Clinic  adQ for routing messages: p [facility] patient phone line: [phone]        _______________________________________________________________________     Anticoagulation Episode Summary       Current INR goal:  2.0-3.0   TTR:  65.0% (1 y)   Target end date:  Indefinite   Send INR reminders to:  [facility]    Indications    Heart valve replaced [Z95.2] [Z95.2]  Long term current use of anticoagulants with INR goal of 2.0-3.0 [Z79.01]  S/P aortic valve replacement [Z95.2]  Marfan's syndrome [Q87.40]             Comments:   --             Anticoagulation Care Providers       Provider Role Specialty Phone number    March, Sundar Cantu MD Referring Cardiovascular Disease [phone]    Raman Morales MD Referring Cardiovascular Disease [phone]

## 2025-02-19 ENCOUNTER — TELEPHONE (OUTPATIENT)
Dept: FAMILY MEDICINE | Facility: CLINIC | Age: 65
End: 2025-02-19

## 2025-02-19 ENCOUNTER — OFFICE VISIT (OUTPATIENT)
Dept: INTERNAL MEDICINE | Facility: CLINIC | Age: 65
End: 2025-02-19
Payer: COMMERCIAL

## 2025-02-19 VITALS
TEMPERATURE: 97.5 F | OXYGEN SATURATION: 99 % | WEIGHT: 177.5 LBS | SYSTOLIC BLOOD PRESSURE: 130 MMHG | HEIGHT: 70 IN | DIASTOLIC BLOOD PRESSURE: 79 MMHG | HEART RATE: 84 BPM | RESPIRATION RATE: 16 BRPM | BODY MASS INDEX: 25.41 KG/M2

## 2025-02-19 DIAGNOSIS — Q87.40 MARFAN'S SYNDROME: ICD-10-CM

## 2025-02-19 DIAGNOSIS — N52.9 ERECTILE DYSFUNCTION, UNSPECIFIED ERECTILE DYSFUNCTION TYPE: ICD-10-CM

## 2025-02-19 DIAGNOSIS — K21.9 GASTROESOPHAGEAL REFLUX DISEASE WITHOUT ESOPHAGITIS: ICD-10-CM

## 2025-02-19 DIAGNOSIS — I44.2 ATRIOVENTRICULAR BLOCK, COMPLETE (H): ICD-10-CM

## 2025-02-19 DIAGNOSIS — Z79.899 HIGH RISK MEDICATION USE: ICD-10-CM

## 2025-02-19 DIAGNOSIS — M41.44 NEUROMUSCULAR SCOLIOSIS OF THORACIC REGION: ICD-10-CM

## 2025-02-19 DIAGNOSIS — K62.5 RECTAL BLEEDING: ICD-10-CM

## 2025-02-19 DIAGNOSIS — Z76.89 ESTABLISHING CARE WITH NEW DOCTOR, ENCOUNTER FOR: Primary | ICD-10-CM

## 2025-02-19 DIAGNOSIS — Z95.0 PACEMAKER: ICD-10-CM

## 2025-02-19 DIAGNOSIS — Z95.2 S/P AORTIC VALVE REPLACEMENT: ICD-10-CM

## 2025-02-19 LAB
ALBUMIN SERPL BCG-MCNC: 4.2 G/DL (ref 3.5–5.2)
ALP SERPL-CCNC: 69 U/L (ref 40–150)
ALT SERPL W P-5'-P-CCNC: 21 U/L (ref 0–70)
ANION GAP SERPL CALCULATED.3IONS-SCNC: 7 MMOL/L (ref 7–15)
AST SERPL W P-5'-P-CCNC: 23 U/L (ref 0–45)
BILIRUB SERPL-MCNC: 0.3 MG/DL
BUN SERPL-MCNC: 10.8 MG/DL (ref 8–23)
CALCIUM SERPL-MCNC: 9.3 MG/DL (ref 8.8–10.4)
CHLORIDE SERPL-SCNC: 106 MMOL/L (ref 98–107)
CREAT SERPL-MCNC: 0.91 MG/DL (ref 0.67–1.17)
EGFRCR SERPLBLD CKD-EPI 2021: >90 ML/MIN/1.73M2
ERYTHROCYTE [DISTWIDTH] IN BLOOD BY AUTOMATED COUNT: 13.7 % (ref 10–15)
GLUCOSE SERPL-MCNC: 96 MG/DL (ref 70–99)
HCO3 SERPL-SCNC: 28 MMOL/L (ref 22–29)
HCT VFR BLD AUTO: 41.1 % (ref 40–53)
HGB BLD-MCNC: 14.4 G/DL (ref 13.3–17.7)
MCH RBC QN AUTO: 31.5 PG (ref 26.5–33)
MCHC RBC AUTO-ENTMCNC: 35 G/DL (ref 31.5–36.5)
MCV RBC AUTO: 90 FL (ref 78–100)
PLATELET # BLD AUTO: 111 10E3/UL (ref 150–450)
POTASSIUM SERPL-SCNC: 4.4 MMOL/L (ref 3.4–5.3)
PROT SERPL-MCNC: 6.5 G/DL (ref 6.4–8.3)
RBC # BLD AUTO: 4.57 10E6/UL (ref 4.4–5.9)
SODIUM SERPL-SCNC: 141 MMOL/L (ref 135–145)
WBC # BLD AUTO: 4.2 10E3/UL (ref 4–11)

## 2025-02-19 PROCEDURE — 90472 IMMUNIZATION ADMIN EACH ADD: CPT | Performed by: INTERNAL MEDICINE

## 2025-02-19 PROCEDURE — 36415 COLL VENOUS BLD VENIPUNCTURE: CPT | Performed by: INTERNAL MEDICINE

## 2025-02-19 PROCEDURE — 90677 PCV20 VACCINE IM: CPT | Performed by: INTERNAL MEDICINE

## 2025-02-19 PROCEDURE — 90471 IMMUNIZATION ADMIN: CPT | Performed by: INTERNAL MEDICINE

## 2025-02-19 PROCEDURE — 80053 COMPREHEN METABOLIC PANEL: CPT | Performed by: INTERNAL MEDICINE

## 2025-02-19 PROCEDURE — 99214 OFFICE O/P EST MOD 30 MIN: CPT | Mod: 25 | Performed by: INTERNAL MEDICINE

## 2025-02-19 PROCEDURE — 90673 RIV3 VACCINE NO PRESERV IM: CPT | Performed by: INTERNAL MEDICINE

## 2025-02-19 PROCEDURE — 85027 COMPLETE CBC AUTOMATED: CPT | Performed by: INTERNAL MEDICINE

## 2025-02-19 RX ORDER — OMEPRAZOLE 20 MG/1
20 CAPSULE, DELAYED RELEASE ORAL 2 TIMES DAILY
Qty: 60 CAPSULE | Refills: 1 | Status: SHIPPED | OUTPATIENT
Start: 2025-02-19

## 2025-02-19 RX ORDER — IBUPROFEN 200 MG
200 TABLET ORAL EVERY 4 HOURS PRN
COMMUNITY

## 2025-02-19 RX ORDER — OMEPRAZOLE 20 MG/1
20 TABLET, DELAYED RELEASE ORAL PRN
COMMUNITY

## 2025-02-19 RX ORDER — TADALAFIL 20 MG/1
20 TABLET ORAL
Qty: 30 TABLET | Refills: 5 | Status: SHIPPED | OUTPATIENT
Start: 2025-02-19

## 2025-02-19 RX ORDER — CELECOXIB 200 MG/1
200 CAPSULE ORAL DAILY
Qty: 30 CAPSULE | Refills: 0 | Status: SHIPPED | OUTPATIENT
Start: 2025-02-19

## 2025-02-19 ASSESSMENT — ASTHMA QUESTIONNAIRES
ACT_TOTALSCORE: 25
ACT_TOTALSCORE: 25
QUESTION_3 LAST FOUR WEEKS HOW OFTEN DID YOUR ASTHMA SYMPTOMS (WHEEZING, COUGHING, SHORTNESS OF BREATH, CHEST TIGHTNESS OR PAIN) WAKE YOU UP AT NIGHT OR EARLIER THAN USUAL IN THE MORNING: NOT AT ALL
QUESTION_2 LAST FOUR WEEKS HOW OFTEN HAVE YOU HAD SHORTNESS OF BREATH: NOT AT ALL
QUESTION_4 LAST FOUR WEEKS HOW OFTEN HAVE YOU USED YOUR RESCUE INHALER OR NEBULIZER MEDICATION (SUCH AS ALBUTEROL): NOT AT ALL
QUESTION_1 LAST FOUR WEEKS HOW MUCH OF THE TIME DID YOUR ASTHMA KEEP YOU FROM GETTING AS MUCH DONE AT WORK, SCHOOL OR AT HOME: NONE OF THE TIME
QUESTION_5 LAST FOUR WEEKS HOW WOULD YOU RATE YOUR ASTHMA CONTROL: COMPLETELY CONTROLLED

## 2025-02-19 NOTE — TELEPHONE ENCOUNTER
----- Message from NUVIA GARNICA sent at 2/18/2025  6:00 PM CST -----  Patient is a new patient establishing care tomorrow.  It was accidentally scheduled as a physical though.  Please let him know that first visit will be a establish care visit to get to know one another and learn his history etc.  Probably will not be able to have time for a physical as well.

## 2025-02-19 NOTE — PROGRESS NOTES
1. Establishing care with new doctor, encounter for (Primary)      2. Marfan's syndrome  Continue to follow-up with his cardiologist.  Want to make sure he has all of his aneurysm screening completed.    3. S/P aortic valve replacement  Continue warfarin per cardiology.    4. Atrioventricular block, complete (H)  Status post pacemaker.    5. Pacemaker      6. Neuromuscular scoliosis of thoracic region  I told him I do not want him taking ibuprofen.  Trial of Celebrex which is slightly safer.  Begin omeprazole 20 mg twice a day given his worsening heartburn.  He needs an EGD for further evaluation.  Low threshold to refer to spine for possible injections or other modalities.  Would also maybe consider seeing the pain clinic.  - celecoxib (CELEBREX) 200 MG capsule; Take 1 capsule (200 mg) by mouth daily.  Dispense: 30 capsule; Refill: 0    7. Erectile dysfunction, unspecified erectile dysfunction type  He does not like the Viagra as it is not helpful enough for him.  Will switch him to tadalafil which she has done well with in the past.  - tadalafil (ADCIRCA/CIALIS) 20 MG tablet; Take 1 tablet (20 mg) by mouth every 48 hours as needed (ED).  Dispense: 30 tablet; Refill: 5    8. Gastroesophageal reflux disease without esophagitis  Uncontrolled.  Begin omeprazole twice daily.  I think he should undergo an EGD given his persistence of symptoms.  - omeprazole (PRILOSEC) 20 MG DR capsule; Take 1 capsule (20 mg) by mouth 2 times daily.  Dispense: 60 capsule; Refill: 1  - Adult GI  Referral - Procedure Only; Future    9. Rectal bleeding  He has not had a colonoscopy in some time.  I think we need to refer him for that given the bleeding.  He also notes a skin tag I want them to take a look at that as well.  Will need to examine him next visit once I have more time.  - Adult GI  Referral - Procedure Only; Future  - CBC with platelets; Future    10. High risk medication use  Labs as below given his high  "risk med use  - Comprehensive metabolic panel (BMP + Alb, Alk Phos, ALT, AST, Total. Bili, TP); Future     Subjective   Henrik is a 65 year old, presenting for the following health issues:  Office Visit (Wants a new provider and has things to discuss with provider)      2/19/2025     1:10 PM   Additional Questions   Roomed by Makayla   Accompanied by Alone     History of Present Illness       Reason for visit:  New patient. Tsf from Dr Roblero   He is taking medications regularly.           Richard is a new patient to the clinic referred by his friends Yrn VOSS and Chevy Galloway.  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  Worked for [employer] for years.  He has a complicated history.  Extensive visit ensued today.  Over 1 hour was spent with patient.  He has Marfan's.  He had surgery at a young age to repair his aortic valve and aorta.  He is maintained on warfarin for this.  He has a complete heart block for which he has a pacemaker.  He has severe scoliosis with chronic pain.  He has been taking ibuprofen despite his warfarin use.  Tylenol does not do anything.  He will occasionally use a THC gummy.  He has been having a lot of heartburn.  He has noted some intermittent rectal bleeding on the toilet paper.  He has not seen a doctor in about 3 years.          Objective    /79 (BP Location: Left arm, Patient Position: Sitting, Cuff Size: Adult Regular)   Pulse 84   Temp 97.5  F (36.4  C) (Oral)   Resp 16   Ht 1.785 m (5' 10.28\")   Wt 80.5 kg (177 lb 8 oz)   SpO2 99%   BMI 25.27 kg/m    Body mass index is 25.27 kg/m .  Physical Exam   Pleasant gentleman.  Marked scoliosis.  Tanned.  Very loud mechanical heart tone.  Small umbilical hernia.            Signed Electronically by: NUVIA GARNICA MD    "

## 2025-03-06 ENCOUNTER — LAB (OUTPATIENT)
Dept: LAB | Facility: CLINIC | Age: 65
End: 2025-03-06
Payer: COMMERCIAL

## 2025-03-06 ENCOUNTER — ANTICOAGULATION THERAPY VISIT (OUTPATIENT)
Dept: ANTICOAGULATION | Facility: CLINIC | Age: 65
End: 2025-03-06

## 2025-03-06 DIAGNOSIS — Z95.2 S/P AORTIC VALVE REPLACEMENT: ICD-10-CM

## 2025-03-06 DIAGNOSIS — Q87.40 MARFAN'S SYNDROME: ICD-10-CM

## 2025-03-06 DIAGNOSIS — Z95.2 HEART VALVE REPLACED: Primary | ICD-10-CM

## 2025-03-06 DIAGNOSIS — Z95.2 HEART VALVE REPLACED: ICD-10-CM

## 2025-03-06 DIAGNOSIS — Z79.01 LONG TERM CURRENT USE OF ANTICOAGULANTS WITH INR GOAL OF 2.0-3.0: ICD-10-CM

## 2025-03-06 LAB — INR BLD: 2.8 (ref 0.9–1.1)

## 2025-03-06 NOTE — PROGRESS NOTES
ANTICOAGULATION MANAGEMENT     Richard Ball 65 year old male is on warfarin with therapeutic INR result. (Goal INR 2.0-3.0)    Recent labs: (last 7 days)     03/06/25  1203   INR 2.8*       ASSESSMENT     Source(s): Chart Review and Patient/Caregiver Call     Warfarin doses taken: Warfarin taken as instructed  Diet: No new diet changes identified  Medication/supplement changes: Pt started celecoxib and omeprazole on 2/19/25 following OV with provider. Per [employer] Celecoxib has potential for interaction with warfarin resulting in increased risk of bleeding and omeprazole has potential for interaction resulting in increased INR and risk of bleeding. Pt states that he has been on OTC omeprazole for over a year.   New illness, injury, or hospitalization: No  Signs or symptoms of bleeding or clotting: No  Previous result: Supratherapeutic  Additional findings: None       PLAN     Recommended plan for no diet, medication or health factor changes affecting INR     Dosing Instructions: Continue your current warfarin dose with next INR in 2-3 weeks- pt states that [place] and prefers to check INR upon return around 4/7/25.     Summary  As of 3/6/2025      Full warfarin instructions:  5 mg every Mon, Fri; 2.5 mg all other days   Next INR check:  4/7/2025               Telephone call with Don who verbalizes understanding and agrees to plan    Patient offered & declined to schedule next visit    Education provided: Goal range and lab monitoring: goal range and significance of current result, Importance of therapeutic range, and Importance of following up at instructed interval    Plan made per [facility] anticoagulation protocol    Wilfredo Collier RN  3/6/2025  Anticoagulation Clinic  [employer] for routing messages: [facility]  [facility] patient phone line: [phone]        _______________________________________________________________________     Anticoagulation Episode Summary       Current INR  goal:  2.0-3.0   TTR:  65.2% (1 y)   Target end date:  Indefinite   Send INR reminders to:  [facility]    Indications    Heart valve replaced [Z95.2] [Z95.2]  Long term current use of anticoagulants with INR goal of 2.0-3.0 [Z79.01]  S/P aortic valve replacement [Z95.2]  Marfan's syndrome [Q87.40]             Comments:  --             Anticoagulation Care Providers       Provider Role Specialty Phone number    March, Sundar Cantu MD Referring Cardiovascular Disease [phone]    Raman Morales MD Referring Cardiovascular Disease [phone]

## 2025-04-02 ENCOUNTER — TELEPHONE (OUTPATIENT)
Dept: CARDIOLOGY | Facility: CLINIC | Age: 65
End: 2025-04-02
Payer: COMMERCIAL

## 2025-04-02 NOTE — TELEPHONE ENCOUNTER
[facility] is updated that warfarin interruption plan is being worked on. [facility] is aware of procedure and patient will be contacted with specifics surrounding procedure.

## 2025-04-02 NOTE — TELEPHONE ENCOUNTER
[facility] Call Center    Phone Message    May a detailed message be left on voicemail: yes     Reason for Call: Order(s): Other:   Reason for requested: 4 day hold orders on Warfarin prior to EGD w/Colonoscopy on April 16th, the hold is to start on April 12th  Date needed: April 7th  Provider name: Dr Radha RODRIGUEZ said this is their second attempt    Action Taken: Other: Cardiology    Travel Screening: Not Applicable    Thank you!  [facility]       Date of Service:

## 2025-04-09 ENCOUNTER — ANCILLARY PROCEDURE (OUTPATIENT)
Dept: CARDIOLOGY | Facility: CLINIC | Age: 65
End: 2025-04-09
Attending: INTERNAL MEDICINE
Payer: COMMERCIAL

## 2025-04-09 DIAGNOSIS — Z95.0 CARDIAC PACEMAKER IN SITU: ICD-10-CM

## 2025-04-09 PROCEDURE — 93296 REM INTERROG EVL PM/IDS: CPT

## 2025-04-09 PROCEDURE — 93294 REM INTERROG EVL PM/LDLS PM: CPT | Performed by: INTERNAL MEDICINE

## 2025-04-10 LAB
MDC_IDC_LEAD_CONNECTION_STATUS: NORMAL
MDC_IDC_LEAD_CONNECTION_STATUS: NORMAL
MDC_IDC_LEAD_IMPLANT_DT: NORMAL
MDC_IDC_LEAD_IMPLANT_DT: NORMAL
MDC_IDC_LEAD_LOCATION: NORMAL
MDC_IDC_LEAD_LOCATION: NORMAL
MDC_IDC_LEAD_LOCATION_DETAIL_1: NORMAL
MDC_IDC_LEAD_LOCATION_DETAIL_1: NORMAL
MDC_IDC_LEAD_MFG: NORMAL
MDC_IDC_LEAD_MFG: NORMAL
MDC_IDC_LEAD_MODEL: NORMAL
MDC_IDC_LEAD_MODEL: NORMAL
MDC_IDC_LEAD_POLARITY_TYPE: NORMAL
MDC_IDC_LEAD_POLARITY_TYPE: NORMAL
MDC_IDC_LEAD_SERIAL: NORMAL
MDC_IDC_LEAD_SERIAL: NORMAL
MDC_IDC_LEAD_SPECIAL_FUNCTION: NORMAL
MDC_IDC_LEAD_SPECIAL_FUNCTION: NORMAL
MDC_IDC_MSMT_BATTERY_DTM: NORMAL
MDC_IDC_MSMT_BATTERY_REMAINING_LONGEVITY: 108 MO
MDC_IDC_MSMT_BATTERY_RRT_TRIGGER: 2.62
MDC_IDC_MSMT_BATTERY_STATUS: NORMAL
MDC_IDC_MSMT_BATTERY_VOLTAGE: 2.99 V
MDC_IDC_MSMT_LEADCHNL_RA_IMPEDANCE_VALUE: 418 OHM
MDC_IDC_MSMT_LEADCHNL_RA_IMPEDANCE_VALUE: 532 OHM
MDC_IDC_MSMT_LEADCHNL_RA_PACING_THRESHOLD_AMPLITUDE: 0.5 V
MDC_IDC_MSMT_LEADCHNL_RA_PACING_THRESHOLD_PULSEWIDTH: 0.4 MS
MDC_IDC_MSMT_LEADCHNL_RA_SENSING_INTR_AMPL: 4.5 MV
MDC_IDC_MSMT_LEADCHNL_RA_SENSING_INTR_AMPL: 4.5 MV
MDC_IDC_MSMT_LEADCHNL_RV_IMPEDANCE_VALUE: 361 OHM
MDC_IDC_MSMT_LEADCHNL_RV_IMPEDANCE_VALUE: 437 OHM
MDC_IDC_MSMT_LEADCHNL_RV_PACING_THRESHOLD_AMPLITUDE: 0.62 V
MDC_IDC_MSMT_LEADCHNL_RV_PACING_THRESHOLD_PULSEWIDTH: 0.4 MS
MDC_IDC_PG_IMPLANT_DTM: NORMAL
MDC_IDC_PG_MFG: NORMAL
MDC_IDC_PG_MODEL: NORMAL
MDC_IDC_PG_SERIAL: NORMAL
MDC_IDC_PG_TYPE: NORMAL
MDC_IDC_SESS_CLINIC_NAME: NORMAL
MDC_IDC_SESS_DTM: NORMAL
MDC_IDC_SESS_TYPE: NORMAL
MDC_IDC_SET_BRADY_AT_MODE_SWITCH_RATE: 171 {BEATS}/MIN
MDC_IDC_SET_BRADY_HYSTRATE: NORMAL
MDC_IDC_SET_BRADY_LOWRATE: 60 {BEATS}/MIN
MDC_IDC_SET_BRADY_MAX_SENSOR_RATE: 130 {BEATS}/MIN
MDC_IDC_SET_BRADY_MAX_TRACKING_RATE: 130 {BEATS}/MIN
MDC_IDC_SET_BRADY_MODE: NORMAL
MDC_IDC_SET_BRADY_PAV_DELAY_LOW: 180 MS
MDC_IDC_SET_BRADY_SAV_DELAY_LOW: 150 MS
MDC_IDC_SET_LEADCHNL_RA_PACING_AMPLITUDE: 1.5 V
MDC_IDC_SET_LEADCHNL_RA_PACING_ANODE_ELECTRODE_1: NORMAL
MDC_IDC_SET_LEADCHNL_RA_PACING_ANODE_LOCATION_1: NORMAL
MDC_IDC_SET_LEADCHNL_RA_PACING_CAPTURE_MODE: NORMAL
MDC_IDC_SET_LEADCHNL_RA_PACING_CATHODE_ELECTRODE_1: NORMAL
MDC_IDC_SET_LEADCHNL_RA_PACING_CATHODE_LOCATION_1: NORMAL
MDC_IDC_SET_LEADCHNL_RA_PACING_POLARITY: NORMAL
MDC_IDC_SET_LEADCHNL_RA_PACING_PULSEWIDTH: 0.4 MS
MDC_IDC_SET_LEADCHNL_RA_SENSING_ANODE_ELECTRODE_1: NORMAL
MDC_IDC_SET_LEADCHNL_RA_SENSING_ANODE_LOCATION_1: NORMAL
MDC_IDC_SET_LEADCHNL_RA_SENSING_CATHODE_ELECTRODE_1: NORMAL
MDC_IDC_SET_LEADCHNL_RA_SENSING_CATHODE_LOCATION_1: NORMAL
MDC_IDC_SET_LEADCHNL_RA_SENSING_POLARITY: NORMAL
MDC_IDC_SET_LEADCHNL_RA_SENSING_SENSITIVITY: 0.3 MV
MDC_IDC_SET_LEADCHNL_RV_PACING_AMPLITUDE: 2 V
MDC_IDC_SET_LEADCHNL_RV_PACING_ANODE_ELECTRODE_1: NORMAL
MDC_IDC_SET_LEADCHNL_RV_PACING_ANODE_LOCATION_1: NORMAL
MDC_IDC_SET_LEADCHNL_RV_PACING_CAPTURE_MODE: NORMAL
MDC_IDC_SET_LEADCHNL_RV_PACING_CATHODE_ELECTRODE_1: NORMAL
MDC_IDC_SET_LEADCHNL_RV_PACING_CATHODE_LOCATION_1: NORMAL
MDC_IDC_SET_LEADCHNL_RV_PACING_POLARITY: NORMAL
MDC_IDC_SET_LEADCHNL_RV_PACING_PULSEWIDTH: 0.4 MS
MDC_IDC_SET_LEADCHNL_RV_SENSING_ANODE_ELECTRODE_1: NORMAL
MDC_IDC_SET_LEADCHNL_RV_SENSING_ANODE_LOCATION_1: NORMAL
MDC_IDC_SET_LEADCHNL_RV_SENSING_CATHODE_ELECTRODE_1: NORMAL
MDC_IDC_SET_LEADCHNL_RV_SENSING_CATHODE_LOCATION_1: NORMAL
MDC_IDC_SET_LEADCHNL_RV_SENSING_POLARITY: NORMAL
MDC_IDC_SET_LEADCHNL_RV_SENSING_SENSITIVITY: 0.9 MV
MDC_IDC_SET_ZONE_DETECTION_INTERVAL: 350 MS
MDC_IDC_SET_ZONE_DETECTION_INTERVAL: 400 MS
MDC_IDC_SET_ZONE_STATUS: NORMAL
MDC_IDC_SET_ZONE_STATUS: NORMAL
MDC_IDC_SET_ZONE_TYPE: NORMAL
MDC_IDC_SET_ZONE_VENDOR_TYPE: NORMAL
MDC_IDC_STAT_AT_BURDEN_PERCENT: 0 %
MDC_IDC_STAT_AT_DTM_END: NORMAL
MDC_IDC_STAT_AT_DTM_START: NORMAL
MDC_IDC_STAT_BRADY_AP_VP_PERCENT: 67.99 %
MDC_IDC_STAT_BRADY_AP_VS_PERCENT: 0 %
MDC_IDC_STAT_BRADY_AS_VP_PERCENT: 32 %
MDC_IDC_STAT_BRADY_AS_VS_PERCENT: 0.01 %
MDC_IDC_STAT_BRADY_DTM_END: NORMAL
MDC_IDC_STAT_BRADY_DTM_START: NORMAL
MDC_IDC_STAT_BRADY_RA_PERCENT_PACED: 67.97 %
MDC_IDC_STAT_BRADY_RV_PERCENT_PACED: 99.99 %
MDC_IDC_STAT_EPISODE_RECENT_COUNT: 0
MDC_IDC_STAT_EPISODE_RECENT_COUNT_DTM_END: NORMAL
MDC_IDC_STAT_EPISODE_RECENT_COUNT_DTM_START: NORMAL
MDC_IDC_STAT_EPISODE_TOTAL_COUNT: 0
MDC_IDC_STAT_EPISODE_TOTAL_COUNT: 4
MDC_IDC_STAT_EPISODE_TOTAL_COUNT_DTM_END: NORMAL
MDC_IDC_STAT_EPISODE_TOTAL_COUNT_DTM_START: NORMAL
MDC_IDC_STAT_EPISODE_TYPE: NORMAL
MDC_IDC_STAT_TACHYTHERAPY_RECENT_DTM_END: NORMAL
MDC_IDC_STAT_TACHYTHERAPY_RECENT_DTM_START: NORMAL
MDC_IDC_STAT_TACHYTHERAPY_TOTAL_DTM_END: NORMAL
MDC_IDC_STAT_TACHYTHERAPY_TOTAL_DTM_START: NORMAL

## 2025-04-11 ENCOUNTER — DOCUMENTATION ONLY (OUTPATIENT)
Dept: ANTICOAGULATION | Facility: CLINIC | Age: 65
End: 2025-04-11

## 2025-04-11 DIAGNOSIS — Z95.2 HEART VALVE REPLACED: Primary | ICD-10-CM

## 2025-04-11 NOTE — PROGRESS NOTES
ANTICOAGULATION CLINIC REFERRAL RENEWAL REQUEST       An annual renewal order is required for all patients referred to [facility] Anticoagulation [facility].?  Please review and sign the pended referral order for Richard Ball.       ANTICOAGULATION SUMMARY      Warfarin indication(s)   Mechanical AVR and Marfan's syndrome    Mechanical heart valve present?  Mechanical  AVR-Bileaflet       Current goal range   INR: 2.0-3.0     Goal appropriate for indication? Goal INR 2-3, standard for indication(s) above     Time in Therapeutic Range (TTR)  (Goal > 60%) 71%       Office visit with referring provider's group within last year yes on 10/1/24       Lissa Montanez RN  [facility] Anticoagulation [facility]

## 2025-04-18 PROBLEM — D12.6 ADENOMATOUS COLON POLYP: Status: ACTIVE | Noted: 2025-04-18

## 2025-04-21 ENCOUNTER — ANTICOAGULATION THERAPY VISIT (OUTPATIENT)
Dept: ANTICOAGULATION | Facility: CLINIC | Age: 65
End: 2025-04-21

## 2025-04-21 ENCOUNTER — LAB (OUTPATIENT)
Dept: LAB | Facility: CLINIC | Age: 65
End: 2025-04-21
Payer: COMMERCIAL

## 2025-04-21 DIAGNOSIS — Z95.2 HEART VALVE REPLACED: ICD-10-CM

## 2025-04-21 DIAGNOSIS — Z95.2 HEART VALVE REPLACED: Primary | ICD-10-CM

## 2025-04-21 DIAGNOSIS — Q87.40 MARFAN'S SYNDROME: ICD-10-CM

## 2025-04-21 DIAGNOSIS — Z79.01 LONG TERM CURRENT USE OF ANTICOAGULANTS WITH INR GOAL OF 2.0-3.0: ICD-10-CM

## 2025-04-21 DIAGNOSIS — Z95.2 S/P AORTIC VALVE REPLACEMENT: ICD-10-CM

## 2025-04-21 LAB — INR BLD: 1.9 (ref 0.9–1.1)

## 2025-04-21 PROCEDURE — 36416 COLLJ CAPILLARY BLOOD SPEC: CPT

## 2025-04-21 PROCEDURE — 85610 PROTHROMBIN TIME: CPT

## 2025-04-21 NOTE — PROGRESS NOTES
ANTICOAGULATION MANAGEMENT     Richard Ball 65 year old male is on warfarin with subtherapeutic INR result. (Goal INR 2.0-3.0)    Recent labs: (last 7 days)     04/21/25  0805   INR 1.9*       ASSESSMENT     Source(s): Chart Review     Warfarin doses taken: Reviewed in chart  New illness, injury, or hospitalization: Yes: Had EGD 4/16  Previous result: Therapeutic last 2(+) visits  Additional findings: None and Bridging with Enoxaparin until INR >= 2.0       PLAN     Recommended plan for temporary change(s) affecting INR     Dosing Instructions: Continue your current warfarin dose Continue bridging with Enoxaparin with next INR in 2-3 days       Summary  As of 4/21/2025      Full warfarin instructions:  5 mg every Mon, Fri; 2.5 mg all other days   Next INR check:  4/24/2025               Detailed voice message left for Don with dosing instructions and follow up date.   Sent [employer] message with dosing and follow up instructions    Contact [phone] to schedule and with any changes, questions or concerns.     Education provided: Please call back if any changes to your diet, medications or how you've been taking warfarin    Plan made per [facility] anticoagulation protocol    Pinky Escamilla, RN  4/21/2025  Anticoagulation Clinic  [employer] for routing messages: p [facility] patient phone line: [phone]        _______________________________________________________________________     Anticoagulation Episode Summary       Current INR goal:  2.0-3.0   TTR:  70.4% (1 y)   Target end date:  Indefinite   Send INR reminders to:  [facility]    Indications    Heart valve replaced [Z95.2] [Z95.2]  Long term current use of anticoagulants with INR goal of 2.0-3.0 [Z79.01]  S/P aortic valve replacement [Z95.2]  Marfan's syndrome [Q87.40]             Comments:  --             Anticoagulation Care Providers       Provider Role Specialty Phone number    MarchSundar MD Referring Cardiovascular  Disease [phone]    Raman Morales MD Referring Cardiovascular Disease [phone]

## 2025-04-22 ENCOUNTER — TELEPHONE (OUTPATIENT)
Dept: FAMILY MEDICINE | Facility: CLINIC | Age: 65
End: 2025-04-22
Payer: COMMERCIAL

## 2025-04-22 NOTE — TELEPHONE ENCOUNTER
Spoke with Don. He has had bleeding at injection site of his lovenox injections for the last 2 days. Denies any other bleeding. INR was 1.9 yesterday.  Patient agreeable to come in tomorrow for an INR check to ensure INR has returned to therapeutic range. He will not take his lovenox tonight and prefers to do a small warfarin boost today.     Anticoag calendar updated.     Pinky Escamilla RN

## 2025-04-23 ENCOUNTER — LAB (OUTPATIENT)
Dept: LAB | Facility: CLINIC | Age: 65
End: 2025-04-23
Payer: COMMERCIAL

## 2025-04-23 ENCOUNTER — ANTICOAGULATION THERAPY VISIT (OUTPATIENT)
Dept: ANTICOAGULATION | Facility: CLINIC | Age: 65
End: 2025-04-23

## 2025-04-23 DIAGNOSIS — Z95.2 HEART VALVE REPLACED: ICD-10-CM

## 2025-04-23 DIAGNOSIS — Q87.40 MARFAN'S SYNDROME: ICD-10-CM

## 2025-04-23 DIAGNOSIS — Z79.01 LONG TERM CURRENT USE OF ANTICOAGULANTS WITH INR GOAL OF 2.0-3.0: ICD-10-CM

## 2025-04-23 DIAGNOSIS — Z95.2 S/P AORTIC VALVE REPLACEMENT: ICD-10-CM

## 2025-04-23 DIAGNOSIS — Z95.2 HEART VALVE REPLACED: Primary | ICD-10-CM

## 2025-04-23 LAB — INR BLD: 2.7 (ref 0.9–1.1)

## 2025-04-23 PROCEDURE — 85610 PROTHROMBIN TIME: CPT

## 2025-04-23 PROCEDURE — 36416 COLLJ CAPILLARY BLOOD SPEC: CPT

## 2025-04-23 NOTE — PROGRESS NOTES
ANTICOAGULATION MANAGEMENT     Richard Ball 65 year old male is on warfarin with therapeutic INR result. (Goal INR 2.0-3.0)    Recent labs: (last 7 days)     04/23/25  1426   INR 2.7*       ASSESSMENT     Source(s): Chart Review and Patient/Caregiver Call     Warfarin doses taken: Warfarin taken as instructed-Booster doses of warfarin X 2  Diet: No new diet changes identified  Medication/supplement changes: None noted  New illness, injury, or hospitalization: Yes: Patient had an EGD on 4/16/25  Signs or symptoms of bleeding or clotting: Yes: Patient did have bleeding yesterday as reported.  Patient reports no further bleeding overnight or today  Previous result: Subtherapeutic  Additional findings: None       PLAN     Recommended plan for temporary change(s) affecting INR     Dosing Instructions: Continue your current warfarin dose with next INR in 1 week       Summary  As of 4/23/2025      Full warfarin instructions:  5 mg every Mon, Fri; 2.5 mg all other days   Next INR check:  4/30/2025               Telephone call with Don who verbalizes understanding and agrees to plan and who agrees to plan and repeated back plan correctly    Lab visit scheduled    Education provided: Taking warfarin: Importance of taking warfarin as instructed  Goal range and lab monitoring: goal range and significance of current result, Importance of therapeutic range, and Importance of following up at instructed interval    Plan made per [facility] anticoagulation protocol    Catia Manning RN  4/23/2025  Anticoagulation Clinic  [employer] for routing messages: veronica [facility] patient phone line: [phone]        _______________________________________________________________________     Anticoagulation Episode Summary       Current INR goal:  2.0-3.0   TTR:  70.3% (1 y)   Target end date:  Indefinite   Send INR reminders to:  [facility]    Indications    Heart valve replaced [Z95.2] [Z95.2]  Long term current use of  anticoagulants with INR goal of 2.0-3.0 [Z79.01]  S/P aortic valve replacement [Z95.2]  Marfan's syndrome [Q87.40]             Comments:  --             Anticoagulation Care Providers       Provider Role Specialty Phone number    MarchSundar MD Referring Cardiovascular Disease [phone]    Raman Morales MD Referring Cardiovascular Disease [phone]

## 2025-04-30 ENCOUNTER — LAB (OUTPATIENT)
Dept: LAB | Facility: CLINIC | Age: 65
End: 2025-04-30
Payer: COMMERCIAL

## 2025-04-30 ENCOUNTER — ANTICOAGULATION THERAPY VISIT (OUTPATIENT)
Dept: ANTICOAGULATION | Facility: CLINIC | Age: 65
End: 2025-04-30

## 2025-04-30 DIAGNOSIS — Z95.2 HEART VALVE REPLACED: ICD-10-CM

## 2025-04-30 DIAGNOSIS — Z95.2 S/P AORTIC VALVE REPLACEMENT: ICD-10-CM

## 2025-04-30 DIAGNOSIS — Z95.2 HEART VALVE REPLACED: Primary | ICD-10-CM

## 2025-04-30 DIAGNOSIS — Q87.40 MARFAN'S SYNDROME: ICD-10-CM

## 2025-04-30 DIAGNOSIS — Z79.01 LONG TERM CURRENT USE OF ANTICOAGULANTS WITH INR GOAL OF 2.0-3.0: ICD-10-CM

## 2025-04-30 LAB — INR BLD: 3.5 (ref 0.9–1.1)

## 2025-04-30 PROCEDURE — 85610 PROTHROMBIN TIME: CPT

## 2025-04-30 PROCEDURE — 36416 COLLJ CAPILLARY BLOOD SPEC: CPT

## 2025-04-30 NOTE — PROGRESS NOTES
ANTICOAGULATION MANAGEMENT     Richard Ball 65 year old male is on warfarin with supratherapeutic INR result. (Goal INR 2.0-3.0)    Recent labs: (last 7 days)     04/30/25  1347   INR 3.5*       ASSESSMENT     Source(s): Chart Review and Patient/Caregiver Call     Warfarin doses taken: Warfarin taken as instructed  Diet: No new diet changes identified  Medication/supplement changes: None noted  New illness, injury, or hospitalization: No  Signs or symptoms of bleeding or clotting: No  Previous result: Therapeutic last visit; previously outside of goal range  Additional findings: None      Pt will increase greens slightly tonight      PLAN     Recommended plan for no diet, medication or health factor changes affecting INR     Dosing Instructions: Continue your current warfarin dose with next INR in 2 weeks       Summary  As of 4/30/2025      Full warfarin instructions:  5 mg every Mon, Fri; 2.5 mg all other days   Next INR check:  5/14/2025               Telephone call with Don who verbalizes understanding and agrees to plan    Lab visit scheduled    Education provided: Please call back if any changes to your diet, medications or how you've been taking warfarin  Goal range and lab monitoring: goal range and significance of current result, Importance of therapeutic range, and Importance of following up at instructed interval  Symptom monitoring: monitoring for bleeding signs and symptoms    Plan made per [facility] anticoagulation protocol    Miranda Harper, RN  4/30/2025  Anticoagulation Clinic  [employer] for routing messages: veronica [facility] patient phone line: [phone]        _______________________________________________________________________     Anticoagulation Episode Summary       Current INR goal:  2.0-3.0   TTR:  69.1% (1 y)   Target end date:  Indefinite   Send INR reminders to:  [facility]    Indications    Heart valve replaced [Z95.2] [Z95.2]  Long term current use of anticoagulants  with INR goal of 2.0-3.0 [Z79.01]  S/P aortic valve replacement [Z95.2]  Marfan's syndrome [Q87.40]             Comments:  --             Anticoagulation Care Providers       Provider Role Specialty Phone number    March, Sundar Cantu MD Referring Cardiovascular Disease [phone]    Raman Morales MD Referring Cardiovascular Disease [phone]

## 2025-05-15 ENCOUNTER — RESULTS FOLLOW-UP (OUTPATIENT)
Dept: ANTICOAGULATION | Facility: CLINIC | Age: 65
End: 2025-05-15

## 2025-05-15 ENCOUNTER — ANTICOAGULATION THERAPY VISIT (OUTPATIENT)
Dept: ANTICOAGULATION | Facility: CLINIC | Age: 65
End: 2025-05-15

## 2025-05-15 ENCOUNTER — LAB (OUTPATIENT)
Dept: LAB | Facility: CLINIC | Age: 65
End: 2025-05-15
Payer: COMMERCIAL

## 2025-05-15 DIAGNOSIS — Z79.01 LONG TERM CURRENT USE OF ANTICOAGULANTS WITH INR GOAL OF 2.0-3.0: ICD-10-CM

## 2025-05-15 DIAGNOSIS — Z95.2 S/P AORTIC VALVE REPLACEMENT: ICD-10-CM

## 2025-05-15 DIAGNOSIS — Z95.2 HEART VALVE REPLACED: ICD-10-CM

## 2025-05-15 DIAGNOSIS — Z95.2 HEART VALVE REPLACED: Primary | ICD-10-CM

## 2025-05-15 DIAGNOSIS — Q87.40 MARFAN'S SYNDROME: ICD-10-CM

## 2025-05-15 LAB — INR BLD: 2.7 (ref 0.9–1.1)

## 2025-05-15 NOTE — PROGRESS NOTES
ANTICOAGULATION MANAGEMENT     Richard Ball 65 year old male is on warfarin with therapeutic INR result. (Goal INR 2.0-3.0)    Recent labs: (last 7 days)     05/15/25  1210   INR 2.7*       ASSESSMENT     Source(s): Chart Review and Patient/Caregiver Call     Warfarin doses taken: Warfarin taken as instructed  Diet: No new diet changes identified  Medication/supplement changes: None noted  New illness, injury, or hospitalization: No  Signs or symptoms of bleeding or clotting: No  Previous result: Supratherapeutic  Additional findings: None       PLAN     Recommended plan for no diet, medication or health factor changes affecting INR     Dosing Instructions: Continue your current warfarin dose with next INR in 3 weeks   - pt prefers to go closer to 4 weeks 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     Summary  As of 5/15/2025      Full warfarin instructions:  5 mg every Mon, Fri; 2.5 mg all other days   Next INR check:  6/10/2025               Telephone call with Don who verbalizes understanding and agrees to plan except recommended INR recheck date     Lab visit scheduled    Education provided: Goal range and lab monitoring: goal range and significance of current result and Importance of therapeutic range    Plan made per [facility] anticoagulation protocol    Wilfredo Collier RN  5/15/2025  Anticoagulation Clinic  [employer] for routing messages: p [facility] patient phone line: [phone]        _______________________________________________________________________     Anticoagulation Episode Summary       Current INR goal:  2.0-3.0   TTR:  66.5% (1 y)   Target end date:  Indefinite   Send INR reminders to:  [facility]    Indications    Heart valve replaced [Z95.2] [Z95.2]  Long term current use of anticoagulants with INR goal of 2.0-3.0 [Z79.01]  S/P aortic valve replacement [Z95.2]  Marfan's syndrome [Q87.40]             Comments:  --             Anticoagulation Care Providers       Provider Role  Specialty Phone number    March, Gwynane MD Alondra Referring Cardiovascular Disease [phone]    Raman Morales MD Referring Cardiovascular Disease [phone]

## 2025-05-27 ENCOUNTER — MYC REFILL (OUTPATIENT)
Dept: INTERNAL MEDICINE | Facility: CLINIC | Age: 65
End: 2025-05-27
Payer: COMMERCIAL

## 2025-05-27 DIAGNOSIS — M41.44 NEUROMUSCULAR SCOLIOSIS OF THORACIC REGION: ICD-10-CM

## 2025-05-27 RX ORDER — CELECOXIB 200 MG/1
200 CAPSULE ORAL DAILY
Qty: 30 CAPSULE | Refills: 0 | OUTPATIENT
Start: 2025-05-27

## 2025-05-27 RX ORDER — CELECOXIB 200 MG/1
200 CAPSULE ORAL DAILY
Qty: 30 CAPSULE | Refills: 0 | Status: SHIPPED | OUTPATIENT
Start: 2025-05-27 | End: 2025-05-28

## 2025-05-28 RX ORDER — CELECOXIB 200 MG/1
200 CAPSULE ORAL DAILY
Qty: 90 CAPSULE | Refills: 1 | Status: SHIPPED | OUTPATIENT
Start: 2025-05-28

## 2025-06-09 ENCOUNTER — RESULTS FOLLOW-UP (OUTPATIENT)
Dept: ANTICOAGULATION | Facility: CLINIC | Age: 65
End: 2025-06-09

## 2025-06-09 ENCOUNTER — ANTICOAGULATION THERAPY VISIT (OUTPATIENT)
Dept: ANTICOAGULATION | Facility: CLINIC | Age: 65
End: 2025-06-09

## 2025-06-09 ENCOUNTER — OFFICE VISIT (OUTPATIENT)
Dept: INTERNAL MEDICINE | Facility: CLINIC | Age: 65
End: 2025-06-09
Payer: COMMERCIAL

## 2025-06-09 ENCOUNTER — OFFICE VISIT (OUTPATIENT)
Dept: SURGERY | Facility: CLINIC | Age: 65
End: 2025-06-09
Payer: COMMERCIAL

## 2025-06-09 VITALS
RESPIRATION RATE: 14 BRPM | HEIGHT: 70 IN | TEMPERATURE: 97.3 F | OXYGEN SATURATION: 97 % | WEIGHT: 174 LBS | SYSTOLIC BLOOD PRESSURE: 115 MMHG | BODY MASS INDEX: 24.91 KG/M2 | DIASTOLIC BLOOD PRESSURE: 76 MMHG | HEART RATE: 77 BPM

## 2025-06-09 VITALS
HEART RATE: 83 BPM | WEIGHT: 174 LBS | HEIGHT: 70 IN | BODY MASS INDEX: 24.91 KG/M2 | OXYGEN SATURATION: 96 % | DIASTOLIC BLOOD PRESSURE: 70 MMHG | SYSTOLIC BLOOD PRESSURE: 126 MMHG

## 2025-06-09 DIAGNOSIS — Z79.01 LONG TERM CURRENT USE OF ANTICOAGULANTS WITH INR GOAL OF 2.0-3.0: ICD-10-CM

## 2025-06-09 DIAGNOSIS — Z95.2 HEART VALVE REPLACED: Primary | ICD-10-CM

## 2025-06-09 DIAGNOSIS — L98.9 SKIN LESION: ICD-10-CM

## 2025-06-09 DIAGNOSIS — J45.40 MODERATE PERSISTENT ASTHMA, UNSPECIFIED WHETHER COMPLICATED: ICD-10-CM

## 2025-06-09 DIAGNOSIS — M41.44 NEUROMUSCULAR SCOLIOSIS OF THORACIC REGION: ICD-10-CM

## 2025-06-09 DIAGNOSIS — Z95.2 S/P AORTIC VALVE REPLACEMENT: ICD-10-CM

## 2025-06-09 DIAGNOSIS — Z95.2 HEART VALVE REPLACED: ICD-10-CM

## 2025-06-09 DIAGNOSIS — G89.29 CHRONIC LOW BACK PAIN WITHOUT SCIATICA, UNSPECIFIED BACK PAIN LATERALITY: ICD-10-CM

## 2025-06-09 DIAGNOSIS — K42.9 UMBILICAL HERNIA WITHOUT OBSTRUCTION AND WITHOUT GANGRENE: ICD-10-CM

## 2025-06-09 DIAGNOSIS — Q87.40 MARFAN'S SYNDROME: ICD-10-CM

## 2025-06-09 DIAGNOSIS — K62.5 RECTAL BLEEDING: ICD-10-CM

## 2025-06-09 DIAGNOSIS — M54.50 CHRONIC LOW BACK PAIN WITHOUT SCIATICA, UNSPECIFIED BACK PAIN LATERALITY: ICD-10-CM

## 2025-06-09 DIAGNOSIS — R10.9 LEFT LATERAL ABDOMINAL PAIN: Primary | ICD-10-CM

## 2025-06-09 DIAGNOSIS — K21.9 GASTROESOPHAGEAL REFLUX DISEASE WITHOUT ESOPHAGITIS: ICD-10-CM

## 2025-06-09 LAB
ALBUMIN SERPL BCG-MCNC: 4 G/DL (ref 3.5–5.2)
ALBUMIN UR-MCNC: NEGATIVE MG/DL
ALP SERPL-CCNC: 61 U/L (ref 40–150)
ALT SERPL W P-5'-P-CCNC: 19 U/L (ref 0–70)
ANION GAP SERPL CALCULATED.3IONS-SCNC: 9 MMOL/L (ref 7–15)
APPEARANCE UR: CLEAR
AST SERPL W P-5'-P-CCNC: 22 U/L (ref 0–45)
BILIRUB SERPL-MCNC: 0.5 MG/DL
BILIRUB UR QL STRIP: NEGATIVE
BUN SERPL-MCNC: 9.6 MG/DL (ref 8–23)
CALCIUM SERPL-MCNC: 9 MG/DL (ref 8.8–10.4)
CHLORIDE SERPL-SCNC: 106 MMOL/L (ref 98–107)
COLOR UR AUTO: YELLOW
CREAT SERPL-MCNC: 0.94 MG/DL (ref 0.67–1.17)
EGFRCR SERPLBLD CKD-EPI 2021: >90 ML/MIN/1.73M2
ERYTHROCYTE [DISTWIDTH] IN BLOOD BY AUTOMATED COUNT: 14 % (ref 10–15)
GLUCOSE SERPL-MCNC: 102 MG/DL (ref 70–99)
GLUCOSE UR STRIP-MCNC: NEGATIVE MG/DL
HCO3 SERPL-SCNC: 27 MMOL/L (ref 22–29)
HCT VFR BLD AUTO: 39.7 % (ref 40–53)
HGB BLD-MCNC: 13.8 G/DL (ref 13.3–17.7)
HGB UR QL STRIP: NEGATIVE
INR BLD: 2.7 (ref 0.9–1.1)
KETONES UR STRIP-MCNC: NEGATIVE MG/DL
LEUKOCYTE ESTERASE UR QL STRIP: NEGATIVE
MCH RBC QN AUTO: 31 PG (ref 26.5–33)
MCHC RBC AUTO-ENTMCNC: 34.8 G/DL (ref 31.5–36.5)
MCV RBC AUTO: 89 FL (ref 78–100)
NITRATE UR QL: NEGATIVE
PH UR STRIP: 7 [PH] (ref 5–8)
PLATELET # BLD AUTO: 94 10E3/UL (ref 150–450)
POTASSIUM SERPL-SCNC: 4.7 MMOL/L (ref 3.4–5.3)
PROT SERPL-MCNC: 6.2 G/DL (ref 6.4–8.3)
RBC # BLD AUTO: 4.45 10E6/UL (ref 4.4–5.9)
SODIUM SERPL-SCNC: 142 MMOL/L (ref 135–145)
SP GR UR STRIP: 1.01 (ref 1–1.03)
UROBILINOGEN UR STRIP-ACNC: 0.2 E.U./DL
WBC # BLD AUTO: 3.9 10E3/UL (ref 4–11)

## 2025-06-09 PROCEDURE — 1125F AMNT PAIN NOTED PAIN PRSNT: CPT | Performed by: INTERNAL MEDICINE

## 2025-06-09 PROCEDURE — 3078F DIAST BP <80 MM HG: CPT | Performed by: SURGERY

## 2025-06-09 PROCEDURE — 3074F SYST BP LT 130 MM HG: CPT | Performed by: INTERNAL MEDICINE

## 2025-06-09 PROCEDURE — 36415 COLL VENOUS BLD VENIPUNCTURE: CPT | Performed by: INTERNAL MEDICINE

## 2025-06-09 PROCEDURE — 3074F SYST BP LT 130 MM HG: CPT | Performed by: SURGERY

## 2025-06-09 PROCEDURE — 3078F DIAST BP <80 MM HG: CPT | Performed by: INTERNAL MEDICINE

## 2025-06-09 PROCEDURE — 85027 COMPLETE CBC AUTOMATED: CPT | Performed by: INTERNAL MEDICINE

## 2025-06-09 PROCEDURE — 99214 OFFICE O/P EST MOD 30 MIN: CPT | Performed by: INTERNAL MEDICINE

## 2025-06-09 PROCEDURE — 80053 COMPREHEN METABOLIC PANEL: CPT | Performed by: INTERNAL MEDICINE

## 2025-06-09 PROCEDURE — 36416 COLLJ CAPILLARY BLOOD SPEC: CPT | Performed by: INTERNAL MEDICINE

## 2025-06-09 PROCEDURE — 81003 URINALYSIS AUTO W/O SCOPE: CPT | Performed by: INTERNAL MEDICINE

## 2025-06-09 PROCEDURE — 85610 PROTHROMBIN TIME: CPT | Performed by: INTERNAL MEDICINE

## 2025-06-09 PROCEDURE — G2211 COMPLEX E/M VISIT ADD ON: HCPCS | Performed by: INTERNAL MEDICINE

## 2025-06-09 PROCEDURE — 99203 OFFICE O/P NEW LOW 30 MIN: CPT | Performed by: SURGERY

## 2025-06-09 RX ORDER — BUDESONIDE AND FORMOTEROL FUMARATE DIHYDRATE 80; 4.5 UG/1; UG/1
2 AEROSOL RESPIRATORY (INHALATION) 2 TIMES DAILY
Qty: 10.2 G | Refills: 3 | Status: CANCELLED | OUTPATIENT
Start: 2025-06-09

## 2025-06-09 RX ORDER — ALBUTEROL SULFATE 90 UG/1
2 INHALANT RESPIRATORY (INHALATION) EVERY 4 HOURS PRN
Qty: 18 G | Refills: 3 | Status: CANCELLED | OUTPATIENT
Start: 2025-06-09

## 2025-06-09 RX ORDER — ALBUTEROL SULFATE 90 UG/1
2 INHALANT RESPIRATORY (INHALATION) EVERY 4 HOURS PRN
Qty: 18 G | Refills: 3 | Status: SHIPPED | OUTPATIENT
Start: 2025-06-09

## 2025-06-09 RX ORDER — OMEPRAZOLE 20 MG/1
20 CAPSULE, DELAYED RELEASE ORAL 2 TIMES DAILY
Qty: 60 CAPSULE | Refills: 1 | Status: CANCELLED | OUTPATIENT
Start: 2025-06-09

## 2025-06-09 RX ORDER — PANTOPRAZOLE SODIUM 40 MG
40 TABLET, DELAYED RELEASE (ENTERIC COATED) ORAL DAILY
Qty: 90 TABLET | Refills: 3 | Status: SHIPPED | OUTPATIENT
Start: 2025-06-09

## 2025-06-09 RX ORDER — BUDESONIDE AND FORMOTEROL FUMARATE DIHYDRATE 80; 4.5 UG/1; UG/1
2 AEROSOL RESPIRATORY (INHALATION) 2 TIMES DAILY
Qty: 10.2 G | Refills: 3 | Status: SHIPPED | OUTPATIENT
Start: 2025-06-09

## 2025-06-09 RX ORDER — PANTOPRAZOLE SODIUM 40 MG
40 TABLET, DELAYED RELEASE (ENTERIC COATED) ORAL DAILY
COMMUNITY
Start: 2025-04-16 | End: 2025-06-09

## 2025-06-09 ASSESSMENT — PAIN SCALES - GENERAL: PAINLEVEL_OUTOF10: MILD PAIN (2)

## 2025-06-09 NOTE — PROGRESS NOTES
"Richard is a 65 year old male who presents for hernia evaluation.  The patient has noticed a bulge.  Pain has been present. The hernia has been present for several years but only was bothersome acutely this past weekend. 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 He has not had any prior surgery in the area. He has Marfan syndrome and has had an aortic root repair and mechanical valve placement. He is on chronic coumadin. His hernia is less bothersome today.    Constipation No  DysuriaNo  Cough No  Smoking No    Pt's chart has been reviewed for PMH, PSH, allergies, medications, social and family history.    ROS:  Pulm:  No shortness of breath, dyspnea on exertion, cough, or hemoptysis  CV:  negative  ABD:  See chief complaint  :  Negative  All other systems on 10 point review are negative.    Ht 1.778 m (5' 10\")   Wt 78.9 kg (174 lb)   BMI 24.97 kg/m    Physical exam:  Patient able to get up on table without difficulty.  abdomen is soft without significant tenderness, masses, organomegaly or guarding  bowel sounds are positive and no caput medusa noted.  There is eversion at the superior aspect of the umbilicus with a fat-containing reducible and non-tender hernia present.    Imaging  None    Assesment: umbilical hernia, minimally symptomatic.    Plan: The nature of hernias, anatomic considerations, indications and approaches to repair were discussed.  Risks of operative intervention were discussed including infection, bleeding, harm to structures as well as recurrence, which is about 5% per decade of life.  At present he is feeling better and his hernia is only fat containing and reducible. He will continue to observe for now and call us if he has recurrent pain at the site. He will need to hold his coumadin and bridge with Lovenox in the event that he schedules repair.    Ayush Gilliam MD  6/9/2025 3:27 PM    Please route or send letter to:  Primary Care " Provider (PCP)

## 2025-06-09 NOTE — LETTER
"June 9, 2025          Kwasi Casarez MD  [address] [place] [address]      RE:   Richard Ball 1960      Dear Colleague,    Thank you for referring your patient, Richard Ball, to [facility]. Please see a copy of my visit note below.    Richard is a 65 year old male who presents for hernia evaluation.  The patient has noticed a bulge.  Pain has been present. The hernia has been present for several years but only was bothersome acutely this past weekend. 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 He has not had any prior surgery in the area. He has Marfan syndrome and has had an aortic root repair and mechanical valve placement. He is on chronic coumadin. His hernia is less bothersome today.    Constipation No  DysuriaNo  Cough No  Smoking No    Pt's chart has been reviewed for PMH, PSH, allergies, medications, social and family history.    ROS:  Pulm:  No shortness of breath, dyspnea on exertion, cough, or hemoptysis  CV:  negative  ABD:  See chief complaint  :  Negative  All other systems on 10 point review are negative.    Ht 1.778 m (5' 10\")   Wt 78.9 kg (174 lb)   BMI 24.97 kg/m    Physical exam:  Patient able to get up on table without difficulty.  abdomen is soft without significant tenderness, masses, organomegaly or guarding  bowel sounds are positive and no caput medusa noted.        There is eversion at the superior aspect of the umbilicus with a fat-containing reducible and non-tender hernia present.    Imaging  None    Assesment: umbilical hernia, minimally symptomatic.    Plan: The nature of hernias, anatomic considerations, indications and approaches to repair were discussed.  Risks of operative intervention were discussed including infection, bleeding, harm to structures as well as recurrence, which is about 5% per decade of life.  At present he is feeling better and his hernia " is only fat containing and reducible. He will continue to observe for now and call us if he has recurrent pain at the site. He will need to hold his coumadin and bridge with Lovenox in the event that he schedules repair.            Again, thank you for allowing me to participate in the care of your patient.      Sincerely,      Ayush Gilliam MD

## 2025-06-09 NOTE — PROGRESS NOTES
1. Left lateral abdominal pain (Primary)  Labs today for monitoring.  He had a scan within the last year or 2 that was normal.  He has had his scopes etc.  I have urged him to monitor and perhaps do a food diary to him see what makes the bloating worse.  - Comprehensive metabolic panel (BMP + Alb, Alk Phos, ALT, AST, Total. Bili, TP); Future  - UA Macroscopic with reflex to Microscopic and Culture - Lab Collect; Future  - CBC with platelets; Future  - Comprehensive metabolic panel (BMP + Alb, Alk Phos, ALT, AST, Total. Bili, TP)  - UA Macroscopic with reflex to Microscopic and Culture - Lab Collect  - CBC with platelets    2. Umbilical hernia without obstruction and without gangrene  Will have him meet with surgeon.  - Adult Gen Surg  Referral; Future    3. Rectal bleeding  Resolved he tells me    4. Gastroesophageal reflux disease without esophagitis  Continue pantoprazole.  - PROTONIX 40 MG EC tablet; Take 1 tablet (40 mg) by mouth daily.  Dispense: 90 tablet; Refill: 3    5. Neuromuscular scoliosis of thoracic region  Refer to spine clinic given his chronic pain.    6. Chronic low back pain without sciatica, unspecified back pain laterality  Refer to therapy and spine clinic.  I going to recommend topical therapies as well as possibility of injections.  I urged him to use the Celebrex which is safer than the ibuprofen  - Spine  Referral; Future  - Physical Therapy  Referral; Future    7. Skin lesion  Refer to dermatology for good skin check  - Adult Dermatology  Referral; Future    8. Moderate persistent asthma, unspecified whether complicated    - budesonide-formoterol (SYMBICORT/BREYNA) 80-4.5 MCG/ACT inhaler; Inhale 2 puffs into the lungs 2 times daily.  Dispense: 10.2 g; Refill: 3  - albuterol (VENTOLIN HFA) 108 (90 Base) MCG/ACT inhaler; Inhale 2 puffs into the lungs every 4 hours as needed for wheezing. Reported on 3/24/2017  Dispense: 18 g; Refill: 3    9. S/P aortic  valve replacement    - INR; Future    10. Heart valve replaced    - INR point of care     The longitudinal plan of care for the diagnosis(es)/condition(s) as documented were addressed during this visit. Due to the added complexity in care, I will continue to support Henrik in the subsequent management and with ongoing continuity of care.    Subjective   Henrik is a 65 year old, presenting for the following health issues:  Follow Up (Follow up to colonoscopy and endoscopy, Discuss meds. Heartburn. Spot on bridge of nose.)      6/9/2025     8:39 AM   Additional Questions   Roomed by francisca martin     History of Present Illness       Reason for visit:  Discuss meds. Heartburn. Spot on bridge of nose.    He eats 2-3 servings of fruits and vegetables daily.He consumes 0 sweetened beverage(s) daily.He exercises with enough effort to increase his heart rate 20 to 29 minutes per day.  He exercises with enough effort to increase his heart rate 5 days per week.   He is taking medications regularly.            Henrik is a new patient to me having just establish care earlier this year.  Pleasant gentleman with a pretty extensive past medical history including Marfan's.  He was having worsening reflux when I saw him last.  He was set up for EGD and colon.  EGD showed abnormality appearance to the esophagus put him on pantoprazole and had him follow-up with the esophageal clinic apparently nothing came of that.  Colonoscopy showed a polyp.  He has been feeling better on pantoprazole.  He has been taking omeprazole as well he tells me.  His back pain troubles him.  He has chronic back pain due to scoliosis.  I gave him Celebrex because he was taking ibuprofen despite his anticoagulation.  He tells me that Celebrex does not work very well for him and he still prefers ibuprofen wonders what else he can do for back pain.  He saw spine but many years ago.    He has something on his nose that he wants me to look at.    He is looking forward to  "spending the summer off from school.  He will be traveling to [place] and to [place].    He complains of left-sided abdominal pain that is constant and has been present for years.  He also notes he has a lot of bloating.    He also notes that he has umbilical hernia that is tender.    He needs some refills of his inhalers.          Objective    /76 (BP Location: Left arm, Patient Position: Sitting, Cuff Size: Adult Regular)   Pulse 77   Temp 97.3  F (36.3  C)   Resp 14   Ht 1.778 m (5' 10\")   Wt 78.9 kg (174 lb)   SpO2 97%   BMI 24.97 kg/m    Body mass index is 24.97 kg/m .  Physical Exam   Pleasant gentleman.  No distress.  He has what looks to be an seborrheic keratosis as well as a AK on the bridge of his nose.            Signed Electronically by: NUVIA GARNICA MD    "

## 2025-06-09 NOTE — PROGRESS NOTES
ANTICOAGULATION MANAGEMENT     Richard Ball 65 year old male is on warfarin with therapeutic INR result. (Goal INR 2.0-3.0)    Recent labs: (last 7 days)     06/09/25  0939   INR 2.7*       ASSESSMENT     Source(s): Chart Review and Patient/Caregiver Call     Warfarin doses taken: Warfarin taken as instructed  Diet: No new diet changes identified  Medication/supplement changes: Patient reported intermittently taking celebrex or ibuprofen (more recently ibuprofen) d/t increased back pain related to scoliosis. After OV today, patient to start taking celebrex PRN and avoid ibuprofen. He states celebrex is not new for him but ibuprofen had worked better. Does not plan on taking daily, only if needed. Has moved a lot of heavy boxes lately, causing flare up; patient hopeful pain will start improving.   Both celebrex and ibuprofen are NSAIDs and may increase the risk of serious bleeding per [employer]  New illness, injury, or hospitalization: OV today (6/9/25) with several referrals: -hernia repair surgery consult        -dermatology consult for lesion on nose        -spine consult for continuous back pain related to scoliosis.        -Patient instructed to inform [facility] for any upcoming procedure/surgery/injections.    Signs or symptoms of bleeding or clotting: No, patient denies.  Previous result: Therapeutic last visit; previously outside of goal range  Additional findings: Henrik is going out of town for 2 weeks and leaving 6/27/25.        PLAN     Recommended plan for temporary change(s) affecting INR     Dosing Instructions: Continue your current warfarin dose with next INR in 2 weeks.        Summary  As of 6/9/2025      Full warfarin instructions:  5 mg every Mon, Fri; 2.5 mg all other days   Next INR check:  6/26/2025               Telephone call with Henrik who verbalizes understanding and agrees to plan and who agrees to plan and repeated back plan correctly  Sent [employer] message with dosing and follow up  instructions    Lab visit scheduled    Education provided: Please call back if any changes to your diet, medications or how you've been taking warfarin  Goal range and lab monitoring: goal range and significance of current result, Importance of therapeutic range, and Importance of following up at instructed interval  Interaction IS anticipated between warfarin and celebrex and warfarin and ibuprofen.  Symptom monitoring: monitoring for bleeding signs and symptoms  Importance of notifying anticoagulation clinic for: changes in medications; a sooner lab recheck maybe needed and upcoming surgeries and procedures 2 weeks in advance  Written instructions provided  Contact [phone] with any changes, questions or concerns.     Plan made per [facility] anticoagulation protocol    Svetlana Romero RN  6/9/2025  Anticoagulation Clinic  [employer] for routing messages: p [facility] patient phone line: [phone]        _______________________________________________________________________     Anticoagulation Episode Summary       Current INR goal:  2.0-3.0   TTR:  66.5% (1 y)   Target end date:  Indefinite   Send INR reminders to:  [facility]    Indications    Heart valve replaced [Z95.2] [Z95.2]  Long term current use of anticoagulants with INR goal of 2.0-3.0 [Z79.01]  S/P aortic valve replacement [Z95.2]  Marfan's syndrome [Q87.40]             Comments:  --             Anticoagulation Care Providers       Provider Role Specialty Phone number    March, Sundar Cantu MD Referring Cardiovascular Disease [phone]    Raman Morales MD Referring Cardiovascular Disease [phone]

## 2025-06-10 ENCOUNTER — PATIENT OUTREACH (OUTPATIENT)
Dept: CARE COORDINATION | Facility: CLINIC | Age: 65
End: 2025-06-10

## 2025-06-10 ENCOUNTER — RESULTS FOLLOW-UP (OUTPATIENT)
Dept: INTERNAL MEDICINE | Facility: CLINIC | Age: 65
End: 2025-06-10

## 2025-06-10 DIAGNOSIS — D69.6 THROMBOCYTOPENIA: Primary | ICD-10-CM

## 2025-06-11 ENCOUNTER — THERAPY VISIT (OUTPATIENT)
Dept: PHYSICAL THERAPY | Facility: CLINIC | Age: 65
End: 2025-06-11
Attending: INTERNAL MEDICINE
Payer: COMMERCIAL

## 2025-06-11 DIAGNOSIS — M54.50 CHRONIC LOW BACK PAIN WITHOUT SCIATICA, UNSPECIFIED BACK PAIN LATERALITY: ICD-10-CM

## 2025-06-11 DIAGNOSIS — G89.29 CHRONIC LOW BACK PAIN WITHOUT SCIATICA, UNSPECIFIED BACK PAIN LATERALITY: ICD-10-CM

## 2025-06-11 PROCEDURE — 97530 THERAPEUTIC ACTIVITIES: CPT | Mod: GP | Performed by: PHYSICAL THERAPIST

## 2025-06-11 PROCEDURE — 97161 PT EVAL LOW COMPLEX 20 MIN: CPT | Mod: GP | Performed by: PHYSICAL THERAPIST

## 2025-06-11 PROCEDURE — 97110 THERAPEUTIC EXERCISES: CPT | Mod: GP | Performed by: PHYSICAL THERAPIST

## 2025-06-11 NOTE — PROGRESS NOTES
PHYSICAL THERAPY EVALUATION  Type of Visit: Evaluation       Fall Risk Screen:  Have you fallen 2 or more times in the past year?: No  Have you fallen and had an injury in the past year?: No    Subjective   Pt presents with chronic mid/low back pain. Pt was diagnosed with scoliosis when he was 13, spent four years in a back brace, has about a 70 degree curve in low thoracic spine. Pt walks 3-4 miles a day during the summer. Pt going to [place] next year to do a lot of stairs and walking.   Pain starts localized at thoracolumbar junction on L side, spreads down into his leg and into his leg. Ibuprofen helps but MD worried about this because of his blood thinners and INR. Is taking celebrex as needed but not super helpful.   Morning is the worst time, pain doesn't limit sleep.     Pain mostly with standing, walking, lifting. Struggles with walking and standing for 20-30 minutes.         Presenting condition or subjective complaint: Scoliosis  Date of onset: 06/09/25 (MD order)    Relevant medical history:     Dates & types of surgery:      Prior diagnostic imaging/testing results: X-ray     Prior therapy history for the same diagnosis, illness or injury: Yes Years ago      Living Environment  Social support: With a significant other or spouse   Type of home: Apartment/condo; 2-story   Stairs to enter the home: No       Ramp: No   Stairs inside the home: Yes 15 Is there a railing: Yes     Help at home: None  Equipment owned: Straight Cane     Employment: Yes    Hobbies/Interests: Music. Gardening    Patient goals for therapy: Stand walk sor w/ pain    Pain assessment: Pain present     Objective   LUMBAR SPINE EVALUATION  PAIN: Pain Level at Rest: 0/10  Pain Level with Use: 7/10  INTEGUMENTARY (edema, incisions): WNL  POSTURE: Standing Posture: slight anterior pelvic tilt, reduced glute muscle mass. R sided rib hump due to spinal curve  GAIT:   Weightbearing Status: WBAT  Assistive Device(s): None  Gait Deviations:  WNL  BALANCE/PROPRIOCEPTION: 30s B but reduced stability on RLE   ROM: flexion ROM limited to hands to knees with increased pain, extension WNL although feels tight for patient   STRENGTH: low abdominal weakness  MYOTOMES: WNL  DERMATOMES: WNL  FUNCTIONAL TESTS: Double Leg Squat: Anterior knee translation, Knee valgus, Hip internal rotation, Improper use of glutes/hips, and unable to keep feet planted  PALPATION: pain at L paraspinals at thoracolumbar juction       Assessment & Plan   CLINICAL IMPRESSIONS  Medical Diagnosis: chronic low back pain without sciatica    Treatment Diagnosis: L sided low back pain   Impression/Assessment: Patient is a 65 year old male with mid/low back complaints.  The following significant findings have been identified: Pain, Decreased ROM/flexibility, Decreased joint mobility, Decreased strength, Impaired balance, Impaired gait, Impaired muscle performance, Decreased activity tolerance, and Impaired posture. These impairments interfere with their ability to perform self care tasks, work tasks, recreational activities, household chores, household mobility, and community mobility as compared to previous level of function.     Clinical Decision Making (Complexity):  Clinical Presentation: Stable/Uncomplicated  Clinical Presentation Rationale: based on medical and personal factors listed in PT evaluation  Clinical Decision Making (Complexity): Low complexity    PLAN OF CARE  Treatment Interventions:  Interventions: Gait Training, Manual Therapy, Neuromuscular Re-education, Therapeutic Activity, Therapeutic Exercise    Long Term Goals     PT Goal 1  Goal Identifier: Walking  Goal Description: pt will be able to walk 3 miles without increased back pain  Rationale: to maximize safety and independence with performance of ADLs and functional tasks  Target Date: 09/03/25      Frequency of Treatment: once per week  Duration of Treatment: 12 weeks    Recommended Referrals to Other Professionals:  NA  Education Assessment:   Learner/Method: Patient  Education Comments: Pt educated on PT role and plan of care    Risks and benefits of evaluation/treatment have been explained.   Patient/Family/caregiver agrees with Plan of Care.     Evaluation Time:     PT Eval, Low Complexity Minutes (63510): 25     Signing Clinician: Ronda Woodard PT

## 2025-06-12 ENCOUNTER — PATIENT OUTREACH (OUTPATIENT)
Dept: CARE COORDINATION | Facility: CLINIC | Age: 65
End: 2025-06-12
Payer: COMMERCIAL

## 2025-06-26 ENCOUNTER — ANTICOAGULATION THERAPY VISIT (OUTPATIENT)
Dept: ANTICOAGULATION | Facility: CLINIC | Age: 65
End: 2025-06-26

## 2025-06-26 ENCOUNTER — RESULTS FOLLOW-UP (OUTPATIENT)
Dept: ANTICOAGULATION | Facility: CLINIC | Age: 65
End: 2025-06-26

## 2025-06-26 ENCOUNTER — THERAPY VISIT (OUTPATIENT)
Dept: PHYSICAL THERAPY | Facility: CLINIC | Age: 65
End: 2025-06-26
Payer: COMMERCIAL

## 2025-06-26 ENCOUNTER — LAB (OUTPATIENT)
Dept: LAB | Facility: CLINIC | Age: 65
End: 2025-06-26
Payer: COMMERCIAL

## 2025-06-26 DIAGNOSIS — M54.50 CHRONIC LOW BACK PAIN WITHOUT SCIATICA, UNSPECIFIED BACK PAIN LATERALITY: Primary | ICD-10-CM

## 2025-06-26 DIAGNOSIS — Z79.01 LONG TERM CURRENT USE OF ANTICOAGULANTS WITH INR GOAL OF 2.0-3.0: ICD-10-CM

## 2025-06-26 DIAGNOSIS — G89.29 CHRONIC LOW BACK PAIN WITHOUT SCIATICA, UNSPECIFIED BACK PAIN LATERALITY: Primary | ICD-10-CM

## 2025-06-26 DIAGNOSIS — Z95.2 HEART VALVE REPLACED: Primary | ICD-10-CM

## 2025-06-26 DIAGNOSIS — Z95.2 HEART VALVE REPLACED: ICD-10-CM

## 2025-06-26 DIAGNOSIS — Q87.40 MARFAN'S SYNDROME: ICD-10-CM

## 2025-06-26 DIAGNOSIS — Z95.2 S/P AORTIC VALVE REPLACEMENT: ICD-10-CM

## 2025-06-26 LAB — INR BLD: 2 (ref 0.9–1.1)

## 2025-06-26 NOTE — PROGRESS NOTES
ANTICOAGULATION MANAGEMENT     Richard Ball 65 year old male is on warfarin with therapeutic INR result. (Goal INR 2.0-3.0)    Recent labs: (last 7 days)     06/26/25  0934   INR 2.0*       ASSESSMENT     Source(s): Chart Review and Patient/Caregiver Call     Warfarin doses taken: Warfarin taken as instructed  Diet: No new diet changes identified  Medication/supplement changes: None noted  New illness, injury, or hospitalization: No-- Has started PT forpain back.  Signs or symptoms of bleeding or clotting: No  Previous result: Therapeutic last 2(+) visits  Additional findings: Henrik is going out of town for 2 weeks and leaving 6/27/25.      PLAN     Recommended plan for no diet, medication or health factor changes affecting INR     Dosing Instructions: Continue your current warfarin dose with next INR in 2 weeks       Summary  As of 6/26/2025      Full warfarin instructions:  5 mg every Mon, Fri; 2.5 mg all other days   Next INR check:  7/10/2025               Telephone call with Henrik who verbalizes understanding and agrees to plan and who agrees to plan and repeated back plan correctly    Contact [phone] to schedule and with any changes, questions or concerns.     Education provided: Please call back if any changes to your diet, medications or how you've been taking warfarin    Plan made per [facility] anticoagulation protocol    Romelia Encarnacion RN  6/26/2025  Anticoagulation Clinic  [employer] for routing messages: veronica [facility] patient phone line: [phone]        _______________________________________________________________________     Anticoagulation Episode Summary       Current INR goal:  2.0-3.0   TTR:  66.5% (1 y)   Target end date:  Indefinite   Send INR reminders to:  [facility]    Indications    Heart valve replaced [Z95.2] [Z95.2]  Long term current use of anticoagulants with INR goal of 2.0-3.0 [Z79.01]  S/P aortic valve replacement [Z95.2]  Marfan's syndrome [Q87.40]              Comments:  --             Anticoagulation Care Providers       Provider Role Specialty Phone number    March, Sundar Cantu MD Referring Cardiovascular Disease [phone]    Raman Morales MD Referring Cardiovascular Disease [phone]

## 2025-07-07 NOTE — NURSING NOTE
Cardiac Testing: Patient given instructions regarding  echocardiogram . Discussed purpose, preparation, procedure and when to expect results reported back to the patient. Patient demonstrated understanding of this information and agreed to call with further questions or concerns.    Diet: Patient instructed regarding a heart healthy diet, including discussion of reduced fat and sodium intake. Patient demonstrated understanding of this information and agreed to call with further questions or concerns.    Med Reconcile: Reviewed and verified all current medications with the patient. The updated medication list was printed and given to the patient.    Return Appointment: Patient given instructions regarding scheduling next clinic visit. Patient demonstrated understanding of this information and agreed to call with further questions or concerns.    Patient stated he understood all health information given and agreed to call with further questions or concerns.    Anita Webber, MSN, RN, CNL  Cardiology Care Coordinator  [facility]  [phone]         The one month follow up is in regards to following up on sertraline.  Ok to cancel with Elvira for 7/15 and proceed with seeing Madie Cabrera?

## 2025-07-16 ENCOUNTER — MYC MEDICAL ADVICE (OUTPATIENT)
Dept: ANTICOAGULATION | Facility: CLINIC | Age: 65
End: 2025-07-16
Payer: COMMERCIAL

## 2025-07-17 ENCOUNTER — ANTICOAGULATION THERAPY VISIT (OUTPATIENT)
Dept: ANTICOAGULATION | Facility: CLINIC | Age: 65
End: 2025-07-17

## 2025-07-17 ENCOUNTER — LAB (OUTPATIENT)
Dept: LAB | Facility: CLINIC | Age: 65
End: 2025-07-17
Payer: COMMERCIAL

## 2025-07-17 DIAGNOSIS — Q87.40 MARFAN'S SYNDROME: ICD-10-CM

## 2025-07-17 DIAGNOSIS — Z95.2 S/P AORTIC VALVE REPLACEMENT: ICD-10-CM

## 2025-07-17 DIAGNOSIS — D69.6 THROMBOCYTOPENIA: ICD-10-CM

## 2025-07-17 DIAGNOSIS — Z79.01 LONG TERM CURRENT USE OF ANTICOAGULANTS WITH INR GOAL OF 2.0-3.0: ICD-10-CM

## 2025-07-17 DIAGNOSIS — Z95.2 HEART VALVE REPLACED: Primary | ICD-10-CM

## 2025-07-17 LAB
BASOPHILS # BLD AUTO: 0 10E3/UL (ref 0–0.2)
BASOPHILS NFR BLD AUTO: 0 %
EOSINOPHIL # BLD AUTO: 0 10E3/UL (ref 0–0.7)
EOSINOPHIL NFR BLD AUTO: 1 %
ERYTHROCYTE [DISTWIDTH] IN BLOOD BY AUTOMATED COUNT: 13.2 % (ref 10–15)
HCT VFR BLD AUTO: 40.4 % (ref 40–53)
HGB BLD-MCNC: 14.2 G/DL (ref 13.3–17.7)
IMM GRANULOCYTES # BLD: 0 10E3/UL
IMM GRANULOCYTES NFR BLD: 0 %
INR PPP: 2.67 (ref 0.85–1.15)
LYMPHOCYTES # BLD AUTO: 1.5 10E3/UL (ref 0.8–5.3)
LYMPHOCYTES NFR BLD AUTO: 33 %
MCH RBC QN AUTO: 30.6 PG (ref 26.5–33)
MCHC RBC AUTO-ENTMCNC: 35.1 G/DL (ref 31.5–36.5)
MCV RBC AUTO: 87 FL (ref 78–100)
MONOCYTES # BLD AUTO: 0.3 10E3/UL (ref 0–1.3)
MONOCYTES NFR BLD AUTO: 7 %
NEUTROPHILS # BLD AUTO: 2.7 10E3/UL (ref 1.6–8.3)
NEUTROPHILS NFR BLD AUTO: 59 %
PLATELET # BLD AUTO: 129 10E3/UL (ref 150–450)
PROTHROMBIN TIME: 28.7 SECONDS (ref 11.8–14.8)
RBC # BLD AUTO: 4.64 10E6/UL (ref 4.4–5.9)
RETICS # AUTO: 0.08 10E6/UL (ref 0.03–0.1)
RETICS/RBC NFR AUTO: 1.6 % (ref 0.5–2)
WBC # BLD AUTO: 4.6 10E3/UL (ref 4–11)

## 2025-07-17 NOTE — PROGRESS NOTES
ANTICOAGULATION MANAGEMENT     Richard Ball 65 year old male is on warfarin with therapeutic INR result. (Goal INR 2.0-3.0)    Recent labs: (last 7 days)     07/17/25  0914   INR 2.67*       ASSESSMENT     Source(s): Chart Review and Patient/Caregiver Call     Warfarin doses taken: Warfarin taken as instructed  Diet: No new diet changes identified  Medication/supplement changes: None noted  New illness, injury, or hospitalization: Yes: Patient tested positive for Covid 9 days ago and still has a faintly positive reading.  Don did not use Paxlovid.  Mild fatigue and headache that are improving  Signs or symptoms of bleeding or clotting: No  Previous result: Therapeutic last 2(+) visits  Additional findings: None       PLAN     Recommended plan for temporary change(s) affecting INR     Dosing Instructions: Continue your current warfarin dose with next INR in 10 days       Summary  As of 7/17/2025      Full warfarin instructions:  5 mg every Mon, Fri; 2.5 mg all other days   Next INR check:  7/29/2025               Telephone call with Don who verbalizes understanding and agrees to plan and who agrees to plan and repeated back plan correctly    Lab visit scheduled    Education provided: Taking warfarin: Importance of taking warfarin as instructed  Goal range and lab monitoring: goal range and significance of current result, Importance of therapeutic range, and Importance of following up at instructed interval    Plan made per [facility] anticoagulation protocol    Catia Manning RN  7/17/2025  Anticoagulation Clinic  [employer] for routing messages: veronica [facility] patient phone line: [phone]        _______________________________________________________________________     Anticoagulation Episode Summary       Current INR goal:  2.0-3.0   TTR:  69.3% (1 y)   Target end date:  Indefinite   Send INR reminders to:  [facility]    Indications    Heart valve replaced [Z95.2] [Z95.2]  Long term current  use of anticoagulants with INR goal of 2.0-3.0 [Z79.01]  S/P aortic valve replacement [Z95.2]  Marfan's syndrome [Q87.40]             Comments:  --             Anticoagulation Care Providers       Provider Role Specialty Phone number    March, Sundar Cantu MD Referring Cardiovascular Disease [phone]    Raman Morales MD Referring Cardiovascular Disease [phone]

## 2025-07-23 ENCOUNTER — ANCILLARY PROCEDURE (OUTPATIENT)
Dept: CARDIOLOGY | Facility: CLINIC | Age: 65
End: 2025-07-23
Attending: INTERNAL MEDICINE
Payer: COMMERCIAL

## 2025-07-23 DIAGNOSIS — Z95.0 CARDIAC PACEMAKER IN SITU: ICD-10-CM

## 2025-07-23 PROCEDURE — 93294 REM INTERROG EVL PM/LDLS PM: CPT | Performed by: INTERNAL MEDICINE

## 2025-07-23 PROCEDURE — 93296 REM INTERROG EVL PM/IDS: CPT

## 2025-07-25 LAB
MDC_IDC_LEAD_CONNECTION_STATUS: NORMAL
MDC_IDC_LEAD_CONNECTION_STATUS: NORMAL
MDC_IDC_LEAD_IMPLANT_DT: NORMAL
MDC_IDC_LEAD_IMPLANT_DT: NORMAL
MDC_IDC_LEAD_LOCATION: NORMAL
MDC_IDC_LEAD_LOCATION: NORMAL
MDC_IDC_LEAD_LOCATION_DETAIL_1: NORMAL
MDC_IDC_LEAD_LOCATION_DETAIL_1: NORMAL
MDC_IDC_LEAD_MFG: NORMAL
MDC_IDC_LEAD_MFG: NORMAL
MDC_IDC_LEAD_MODEL: NORMAL
MDC_IDC_LEAD_MODEL: NORMAL
MDC_IDC_LEAD_POLARITY_TYPE: NORMAL
MDC_IDC_LEAD_POLARITY_TYPE: NORMAL
MDC_IDC_LEAD_SERIAL: NORMAL
MDC_IDC_LEAD_SERIAL: NORMAL
MDC_IDC_LEAD_SPECIAL_FUNCTION: NORMAL
MDC_IDC_LEAD_SPECIAL_FUNCTION: NORMAL
MDC_IDC_MSMT_BATTERY_DTM: NORMAL
MDC_IDC_MSMT_BATTERY_REMAINING_LONGEVITY: 101 MO
MDC_IDC_MSMT_BATTERY_RRT_TRIGGER: 2.62
MDC_IDC_MSMT_BATTERY_STATUS: NORMAL
MDC_IDC_MSMT_BATTERY_VOLTAGE: 2.99 V
MDC_IDC_MSMT_LEADCHNL_RA_IMPEDANCE_VALUE: 399 OHM
MDC_IDC_MSMT_LEADCHNL_RA_IMPEDANCE_VALUE: 494 OHM
MDC_IDC_MSMT_LEADCHNL_RA_PACING_THRESHOLD_AMPLITUDE: 0.5 V
MDC_IDC_MSMT_LEADCHNL_RA_PACING_THRESHOLD_PULSEWIDTH: 0.4 MS
MDC_IDC_MSMT_LEADCHNL_RA_SENSING_INTR_AMPL: 4.25 MV
MDC_IDC_MSMT_LEADCHNL_RV_IMPEDANCE_VALUE: 323 OHM
MDC_IDC_MSMT_LEADCHNL_RV_IMPEDANCE_VALUE: 399 OHM
MDC_IDC_MSMT_LEADCHNL_RV_PACING_THRESHOLD_AMPLITUDE: 0.62 V
MDC_IDC_MSMT_LEADCHNL_RV_PACING_THRESHOLD_PULSEWIDTH: 0.4 MS
MDC_IDC_MSMT_LEADCHNL_RV_SENSING_INTR_AMPL: 8.12 MV
MDC_IDC_PG_IMPLANT_DTM: NORMAL
MDC_IDC_PG_MFG: NORMAL
MDC_IDC_PG_MODEL: NORMAL
MDC_IDC_PG_SERIAL: NORMAL
MDC_IDC_PG_TYPE: NORMAL
MDC_IDC_SESS_CLINIC_NAME: NORMAL
MDC_IDC_SESS_DTM: NORMAL
MDC_IDC_SESS_TYPE: NORMAL
MDC_IDC_SET_BRADY_AT_MODE_SWITCH_RATE: 171 {BEATS}/MIN
MDC_IDC_SET_BRADY_HYSTRATE: NORMAL
MDC_IDC_SET_BRADY_LOWRATE: 60 {BEATS}/MIN
MDC_IDC_SET_BRADY_MAX_SENSOR_RATE: 130 {BEATS}/MIN
MDC_IDC_SET_BRADY_MAX_TRACKING_RATE: 130 {BEATS}/MIN
MDC_IDC_SET_BRADY_MODE: NORMAL
MDC_IDC_SET_BRADY_PAV_DELAY_LOW: 180 MS
MDC_IDC_SET_BRADY_SAV_DELAY_LOW: 150 MS
MDC_IDC_SET_LEADCHNL_RA_PACING_AMPLITUDE: 1.5 V
MDC_IDC_SET_LEADCHNL_RA_PACING_ANODE_ELECTRODE_1: NORMAL
MDC_IDC_SET_LEADCHNL_RA_PACING_ANODE_LOCATION_1: NORMAL
MDC_IDC_SET_LEADCHNL_RA_PACING_CAPTURE_MODE: NORMAL
MDC_IDC_SET_LEADCHNL_RA_PACING_CATHODE_ELECTRODE_1: NORMAL
MDC_IDC_SET_LEADCHNL_RA_PACING_CATHODE_LOCATION_1: NORMAL
MDC_IDC_SET_LEADCHNL_RA_PACING_POLARITY: NORMAL
MDC_IDC_SET_LEADCHNL_RA_PACING_PULSEWIDTH: 0.4 MS
MDC_IDC_SET_LEADCHNL_RA_SENSING_ANODE_ELECTRODE_1: NORMAL
MDC_IDC_SET_LEADCHNL_RA_SENSING_ANODE_LOCATION_1: NORMAL
MDC_IDC_SET_LEADCHNL_RA_SENSING_CATHODE_ELECTRODE_1: NORMAL
MDC_IDC_SET_LEADCHNL_RA_SENSING_CATHODE_LOCATION_1: NORMAL
MDC_IDC_SET_LEADCHNL_RA_SENSING_POLARITY: NORMAL
MDC_IDC_SET_LEADCHNL_RA_SENSING_SENSITIVITY: 0.3 MV
MDC_IDC_SET_LEADCHNL_RV_PACING_AMPLITUDE: 2 V
MDC_IDC_SET_LEADCHNL_RV_PACING_ANODE_ELECTRODE_1: NORMAL
MDC_IDC_SET_LEADCHNL_RV_PACING_ANODE_LOCATION_1: NORMAL
MDC_IDC_SET_LEADCHNL_RV_PACING_CAPTURE_MODE: NORMAL
MDC_IDC_SET_LEADCHNL_RV_PACING_CATHODE_ELECTRODE_1: NORMAL
MDC_IDC_SET_LEADCHNL_RV_PACING_CATHODE_LOCATION_1: NORMAL
MDC_IDC_SET_LEADCHNL_RV_PACING_POLARITY: NORMAL
MDC_IDC_SET_LEADCHNL_RV_PACING_PULSEWIDTH: 0.4 MS
MDC_IDC_SET_LEADCHNL_RV_SENSING_ANODE_ELECTRODE_1: NORMAL
MDC_IDC_SET_LEADCHNL_RV_SENSING_ANODE_LOCATION_1: NORMAL
MDC_IDC_SET_LEADCHNL_RV_SENSING_CATHODE_ELECTRODE_1: NORMAL
MDC_IDC_SET_LEADCHNL_RV_SENSING_CATHODE_LOCATION_1: NORMAL
MDC_IDC_SET_LEADCHNL_RV_SENSING_POLARITY: NORMAL
MDC_IDC_SET_LEADCHNL_RV_SENSING_SENSITIVITY: 0.9 MV
MDC_IDC_SET_ZONE_DETECTION_INTERVAL: 350 MS
MDC_IDC_SET_ZONE_DETECTION_INTERVAL: 400 MS
MDC_IDC_SET_ZONE_STATUS: NORMAL
MDC_IDC_SET_ZONE_STATUS: NORMAL
MDC_IDC_SET_ZONE_TYPE: NORMAL
MDC_IDC_SET_ZONE_VENDOR_TYPE: NORMAL
MDC_IDC_STAT_AT_BURDEN_PERCENT: 0 %
MDC_IDC_STAT_AT_DTM_END: NORMAL
MDC_IDC_STAT_AT_DTM_START: NORMAL
MDC_IDC_STAT_BRADY_AP_VP_PERCENT: 74.83 %
MDC_IDC_STAT_BRADY_AP_VS_PERCENT: 0 %
MDC_IDC_STAT_BRADY_AS_VP_PERCENT: 25.16 %
MDC_IDC_STAT_BRADY_AS_VS_PERCENT: 0.01 %
MDC_IDC_STAT_BRADY_DTM_END: NORMAL
MDC_IDC_STAT_BRADY_DTM_START: NORMAL
MDC_IDC_STAT_BRADY_RA_PERCENT_PACED: 74.82 %
MDC_IDC_STAT_BRADY_RV_PERCENT_PACED: 99.99 %
MDC_IDC_STAT_EPISODE_RECENT_COUNT: 0
MDC_IDC_STAT_EPISODE_RECENT_COUNT_DTM_END: NORMAL
MDC_IDC_STAT_EPISODE_RECENT_COUNT_DTM_START: NORMAL
MDC_IDC_STAT_EPISODE_TOTAL_COUNT: 0
MDC_IDC_STAT_EPISODE_TOTAL_COUNT: 4
MDC_IDC_STAT_EPISODE_TOTAL_COUNT_DTM_END: NORMAL
MDC_IDC_STAT_EPISODE_TOTAL_COUNT_DTM_START: NORMAL
MDC_IDC_STAT_EPISODE_TYPE: NORMAL
MDC_IDC_STAT_TACHYTHERAPY_RECENT_DTM_END: NORMAL
MDC_IDC_STAT_TACHYTHERAPY_RECENT_DTM_START: NORMAL
MDC_IDC_STAT_TACHYTHERAPY_TOTAL_DTM_END: NORMAL
MDC_IDC_STAT_TACHYTHERAPY_TOTAL_DTM_START: NORMAL

## 2025-07-29 ENCOUNTER — LAB (OUTPATIENT)
Dept: LAB | Facility: CLINIC | Age: 65
End: 2025-07-29
Payer: COMMERCIAL

## 2025-07-29 ENCOUNTER — ANTICOAGULATION THERAPY VISIT (OUTPATIENT)
Dept: ANTICOAGULATION | Facility: CLINIC | Age: 65
End: 2025-07-29

## 2025-07-29 DIAGNOSIS — Z95.2 HEART VALVE REPLACED: ICD-10-CM

## 2025-07-29 LAB — INR BLD: 2.4 (ref 0.9–1.1)

## 2025-07-29 PROCEDURE — 36416 COLLJ CAPILLARY BLOOD SPEC: CPT

## 2025-07-29 PROCEDURE — 85610 PROTHROMBIN TIME: CPT

## 2025-08-26 ENCOUNTER — LAB (OUTPATIENT)
Dept: LAB | Facility: CLINIC | Age: 65
End: 2025-08-26
Payer: COMMERCIAL

## 2025-08-26 ENCOUNTER — ANTICOAGULATION THERAPY VISIT (OUTPATIENT)
Dept: ANTICOAGULATION | Facility: CLINIC | Age: 65
End: 2025-08-26

## 2025-08-26 DIAGNOSIS — Z95.2 S/P AORTIC VALVE REPLACEMENT: ICD-10-CM

## 2025-08-26 DIAGNOSIS — Z79.01 LONG TERM CURRENT USE OF ANTICOAGULANTS WITH INR GOAL OF 2.0-3.0: ICD-10-CM

## 2025-08-26 DIAGNOSIS — Q87.40 MARFAN'S SYNDROME: ICD-10-CM

## 2025-08-26 DIAGNOSIS — Z95.2 HEART VALVE REPLACED: Primary | ICD-10-CM

## 2025-08-26 DIAGNOSIS — Z95.2 HEART VALVE REPLACED: ICD-10-CM

## 2025-08-26 LAB — INR BLD: 2.9 (ref 0.9–1.1)

## 2025-08-26 PROCEDURE — 36416 COLLJ CAPILLARY BLOOD SPEC: CPT

## 2025-08-26 PROCEDURE — 85610 PROTHROMBIN TIME: CPT

## 2025-09-02 ENCOUNTER — TELEPHONE (OUTPATIENT)
Dept: ANTICOAGULATION | Facility: OTHER | Age: 65
End: 2025-09-02

## 2025-09-02 DIAGNOSIS — Q87.40 MARFAN'S SYNDROME: ICD-10-CM

## 2025-09-02 DIAGNOSIS — Z95.2 S/P AORTIC VALVE REPLACEMENT: ICD-10-CM

## 2025-09-02 DIAGNOSIS — Z79.01 LONG TERM CURRENT USE OF ANTICOAGULANTS WITH INR GOAL OF 2.0-3.0: ICD-10-CM

## 2025-09-02 DIAGNOSIS — Z95.2 HEART VALVE REPLACED: Primary | ICD-10-CM

## 2025-09-04 PROBLEM — M54.50 CHRONIC LOW BACK PAIN WITHOUT SCIATICA, UNSPECIFIED BACK PAIN LATERALITY: Status: RESOLVED | Noted: 2025-06-11 | Resolved: 2025-09-04

## 2025-09-04 PROBLEM — G89.29 CHRONIC LOW BACK PAIN WITHOUT SCIATICA, UNSPECIFIED BACK PAIN LATERALITY: Status: RESOLVED | Noted: 2025-06-11 | Resolved: 2025-09-04

## (undated) DEVICE — KIT PREP BRIDGE 591-001

## (undated) DEVICE — ESU GROUND PAD ADULT REM W/15' CORD E7507DB

## (undated) DEVICE — CATH ANGIO ANG GLIDE 5FRX65CM CG507

## (undated) DEVICE — NDL 20GA 1"

## (undated) DEVICE — TUBING SMOKE EVAC ATTACHMENT E3590

## (undated) DEVICE — PREP CHLORAPREP 26ML TINTED ORANGE  260815

## (undated) DEVICE — INTRO SHEATH 4FRX10CM PINNACLE RSS402

## (undated) DEVICE — NDL 16GA 1.5" 305198

## (undated) DEVICE — KIT ROTATION TOOL  6056

## (undated) DEVICE — CATH ULTRASOUND 8.5FRX110CM ICE FLUID M00499120

## (undated) DEVICE — DRAPE IOBAN INCISE 23X17" 6650EZ

## (undated) DEVICE — SU VICRYL 2-0 SH 27" UND J417H

## (undated) DEVICE — CATH ULTRASOUND 9FR ULTRA ICE FLUID DOCK M00499151

## (undated) DEVICE — Device

## (undated) DEVICE — BLADE SAW OSCILLATING STRK 53X32X0.38MM 5400-134-282

## (undated) DEVICE — SYR 10ML SLIP TIP W/O NDL 303134

## (undated) DEVICE — ESU ELEC BLADE 2.75" COATED/INSULATED E1455

## (undated) DEVICE — LINEN TOWEL PACK X6 WHITE 5487

## (undated) DEVICE — SU VICRYL 3-0 SH 27" J316H

## (undated) DEVICE — SU ETHIBOND 0 CT-1 CR 8X18" CX21D

## (undated) DEVICE — DRSG STERI STRIP 1/4X4" R1546

## (undated) DEVICE — SHEATH KIT 14FR LASER SPECTRANETICS 500-302

## (undated) DEVICE — INTRODUCER SAFESHEATH II LONG 7FR 23CM SSL7

## (undated) DEVICE — DEVICE LLD EZ LEAD LOCKING STYLET SPECTRANETICS 518-062

## (undated) DEVICE — BLADE KNIFE SURG 11 371111

## (undated) DEVICE — KIT WRENCH 5873W

## (undated) DEVICE — SU MONOCRYL 4-0 PS-2 27" UND Y426H

## (undated) DEVICE — DEFIB PADPRO CONMED ADULT/CHILD 2001Z-C

## (undated) DEVICE — KIT MICRO-INTRODUCER STIFFEN 4FR 7274V

## (undated) DEVICE — SU VICRYL 2-0 CT-1 27" UND J259H

## (undated) DEVICE — CLIPPER LEAD WIRE EXTRACTION LR-CLP001 G20003

## (undated) DEVICE — DECANTER VIAL 2006S

## (undated) DEVICE — PROTECTOR ARM ONE-STEP TRENDELENBURG 40418

## (undated) DEVICE — COVER EQUIPMENT 36X40" BANDED ELAS OPN LF 01-3640

## (undated) DEVICE — PACK ADULT HEART UMMC PV15CG92D

## (undated) DEVICE — SPONGE RAY-TEC 4X8" 7318

## (undated) DEVICE — KIT ACCESSORY DEVICE LLD SPECTRANETICS 518-027

## (undated) DEVICE — BLADE SAW STERNAL 20X30MM KM-32

## (undated) DEVICE — ESU PENCIL SMOKE EVAC W/ROCKER SWITCH 0703-047-000

## (undated) DEVICE — GLIDEWIRE TERUMO .035X180CM 1.5,, J-TIP GR3525

## (undated) DEVICE — KIT STYLET EXT TAPER BALL TIP 58CM 6082-58CM

## (undated) DEVICE — SU SILK 0 TIE 6X30" A306H

## (undated) DEVICE — SUCTION MANIFOLD NEPTUNE 2 SYS 4 PORT 0702-020-000

## (undated) DEVICE — DRSG PRIMAPORE 03 1/8X6" 66000318

## (undated) DEVICE — LINEN TOWEL PACK X30 5481

## (undated) RX ORDER — FENTANYL CITRATE 50 UG/ML
INJECTION, SOLUTION INTRAMUSCULAR; INTRAVENOUS
Status: DISPENSED
Start: 2022-03-07

## (undated) RX ORDER — HYDROMORPHONE HCL IN WATER/PF 6 MG/30 ML
PATIENT CONTROLLED ANALGESIA SYRINGE INTRAVENOUS
Status: DISPENSED
Start: 2022-03-07

## (undated) RX ORDER — HEPARIN SODIUM 1000 [USP'U]/ML
INJECTION, SOLUTION INTRAVENOUS; SUBCUTANEOUS
Status: DISPENSED
Start: 2018-06-27

## (undated) RX ORDER — ONDANSETRON 2 MG/ML
INJECTION INTRAMUSCULAR; INTRAVENOUS
Status: DISPENSED
Start: 2022-03-07

## (undated) RX ORDER — NITROGLYCERIN 5 MG/ML
VIAL (ML) INTRAVENOUS
Status: DISPENSED
Start: 2018-06-27

## (undated) RX ORDER — LIDOCAINE HYDROCHLORIDE 10 MG/ML
INJECTION, SOLUTION EPIDURAL; INFILTRATION; INTRACAUDAL; PERINEURAL
Status: DISPENSED
Start: 2018-11-05

## (undated) RX ORDER — PROPOFOL 10 MG/ML
INJECTION, EMULSION INTRAVENOUS
Status: DISPENSED
Start: 2022-03-07

## (undated) RX ORDER — GLYCOPYRROLATE 0.2 MG/ML
INJECTION, SOLUTION INTRAMUSCULAR; INTRAVENOUS
Status: DISPENSED
Start: 2022-03-07

## (undated) RX ORDER — ACETAMINOPHEN 325 MG/1
TABLET ORAL
Status: DISPENSED
Start: 2022-03-07

## (undated) RX ORDER — METOPROLOL TARTRATE 1 MG/ML
INJECTION, SOLUTION INTRAVENOUS
Status: DISPENSED
Start: 2018-06-25

## (undated) RX ORDER — CEFAZOLIN SODIUM 1 G/3ML
INJECTION, POWDER, FOR SOLUTION INTRAMUSCULAR; INTRAVENOUS
Status: DISPENSED
Start: 2022-03-07

## (undated) RX ORDER — FENTANYL CITRATE 50 UG/ML
INJECTION, SOLUTION INTRAMUSCULAR; INTRAVENOUS
Status: DISPENSED
Start: 2018-06-27

## (undated) RX ORDER — ALBUTEROL SULFATE 90 UG/1
AEROSOL, METERED RESPIRATORY (INHALATION)
Status: DISPENSED
Start: 2022-03-07

## (undated) RX ORDER — ADENOSINE 3 MG/ML
INJECTION, SOLUTION INTRAVENOUS
Status: DISPENSED
Start: 2018-06-27

## (undated) RX ORDER — NITROGLYCERIN 0.4 MG/1
TABLET SUBLINGUAL
Status: DISPENSED
Start: 2018-06-25

## (undated) RX ORDER — HEPARIN SODIUM 1000 [USP'U]/ML
INJECTION, SOLUTION INTRAVENOUS; SUBCUTANEOUS
Status: DISPENSED
Start: 2022-03-07

## (undated) RX ORDER — HYDROMORPHONE HYDROCHLORIDE 1 MG/ML
INJECTION, SOLUTION INTRAMUSCULAR; INTRAVENOUS; SUBCUTANEOUS
Status: DISPENSED
Start: 2022-03-07

## (undated) RX ORDER — ROCURONIUM BROMIDE 50 MG/5 ML
SYRINGE (ML) INTRAVENOUS
Status: DISPENSED
Start: 2022-03-07

## (undated) RX ORDER — ESMOLOL HYDROCHLORIDE 10 MG/ML
INJECTION INTRAVENOUS
Status: DISPENSED
Start: 2022-03-07

## (undated) RX ORDER — LIDOCAINE HYDROCHLORIDE 20 MG/ML
INJECTION, SOLUTION EPIDURAL; INFILTRATION; INTRACAUDAL; PERINEURAL
Status: DISPENSED
Start: 2022-03-07

## (undated) RX ORDER — CEFAZOLIN SODIUM/WATER 2 G/20 ML
SYRINGE (ML) INTRAVENOUS
Status: DISPENSED
Start: 2022-03-07

## (undated) RX ORDER — VERAPAMIL HYDROCHLORIDE 2.5 MG/ML
INJECTION, SOLUTION INTRAVENOUS
Status: DISPENSED
Start: 2018-06-27

## (undated) RX ORDER — LIDOCAINE HYDROCHLORIDE 10 MG/ML
INJECTION, SOLUTION EPIDURAL; INFILTRATION; INTRACAUDAL; PERINEURAL
Status: DISPENSED
Start: 2022-03-07

## (undated) RX ORDER — IOPAMIDOL 755 MG/ML
INJECTION, SOLUTION INTRAVASCULAR
Status: DISPENSED
Start: 2022-03-07

## (undated) RX ORDER — LIDOCAINE HYDROCHLORIDE 10 MG/ML
INJECTION, SOLUTION EPIDURAL; INFILTRATION; INTRACAUDAL; PERINEURAL
Status: DISPENSED
Start: 2018-06-27